# Patient Record
Sex: MALE | Race: WHITE | Employment: UNEMPLOYED | URBAN - METROPOLITAN AREA
[De-identification: names, ages, dates, MRNs, and addresses within clinical notes are randomized per-mention and may not be internally consistent; named-entity substitution may affect disease eponyms.]

---

## 2017-08-31 ENCOUNTER — OFFICE VISIT (OUTPATIENT)
Dept: FAMILY MEDICINE CLINIC | Age: 57
End: 2017-08-31

## 2017-08-31 VITALS
DIASTOLIC BLOOD PRESSURE: 82 MMHG | HEART RATE: 65 BPM | BODY MASS INDEX: 32.91 KG/M2 | RESPIRATION RATE: 18 BRPM | SYSTOLIC BLOOD PRESSURE: 126 MMHG | WEIGHT: 243 LBS | HEIGHT: 72 IN | OXYGEN SATURATION: 97 %

## 2017-08-31 DIAGNOSIS — Z23 NEED FOR INFLUENZA VACCINATION: ICD-10-CM

## 2017-08-31 DIAGNOSIS — Z80.42 FH: PROSTATE CANCER: ICD-10-CM

## 2017-08-31 DIAGNOSIS — F20.3 UNDIFFERENTIATED SCHIZOPHRENIA (HCC): Primary | ICD-10-CM

## 2017-08-31 DIAGNOSIS — E78.00 PURE HYPERCHOLESTEROLEMIA: ICD-10-CM

## 2017-08-31 DIAGNOSIS — I10 ESSENTIAL HYPERTENSION, BENIGN: ICD-10-CM

## 2017-08-31 DIAGNOSIS — R60.0 PEDAL EDEMA: ICD-10-CM

## 2017-08-31 LAB
A/G RATIO: 1.9 (ref 1.1–2.2)
ALBUMIN SERPL-MCNC: 4.3 G/DL (ref 3.4–5)
ALP BLD-CCNC: 68 U/L (ref 40–129)
ALT SERPL-CCNC: 34 U/L (ref 10–40)
ANION GAP SERPL CALCULATED.3IONS-SCNC: 16 MMOL/L (ref 3–16)
AST SERPL-CCNC: 39 U/L (ref 15–37)
BILIRUB SERPL-MCNC: 0.3 MG/DL (ref 0–1)
BUN BLDV-MCNC: 16 MG/DL (ref 7–20)
CALCIUM SERPL-MCNC: 9.3 MG/DL (ref 8.3–10.6)
CHLORIDE BLD-SCNC: 106 MMOL/L (ref 99–110)
CHOLESTEROL, TOTAL: 148 MG/DL (ref 0–199)
CO2: 22 MMOL/L (ref 21–32)
CREAT SERPL-MCNC: 0.8 MG/DL (ref 0.9–1.3)
GFR AFRICAN AMERICAN: >60
GFR NON-AFRICAN AMERICAN: >60
GLOBULIN: 2.3 G/DL
GLUCOSE BLD-MCNC: 100 MG/DL (ref 70–99)
HDLC SERPL-MCNC: 50 MG/DL (ref 40–60)
LDL CHOLESTEROL CALCULATED: 76 MG/DL
POTASSIUM SERPL-SCNC: 4.2 MMOL/L (ref 3.5–5.1)
PROSTATE SPECIFIC ANTIGEN: 0.57 NG/ML (ref 0–4)
SODIUM BLD-SCNC: 144 MMOL/L (ref 136–145)
TOTAL PROTEIN: 6.6 G/DL (ref 6.4–8.2)
TRIGL SERPL-MCNC: 112 MG/DL (ref 0–150)
VLDLC SERPL CALC-MCNC: 22 MG/DL

## 2017-08-31 PROCEDURE — 90471 IMMUNIZATION ADMIN: CPT | Performed by: FAMILY MEDICINE

## 2017-08-31 PROCEDURE — 99203 OFFICE O/P NEW LOW 30 MIN: CPT | Performed by: FAMILY MEDICINE

## 2017-08-31 PROCEDURE — 90686 IIV4 VACC NO PRSV 0.5 ML IM: CPT | Performed by: FAMILY MEDICINE

## 2017-08-31 RX ORDER — AMLODIPINE BESYLATE 5 MG/1
5 TABLET ORAL DAILY
COMMUNITY
End: 2017-08-31 | Stop reason: SDUPTHER

## 2017-08-31 RX ORDER — HALOPERIDOL 5 MG
5 TABLET ORAL 4 TIMES DAILY
COMMUNITY
End: 2017-09-14

## 2017-08-31 RX ORDER — AMLODIPINE BESYLATE 5 MG/1
5 TABLET ORAL DAILY
Qty: 30 TABLET | Refills: 5 | Status: SHIPPED | OUTPATIENT
Start: 2017-08-31 | End: 2017-09-14

## 2017-08-31 RX ORDER — SIMVASTATIN 20 MG
20 TABLET ORAL NIGHTLY
COMMUNITY
End: 2017-08-31 | Stop reason: SDUPTHER

## 2017-08-31 RX ORDER — BENZTROPINE MESYLATE 2 MG/1
2 TABLET ORAL 2 TIMES DAILY
COMMUNITY
End: 2017-09-14

## 2017-08-31 RX ORDER — TRIAMTERENE AND HYDROCHLOROTHIAZIDE 37.5; 25 MG/1; MG/1
1 CAPSULE ORAL DAILY PRN
Qty: 30 CAPSULE | Refills: 2 | Status: SHIPPED | OUTPATIENT
Start: 2017-08-31 | End: 2017-09-14

## 2017-08-31 RX ORDER — SIMVASTATIN 20 MG
20 TABLET ORAL NIGHTLY
Qty: 30 TABLET | Refills: 5 | Status: SHIPPED | OUTPATIENT
Start: 2017-08-31 | End: 2018-02-07

## 2017-08-31 RX ORDER — HYDROXYZINE 50 MG/1
50 TABLET, FILM COATED ORAL 3 TIMES DAILY PRN
COMMUNITY
End: 2017-09-14

## 2017-08-31 RX ORDER — DOXEPIN HYDROCHLORIDE 10 MG/1
10 CAPSULE ORAL NIGHTLY
COMMUNITY
End: 2017-09-14

## 2017-08-31 RX ORDER — BUSPIRONE HYDROCHLORIDE 10 MG/1
10 TABLET ORAL 2 TIMES DAILY
Qty: 60 TABLET | Refills: 5 | Status: ON HOLD | OUTPATIENT
Start: 2017-08-31 | End: 2017-10-04 | Stop reason: HOSPADM

## 2017-08-31 RX ORDER — ESCITALOPRAM OXALATE 20 MG/1
20 TABLET ORAL DAILY
COMMUNITY
End: 2017-09-14

## 2017-08-31 RX ORDER — TRAZODONE HYDROCHLORIDE 50 MG/1
50 TABLET ORAL NIGHTLY
COMMUNITY
End: 2017-09-14

## 2017-08-31 RX ORDER — BUSPIRONE HYDROCHLORIDE 10 MG/1
10 TABLET ORAL 2 TIMES DAILY
COMMUNITY
End: 2017-08-31 | Stop reason: SDUPTHER

## 2017-08-31 RX ORDER — CLONAZEPAM 1 MG/1
1 TABLET ORAL 2 TIMES DAILY PRN
COMMUNITY
End: 2017-09-14

## 2017-08-31 RX ORDER — FLUPHENAZINE HYDROCHLORIDE 10 MG/1
10 TABLET ORAL DAILY
COMMUNITY
End: 2017-09-14

## 2017-08-31 RX ORDER — DOCUSATE SODIUM 100 MG/1
100 CAPSULE, LIQUID FILLED ORAL 2 TIMES DAILY
COMMUNITY
End: 2017-09-14

## 2017-09-01 ENCOUNTER — TELEPHONE (OUTPATIENT)
Dept: FAMILY MEDICINE CLINIC | Age: 57
End: 2017-09-01

## 2017-09-01 PROBLEM — R74.8 ELEVATED LIVER ENZYMES: Status: ACTIVE | Noted: 2017-09-01

## 2017-09-01 NOTE — TELEPHONE ENCOUNTER
Someone claiming to be Hakeem's sister trying to access the [employer] information but the ss# was missing, she gave me #  which came up as Jakub Kern ss#. We need Dev ss#.  AIDEE

## 2017-09-14 ENCOUNTER — OFFICE VISIT (OUTPATIENT)
Dept: FAMILY MEDICINE CLINIC | Age: 57
End: 2017-09-14

## 2017-09-14 VITALS
OXYGEN SATURATION: 96 % | RESPIRATION RATE: 20 BRPM | BODY MASS INDEX: 32.25 KG/M2 | WEIGHT: 237.8 LBS | HEART RATE: 85 BPM | SYSTOLIC BLOOD PRESSURE: 112 MMHG | DIASTOLIC BLOOD PRESSURE: 78 MMHG | TEMPERATURE: 98.7 F

## 2017-09-14 DIAGNOSIS — F20.3 UNDIFFERENTIATED SCHIZOPHRENIA (HCC): ICD-10-CM

## 2017-09-14 DIAGNOSIS — R60.0 PEDAL EDEMA: ICD-10-CM

## 2017-09-14 DIAGNOSIS — I10 ESSENTIAL HYPERTENSION, BENIGN: Primary | ICD-10-CM

## 2017-09-14 DIAGNOSIS — E78.00 PURE HYPERCHOLESTEROLEMIA: ICD-10-CM

## 2017-09-14 PROCEDURE — 99214 OFFICE O/P EST MOD 30 MIN: CPT | Performed by: FAMILY MEDICINE

## 2017-09-14 RX ORDER — TRIAMTERENE AND HYDROCHLOROTHIAZIDE 37.5; 25 MG/1; MG/1
.5-1 TABLET ORAL DAILY PRN
Qty: 30 TABLET | Refills: 3 | Status: SHIPPED | OUTPATIENT
Start: 2017-09-14 | End: 2018-02-01

## 2017-10-02 ENCOUNTER — TELEPHONE (OUTPATIENT)
Dept: FAMILY MEDICINE CLINIC | Age: 57
End: 2017-10-02

## 2017-10-02 NOTE — TELEPHONE ENCOUNTER
COPIED FROM PT'S MOTHERS MIYA:    Gonzalo Valero  My son Antonieta Murrieta has his first psychiatrist appointment on October 5th at 3:30 PM with Dr. Ellen Quintana has not slept for two days and is in constant conversation with himself and extreme mood swings. I have increased the Buspirone to 1 1/2 pills twice a day but not helping.  Is there something I can give him to calm him so I can get him to [facility] in [place] without calling the ambulance for they will bring the police. [facility] is the pharmacy we use [phone].  P would like him to make it until Thursday but I don't think I can handle it without giving him something to relax. I can be reached at [phone] is my cell or my daughter in law 358Geovanna Abbasi or my son Tobin Pollack [phone].  [address] 9/17/2017  ER report is in pt chart.  Please see

## 2017-10-03 NOTE — TELEPHONE ENCOUNTER
I spoke with Musa Rivera. Unable to reach [place]. There is nothing that is needed presently. They are trying to get him to take medication, difficult at times. Musa Rivera said that they will keep you posted through [employer] of Hakeem's progress. FYI.

## 2017-10-04 RX ORDER — HYDROXYZINE HYDROCHLORIDE 25 MG/1
25 TABLET, FILM COATED ORAL EVERY 6 HOURS PRN
Qty: 20 TABLET | Refills: 2 | Status: SHIPPED | OUTPATIENT
Start: 2017-10-04 | End: 2017-10-14

## 2017-10-10 ENCOUNTER — TELEPHONE (OUTPATIENT)
Dept: FAMILY MEDICINE CLINIC | Age: 57
End: 2017-10-10

## 2017-10-10 DIAGNOSIS — F20.3 UNDIFFERENTIATED SCHIZOPHRENIA (HCC): Primary | ICD-10-CM

## 2018-02-01 ENCOUNTER — TELEPHONE (OUTPATIENT)
Dept: FAMILY MEDICINE CLINIC | Age: 58
End: 2018-02-01

## 2018-02-01 DIAGNOSIS — R60.0 PEDAL EDEMA: Primary | ICD-10-CM

## 2018-02-01 RX ORDER — FUROSEMIDE 20 MG/1
20 TABLET ORAL DAILY PRN
Qty: 30 TABLET | Refills: 2 | Status: SHIPPED | OUTPATIENT
Start: 2018-02-01 | End: 2018-03-14

## 2018-02-01 NOTE — TELEPHONE ENCOUNTER
Pt calling states he stopped the triam/ hctz yesterday because he thinks it was making him SOB for months and he saw on the Internet that this was a side effect. . Denies any CP. Pt states today he feels 100%better. Pt took his mother's lasix 20mg today and it helped and he felt fine on that and is asking if he can get an rx for this? Pt also states he stopped his simvistatin because it was making him feel bloated.  appt [facility]'ed for wed.2-7-18

## 2018-02-07 ENCOUNTER — OFFICE VISIT (OUTPATIENT)
Dept: FAMILY MEDICINE CLINIC | Age: 58
End: 2018-02-07

## 2018-02-07 VITALS
WEIGHT: 254.2 LBS | HEART RATE: 77 BPM | BODY MASS INDEX: 34.48 KG/M2 | RESPIRATION RATE: 16 BRPM | DIASTOLIC BLOOD PRESSURE: 66 MMHG | TEMPERATURE: 98 F | SYSTOLIC BLOOD PRESSURE: 110 MMHG | OXYGEN SATURATION: 98 %

## 2018-02-07 DIAGNOSIS — E78.00 PURE HYPERCHOLESTEROLEMIA: ICD-10-CM

## 2018-02-07 DIAGNOSIS — I10 ESSENTIAL HYPERTENSION, BENIGN: Primary | ICD-10-CM

## 2018-02-07 DIAGNOSIS — R60.0 PEDAL EDEMA: ICD-10-CM

## 2018-02-07 DIAGNOSIS — F20.3 UNDIFFERENTIATED SCHIZOPHRENIA (HCC): ICD-10-CM

## 2018-02-07 PROCEDURE — G8417 CALC BMI ABV UP PARAM F/U: HCPCS | Performed by: FAMILY MEDICINE

## 2018-02-07 PROCEDURE — 99214 OFFICE O/P EST MOD 30 MIN: CPT | Performed by: FAMILY MEDICINE

## 2018-02-07 PROCEDURE — 3017F COLORECTAL CA SCREEN DOC REV: CPT | Performed by: FAMILY MEDICINE

## 2018-02-07 PROCEDURE — G8427 DOCREV CUR MEDS BY ELIG CLIN: HCPCS | Performed by: FAMILY MEDICINE

## 2018-02-07 PROCEDURE — 1036F TOBACCO NON-USER: CPT | Performed by: FAMILY MEDICINE

## 2018-02-07 PROCEDURE — G8484 FLU IMMUNIZE NO ADMIN: HCPCS | Performed by: FAMILY MEDICINE

## 2018-02-07 RX ORDER — TRIAMTERENE AND HYDROCHLOROTHIAZIDE 37.5; 25 MG/1; MG/1
1 TABLET ORAL DAILY
Qty: 90 TABLET | Refills: 1 | Status: SHIPPED | OUTPATIENT
Start: 2018-02-07 | End: 2018-09-12 | Stop reason: ALTCHOICE

## 2018-02-07 RX ORDER — HYDROXYZINE HYDROCHLORIDE 25 MG/1
TABLET, FILM COATED ORAL
COMMUNITY
Start: 2017-12-07 | End: 2018-09-12 | Stop reason: SINTOL

## 2018-02-07 RX ORDER — ARIPIPRAZOLE 5 MG/1
TABLET ORAL
COMMUNITY
Start: 2017-12-19 | End: 2018-09-12

## 2018-02-07 RX ORDER — TRIAMTERENE AND HYDROCHLOROTHIAZIDE 37.5; 25 MG/1; MG/1
1 TABLET ORAL DAILY
COMMUNITY
End: 2018-02-07 | Stop reason: SDUPTHER

## 2018-02-07 RX ORDER — ALPRAZOLAM 0.5 MG/1
TABLET ORAL
COMMUNITY
Start: 2017-12-27 | End: 2018-09-12

## 2018-02-07 RX ORDER — EZETIMIBE 10 MG/1
10 TABLET ORAL DAILY
Qty: 90 TABLET | Refills: 1 | Status: SHIPPED | OUTPATIENT
Start: 2018-02-07 | End: 2018-09-12 | Stop reason: SINTOL

## 2018-02-07 NOTE — PROGRESS NOTES
triamterene-hydrochlorothiazide (MAXZIDE-25) 37.5-25 MG per tablet Take 1 tablet by mouth daily            Patient Active Problem List   Diagnosis    Undifferentiated schizophrenia ([facility]    Essential hypertension, benign    Pure hypercholesterolemia    Elevated liver enzymes    Pedal edema    Non compliance w medication regimen     PMH, PSH, SH, Problem list reviewed. Social History   Substance Use Topics    Smoking status: Former Smoker    Smokeless tobacco: Never Used    Alcohol use No      Allergies   Allergen Reactions    Klonopin [Clonazepam]         Review of Systems    Objective:   Physical Exam  .NAD    Skin is warm and dry. No rash. Well hydrated  Alert and oriented x 3. Speech is intense, focused on risk of medication. No agitation or psychomotor retardation. No hallucinations. Not suicidal.   The neck is supple and free of adenopathy or masses, the thyroid is normal without enlargement or nodules. Chest: clear with no wheezes or rales. No retractions, or use of accessory muscles noted. Cardiovascular: PMI is not displaced, and no thrill noted. Regular rate and rhythm with no rub, murmur or gallop.  trace peripheral edema. No carotid bruits. The abdomen is soft without tenderness, guarding, mass, rebound or organomegaly. Aorta, femoral, DP and PT pulses intact. Assessment:      1. Essential hypertension, benign  triamterene-hydrochlorothiazide (MAXZIDE-25) 37.5-25 MG per tablet  Well controlled   2. Pure hypercholesterolemia  ezetimibe (ZETIA) 10 MG tablet   3. Pedal edema  Continue triam/HCTZ . Add support hose. 4. Undifferentiated schizophrenia ([facility]  Continue with Dr. Lamon Brunner:      Side effects of current medications reviewed and questions answered. He wants to keep the appt with me in March.

## 2018-03-14 ENCOUNTER — OFFICE VISIT (OUTPATIENT)
Dept: FAMILY MEDICINE CLINIC | Age: 58
End: 2018-03-14

## 2018-03-14 VITALS
HEART RATE: 75 BPM | SYSTOLIC BLOOD PRESSURE: 114 MMHG | RESPIRATION RATE: 20 BRPM | TEMPERATURE: 99 F | WEIGHT: 252.8 LBS | DIASTOLIC BLOOD PRESSURE: 82 MMHG | OXYGEN SATURATION: 95 % | BODY MASS INDEX: 34.29 KG/M2

## 2018-03-14 DIAGNOSIS — Z91.14 NON COMPLIANCE W MEDICATION REGIMEN: ICD-10-CM

## 2018-03-14 DIAGNOSIS — Z80.42 FAMILY HISTORY OF PROSTATE CANCER: ICD-10-CM

## 2018-03-14 DIAGNOSIS — I10 ESSENTIAL HYPERTENSION, BENIGN: Primary | ICD-10-CM

## 2018-03-14 DIAGNOSIS — E78.00 PURE HYPERCHOLESTEROLEMIA: ICD-10-CM

## 2018-03-14 DIAGNOSIS — Z23 NEED FOR SHINGLES VACCINE: ICD-10-CM

## 2018-03-14 DIAGNOSIS — F20.3 UNDIFFERENTIATED SCHIZOPHRENIA (HCC): ICD-10-CM

## 2018-03-14 DIAGNOSIS — F42.4 SKIN PICKING HABIT: ICD-10-CM

## 2018-03-14 PROCEDURE — 1036F TOBACCO NON-USER: CPT | Performed by: FAMILY MEDICINE

## 2018-03-14 PROCEDURE — G8417 CALC BMI ABV UP PARAM F/U: HCPCS | Performed by: FAMILY MEDICINE

## 2018-03-14 PROCEDURE — 3017F COLORECTAL CA SCREEN DOC REV: CPT | Performed by: FAMILY MEDICINE

## 2018-03-14 PROCEDURE — G8427 DOCREV CUR MEDS BY ELIG CLIN: HCPCS | Performed by: FAMILY MEDICINE

## 2018-03-14 PROCEDURE — 99214 OFFICE O/P EST MOD 30 MIN: CPT | Performed by: FAMILY MEDICINE

## 2018-03-14 PROCEDURE — G8482 FLU IMMUNIZE ORDER/ADMIN: HCPCS | Performed by: FAMILY MEDICINE

## 2018-03-14 RX ORDER — DIAZEPAM 5 MG/1
TABLET ORAL
COMMUNITY
Start: 2018-02-13 | End: 2018-09-12 | Stop reason: ALTCHOICE

## 2018-03-19 DIAGNOSIS — E78.00 PURE HYPERCHOLESTEROLEMIA: ICD-10-CM

## 2018-03-19 DIAGNOSIS — Z80.42 FAMILY HISTORY OF PROSTATE CANCER: ICD-10-CM

## 2018-03-19 LAB
A/G RATIO: 2 (ref 1.1–2.2)
ALBUMIN SERPL-MCNC: 4.3 G/DL (ref 3.4–5)
ALP BLD-CCNC: 63 U/L (ref 40–129)
ALT SERPL-CCNC: 32 U/L (ref 10–40)
ANION GAP SERPL CALCULATED.3IONS-SCNC: 15 MMOL/L (ref 3–16)
AST SERPL-CCNC: 28 U/L (ref 15–37)
BILIRUB SERPL-MCNC: 0.3 MG/DL (ref 0–1)
BUN BLDV-MCNC: 13 MG/DL (ref 7–20)
CALCIUM SERPL-MCNC: 9 MG/DL (ref 8.3–10.6)
CHLORIDE BLD-SCNC: 103 MMOL/L (ref 99–110)
CHOLESTEROL, TOTAL: 215 MG/DL (ref 0–199)
CO2: 24 MMOL/L (ref 21–32)
CREAT SERPL-MCNC: 1 MG/DL (ref 0.9–1.3)
GFR AFRICAN AMERICAN: >60
GFR NON-AFRICAN AMERICAN: >60
GLOBULIN: 2.2 G/DL
GLUCOSE BLD-MCNC: 106 MG/DL (ref 70–99)
HDLC SERPL-MCNC: 46 MG/DL (ref 40–60)
LDL CHOLESTEROL CALCULATED: 127 MG/DL
POTASSIUM SERPL-SCNC: 4.4 MMOL/L (ref 3.5–5.1)
PROSTATE SPECIFIC ANTIGEN: 1 NG/ML (ref 0–4)
SODIUM BLD-SCNC: 142 MMOL/L (ref 136–145)
TOTAL PROTEIN: 6.5 G/DL (ref 6.4–8.2)
TRIGL SERPL-MCNC: 212 MG/DL (ref 0–150)
TSH SERPL DL<=0.05 MIU/L-ACNC: 1.74 UIU/ML (ref 0.27–4.2)
VLDLC SERPL CALC-MCNC: 42 MG/DL

## 2018-05-02 ENCOUNTER — OFFICE VISIT (OUTPATIENT)
Dept: FAMILY MEDICINE CLINIC | Age: 58
End: 2018-05-02

## 2018-05-02 VITALS
WEIGHT: 255.8 LBS | HEIGHT: 73 IN | BODY MASS INDEX: 33.9 KG/M2 | HEART RATE: 76 BPM | DIASTOLIC BLOOD PRESSURE: 78 MMHG | RESPIRATION RATE: 20 BRPM | OXYGEN SATURATION: 95 % | SYSTOLIC BLOOD PRESSURE: 122 MMHG | TEMPERATURE: 98.2 F

## 2018-05-02 DIAGNOSIS — I10 ESSENTIAL HYPERTENSION, BENIGN: ICD-10-CM

## 2018-05-02 DIAGNOSIS — E78.00 PURE HYPERCHOLESTEROLEMIA: Primary | ICD-10-CM

## 2018-05-02 DIAGNOSIS — F20.3 UNDIFFERENTIATED SCHIZOPHRENIA (HCC): ICD-10-CM

## 2018-05-02 DIAGNOSIS — Z11.59 ENCOUNTER FOR HEPATITIS C SCREENING TEST FOR LOW RISK PATIENT: ICD-10-CM

## 2018-05-02 PROCEDURE — G8417 CALC BMI ABV UP PARAM F/U: HCPCS | Performed by: FAMILY MEDICINE

## 2018-05-02 PROCEDURE — 99214 OFFICE O/P EST MOD 30 MIN: CPT | Performed by: FAMILY MEDICINE

## 2018-05-02 PROCEDURE — G8427 DOCREV CUR MEDS BY ELIG CLIN: HCPCS | Performed by: FAMILY MEDICINE

## 2018-05-02 PROCEDURE — 1036F TOBACCO NON-USER: CPT | Performed by: FAMILY MEDICINE

## 2018-05-02 PROCEDURE — 3017F COLORECTAL CA SCREEN DOC REV: CPT | Performed by: FAMILY MEDICINE

## 2018-06-01 DIAGNOSIS — E78.00 PURE HYPERCHOLESTEROLEMIA: ICD-10-CM

## 2018-06-01 DIAGNOSIS — Z11.59 ENCOUNTER FOR HEPATITIS C SCREENING TEST FOR LOW RISK PATIENT: ICD-10-CM

## 2018-06-01 LAB
CHOLESTEROL, TOTAL: 255 MG/DL (ref 0–199)
HDLC SERPL-MCNC: 50 MG/DL (ref 40–60)
LDL CHOLESTEROL CALCULATED: 170 MG/DL
TRIGL SERPL-MCNC: 177 MG/DL (ref 0–150)
VLDLC SERPL CALC-MCNC: 35 MG/DL

## 2018-06-02 LAB — HEPATITIS C ANTIBODY INTERPRETATION: NORMAL

## 2018-06-06 ENCOUNTER — TELEPHONE (OUTPATIENT)
Dept: FAMILY MEDICINE CLINIC | Age: 58
End: 2018-06-06

## 2018-06-06 DIAGNOSIS — E78.00 PURE HYPERCHOLESTEROLEMIA: ICD-10-CM

## 2018-06-06 DIAGNOSIS — E78.00 ELEVATED CHOLESTEROL: Primary | ICD-10-CM

## 2018-06-08 RX ORDER — LOVASTATIN 10 MG/1
10 TABLET ORAL NIGHTLY
Qty: 30 TABLET | Refills: 2 | Status: SHIPPED | OUTPATIENT
Start: 2018-06-08 | End: 2018-09-12 | Stop reason: SINTOL

## 2018-07-13 ENCOUNTER — OFFICE VISIT (OUTPATIENT)
Dept: FAMILY MEDICINE CLINIC | Age: 58
End: 2018-07-13

## 2018-07-13 VITALS
SYSTOLIC BLOOD PRESSURE: 120 MMHG | OXYGEN SATURATION: 94 % | DIASTOLIC BLOOD PRESSURE: 80 MMHG | RESPIRATION RATE: 22 BRPM | HEART RATE: 85 BPM | HEIGHT: 73 IN | WEIGHT: 260 LBS | BODY MASS INDEX: 34.46 KG/M2

## 2018-07-13 DIAGNOSIS — R74.8 ELEVATED LIVER ENZYMES: ICD-10-CM

## 2018-07-13 DIAGNOSIS — E78.00 PURE HYPERCHOLESTEROLEMIA: ICD-10-CM

## 2018-07-13 DIAGNOSIS — F20.3 UNDIFFERENTIATED SCHIZOPHRENIA (HCC): ICD-10-CM

## 2018-07-13 DIAGNOSIS — I10 ESSENTIAL HYPERTENSION, BENIGN: Primary | ICD-10-CM

## 2018-07-13 PROCEDURE — G8417 CALC BMI ABV UP PARAM F/U: HCPCS | Performed by: FAMILY MEDICINE

## 2018-07-13 PROCEDURE — 3017F COLORECTAL CA SCREEN DOC REV: CPT | Performed by: FAMILY MEDICINE

## 2018-07-13 PROCEDURE — G8427 DOCREV CUR MEDS BY ELIG CLIN: HCPCS | Performed by: FAMILY MEDICINE

## 2018-07-13 PROCEDURE — 99214 OFFICE O/P EST MOD 30 MIN: CPT | Performed by: FAMILY MEDICINE

## 2018-07-13 PROCEDURE — 1036F TOBACCO NON-USER: CPT | Performed by: FAMILY MEDICINE

## 2018-07-13 RX ORDER — MAGNESIUM OXIDE 400 MG/1
400 TABLET ORAL DAILY
COMMUNITY
End: 2019-01-28

## 2018-07-13 RX ORDER — MULTIVIT WITH MINERALS/LUTEIN
250 TABLET ORAL DAILY
COMMUNITY
End: 2019-12-06

## 2018-07-13 RX ORDER — VITAMIN E 268 MG
400 CAPSULE ORAL DAILY
COMMUNITY
End: 2019-06-05

## 2018-07-13 RX ORDER — VITAMIN B COMPLEX
1 CAPSULE ORAL DAILY
COMMUNITY
End: 2019-06-05

## 2018-07-13 RX ORDER — ROSUVASTATIN CALCIUM 5 MG/1
5 TABLET, COATED ORAL NIGHTLY
Qty: 30 TABLET | Refills: 3 | Status: SHIPPED | OUTPATIENT
Start: 2018-07-13 | End: 2018-09-12 | Stop reason: SINTOL

## 2018-07-13 ASSESSMENT — PATIENT HEALTH QUESTIONNAIRE - PHQ9
1. LITTLE INTEREST OR PLEASURE IN DOING THINGS: 0
2. FEELING DOWN, DEPRESSED OR HOPELESS: 0
SUM OF ALL RESPONSES TO PHQ9 QUESTIONS 1 & 2: 0
SUM OF ALL RESPONSES TO PHQ QUESTIONS 1-9: 0

## 2018-09-11 PROBLEM — R74.8 ELEVATED LIVER ENZYMES: Status: RESOLVED | Noted: 2017-09-01 | Resolved: 2018-09-11

## 2018-09-12 ENCOUNTER — OFFICE VISIT (OUTPATIENT)
Dept: FAMILY MEDICINE CLINIC | Age: 58
End: 2018-09-12

## 2018-09-12 VITALS
SYSTOLIC BLOOD PRESSURE: 118 MMHG | OXYGEN SATURATION: 95 % | DIASTOLIC BLOOD PRESSURE: 88 MMHG | BODY MASS INDEX: 34.33 KG/M2 | HEART RATE: 82 BPM | WEIGHT: 259 LBS | HEIGHT: 73 IN

## 2018-09-12 DIAGNOSIS — E78.00 PURE HYPERCHOLESTEROLEMIA: ICD-10-CM

## 2018-09-12 DIAGNOSIS — Z23 NEED FOR INFLUENZA VACCINATION: ICD-10-CM

## 2018-09-12 DIAGNOSIS — F20.3 UNDIFFERENTIATED SCHIZOPHRENIA (HCC): ICD-10-CM

## 2018-09-12 DIAGNOSIS — I10 ESSENTIAL HYPERTENSION, BENIGN: Primary | ICD-10-CM

## 2018-09-12 PROCEDURE — 90471 IMMUNIZATION ADMIN: CPT | Performed by: FAMILY MEDICINE

## 2018-09-12 PROCEDURE — G8427 DOCREV CUR MEDS BY ELIG CLIN: HCPCS | Performed by: FAMILY MEDICINE

## 2018-09-12 PROCEDURE — G8417 CALC BMI ABV UP PARAM F/U: HCPCS | Performed by: FAMILY MEDICINE

## 2018-09-12 PROCEDURE — 90686 IIV4 VACC NO PRSV 0.5 ML IM: CPT | Performed by: FAMILY MEDICINE

## 2018-09-12 PROCEDURE — 99214 OFFICE O/P EST MOD 30 MIN: CPT | Performed by: FAMILY MEDICINE

## 2018-09-12 PROCEDURE — 1036F TOBACCO NON-USER: CPT | Performed by: FAMILY MEDICINE

## 2018-09-12 PROCEDURE — 3017F COLORECTAL CA SCREEN DOC REV: CPT | Performed by: FAMILY MEDICINE

## 2018-09-12 RX ORDER — ARIPIPRAZOLE 5 MG/1
5 TABLET ORAL DAILY
Qty: 30 TABLET | Refills: 5 | Status: SHIPPED | OUTPATIENT
Start: 2018-09-12 | End: 2019-03-22 | Stop reason: SINTOL

## 2018-09-12 RX ORDER — DIPHENHYDRAMINE HCL 25 MG
100 CAPSULE ORAL EVERY 6 HOURS PRN
COMMUNITY
End: 2019-05-02 | Stop reason: SDUPTHER

## 2018-09-12 RX ORDER — CHLORAL HYDRATE 500 MG
3000 CAPSULE ORAL DAILY
COMMUNITY
End: 2019-01-28

## 2018-09-12 NOTE — PROGRESS NOTES
Vaccine Information Sheet, \"Influenza - Inactivated\"  given to Urszula Berrios, or parent/legal guardian of  Urszula Berrios and verbalized understanding. Patient responses:    Have you ever had a reaction to a flu vaccine? No  Are you able to eat eggs without adverse effects? Yes  Do you have any current illness? No  Have you ever had Guillian [place] Syndrome? No    Flu vaccine given per order. Please see immunization tab.

## 2018-09-25 ENCOUNTER — TELEPHONE (OUTPATIENT)
Dept: FAMILY MEDICINE CLINIC | Age: 58
End: 2018-09-25

## 2018-09-25 DIAGNOSIS — E78.00 PURE HYPERCHOLESTEROLEMIA: ICD-10-CM

## 2018-09-25 RX ORDER — ROSUVASTATIN CALCIUM 5 MG/1
5 TABLET, COATED ORAL NIGHTLY
Qty: 30 TABLET | Refills: 2 | Status: SHIPPED | OUTPATIENT
Start: 2018-09-25 | End: 2019-01-28

## 2019-01-28 ENCOUNTER — OFFICE VISIT (OUTPATIENT)
Dept: FAMILY MEDICINE CLINIC | Age: 59
End: 2019-01-28
Payer: COMMERCIAL

## 2019-01-28 VITALS
HEART RATE: 87 BPM | SYSTOLIC BLOOD PRESSURE: 124 MMHG | OXYGEN SATURATION: 94 % | TEMPERATURE: 97.3 F | RESPIRATION RATE: 12 BRPM | BODY MASS INDEX: 34.2 KG/M2 | DIASTOLIC BLOOD PRESSURE: 80 MMHG | WEIGHT: 259.2 LBS

## 2019-01-28 DIAGNOSIS — F20.3 UNDIFFERENTIATED SCHIZOPHRENIA (HCC): ICD-10-CM

## 2019-01-28 DIAGNOSIS — I10 ESSENTIAL HYPERTENSION, BENIGN: ICD-10-CM

## 2019-01-28 DIAGNOSIS — E78.00 PURE HYPERCHOLESTEROLEMIA: Primary | ICD-10-CM

## 2019-01-28 PROCEDURE — 3017F COLORECTAL CA SCREEN DOC REV: CPT | Performed by: FAMILY MEDICINE

## 2019-01-28 PROCEDURE — G8417 CALC BMI ABV UP PARAM F/U: HCPCS | Performed by: FAMILY MEDICINE

## 2019-01-28 PROCEDURE — 99214 OFFICE O/P EST MOD 30 MIN: CPT | Performed by: FAMILY MEDICINE

## 2019-01-28 PROCEDURE — G8482 FLU IMMUNIZE ORDER/ADMIN: HCPCS | Performed by: FAMILY MEDICINE

## 2019-01-28 PROCEDURE — 1036F TOBACCO NON-USER: CPT | Performed by: FAMILY MEDICINE

## 2019-01-28 PROCEDURE — G8427 DOCREV CUR MEDS BY ELIG CLIN: HCPCS | Performed by: FAMILY MEDICINE

## 2019-02-27 DIAGNOSIS — E78.00 PURE HYPERCHOLESTEROLEMIA: ICD-10-CM

## 2019-02-27 LAB
A/G RATIO: 2.2 (ref 1.1–2.2)
ALBUMIN SERPL-MCNC: 4.3 G/DL (ref 3.4–5)
ALP BLD-CCNC: 83 U/L (ref 40–129)
ALT SERPL-CCNC: 27 U/L (ref 10–40)
ANION GAP SERPL CALCULATED.3IONS-SCNC: 12 MMOL/L (ref 3–16)
AST SERPL-CCNC: 22 U/L (ref 15–37)
BILIRUB SERPL-MCNC: 0.4 MG/DL (ref 0–1)
BUN BLDV-MCNC: 13 MG/DL (ref 7–20)
CALCIUM SERPL-MCNC: 9.4 MG/DL (ref 8.3–10.6)
CHLORIDE BLD-SCNC: 107 MMOL/L (ref 99–110)
CHOLESTEROL, TOTAL: 193 MG/DL (ref 0–199)
CO2: 24 MMOL/L (ref 21–32)
CREAT SERPL-MCNC: 1 MG/DL (ref 0.8–1.3)
GFR AFRICAN AMERICAN: >60
GFR NON-AFRICAN AMERICAN: >60
GLOBULIN: 2 G/DL
GLUCOSE BLD-MCNC: 92 MG/DL (ref 70–99)
HDLC SERPL-MCNC: 46 MG/DL (ref 40–60)
LDL CHOLESTEROL CALCULATED: 111 MG/DL
POTASSIUM SERPL-SCNC: 4.2 MMOL/L (ref 3.5–5.1)
SODIUM BLD-SCNC: 143 MMOL/L (ref 136–145)
TOTAL PROTEIN: 6.3 G/DL (ref 6.4–8.2)
TRIGL SERPL-MCNC: 181 MG/DL (ref 0–150)
TSH REFLEX: 2.13 UIU/ML (ref 0.27–4.2)
VLDLC SERPL CALC-MCNC: 36 MG/DL

## 2019-03-06 ENCOUNTER — OFFICE VISIT (OUTPATIENT)
Dept: FAMILY MEDICINE CLINIC | Age: 59
End: 2019-03-06
Payer: COMMERCIAL

## 2019-03-06 VITALS
BODY MASS INDEX: 34.12 KG/M2 | HEART RATE: 75 BPM | OXYGEN SATURATION: 97 % | RESPIRATION RATE: 12 BRPM | SYSTOLIC BLOOD PRESSURE: 122 MMHG | DIASTOLIC BLOOD PRESSURE: 78 MMHG | WEIGHT: 258.6 LBS

## 2019-03-06 DIAGNOSIS — I10 ESSENTIAL HYPERTENSION, BENIGN: Primary | ICD-10-CM

## 2019-03-06 DIAGNOSIS — J40 BRONCHITIS: ICD-10-CM

## 2019-03-06 DIAGNOSIS — F20.3 UNDIFFERENTIATED SCHIZOPHRENIA (HCC): ICD-10-CM

## 2019-03-06 DIAGNOSIS — E78.00 PURE HYPERCHOLESTEROLEMIA: ICD-10-CM

## 2019-03-06 PROCEDURE — G8417 CALC BMI ABV UP PARAM F/U: HCPCS | Performed by: FAMILY MEDICINE

## 2019-03-06 PROCEDURE — 1036F TOBACCO NON-USER: CPT | Performed by: FAMILY MEDICINE

## 2019-03-06 PROCEDURE — 3017F COLORECTAL CA SCREEN DOC REV: CPT | Performed by: FAMILY MEDICINE

## 2019-03-06 PROCEDURE — G8427 DOCREV CUR MEDS BY ELIG CLIN: HCPCS | Performed by: FAMILY MEDICINE

## 2019-03-06 PROCEDURE — G8482 FLU IMMUNIZE ORDER/ADMIN: HCPCS | Performed by: FAMILY MEDICINE

## 2019-03-06 PROCEDURE — 99214 OFFICE O/P EST MOD 30 MIN: CPT | Performed by: FAMILY MEDICINE

## 2019-03-06 RX ORDER — DOXYCYCLINE HYCLATE 100 MG/1
100 CAPSULE ORAL 2 TIMES DAILY
COMMUNITY
End: 2019-03-22 | Stop reason: ALTCHOICE

## 2019-03-06 RX ORDER — AZITHROMYCIN 250 MG/1
TABLET, FILM COATED ORAL
Qty: 1 PACKET | Refills: 0 | Status: SHIPPED | OUTPATIENT
Start: 2019-03-06 | End: 2019-03-16

## 2019-03-22 ENCOUNTER — OFFICE VISIT (OUTPATIENT)
Dept: FAMILY MEDICINE CLINIC | Age: 59
End: 2019-03-22
Payer: COMMERCIAL

## 2019-03-22 VITALS
OXYGEN SATURATION: 97 % | HEIGHT: 73 IN | HEART RATE: 97 BPM | TEMPERATURE: 98.3 F | DIASTOLIC BLOOD PRESSURE: 82 MMHG | RESPIRATION RATE: 20 BRPM | SYSTOLIC BLOOD PRESSURE: 128 MMHG | BODY MASS INDEX: 35.04 KG/M2 | WEIGHT: 264.4 LBS

## 2019-03-22 DIAGNOSIS — R09.81 NASAL CONGESTION: Primary | ICD-10-CM

## 2019-03-22 DIAGNOSIS — R09.89 GLOBUS SENSATION: ICD-10-CM

## 2019-03-22 PROCEDURE — 3017F COLORECTAL CA SCREEN DOC REV: CPT | Performed by: FAMILY MEDICINE

## 2019-03-22 PROCEDURE — G8417 CALC BMI ABV UP PARAM F/U: HCPCS | Performed by: FAMILY MEDICINE

## 2019-03-22 PROCEDURE — 1036F TOBACCO NON-USER: CPT | Performed by: FAMILY MEDICINE

## 2019-03-22 PROCEDURE — G8482 FLU IMMUNIZE ORDER/ADMIN: HCPCS | Performed by: FAMILY MEDICINE

## 2019-03-22 PROCEDURE — G8427 DOCREV CUR MEDS BY ELIG CLIN: HCPCS | Performed by: FAMILY MEDICINE

## 2019-03-22 PROCEDURE — 99213 OFFICE O/P EST LOW 20 MIN: CPT | Performed by: FAMILY MEDICINE

## 2019-05-02 ENCOUNTER — TELEPHONE (OUTPATIENT)
Dept: FAMILY MEDICINE CLINIC | Age: 59
End: 2019-05-02

## 2019-05-02 RX ORDER — DIPHENHYDRAMINE HCL 25 MG
50 CAPSULE ORAL EVERY 6 HOURS PRN
Qty: 60 CAPSULE | Refills: 2 | Status: SHIPPED | OUTPATIENT
Start: 2019-05-02 | End: 2019-07-10 | Stop reason: SDUPTHER

## 2019-05-02 NOTE — TELEPHONE ENCOUNTER
Patient requesting refill for his tardive dyskinesia. Please advise. Thanks.     diphenhydrAMINE (BENADRYL) 25 MG capsule [[id number]]     Order Details   Dose: 100 mg Route: Oral Frequency: EVERY 6 HOURS PRN for Itching   Dispense Quantity: -- Refills: -- Fills remaining: --           Sig: Take 100 mg by mouth every 6 hours as needed for Itching          Written Date: -- Expiration Date: -- Ordering Date: 09/12/18    Start Date: -- End Date: --     Ordering Provider:  -- Authorizing Provider:  Perry Mueller MD Ordering User:  Adam Espinoza MA             Pharmacy:  [address] [phone] - F [phone]      Pharmacy Comments:  --          Fill quantity remaining:  --

## 2019-06-04 NOTE — PROGRESS NOTES
Subjective:      Patient ID: Lorrie Arshad 62 y.o. male. The primary encounter diagnosis was Essential hypertension, benign. Diagnoses of Pure hypercholesterolemia and Family history of prostate cancer were also pertinent to this visit. DIAZ Verduzco continues to get Ability from his mom without his knowledge per her agreement with psychiatry. He is not longer seeing psychiatry. His mom reports good results with current medication. If she stops giving him Ativan he becomes irrational, angry and she is unable to manage his emotions. She also gives him Valium 5 mg if he is very agitated, started by psych as well. (DR. Griselda Osorio). 30 tabs of Valium have lasted her a year. He is very helpful at home, pays the bills, does the taxes. No delusions, hallucinations. Sleeps well. He continues to complain of tardive dyskinesia - abnormal mouth movements. He took Cogentin in the past; it did not help. Benadryl helps minimally. He sees a psychologist weekly, Dr. Celia Jasmine. He is going to the gym 3 times a week. He complains of a globus sensation. No dyspnea, cough wheeze. He wonders if this is the statin. Hypertension: Patient here for follow-up of elevated blood pressure. He is exercising and is adherent to low salt diet. Blood pressure is not testing at home. Cardiac symptoms none. Patient denies chest pain, chest pressure/discomfort, exertional chest pressure/discomfort, lower extremity edema, near-syncope, orthopnea, palpitations and paroxysmal nocturnal dyspnea. Cardiovascular risk factors: advanced age (older than 54 for men, 72 for women), dyslipidemia and obesity (BMI >= 30 kg/m2). Use of agents associated with hypertension: none. History of target organ damage: none. Hyperlipidemia:  No new myalgias or GI upset on Livalo. Medication compliance: compliant most of the time. Patient is  following a low fat, low cholesterol diet. He is  exercising regularly.      Lab Results Component Value Date    CHOL 193 02/27/2019    TRIG 181 (H) 02/27/2019    HDL 46 02/27/2019    LDLCALC 111 (H) 02/27/2019     Lab Results   Component Value Date    ALT 27 02/27/2019    AST 22 02/27/2019        Lab Results   Component Value Date     02/27/2019    K 4.2 02/27/2019     02/27/2019    CO2 24 02/27/2019    BUN 13 02/27/2019    CREATININE 1.0 02/27/2019    GLUCOSE 92 02/27/2019    CALCIUM 9.4 02/27/2019      Lab Results   Component Value Date    PSA 1.00 03/19/2018    PSA 0.57 08/31/2017          Outpatient Medications Marked as Taking for the 6/5/19 encounter (Office Visit) with Ag Wren MD   Medication Sig Dispense Refill           diphenhydrAMINE (BENADRYL) 25 MG capsule Take 2 capsules by mouth every 6 hours as needed for Itching 60 capsule 2    pitavastatin (LIVALO) 1 MG TABS tablet Take 1 tablet by mouth nightly 30 tablet 5    calcium-cholecalciferol (CALCIUM 500+D) 500-200 MG-UNIT per tablet Take 1 tablet by mouth daily      Ascorbic Acid (VITAMIN C) 250 MG tablet Take 250 mg by mouth daily          Allergies   Allergen Reactions    Clozaril [Clozapine]     Crestor [Rosuvastatin]     Invega [Paliperidone Er] Other (See Comments)    Klonopin [Clonazepam]     Lipitor [Atorvastatin]      Increased urination    Mevacor [Lovastatin]     Simvastatin        Patient Active Problem List   Diagnosis    Undifferentiated schizophrenia ([facility]    Essential hypertension, benign    Pure hypercholesterolemia    Pedal edema    Non compliance w medication regimen    Family history of prostate cancer       Past Medical History:   Diagnosis Date    Elevated liver enzymes 9/1/2017    Hypertension     Pure hypercholesterolemia 8/31/2017       Past Surgical History:   Procedure Laterality Date    NOSE SURGERY      deviated septum        Family History   Problem Relation Age of Onset    Hypertension Mother     Heart Failure Mother 80    Prostate Cancer Father 52       Social of current medications reviewed and questions answered. Follow up in 4 weeks or prn.

## 2019-06-05 ENCOUNTER — OFFICE VISIT (OUTPATIENT)
Dept: FAMILY MEDICINE CLINIC | Age: 59
End: 2019-06-05
Payer: COMMERCIAL

## 2019-06-05 VITALS
DIASTOLIC BLOOD PRESSURE: 84 MMHG | BODY MASS INDEX: 33.91 KG/M2 | OXYGEN SATURATION: 96 % | RESPIRATION RATE: 16 BRPM | SYSTOLIC BLOOD PRESSURE: 124 MMHG | HEART RATE: 72 BPM | WEIGHT: 257 LBS

## 2019-06-05 DIAGNOSIS — Z80.42 FAMILY HISTORY OF PROSTATE CANCER: ICD-10-CM

## 2019-06-05 DIAGNOSIS — G24.01 TARDIVE DYSKINESIA: ICD-10-CM

## 2019-06-05 DIAGNOSIS — F20.3 UNDIFFERENTIATED SCHIZOPHRENIA (HCC): ICD-10-CM

## 2019-06-05 DIAGNOSIS — E78.00 PURE HYPERCHOLESTEROLEMIA: ICD-10-CM

## 2019-06-05 DIAGNOSIS — I10 ESSENTIAL HYPERTENSION, BENIGN: ICD-10-CM

## 2019-06-05 DIAGNOSIS — I10 ESSENTIAL HYPERTENSION, BENIGN: Primary | ICD-10-CM

## 2019-06-05 LAB
A/G RATIO: 2 (ref 1.1–2.2)
ALBUMIN SERPL-MCNC: 4.5 G/DL (ref 3.4–5)
ALP BLD-CCNC: 99 U/L (ref 40–129)
ALT SERPL-CCNC: 28 U/L (ref 10–40)
ANION GAP SERPL CALCULATED.3IONS-SCNC: 15 MMOL/L (ref 3–16)
AST SERPL-CCNC: 19 U/L (ref 15–37)
BILIRUB SERPL-MCNC: 0.3 MG/DL (ref 0–1)
BUN BLDV-MCNC: 13 MG/DL (ref 7–20)
CALCIUM SERPL-MCNC: 10.3 MG/DL (ref 8.3–10.6)
CHLORIDE BLD-SCNC: 105 MMOL/L (ref 99–110)
CHOLESTEROL, TOTAL: 209 MG/DL (ref 0–199)
CO2: 23 MMOL/L (ref 21–32)
CREAT SERPL-MCNC: 1 MG/DL (ref 0.8–1.3)
GFR AFRICAN AMERICAN: >60
GFR NON-AFRICAN AMERICAN: >60
GLOBULIN: 2.3 G/DL
GLUCOSE BLD-MCNC: 93 MG/DL (ref 70–99)
HDLC SERPL-MCNC: 45 MG/DL (ref 40–60)
LDL CHOLESTEROL CALCULATED: 116 MG/DL
POTASSIUM SERPL-SCNC: 4.5 MMOL/L (ref 3.5–5.1)
PROSTATE SPECIFIC ANTIGEN: 1.25 NG/ML (ref 0–4)
SODIUM BLD-SCNC: 143 MMOL/L (ref 136–145)
TOTAL PROTEIN: 6.8 G/DL (ref 6.4–8.2)
TRIGL SERPL-MCNC: 239 MG/DL (ref 0–150)
TSH REFLEX: 1.52 UIU/ML (ref 0.27–4.2)
VLDLC SERPL CALC-MCNC: 48 MG/DL

## 2019-06-05 PROCEDURE — 1036F TOBACCO NON-USER: CPT | Performed by: FAMILY MEDICINE

## 2019-06-05 PROCEDURE — 3017F COLORECTAL CA SCREEN DOC REV: CPT | Performed by: FAMILY MEDICINE

## 2019-06-05 PROCEDURE — G8427 DOCREV CUR MEDS BY ELIG CLIN: HCPCS | Performed by: FAMILY MEDICINE

## 2019-06-05 PROCEDURE — G8417 CALC BMI ABV UP PARAM F/U: HCPCS | Performed by: FAMILY MEDICINE

## 2019-06-05 PROCEDURE — 99214 OFFICE O/P EST MOD 30 MIN: CPT | Performed by: FAMILY MEDICINE

## 2019-06-05 RX ORDER — ARIPIPRAZOLE 5 MG/1
5 TABLET ORAL DAILY
Qty: 30 TABLET | Refills: 5 | Status: SHIPPED | OUTPATIENT
Start: 2019-06-05 | End: 2019-09-04 | Stop reason: SDUPTHER

## 2019-06-05 RX ORDER — DIAZEPAM 5 MG/1
5 TABLET ORAL EVERY 12 HOURS PRN
Qty: 30 TABLET | Refills: 0 | Status: SHIPPED | OUTPATIENT
Start: 2019-06-05 | End: 2019-07-10

## 2019-06-05 ASSESSMENT — PATIENT HEALTH QUESTIONNAIRE - PHQ9
2. FEELING DOWN, DEPRESSED OR HOPELESS: 0
SUM OF ALL RESPONSES TO PHQ QUESTIONS 1-9: 0
1. LITTLE INTEREST OR PLEASURE IN DOING THINGS: 0
SUM OF ALL RESPONSES TO PHQ9 QUESTIONS 1 & 2: 0
SUM OF ALL RESPONSES TO PHQ QUESTIONS 1-9: 0

## 2019-06-11 ENCOUNTER — TELEPHONE (OUTPATIENT)
Dept: PAIN MANAGEMENT | Age: 59
End: 2019-06-11

## 2019-06-11 NOTE — TELEPHONE ENCOUNTER
Submitted PA for Ingrezza 40MG capsules, Key: [id number] - PA Case ID: [id number] - Rx #: [id number]  Via CMM STATUS: PENDING

## 2019-06-12 NOTE — TELEPHONE ENCOUNTER
Received DENIAL for Ingrezza 40MG capsules. Denial letter attached. Please advise patient. Thank you.

## 2019-07-07 NOTE — PROGRESS NOTES
Outpatient Medications Marked as Taking for the 7/10/19 encounter (Office Visit) with Divya Calderon MD   Medication Sig Dispense Refill    Omega-3 Fatty Acids (FISH OIL) 1000 MG CAPS Take 1,000 mg by mouth daily      B Complex Vitamins (VITAMIN B COMPLEX PO) Take 1 capsule by mouth daily      diphenhydrAMINE (BENADRYL) 25 MG capsule Take 2 capsules by mouth every 6 hours as needed for Itching 120 capsule 2    pitavastatin (LIVALO) 1 MG TABS tablet Take 1 tablets by mouth nightly 45 tablet 1    calcium-cholecalciferol (CALCIUM 500+D) 500-200 MG-UNIT per tablet Take 1 tablet by mouth daily      Ascorbic Acid (VITAMIN C) 250 MG tablet Take 250 mg by mouth daily          Allergies   Allergen Reactions    Clozaril [Clozapine]     Crestor [Rosuvastatin]     Invega [Paliperidone Er] Other (See Comments)    Klonopin [Clonazepam]     Lipitor [Atorvastatin]      Increased urination    Mevacor [Lovastatin]     Simvastatin        Patient Active Problem List   Diagnosis    Undifferentiated schizophrenia ([facility]    Essential hypertension, benign    Pure hypercholesterolemia    Pedal edema    Non compliance w medication regimen    Family history of prostate cancer       Past Medical History:   Diagnosis Date    Elevated liver enzymes 2017    Hypertension     Pure hypercholesterolemia 2017       Past Surgical History:   Procedure Laterality Date    NOSE SURGERY      deviated septum        Family History   Problem Relation Age of Onset    Hypertension Mother     Heart Failure Mother 80    Prostate Cancer Father 52       Social History     Tobacco Use    Smoking status: Former Smoker     Packs/day: 0.10     Years: 4.00     Pack years: 0.40     Types: Cigarettes     Last attempt to quit: 1980     Years since quittin.5    Smokeless tobacco: Never Used    Tobacco comment: 1 pk per year - social smoker, didn't smoke much at all    Substance Use Topics    Alcohol use: No    Drug

## 2019-07-10 ENCOUNTER — OFFICE VISIT (OUTPATIENT)
Dept: FAMILY MEDICINE CLINIC | Age: 59
End: 2019-07-10
Payer: COMMERCIAL

## 2019-07-10 VITALS
DIASTOLIC BLOOD PRESSURE: 68 MMHG | SYSTOLIC BLOOD PRESSURE: 122 MMHG | HEIGHT: 73 IN | TEMPERATURE: 98.8 F | WEIGHT: 257 LBS | RESPIRATION RATE: 16 BRPM | HEART RATE: 86 BPM | OXYGEN SATURATION: 95 % | BODY MASS INDEX: 34.06 KG/M2

## 2019-07-10 DIAGNOSIS — G24.01 TARDIVE DYSTONIA: ICD-10-CM

## 2019-07-10 DIAGNOSIS — E78.00 PURE HYPERCHOLESTEROLEMIA: ICD-10-CM

## 2019-07-10 DIAGNOSIS — I10 ESSENTIAL HYPERTENSION, BENIGN: Primary | ICD-10-CM

## 2019-07-10 PROCEDURE — G8417 CALC BMI ABV UP PARAM F/U: HCPCS | Performed by: FAMILY MEDICINE

## 2019-07-10 PROCEDURE — G8427 DOCREV CUR MEDS BY ELIG CLIN: HCPCS | Performed by: FAMILY MEDICINE

## 2019-07-10 PROCEDURE — 99214 OFFICE O/P EST MOD 30 MIN: CPT | Performed by: FAMILY MEDICINE

## 2019-07-10 PROCEDURE — 3017F COLORECTAL CA SCREEN DOC REV: CPT | Performed by: FAMILY MEDICINE

## 2019-07-10 PROCEDURE — 1036F TOBACCO NON-USER: CPT | Performed by: FAMILY MEDICINE

## 2019-07-10 RX ORDER — CHLORAL HYDRATE 500 MG
1000 CAPSULE ORAL DAILY
Status: ON HOLD | COMMUNITY
End: 2021-10-27 | Stop reason: HOSPADM

## 2019-07-10 RX ORDER — DIPHENHYDRAMINE HCL 25 MG
50 CAPSULE ORAL EVERY 6 HOURS PRN
Qty: 120 CAPSULE | Refills: 2 | Status: SHIPPED | OUTPATIENT
Start: 2019-07-10 | End: 2020-07-29

## 2019-07-30 DIAGNOSIS — E78.00 PURE HYPERCHOLESTEROLEMIA: ICD-10-CM

## 2019-07-30 RX ORDER — PITAVASTATIN CALCIUM 1.04 MG/1
TABLET, FILM COATED ORAL
Qty: 30 TABLET | Refills: 2 | Status: SHIPPED | OUTPATIENT
Start: 2019-07-30 | End: 2019-11-01 | Stop reason: SDUPTHER

## 2019-09-04 ENCOUNTER — NURSE ONLY (OUTPATIENT)
Dept: FAMILY MEDICINE CLINIC | Age: 59
End: 2019-09-04
Payer: COMMERCIAL

## 2019-09-04 DIAGNOSIS — Z23 NEED FOR INFLUENZA VACCINATION: Primary | ICD-10-CM

## 2019-09-04 DIAGNOSIS — F20.3 UNDIFFERENTIATED SCHIZOPHRENIA (HCC): ICD-10-CM

## 2019-09-04 PROCEDURE — 90471 IMMUNIZATION ADMIN: CPT | Performed by: FAMILY MEDICINE

## 2019-09-04 PROCEDURE — 90686 IIV4 VACC NO PRSV 0.5 ML IM: CPT | Performed by: FAMILY MEDICINE

## 2019-09-04 RX ORDER — ARIPIPRAZOLE 15 MG/1
7.5 TABLET ORAL DAILY
Qty: 45 TABLET | Refills: 1 | Status: SHIPPED | OUTPATIENT
Start: 2019-09-04 | End: 2019-12-06

## 2019-09-04 NOTE — PROGRESS NOTES
Vaccine Information Sheet, \"Influenza - Inactivated\"  given to Meka León, or parent/legal guardian of  Meka León and verbalized understanding. Patient responses:    Have you ever had a reaction to a flu vaccine? No  Are you able to eat eggs without adverse effects? Yes  Do you have any current illness? No  Have you ever had Guillian [place] Syndrome? No    Flu vaccine given per order. Please see immunization tab. Current Influenza vaccine VIS given to patient. Influenza consent form/questionnaire completed and signed. Patient responses to  Influenza consent form / questionnaire  reviewed. Vaccine given per protocol.

## 2019-12-06 ENCOUNTER — OFFICE VISIT (OUTPATIENT)
Dept: FAMILY MEDICINE CLINIC | Age: 59
End: 2019-12-06
Payer: COMMERCIAL

## 2019-12-06 VITALS
DIASTOLIC BLOOD PRESSURE: 78 MMHG | SYSTOLIC BLOOD PRESSURE: 122 MMHG | WEIGHT: 252 LBS | OXYGEN SATURATION: 97 % | TEMPERATURE: 98.2 F | HEIGHT: 73 IN | RESPIRATION RATE: 14 BRPM | HEART RATE: 78 BPM | BODY MASS INDEX: 33.4 KG/M2

## 2019-12-06 DIAGNOSIS — E78.00 PURE HYPERCHOLESTEROLEMIA: ICD-10-CM

## 2019-12-06 DIAGNOSIS — J06.9 VIRAL URI: Primary | ICD-10-CM

## 2019-12-06 DIAGNOSIS — F20.3 UNDIFFERENTIATED SCHIZOPHRENIA (HCC): ICD-10-CM

## 2019-12-06 PROCEDURE — G8427 DOCREV CUR MEDS BY ELIG CLIN: HCPCS | Performed by: FAMILY MEDICINE

## 2019-12-06 PROCEDURE — 99214 OFFICE O/P EST MOD 30 MIN: CPT | Performed by: FAMILY MEDICINE

## 2019-12-06 PROCEDURE — G8482 FLU IMMUNIZE ORDER/ADMIN: HCPCS | Performed by: FAMILY MEDICINE

## 2019-12-06 PROCEDURE — G8417 CALC BMI ABV UP PARAM F/U: HCPCS | Performed by: FAMILY MEDICINE

## 2019-12-06 PROCEDURE — 3017F COLORECTAL CA SCREEN DOC REV: CPT | Performed by: FAMILY MEDICINE

## 2019-12-06 PROCEDURE — 1036F TOBACCO NON-USER: CPT | Performed by: FAMILY MEDICINE

## 2019-12-06 RX ORDER — ASCORBIC ACID 1000 MG
TABLET ORAL
Status: ON HOLD | COMMUNITY
End: 2021-10-27 | Stop reason: HOSPADM

## 2019-12-10 ENCOUNTER — PATIENT MESSAGE (OUTPATIENT)
Dept: FAMILY MEDICINE CLINIC | Age: 59
End: 2019-12-10

## 2019-12-10 RX ORDER — CEFUROXIME AXETIL 500 MG/1
500 TABLET ORAL 2 TIMES DAILY
Qty: 20 TABLET | Refills: 0 | Status: SHIPPED | OUTPATIENT
Start: 2019-12-10 | End: 2019-12-20

## 2020-01-21 ENCOUNTER — PATIENT MESSAGE (OUTPATIENT)
Dept: FAMILY MEDICINE CLINIC | Age: 60
End: 2020-01-21

## 2020-01-21 NOTE — TELEPHONE ENCOUNTER
I have been having problems with TD since Christmas and had to start Benadryl. Could it be time to see a Neurologist and start that medication for tar dive dyskinesia? Could you set me up with one? With a Neurologist the doctor has to make the appoint to see one.  Fredy Goes Sugar    Please advise

## 2020-02-18 ENCOUNTER — PATIENT MESSAGE (OUTPATIENT)
Dept: FAMILY MEDICINE CLINIC | Age: 60
End: 2020-02-18

## 2020-02-19 NOTE — TELEPHONE ENCOUNTER
From: Quynh Boucher  To: Nohemi Sinha MD  Sent: 2/18/2020 5:09 PM EST  Subject: Prescription Question    I got a letter from [facility] saying that I need to get approval for [facility] and that you got the same letter. I am doing very good on livalo and do not want to change as this statin is the last option. I am starting a new bottle of livalo tomorrow and i have one refill left on this prescription. Also the tardive has been very bad since christmas and i have been taking 200 mg of benadryl in the mornings. Do you think that this is safe. i didn't like prescription benadryl.  I am on the waiting list at [facility] to see a neurologist. Torito meyer

## 2020-03-02 RX ORDER — ARIPIPRAZOLE 5 MG/1
5 TABLET ORAL DAILY
Qty: 30 TABLET | Refills: 5 | Status: SHIPPED | OUTPATIENT
Start: 2020-03-02 | End: 2020-03-02

## 2020-06-14 ENCOUNTER — TELEPHONE (OUTPATIENT)
Dept: FAMILY MEDICINE CLINIC | Age: 60
End: 2020-06-14

## 2020-06-14 NOTE — TELEPHONE ENCOUNTER
Went to [facility] last night--apparently has tardive dyskinesia from antipsychotic use--wanted to try a new med for this(valbenazine) -told them can try but not sure insurance will cover

## 2020-07-29 ENCOUNTER — HOSPITAL ENCOUNTER (INPATIENT)
Age: 60
LOS: 7 days | Discharge: HOME | DRG: 750 | End: 2020-08-05
Attending: EMERGENCY MEDICINE | Admitting: PSYCHIATRY & NEUROLOGY
Payer: COMMERCIAL

## 2020-07-29 PROBLEM — F20.9 SCHIZOPHRENIA (HCC): Status: ACTIVE | Noted: 2020-07-29

## 2020-07-29 LAB
A/G RATIO: 1.6 (ref 1.1–2.2)
ACETAMINOPHEN LEVEL: <5 UG/ML (ref 10–30)
ALBUMIN SERPL-MCNC: 4.7 G/DL (ref 3.4–5)
ALP BLD-CCNC: 73 U/L (ref 40–129)
ALT SERPL-CCNC: 35 U/L (ref 10–40)
AMPHETAMINE SCREEN, URINE: NORMAL
ANION GAP SERPL CALCULATED.3IONS-SCNC: 12 MMOL/L (ref 3–16)
AST SERPL-CCNC: 40 U/L (ref 15–37)
BARBITURATE SCREEN URINE: NORMAL
BENZODIAZEPINE SCREEN, URINE: NORMAL
BILIRUB SERPL-MCNC: 0.3 MG/DL (ref 0–1)
BUN BLDV-MCNC: 16 MG/DL (ref 7–20)
CALCIUM SERPL-MCNC: 10 MG/DL (ref 8.3–10.6)
CANNABINOID SCREEN URINE: NORMAL
CHLORIDE BLD-SCNC: 107 MMOL/L (ref 99–110)
CO2: 24 MMOL/L (ref 21–32)
COCAINE METABOLITE SCREEN URINE: NORMAL
CREAT SERPL-MCNC: 0.9 MG/DL (ref 0.8–1.3)
EKG ATRIAL RATE: 89 BPM
EKG DIAGNOSIS: NORMAL
EKG P AXIS: 27 DEGREES
EKG P-R INTERVAL: 152 MS
EKG Q-T INTERVAL: 360 MS
EKG QRS DURATION: 84 MS
EKG QTC CALCULATION (BAZETT): 438 MS
EKG R AXIS: -48 DEGREES
EKG T AXIS: 19 DEGREES
EKG VENTRICULAR RATE: 89 BPM
ETHANOL: NORMAL MG/DL (ref 0–0.08)
GFR AFRICAN AMERICAN: >60
GFR NON-AFRICAN AMERICAN: >60
GLOBULIN: 2.9 G/DL
GLUCOSE BLD-MCNC: 132 MG/DL (ref 70–99)
HCT VFR BLD CALC: 52.9 % (ref 40.5–52.5)
HEMOGLOBIN: 17.7 G/DL (ref 13.5–17.5)
Lab: NORMAL
MCH RBC QN AUTO: 30.9 PG (ref 26–34)
MCHC RBC AUTO-ENTMCNC: 33.5 G/DL (ref 31–36)
MCV RBC AUTO: 92 FL (ref 80–100)
METHADONE SCREEN, URINE: NORMAL
OPIATE SCREEN URINE: NORMAL
OXYCODONE URINE: NORMAL
PDW BLD-RTO: 13.5 % (ref 12.4–15.4)
PH UA: 5
PHENCYCLIDINE SCREEN URINE: NORMAL
PLATELET # BLD: 282 K/UL (ref 135–450)
PMV BLD AUTO: 8.4 FL (ref 5–10.5)
POTASSIUM REFLEX MAGNESIUM: 4.5 MMOL/L (ref 3.5–5.1)
PROPOXYPHENE SCREEN: NORMAL
RBC # BLD: 5.75 M/UL (ref 4.2–5.9)
SALICYLATE, SERUM: <0.3 MG/DL (ref 15–30)
SODIUM BLD-SCNC: 143 MMOL/L (ref 136–145)
TOTAL PROTEIN: 7.6 G/DL (ref 6.4–8.2)
TSH SERPL DL<=0.05 MIU/L-ACNC: 0.81 UIU/ML (ref 0.27–4.2)
WBC # BLD: 7.5 K/UL (ref 4–11)

## 2020-07-29 PROCEDURE — G0480 DRUG TEST DEF 1-7 CLASSES: HCPCS

## 2020-07-29 PROCEDURE — 85027 COMPLETE CBC AUTOMATED: CPT

## 2020-07-29 PROCEDURE — 6360000002 HC RX W HCPCS: Performed by: EMERGENCY MEDICINE

## 2020-07-29 PROCEDURE — 6360000002 HC RX W HCPCS: Performed by: NURSE PRACTITIONER

## 2020-07-29 PROCEDURE — 99285 EMERGENCY DEPT VISIT HI MDM: CPT

## 2020-07-29 PROCEDURE — 80307 DRUG TEST PRSMV CHEM ANLYZR: CPT

## 2020-07-29 PROCEDURE — 83036 HEMOGLOBIN GLYCOSYLATED A1C: CPT

## 2020-07-29 PROCEDURE — 1240000000 HC EMOTIONAL WELLNESS R&B

## 2020-07-29 PROCEDURE — 84443 ASSAY THYROID STIM HORMONE: CPT

## 2020-07-29 PROCEDURE — 93010 ELECTROCARDIOGRAM REPORT: CPT | Performed by: INTERNAL MEDICINE

## 2020-07-29 PROCEDURE — 96374 THER/PROPH/DIAG INJ IV PUSH: CPT

## 2020-07-29 PROCEDURE — 80061 LIPID PANEL: CPT

## 2020-07-29 PROCEDURE — 96375 TX/PRO/DX INJ NEW DRUG ADDON: CPT

## 2020-07-29 PROCEDURE — 90792 PSYCH DIAG EVAL W/MED SRVCS: CPT | Performed by: NURSE PRACTITIONER

## 2020-07-29 PROCEDURE — 36415 COLL VENOUS BLD VENIPUNCTURE: CPT

## 2020-07-29 PROCEDURE — 80053 COMPREHEN METABOLIC PANEL: CPT

## 2020-07-29 PROCEDURE — 93005 ELECTROCARDIOGRAM TRACING: CPT | Performed by: EMERGENCY MEDICINE

## 2020-07-29 RX ORDER — LORAZEPAM 2 MG/ML
2 INJECTION INTRAMUSCULAR EVERY 6 HOURS PRN
Status: DISCONTINUED | OUTPATIENT
Start: 2020-07-29 | End: 2020-08-05 | Stop reason: HOSPADM

## 2020-07-29 RX ORDER — DIPHENHYDRAMINE HYDROCHLORIDE 50 MG/ML
25 INJECTION INTRAMUSCULAR; INTRAVENOUS EVERY 6 HOURS PRN
Status: DISCONTINUED | OUTPATIENT
Start: 2020-07-29 | End: 2020-08-05 | Stop reason: HOSPADM

## 2020-07-29 RX ORDER — IBUPROFEN 400 MG/1
400 TABLET ORAL EVERY 6 HOURS PRN
Status: DISCONTINUED | OUTPATIENT
Start: 2020-07-29 | End: 2020-08-05 | Stop reason: HOSPADM

## 2020-07-29 RX ORDER — HALOPERIDOL 5 MG/ML
5 INJECTION INTRAMUSCULAR EVERY 6 HOURS PRN
Status: DISCONTINUED | OUTPATIENT
Start: 2020-07-29 | End: 2020-07-31

## 2020-07-29 RX ORDER — DIPHENHYDRAMINE HYDROCHLORIDE 50 MG/ML
50 INJECTION INTRAMUSCULAR; INTRAVENOUS ONCE
Status: COMPLETED | OUTPATIENT
Start: 2020-07-29 | End: 2020-07-29

## 2020-07-29 RX ORDER — TRAZODONE HYDROCHLORIDE 50 MG/1
50 TABLET ORAL NIGHTLY PRN
Status: DISCONTINUED | OUTPATIENT
Start: 2020-07-29 | End: 2020-08-05 | Stop reason: HOSPADM

## 2020-07-29 RX ORDER — HALOPERIDOL 5 MG/ML
5 INJECTION INTRAMUSCULAR ONCE
Status: COMPLETED | OUTPATIENT
Start: 2020-07-29 | End: 2020-07-29

## 2020-07-29 RX ORDER — BENZTROPINE MESYLATE 1 MG/ML
2 INJECTION INTRAMUSCULAR; INTRAVENOUS 2 TIMES DAILY PRN
Status: DISCONTINUED | OUTPATIENT
Start: 2020-07-29 | End: 2020-08-05 | Stop reason: HOSPADM

## 2020-07-29 RX ORDER — HALOPERIDOL 5 MG
5 TABLET ORAL EVERY 6 HOURS PRN
Status: DISCONTINUED | OUTPATIENT
Start: 2020-07-29 | End: 2020-07-31

## 2020-07-29 RX ORDER — DIPHENHYDRAMINE HCL 25 MG
50 TABLET ORAL EVERY 6 HOURS PRN
Status: DISCONTINUED | OUTPATIENT
Start: 2020-07-29 | End: 2020-08-05 | Stop reason: HOSPADM

## 2020-07-29 RX ORDER — HYDROXYZINE PAMOATE 25 MG/1
50 CAPSULE ORAL 3 TIMES DAILY PRN
Status: DISCONTINUED | OUTPATIENT
Start: 2020-07-29 | End: 2020-08-05 | Stop reason: HOSPADM

## 2020-07-29 RX ORDER — LORAZEPAM 2 MG/ML
2 INJECTION INTRAMUSCULAR ONCE
Status: COMPLETED | OUTPATIENT
Start: 2020-07-29 | End: 2020-07-29

## 2020-07-29 RX ORDER — HALOPERIDOL 1 MG/1
1 TABLET ORAL 2 TIMES DAILY
Status: DISCONTINUED | OUTPATIENT
Start: 2020-07-29 | End: 2020-07-29

## 2020-07-29 RX ORDER — ACETAMINOPHEN 325 MG/1
650 TABLET ORAL EVERY 4 HOURS PRN
Status: DISCONTINUED | OUTPATIENT
Start: 2020-07-29 | End: 2020-08-05 | Stop reason: HOSPADM

## 2020-07-29 RX ORDER — LORAZEPAM 2 MG/1
2 TABLET ORAL EVERY 6 HOURS PRN
Status: DISCONTINUED | OUTPATIENT
Start: 2020-07-29 | End: 2020-08-05 | Stop reason: HOSPADM

## 2020-07-29 RX ORDER — MAGNESIUM HYDROXIDE/ALUMINUM HYDROXICE/SIMETHICONE 120; 1200; 1200 MG/30ML; MG/30ML; MG/30ML
30 SUSPENSION ORAL EVERY 6 HOURS PRN
Status: DISCONTINUED | OUTPATIENT
Start: 2020-07-29 | End: 2020-08-05 | Stop reason: HOSPADM

## 2020-07-29 RX ADMIN — DIPHENHYDRAMINE HYDROCHLORIDE 50 MG: 50 INJECTION, SOLUTION INTRAMUSCULAR; INTRAVENOUS at 11:42

## 2020-07-29 RX ADMIN — DIPHENHYDRAMINE HYDROCHLORIDE 25 MG: 50 INJECTION, SOLUTION INTRAMUSCULAR; INTRAVENOUS at 19:52

## 2020-07-29 RX ADMIN — LORAZEPAM 2 MG: 2 INJECTION INTRAMUSCULAR; INTRAVENOUS at 19:51

## 2020-07-29 RX ADMIN — HALOPERIDOL LACTATE 5 MG: 5 INJECTION, SOLUTION INTRAMUSCULAR at 19:52

## 2020-07-29 RX ADMIN — HALOPERIDOL LACTATE 5 MG: 5 INJECTION, SOLUTION INTRAMUSCULAR at 11:39

## 2020-07-29 RX ADMIN — LORAZEPAM 2 MG: 2 INJECTION INTRAMUSCULAR; INTRAVENOUS at 11:39

## 2020-07-29 ASSESSMENT — SLEEP AND FATIGUE QUESTIONNAIRES
DO YOU HAVE DIFFICULTY SLEEPING: NO
DO YOU USE A SLEEP AID: NO
AVERAGE NUMBER OF SLEEP HOURS: 7

## 2020-07-29 ASSESSMENT — PAIN SCALES - GENERAL: PAINLEVEL_OUTOF10: 0

## 2020-07-29 ASSESSMENT — LIFESTYLE VARIABLES: HISTORY_ALCOHOL_USE: NO

## 2020-07-29 NOTE — ED NOTES
Restraint 1 Hour RN assessment      Ryan Gutiérrez was placed in Leather/Alternative Bilateral Wrist and Leather/Alternative Bilateral Ankle restraints at 11:20 due to Behavioral management problems. Currently patient is continuing to be uncooperative and aggressive and has reacted negatively to being placed in restraints. Patient medical history includes       Diagnosis Date    Elevated liver enzymes 9/1/2017    Hypertension     Pure hypercholesterolemia 8/31/2017    with consideration given to any physical and/or mental issues in regards to restraint risk. Current mental status awake and alert; oriented to person, place, and time. At this time the patient  does not meet criteria for continued restraint AEB Anxious, Agitated, Combative, Disruptive and Imulsive behavior. The room has been assessed for safety and potentially harmful objects. Patient has been assessed for comfort and current needs. Respirations 15. Skin Skin color, tempature, turgor normal. No rashes or lesions. Current vitals include 159/102bp, 83p.     Most recent lab values include:   Admission on 07/29/2020   Component Date Value Ref Range Status    WBC 07/29/2020 7.5  4.0 - 11.0 K/uL Final    RBC 07/29/2020 5.75  4.20 - 5.90 M/uL Final    Hemoglobin 07/29/2020 17.7* 13.5 - 17.5 g/dL Final    Hematocrit 07/29/2020 52.9* 40.5 - 52.5 % Final    MCV 07/29/2020 92.0  80.0 - 100.0 fL Final    MCH 07/29/2020 30.9  26.0 - 34.0 pg Final    MCHC 07/29/2020 33.5  31.0 - 36.0 g/dL Final    RDW 07/29/2020 13.5  12.4 - 15.4 % Final    Platelets 41/27/3534 282  135 - 450 K/uL Final    MPV 07/29/2020 8.4  5.0 - 10.5 fL Final    Sodium 07/29/2020 143  136 - 145 mmol/L Final    Potassium reflex Magnesium 07/29/2020 4.5  3.5 - 5.1 mmol/L Final    Chloride 07/29/2020 107  99 - 110 mmol/L Final    CO2 07/29/2020 24  21 - 32 mmol/L Final    Anion Gap 07/29/2020 12  3 - 16 Final    Glucose 07/29/2020 132* 70 - 99 mg/dL Final    BUN Preliminary    T Axis 07/29/2020 19  degrees Preliminary    Diagnosis 07/29/2020 Normal sinus rhythmLeft anterior fascicular blockAbnormal ECGWhen compared with ECG of 29-JUL-2020 11:46, (unconfirmed)No significant change was found   Preliminary         Patient presentation and results of RN assessment has been discussed with the on call physician.         Marilee Pa RN  07/29/20 0862

## 2020-07-29 NOTE — ED PROVIDER NOTES
[address] ED  EMERGENCY DEPARTMENT ENCOUNTER        Pt Name: Randall Viera  MRN: [id number]  [place] 1960  Date of evaluation: 7/29/2020  Provider: SAULO Thomas - CNP  PCP: Saud Pruitt MD  ED Attending: Heidy Dumas MD    CHIEF COMPLAINT       Chief Complaint   Patient presents with   [facility]     Brought in by Neida RODRIGUEZ for eval, pt being aggressive, placed on hold       HISTORY OF PRESENT ILLNESS   (Location/Symptom, Timing/Onset, Context/Setting, Quality, Duration, Modifying Factors, Severity)  Note limiting factors. Randall Viera is a 56 y.o. male for aggressive behavior. Onset was today. Context includes patient was brought in by police and mobile crisis for aggressive behavior. Please were called because he was outside yelling in his neighbors. Mobile crisis placed patient under psychiatric hold. Alleviating factors include nothing. Aggravating factors include nothing. Pain is 0/10. Nothing has been used for pain today. Nursing Notes were all reviewed and agreed with or any disagreements were addressed  in the HPI. REVIEW OF SYSTEMS  (2-9 systems for level 4, 10 or more for level 5)     Review of Systems   Unable to perform ROS: Psychiatric disorder       Positivesand Pertinent negatives as per HPI. Except as noted above in the ROS, all other systems were reviewed and negative.        PAST MEDICAL HISTORY     Past Medical History:   Diagnosis Date    Elevated liver enzymes 9/1/2017    Hypertension     Pure hypercholesterolemia 8/31/2017         SURGICAL HISTORY       Past Surgical History:   Procedure Laterality Date    NOSE SURGERY      deviated septum         CURRENT MEDICATIONS       Previous Medications    COENZYME Q10 (CO Q 10) 10 MG CAPS    Take by mouth    DIPHENHYDRAMINE (BENADRYL) 25 MG CAPSULE    Take 2 capsules by mouth every 6 hours as needed for Itching    FLAXSEED, LINSEED, (FLAX SEED OIL PO)    Take 1 capsule by mouth daily    MULTIPLE VITAMINS-MINERALS (MULTIVITAMIN ADULT PO)    Take by mouth    OMEGA-3 FATTY ACIDS (FISH OIL) 1000 MG CAPS    Take 1,000 mg by mouth daily    PITAVASTATIN (LIVALO) 2 MG TABS TABLET    Take 0.5 tablets by mouth nightly         ALLERGIES     Clozaril [clozapine]; Crestor [rosuvastatin]; Invega [paliperidone er]; Moises Julio; Lipitor [atorvastatin];  Mevacor [lovastatin]; and Simvastatin    FAMILY HISTORY       Family History   Problem Relation Age of Onset    Hypertension Mother     Heart Failure Mother 80    Prostate Cancer Father 52         SOCIAL HISTORY       Social History     Socioeconomic History    Marital status: Single     Spouse name: Not on file    Number of children: Not on file    Years of education: Not on file    Highest education level: Not on file   Occupational History    Not on file   Social Needs    Financial resource strain: Not on file    Food insecurity     Worry: Not on file     Inability: Not on file    Transportation needs     Medical: Not on file     Non-medical: Not on file   Tobacco Use    Smoking status: Former Smoker     Packs/day: 0.10     Years: 4.00     Pack years: 0.40     Types: Cigarettes     Last attempt to quit: 1980     Years since quittin.6    Smokeless tobacco: Never Used    Tobacco comment: 1 pk per year - social smoker, didn't smoke much at all    Substance and Sexual Activity    Alcohol use: No    Drug use: No     Comment: Pt states he does \"natural stuff\"    Sexual activity: Never   Lifestyle    Physical activity     Days per week: Not on file     Minutes per session: Not on file    Stress: Not on file   Relationships    Social connections     Talks on phone: Not on file     Gets together: Not on file     Attends Nondenominational service: Not on file     Active member of club or organization: Not on file     Attends meetings of clubs or organizations: Not on file     Relationship status: Not on file   [address] [place] [address] Intimate partner violence     Fear of current or ex partner: Not on file     Emotionally abused: Not on file     Physically abused: Not on file     Forced sexual activity: Not on file   Other Topics Concern    Not on file   Social History Narrative    Not on file       SCREENINGS    Isiah Coma Scale  Eye Opening: Spontaneous  Best Verbal Response: Oriented  Best Motor Response: Obeys commands  [place] Coma Scale Score: 15        PHYSICAL EXAM    (up to 7 for level 4, 8 ormore for level 5)     ED Triage Vitals   BP Temp Temp src Pulse Resp SpO2 Height Weight   -- -- -- -- -- -- -- --       Physical Exam  Constitutional:       Appearance: He is well-developed. HENT:      Head: Normocephalic and atraumatic. Neck:      Musculoskeletal: Normal range of motion. Cardiovascular:      Rate and Rhythm: Normal rate. Pulmonary:      Effort: Pulmonary effort is normal. No respiratory distress. Abdominal:      General: There is no distension. Palpations: Abdomen is soft. Tenderness: There is no abdominal tenderness. Musculoskeletal: Normal range of motion. Skin:     General: Skin is warm and dry. Neurological:      Mental Status: He is alert. Psychiatric:         Mood and Affect: Affect is inappropriate. Thought Content: Thought content does not include homicidal or suicidal ideation. Thought content does not include homicidal or suicidal plan. Cognition and Memory: Cognition is impaired. Memory is impaired.       Comments: Yelling outside at his neighbors   Concern for violence towards 90yr old mother         DIAGNOSTIC RESULTS   LABS:    Labs Reviewed   CBC - Abnormal; Notable for the following components:       Result Value    Hemoglobin 17.7 (*)     Hematocrit 52.9 (*)     All other components within normal limits    Narrative:     Performed at:  [facility],  [place], [facility]   Phone [phone]   COMPREHENSIVE METABOLIC medications:  Medications   diphenhydrAMINE (BENADRYL) injection 50 mg (50 mg Intravenous Given 7/29/20 1142)   LORazepam (ATIVAN) injection 2 mg (2 mg Intravenous Given 7/29/20 1139)   haloperidol lactate (HALDOL) injection 5 mg (5 mg Intravenous Given 7/29/20 1139)       Patient was seen and evaluated by myself and . Patient here today for complaints of aggressive behavior at home. Patient resides with his 25-year-old mother and was brought in by police after he was outside yelling at his neighbors and the police and mobile crisis were called. Mobile crisis reports that they have dealt with this patient a few times and have been trying to give him resources however today with his aggression they brought him to the ED. He was placed under psychiatric hold. On arrival to the ED the patient continued to be aggressive and required restraints. He was placed in four-point restraints and chemically restrained. Patient continues to make comments that are inappropriate. Lab values have been reviewed and interpreted. Patient denies any suicidal or homicidal ideations. At this point the patient is considered medically cleared however urine drug screen and urinalysis are currently pending. Behavioral health was consulted for further evaluation and assistance and final disposition. The patient tolerated their visit well. They were seen and evaluated by the Izzy Boyd MD who agreed with the assessment and plan. The patient and / or the family were informed of the results of any tests, a time was given to answer questions, a plan was proposed and they agreed Maame Rob. FINAL IMPRESSION      1. Aggressive behavior          DISPOSITION/PLAN   DISPOSITION        PATIENT REFERRED TO:  No follow-up provider specified.     DISCHARGE MEDICATIONS:  New Prescriptions    No medications on file       DISCONTINUED MEDICATIONS:  Discontinued Medications    No medications on file (Please note that portions of this note were completed with a voice recognition program.  Efforts were made to edit the dictations but occasionally words are mis-transcribed.)    SAULO Patton CNP (electronically signed)       SAUOL Patton CNP  07/29/20 1300

## 2020-07-29 NOTE — CONSULTS
[facility]  Psychiatric Evaluation    Mercedes Elena  [id number]    Presenting Problem: Delusional, agitation, aggressive behavior    Hold Status: Statement of Belief    Mercedes Elena is a 56year old male who presented to the ED via Mobile Crisis Team and police in handcuffs due to aggressive behavior. Per the Statement of Belief, \"MCT was requested for the 3rd time in one week due to concerns from police, his family, and the neighborhood, as Nirmal Nunes has continually stood outside his home, yelling at his neighbors. The police initially became involved due to concerns from family and neighbors that Nirmal Nunes may attempt to harm children in the neighborhood. Teresas mood is often labile and behavior often impulsive. On previous police and MCT runs, Nirmal Nunes has presented with calm mood and cooperative behavior, but his mother would report that when no one was present, he would become angry and call family to yell at them frequently. Nirmal Nunes admitted that he slapped his mother for not letting him use the phone. Nirmal Nunes has reported that he was possessed by a demon in 2001 [facility], but has since been 'through deliverance. Nirmal Nunes also expresses delusions that he is a 'certified '. MCT has attempted to get him connected with ongoing services, but he sabotages any attempts to get him treatment. He stopped taking medication (Abilify) and was seen flushing it today. He also admitted to throwing away food, but reported having little to eat. His mother reported she is now terrified for her safety. Teresas agitation has increased over the last week and there is concern he would be arrested due to his aggressive behavior\". Upon arrival to the ED, he was aggressive upon arrival, threatening, spitting on staff. He was placed in four-point restraints and given emergency medications. Attempted to meet with Nirmal Nunes in room 2 of the ED with Enzo Biggs RN.  He presents as disorganized, delusional, easily additional details on this  Outpatient Services: History of outpatient psychiatric services per chart review but none current; MCT attempting to get his established with [facility]  Past Medication Trials: Allergies listed to Clozaril, Invega, Olanzapine, Rexuli, Thiothixene, Klonopin, Ingrezza. He states he has been on every antipsychotic on the [facility] 9091 list.  Family Hx Psychiatric: None known     Substance Abuse History:  ETOH: Per Epic, none   Illicit: Per Epic, pt states he does \"natural stuff\"  Prescription: None listed in Epic  OARRS: Ran and reviewed, no fills    Review of Systems   Unable to perform ROS: Psychiatric disorder   Exhibited agitation, behavior problem, delusional thought process, verbal threats of harming others and burning down buildings    Appearance/Hygiene: street clothes, seated in bed, in 4-point restraints  Motor Behavior: observed while seated in bed, pulling on the restraints at times during assessment    Attitude: uncooperative  Affect: agitation   Speech: loud and pressured  Mood: angry and irritable   Thought Processes: disorganized  Perceptions: appeared distracted at times but did not overtly appear to be RTIS during assessment    Thought Content: + delusional content   Suicidal Ideation: no specific plan to harm self   Homicidal Ideation: reports he has \"horrible thoughts\" towards psychiatric providers but would not elaborate  Cognition: impaired  Orientation: difficult to fully assess due to patient's current presentation  Memory: difficult to fully assess due to patient's current presentation  Concentration: impaired  Insight/Judgement: impaired insight and judgment, delusions    Exam  Physical Exam  Vitals signs and nursing note reviewed. HENT:      Head: Normocephalic.       Nose: Nose normal.   Eyes:      General: Lids are normal.   Cardiovascular:      Comments: HR and BP elevated upon arrival to ED but improving upon rechecks  Pulmonary:      Effort: Pulmonary effort is normal. Musculoskeletal:      Comments: In 4 pt restraints during assessment    Neurological:      Mental Status: He is alert. Psychiatric:         Mood and Affect: Affect is labile and angry. Behavior: Behavior is uncooperative, agitated and aggressive. Thought Content: Thought content is delusional. Thought content includes homicidal ideation. Thought content does not include suicidal ideation. Judgment: Judgment is impulsive and inappropriate.       Comments: Irritable, angry mood  Pressured speech, mumbling at times, increased volume  Verbally threatening to kill staff and burn down the hospital, pulling on restraints   Poor insight  Expressed having \"horrible thoughts\" towards psychiatric providers but would not elaborate  Difficult to fully assess cognition, orientation, memory due to current presentation     Labs  Recent Results (from the past 24 hour(s))   CBC    Collection Time: 07/29/20 11:30 AM   Result Value Ref Range    WBC 7.5 4.0 - 11.0 K/uL    RBC 5.75 4.20 - 5.90 M/uL    Hemoglobin 17.7 (H) 13.5 - 17.5 g/dL    Hematocrit 52.9 (H) 40.5 - 52.5 %    MCV 92.0 80.0 - 100.0 fL    MCH 30.9 26.0 - 34.0 pg    MCHC 33.5 31.0 - 36.0 g/dL    RDW 13.5 12.4 - 15.4 %    Platelets 025 700 - 175 K/uL    MPV 8.4 5.0 - 10.5 fL   Comprehensive Metabolic Panel w/ Reflex to MG    Collection Time: 07/29/20 11:30 AM   Result Value Ref Range    Sodium 143 136 - 145 mmol/L    Potassium reflex Magnesium 4.5 3.5 - 5.1 mmol/L    Chloride 107 99 - 110 mmol/L    CO2 24 21 - 32 mmol/L    Anion Gap 12 3 - 16    Glucose 132 (H) 70 - 99 mg/dL    BUN 16 7 - 20 mg/dL    CREATININE 0.9 0.8 - 1.3 mg/dL    GFR Non-African American >60 >60    GFR African American >60 >60    Calcium 10.0 8.3 - 10.6 mg/dL    Total Protein 7.6 6.4 - 8.2 g/dL    Alb 4.7 3.4 - 5.0 g/dL    Albumin/Globulin Ratio 1.6 1.1 - 2.2    Total Bilirubin 0.3 0.0 - 1.0 mg/dL    Alkaline Phosphatase 73 40 - 129 U/L    ALT 35 10 - 40 U/L    AST 40 (H) 15 - 37 U/L    Globulin 2.9 g/dL   Ethanol    Collection Time: 07/29/20 11:30 AM   Result Value Ref Range    Ethanol Lvl None Detected mg/dL   Acetaminophen (TYLENOL) level    Collection Time: 07/29/20 11:30 AM   Result Value Ref Range    Acetaminophen Level <5 (L) 10 - 30 ug/mL   Salicylate    Collection Time: 07/29/20 11:30 AM   Result Value Ref Range    Salicylate, Serum <2.1 (L) 15.0 - 30.0 mg/dL   EKG 12 Lead    Collection Time: 07/29/20 11:46 AM   Result Value Ref Range    Ventricular Rate 89 BPM    Atrial Rate 89 BPM    P-R Interval 152 ms    QRS Duration 84 ms    Q-T Interval 360 ms    QTc Calculation (Bazett) 438 ms    P Axis 27 degrees    R Axis -48 degrees    T Axis 19 degrees    Diagnosis       Normal sinus rhythmLeft anterior fascicular blockAbnormal ECGWhen compared with ECG of 29-JUL-2020 11:46, (unconfirmed)No significant change was found      Radiology  None completed today in ED/TUNDE    EKG Interpretation  Completed 07/29/2020  Component  Value  Ref Range & Units  Status  Collected  Lab    Ventricular Rate  89  BPM  Preliminary  07/29/2020 11:46 AM  14    Atrial Rate  89  BPM  Preliminary  07/29/2020 11:46 AM  14    P-R Interval  152  ms  Preliminary  07/29/2020 11:46 AM  14    QRS Duration  84  ms  Preliminary  07/29/2020 11:46 AM  14    Q-T Interval  360  ms  Preliminary  07/29/2020 11:46 AM  14    QTc Calculation (Bazett)  438  ms  Preliminary  07/29/2020 11:46 AM  14    P Axis  27  degrees  Preliminary  07/29/2020 11:46 AM  14    R Axis  -48  degrees  Preliminary  07/29/2020 11:46 AM  14    T Axis  19  degrees  Preliminary  07/29/2020 11:46 AM  14    Diagnosis    Preliminary  07/29/2020 11:46 AM  14    Normal sinus rhythmLeft anterior fascicular blockAbnormal ECGWhen compared with ECG of 29-JUL-2020 11:46, (unconfirmed)No significant change was found      Diagnosis:  1. Schizophrenia  2.  Borderline intellectual functioning, per history     Level of Care Disposition: Admit to adult I    RATIONALE FOR ADMISSION:   Patient has a mental illness: schizophrenia   Patient at imminent risk of danger to self as demonstrated by delusional thought content, poor insight and judgement, poor safety awareness  Patient at imminent risk of danger toward others demonstrated by verbal threats of killing staff members, having \"horrible thoughts\" towards psychiatric providers and burning down the hospital   Patient is at imminent risk of violating their own rights or the rights of others demonstrated by AND they could benefit from psychiatric treatment    Clinical Summary:    Patient presents to [facility] via MCT and police on a SOB for delusional thought content, agitation, aggression. Madyson Washington does not appear to be medically compromised and was evaluated and treated in the Emergency Department. Patient was clinically sober at the time of the evaluation. Patient was evaluated and offered supportive counseling. Collateral information was gathered by [facility] staff from patient's mother and brother. Patient required 4-point restraints was given Benadryl, Haldol, and Ativan while in [facility] for agitation, verbal threats, and aggression and this was effective in reducing his behaviors. At this time the patient is a danger to self and others and would also benefit from inpatient treatment. Inpatient hospitalization is the least restrictive environment for the patient at this time. The patient will be admitted to the inpatient unit.      Babs Hall, MPH, APRN, PMHNP-BC  07/29/20

## 2020-07-29 NOTE — ED NOTES
Verbal consent to treat obtained from mother and guardian Harsh Forte earlier during collateral phone call. Guardianship and [employer] paperwork faxed to [facility].

## 2020-07-29 NOTE — BH NOTE
Brought patients tray into him. Patient was asleep. Patient startled. Says he needs to protect his mom. Patient states, Vishal Lloyd is a warlock and Christin Bullardevan is a witch. \" Fears for his mom's safety. Patient asks, \"Do u think there is a neighborhood watch able to look out for her? Maybe I should call her? \" Patient up eating meal in room. Encouraged patient to focus on one thing at a time.

## 2020-07-29 NOTE — ED PROVIDER NOTES
[id number] [facility]  Psychiatric Evaluation (Brought in by [facility] for eval, pt being aggressive, placed on hold)       HISTORY OF PRESENT ILLNESS  Milo Hassan is a 56 y.o. male  who presents to the ED for evaluation of aggressive behaviors. Per police, police were called on to his house due to patient's aggressive behavior towards his mother. Patient is supposed to be taking Abilify but has not been taking them recently. Patient presents to the emergency department cussing and spitting at staff. When patient is inquired about hallucinations, patient reports he is a prophet and says \"it's a Latter-day thing. \"     No other complaints, modifying factors or associated symptoms. I have reviewed the following from the nursing documentation.     Past Medical History:   Diagnosis Date    Elevated liver enzymes 2017    Hypertension     Pure hypercholesterolemia 2017     Past Surgical History:   Procedure Laterality Date    NOSE SURGERY      deviated septum     Family History   Problem Relation Age of Onset    Hypertension Mother     Heart Failure Mother 80    Prostate Cancer Father 52    Cancer Father      Social History     Socioeconomic History    Marital status: Single     Spouse name: Not on file    Number of children: 1    Years of education: 15    Highest education level: Not on file   Occupational History     Employer: UNEMPLOYED   Social Needs    Financial resource strain: Not on file    Food insecurity     Worry: Not on file     Inability: Not on file   [employer] needs     Medical: Not on file     Non-medical: Not on file   Tobacco Use    Smoking status: Former Smoker     Packs/day: 0.10     Years: 4.00     Pack years: 0.40     Types: Cigarettes     Last attempt to quit: 1980     Years since quittin.6    Smokeless tobacco: Never Used    Tobacco comment: 1 pk per year - social smoker, didn't smoke much at all    Substance and Sexual Activity    Alcohol use: No    Drug use: No     Comment: Pt states he does \"natural stuff\"    Sexual activity: Never   Lifestyle    Physical activity     Days per week: Not on file     Minutes per session: Not on file    Stress: Not on file   Relationships    Social connections     Talks on phone: Not on file     Gets together: Not on file     Attends Islam service: Not on file     Active member of club or organization: Not on file     Attends meetings of clubs or organizations: Not on file     Relationship status: Not on file    Intimate partner violence     Fear of current or ex partner: Not on file     Emotionally abused: Not on file     Physically abused: Not on file     Forced sexual activity: Not on file   Other Topics Concern    Not on file   Social History Narrative    Not on file     Current Facility-Administered Medications   Medication Dose Route Frequency Provider Last Rate Last Dose    acetaminophen (TYLENOL) tablet 650 mg  650 mg Oral Q4H PRN Narberth Prince Elena, APRN - CNP        ibuprofen (ADVIL;MOTRIN) tablet 400 mg  400 mg Oral Q6H PRN Narberth Prince Elena, APRN - CNP        traZODone (DESYREL) tablet 50 mg  50 mg Oral Nightly PRN Narberth Prince Elena, APRN - CNP        benztropine mesylate (COGENTIN) injection 2 mg  2 mg Intramuscular BID PRN Narberth  Shahid, APRN - CNP        magnesium hydroxide (MILK OF MAGNESIA) 400 MG/5ML suspension 30 mL  30 mL Oral Daily PRN Narberthbonnie Elena, APRN - CNP        aluminum & magnesium hydroxide-simethicone (MAALOX) 200-200-20 MG/5ML suspension 30 mL  30 mL Oral Q6H PRN Narberth Prince Elena, APRN - CNP        hydrOXYzine (VISTARIL) capsule 50 mg  50 mg Oral TID PRN Narberth Prince Elena, APRN - CNP        diphenhydrAMINE (BENADRYL) tablet 50 mg  50 mg Oral Q6H PRN Maddisonbonnie Elena, APRN - CNP        Or    diphenhydrAMINE (BENADRYL) injection 25 mg  25 mg Intramuscular Q6H PRN Narberthbonnie Elena, APRN - CNP   25 mg at 07/30/20 0518    haloperidol (HALDOL) tablet 5 mg  5 mg Oral Q6H PRN [facility], APRN - CNP        Or    haloperidol lactate (HALDOL) injection 5 mg  5 mg Intramuscular Q6H PRN Willard Frederick Fling, APRN - CNP   5 mg at 07/30/20 0518    LORazepam (ATIVAN) tablet 2 mg  2 mg Oral Q6H PRN Willard Frederick Fling, APRN - CNP        Or    LORazepam (ATIVAN) injection 2 mg  2 mg Intramuscular Q6H PRN Maddison Frederick Fling, APRN - CNP   2 mg at 07/30/20 0518    pitavastatin (LIVALO) tablet 1 mg  1 mg Oral Nightly Willard Frederick Fling, APRN - CNP         Allergies   Allergen Reactions    Clozaril [Clozapine]     Crestor [Rosuvastatin] Other (See Comments)     myalgia    Haldol [Haloperidol]      Gives him tardive dysknesia    Invega [Paliperidone Er] Other (See Comments)    Klonopin [Clonazepam]     Lipitor [Atorvastatin]      Increased urination    Mevacor [Lovastatin] Other (See Comments)     myalgia    Olanzapine     Rexulti [Brexpiprazole]     Simvastatin Other (See Comments)     myalgia    Thiothixene     Vraylar [Cariprazine Hcl]        REVIEW OF SYSTEMS  10 systems reviewed, pertinent positives per HPI otherwise noted to be negative. PHYSICAL EXAM  /83   Pulse 87   Temp 97.7 °F (36.5 °C) (Temporal)   Resp 18   Ht 6' 1\" (1.854 m)   Wt 252 lb (114.3 kg)   SpO2 95%   BMI 33.25 kg/m²    GENERAL APPEARANCE: Combative  HENT: Normocephalic. Atraumatic. CN  2-12 grossly intact. HEART/CHEST: RRR. No murmurs appreciated  LUNGS: Respirations unlabored. Speaking comfortably in full sentences. CTAB. ABDOMEN: Soft, non-distended abdomen. Non tender to palpation. No guarding. No rebound. EXTREMITIES: No gross deformities. Moving all extremities. All extremities neurovascularly intact. SKIN: Warm and dry. No acute rashes. NEUROLOGICAL: Alert and oriented. CN's 2-12 intact. No gross facial drooping. Strength 5/5, sensation intact.     LABS  Results for orders placed or performed during the hospital encounter of 07/29/20   CBC   Result Value Ref Range    WBC 7.5 4.0 - 11.0 K/uL    RBC 5.75 4.20 - 5.90 M/uL Interval 152 ms    QRS Duration 84 ms    Q-T Interval 360 ms    QTc Calculation (Bazett) 438 ms    P Axis 27 degrees    R Axis -48 degrees    T Axis 19 degrees    Diagnosis       Normal sinus rhythmleft axisLeft anterior fascicular blockNonspecific ST abnormalityConfirmed by Andre Burr MD PAM ([id number]) on 7/29/2020 2:44:43 PM       I have reviewed all labs for this visit. ECG  The Ekg interpreted by me shows  Normal sinus rhythm with a rate of 89  Axis is left  QTc is normal  Intervals and Durations are unremarkable. ST Segments: No ST elevation    ED COURSE/MDM  Patient seen and evaluated. At presentation, patient was combative, cursing, spitting at staff. Attempted verbal de-escalation and was unsuccessful. Restraints had to be put on patient's extremities. Patient given 2 mg IV Ativan, 50 mg Benadryl, and 5 mg IV Haldol. Differential diagnosis includes HI, psychotic break, agitation to a stressor, substance use, among others. Given this, CBC, CMP, urine drug screen, and EKG obtained. Psychiatry consulted for evaluation. Labs remarkable for normal white count, normal hemoglobin, normal renal function, normal electrolytes, negative alcohol, negative acetaminophen, and negative salicylate. Psychiatry consulted for evaluation. Patient admitted.     During the patient's ED course, the patient was given:  Medications   acetaminophen (TYLENOL) tablet 650 mg (has no administration in time range)   ibuprofen (ADVIL;MOTRIN) tablet 400 mg (has no administration in time range)   traZODone (DESYREL) tablet 50 mg (has no administration in time range)   benztropine mesylate (COGENTIN) injection 2 mg (has no administration in time range)   magnesium hydroxide (MILK OF MAGNESIA) 400 MG/5ML suspension 30 mL (has no administration in time range)   aluminum & magnesium hydroxide-simethicone (MAALOX) 200-200-20 MG/5ML suspension 30 mL (has no administration in time range)   hydrOXYzine (VISTARIL) capsule 50 mg (has no

## 2020-07-29 NOTE — ED NOTES
Pt being aggressive upon arrival, yelling, spitting on staff members, and threatening. Patient brought in by police in handcuffs. Handcuffs removed and patient placed in 4-point leather restraints. Dr. Brian Rizo at bedside.       Jenelle Hammans, RN  07/29/20 [id number]

## 2020-07-29 NOTE — BH NOTE
Upon arrival to [facility] unit, pt asked to use phone. Pt provided pt phone and informed he must be appropriate when making calls. While on phone pt heard stating, \"Mom, fuck you! \". Pt reminded by charge RN, Fernando Peres pt must be appropriate while on phone and not use profanity.

## 2020-07-30 ENCOUNTER — TELEPHONE (OUTPATIENT)
Dept: FAMILY MEDICINE CLINIC | Age: 60
End: 2020-07-30

## 2020-07-30 LAB
CHOLESTEROL, TOTAL: 162 MG/DL (ref 0–199)
EKG ATRIAL RATE: 67 BPM
EKG DIAGNOSIS: NORMAL
EKG P AXIS: 38 DEGREES
EKG P-R INTERVAL: 164 MS
EKG Q-T INTERVAL: 404 MS
EKG QRS DURATION: 94 MS
EKG QTC CALCULATION (BAZETT): 426 MS
EKG R AXIS: -45 DEGREES
EKG T AXIS: -12 DEGREES
EKG VENTRICULAR RATE: 67 BPM
ESTIMATED AVERAGE GLUCOSE: 102.5 MG/DL
HBA1C MFR BLD: 5.2 %
HDLC SERPL-MCNC: 48 MG/DL (ref 40–60)
LDL CHOLESTEROL CALCULATED: 87 MG/DL
SARS-COV-2, NAAT: NOT DETECTED
TRIGL SERPL-MCNC: 135 MG/DL (ref 0–150)
VLDLC SERPL CALC-MCNC: 27 MG/DL

## 2020-07-30 PROCEDURE — 6360000002 HC RX W HCPCS: Performed by: NURSE PRACTITIONER

## 2020-07-30 PROCEDURE — 2500000003 HC RX 250 WO HCPCS: Performed by: PSYCHIATRY & NEUROLOGY

## 2020-07-30 PROCEDURE — 6360000002 HC RX W HCPCS: Performed by: PSYCHIATRY & NEUROLOGY

## 2020-07-30 PROCEDURE — 1240000000 HC EMOTIONAL WELLNESS R&B

## 2020-07-30 PROCEDURE — 93005 ELECTROCARDIOGRAM TRACING: CPT | Performed by: PSYCHIATRY & NEUROLOGY

## 2020-07-30 PROCEDURE — 99223 1ST HOSP IP/OBS HIGH 75: CPT | Performed by: PSYCHIATRY & NEUROLOGY

## 2020-07-30 PROCEDURE — U0002 COVID-19 LAB TEST NON-CDC: HCPCS

## 2020-07-30 PROCEDURE — 93010 ELECTROCARDIOGRAM REPORT: CPT | Performed by: INTERNAL MEDICINE

## 2020-07-30 RX ORDER — CHLORPROMAZINE HYDROCHLORIDE 25 MG/ML
50 INJECTION INTRAMUSCULAR ONCE
Status: COMPLETED | OUTPATIENT
Start: 2020-07-30 | End: 2020-07-30

## 2020-07-30 RX ORDER — ZIPRASIDONE MESYLATE 20 MG/ML
20 INJECTION, POWDER, LYOPHILIZED, FOR SOLUTION INTRAMUSCULAR ONCE
Status: COMPLETED | OUTPATIENT
Start: 2020-07-30 | End: 2020-07-30

## 2020-07-30 RX ADMIN — HALOPERIDOL LACTATE 5 MG: 5 INJECTION, SOLUTION INTRAMUSCULAR at 05:18

## 2020-07-30 RX ADMIN — CHLORPROMAZINE HYDROCHLORIDE 50 MG: 25 INJECTION INTRAMUSCULAR at 08:56

## 2020-07-30 RX ADMIN — ZIPRASIDONE MESYLATE 20 MG: 20 INJECTION, POWDER, LYOPHILIZED, FOR SOLUTION INTRAMUSCULAR at 10:19

## 2020-07-30 RX ADMIN — LORAZEPAM 2 MG: 2 INJECTION INTRAMUSCULAR; INTRAVENOUS at 05:18

## 2020-07-30 RX ADMIN — DIPHENHYDRAMINE HYDROCHLORIDE 25 MG: 50 INJECTION, SOLUTION INTRAMUSCULAR; INTRAVENOUS at 05:18

## 2020-07-30 ASSESSMENT — PAIN SCALES - GENERAL: PAINLEVEL_OUTOF10: 0

## 2020-07-30 ASSESSMENT — LIFESTYLE VARIABLES: HISTORY_ALCOHOL_USE: NO

## 2020-07-30 ASSESSMENT — SLEEP AND FATIGUE QUESTIONNAIRES
DO YOU USE A SLEEP AID: NO
DO YOU HAVE DIFFICULTY SLEEPING: NO
AVERAGE NUMBER OF SLEEP HOURS: 6

## 2020-07-30 NOTE — BH NOTE
Pt is awake and eating lunch at table after urinating large amount  And placing pants on He is calmer than this a.m. remains irritable  At times but able to control himself at this time will continue to monitor.   Sander Torres, KEVON-BC

## 2020-07-30 NOTE — PROGRESS NOTES
Patient came out of his room and yelled at the other patients who were watching TV, \"You dam people called me a faggot\" Patient then went toward the direction to other patients in a physically threatening manner. I explained that I didn't hear any one tel Patent was asked to not approach the other patients so quickly as this could be interpreted as a threat to others. Patient continued to argue and was clearly verbalizing psychotic content. 'I'm supposed to get my own hilario! I don't belong here! \" \"Those assholes called me a faggot. \" patient became louder as he continued to make accusations towards the two patients on the small unit. Staff interceded and attempted to get patient to talk about something else, provide some distraction but attempts were unsuccessful. Patient continued to threaten patients and staff. Two patients were removed from the small side and taken to the large side of the [facility] without incident. Patient was advised that verbal and physical threatening wouldn't be tolerated and he was asked to control his violence or he would be given medication to help reduce his agitation. Patient replied, \"You can't give me anything, I'm allergic to everything. \" Patient then started targeting the staff, \"You bitch I'm going to kill you! \"    Patient was asked not to use threatening language toward staff or others. Patient response, \"Fuck you I'll do what I want! \" Patient approached the team station and grabbed the computer in an effort to cause damage. Patient was redirected from threatening staff and expectations that verbal and physical agitation would not be tolerated on the unit. Patient continued to yell \"Fuck you let me out of here, I'm here by mistake. \"

## 2020-07-30 NOTE — PLAN OF CARE
Problem: Altered Mood, Psychotic Behavior:  Goal: Able to verbalize decrease in frequency and intensity of hallucinations  Description: Able to verbalize decrease in frequency and intensity of hallucinations  Outcome: Ongoing  Goal: Able to verbalize reality based thinking  Description: Able to verbalize reality based thinking  Outcome: Ongoing   Patient is not oriented to his situation this evening. He refuses to cooperate with staff, he is noted to threaten staff and peers. At one time this evening he came rushing out of his room and accused the there patents of calling him \"faggot\" I was present at team station and noted that nothing was spoken to the patient. Attempted to deescalate the patient down but he continued to insist that he hear what he heard and was going to get restitution. Other patients were moved to larger side of unit, and kolby required a code violent to be called. Once help arrived kolby was medicated IM with good results Will continue to work with patient to help him eel safe on the unit as well as his peers.

## 2020-07-30 NOTE — PROGRESS NOTES
Patient was noted to be sleep at this time. Respirations are even and easy. Medication was effective to stop agitation and physical acting out by patient. Will continue to monitor patient.

## 2020-07-30 NOTE — PROGRESS NOTES
Explained to patient that he will be released from restraint as soon as he can demonstrate safe behavior. Patient said he is demonstrating good behavior, and I reminded him that he attempted to kick at the vital sign machine and this staff when obtaining vital signs.

## 2020-07-30 NOTE — BH NOTE
Ailin Faust is screaming again. He is threatening staff, stating \"your dead\" also stating that he is \"going to bring this whole system down. \"  Ailin Faust is also screaming, Fuck You repeatedly. He is demanding that he have a \"wing to myself or I will blow this place up. \"

## 2020-07-30 NOTE — BH NOTE
Anita Acevedo woke up screaming that he had received \"psychiatric medications\". Anita Acevedo repeatedly screamed that he wants a private wing, and  that he is going to \"bring this whole system down\". After screaming for 10 minutes, Anita Acevedo screamed that he was hungry. Anita Acevedo was provided snacks and milk. He was able to calm slightly.

## 2020-07-30 NOTE — BH NOTE
Writer assisted KEVON Iniguez and PARAG Nayak in cleaning up pt after he urinated and defecated on the bed. Pt yelling profanities and sexually inappropriate comments during this process. Writer explained step by step what was being done and why. He continued to yell and threaten staff. \" I will fucking kill you bitch! Take me out of these fucking restraints right now! Your hair will fall out! You are a whore! I hope you all have miscarriages! Pt's underwear removed, bed washed and stephan area cleaned and dried. Pt refusing to allow staff to remove his shirt that is soaked with urine. \" I don't care if you cut it off! \" Towel placed under pt to facilitate drying until pt calms down a little more and wrist restraints can be safely removed one at a time to remove his shirt. Pt asking for something to drink. Writer offered him water and he proceeded to spit it at me. Writer explained very clearly the expected behavior before restraints can be removed. Pt showed no evidence of learning.

## 2020-07-30 NOTE — TELEPHONE ENCOUNTER
Spoke with Roseline Wallace, patients brother, states brother is in [facility] and wanted to make sure they would be able to see brother's allergies.  Informed him the [facility] facility would be able to and if they had any additional questions they can call our office as well

## 2020-07-30 NOTE — BH NOTE
21 - 32 mmol/L Final    Anion Gap 07/29/2020 12  3 - 16 Final    Glucose 07/29/2020 132* 70 - 99 mg/dL Final    BUN 07/29/2020 16  7 - 20 mg/dL Final    CREATININE 07/29/2020 0.9  0.8 - 1.3 mg/dL Final    GFR Non- 07/29/2020 >60  >60 Final    Comment: >60 mL/min/1.73m2 EGFR, calc. for ages 25 and older using the  MDRD formula (not corrected for weight), is valid for stable  renal function.  GFR  07/29/2020 >60  >60 Final    Comment: Chronic Kidney Disease: less than 60 ml/min/1.73 sq.m. Kidney Failure: less than 15 ml/min/1.73 sq.m. Results valid for patients 18 years and older.  Calcium 07/29/2020 10.0  8.3 - 10.6 mg/dL Final    Total Protein 07/29/2020 7.6  6.4 - 8.2 g/dL Final    Alb 07/29/2020 4.7  3.4 - 5.0 g/dL Final    Albumin/Globulin Ratio 07/29/2020 1.6  1.1 - 2.2 Final    Total Bilirubin 07/29/2020 0.3  0.0 - 1.0 mg/dL Final    Alkaline Phosphatase 07/29/2020 73  40 - 129 U/L Final    ALT 07/29/2020 35  10 - 40 U/L Final    AST 07/29/2020 40* 15 - 37 U/L Final    Comment: Specimen hemolysis has exceeded the interference as defined by Roche. Value may be falsely increased. Suggest reorder and recollection if  clinically indicated.       Globulin 07/29/2020 2.9  g/dL Final    Ethanol Lvl 07/29/2020 None Detected  mg/dL Final    Comment:    None Detected  Conversion factor:  100 mg/dl = .100 g/dl  For Medical Purposes Only      Amphetamine Screen, Urine 07/29/2020 Neg  Negative <1000ng/mL Final    Barbiturate Screen, Ur 07/29/2020 Neg  Negative <200 ng/mL Final    Benzodiazepine Screen, Urine 07/29/2020 Neg  Negative <200 ng/mL Final    Cannabinoid Scrn, Ur 07/29/2020 Neg  Negative <50 ng/mL Final    Cocaine Metabolite Screen, Urine 07/29/2020 Neg  Negative <300 ng/mL Final    Opiate Scrn, Ur 07/29/2020 Neg  Negative <300 ng/mL Final    Comment: \"Therapeutic levels of pain medication, especially oxycontin and synthetic  opioids, may not be detected by this Methodology. Pain management screen  panel  Drug panel-PM-Hi Res Ur, Interp (PAIN) should be considered for drug  monitoring \".  PCP Screen, Urine 07/29/2020 Neg  Negative <25 ng/mL Final    Methadone Screen, Urine 07/29/2020 Neg  Negative <300 ng/mL Final    Propoxyphene Scrn, Ur 07/29/2020 Neg  Negative <300 ng/mL Final    Oxycodone Urine 07/29/2020 Neg  Negative <100 ng/ml Final    pH, UA 07/29/2020 5.0   Final    Comment: Urine pH less than 5.0 or greater than 8.0 may indicate sample adulteration. Another sample should be collected if clinically  indicated.  Drug Screen Comment: 07/29/2020 see below   Final    Comment: This method is a screening test to detect only these drug  classes as part of a medical workup. Confirmatory testing  by another method should be ordered if clinically indicated.  Acetaminophen Level 07/29/2020 <5* 10 - 30 ug/mL Final    Comment: Therapeutic Range: 10.0-30.0 ug/mL  Toxic: >=546 ug/mL      Salicylate, Serum 31/07/6381 <0.3* 15.0 - 30.0 mg/dL Final    Comment: Therapeutic Range: 15.0-30.0 mg/dL  Toxic: >30.0 mg/dL      Ventricular Rate 07/29/2020 89  BPM Final    Atrial Rate 07/29/2020 89  BPM Final    P-R Interval 07/29/2020 152  ms Final    QRS Duration 07/29/2020 84  ms Final    Q-T Interval 07/29/2020 360  ms Final    QTc Calculation (Bazett) 07/29/2020 438  ms Final    P Axis 07/29/2020 27  degrees Final    R Axis 07/29/2020 -48  degrees Final    T Axis 07/29/2020 19  degrees Final    Diagnosis 07/29/2020 Normal sinus rhythmleft axisLeft anterior fascicular blockNonspecific ST abnormalityConfirmed by PAM QUILES MD ([id number]) on 7/29/2020 2:44:43 PM   Final    TSH 07/29/2020 0.81  0.27 - 4.20 uIU/mL Final         Patient presentation and results of RN assessment has been discussed with the on call physician.

## 2020-07-30 NOTE — BH NOTE
Shavon Barba is continuing to escalate. He asked for a Bible and was provided one then he threw it at the desk because it is the wrong Bible. He continues to threaten staff calling both staff \"faggots\" and suggesting vile acts.

## 2020-07-30 NOTE — BH NOTE
Pt continues to sleep in room EKG done. Respirations easy and unlabored. Will continue to monitor.   Sander Torres, KEVON-BC

## 2020-07-30 NOTE — BH NOTE
Pt asleep in seclusion room. Woke up and walked to room to sleep. Pt cooperative. Will monitor.   Sander Torres, KEVON-BC

## 2020-07-30 NOTE — BH NOTE
Pt continues to be agitated yelling and screaming at staff. Threatening staff telling nurse that she is going to die and refusing to calm down pt is extremely labile. Given Im thorazine 50 mg per physician order at 6508 and pt continues to be labile and uncooperative. Restraints continue to be needed due to aggressive and threatening behaviors towards staff. Will continue every 15 minute checks and 1:1 monitoring.   Sander Torres, RN-BC

## 2020-07-30 NOTE — BH NOTE
Mother who is guardian called to check on patient. She stated her concern was that he would not take any medications and that he is unsafe to go home and making rash decisions at home since family took his keys away. She stated she would like staff to keep her in the loop about Marco Saunas and his care. She was agreeable to medications to treat him. Informed her pt was sleeping after bad phone call home today. Reassured her jared he was safe and that he is slightly  better since Geodon injection. \  Bernarda Reynoso, KEVON-BC

## 2020-07-30 NOTE — H&P
[brexpiprazole]; Simvastatin; Thiothixene; and [place] [cariprazine hcl]    Social History:  The patient currently lives at home    TOBACCO:   reports that he quit smoking about 40 years ago. His smoking use included cigarettes. He has a 0.40 pack-year smoking history. He has never used smokeless tobacco.  ETOH:   reports no history of alcohol use. Family History:   Positive as follows:        Problem Relation Age of Onset    Hypertension Mother     Heart Failure Mother 80    Prostate Cancer Father 52    Cancer Father        REVIEW OF SYSTEMS:     Constitutional: Negative for fever   HENT: Negative for sore throat   Eyes: Negative for redness   Respiratory: Negative  for dyspnea, cough   Cardiovascular: Negative for chest pain   Gastrointestinal: Negative for vomiting, diarrhea   Genitourinary: Negative for hematuria   Musculoskeletal: Negative for arthralgias   Skin: Negative for rash   Neurological: Negative for syncope    Hematological: Negative for easy bruising/bleeding   Psychiatric/Behavorial: Per psychiatry team evaluation     PHYSICAL EXAM:    /83   Pulse 87   Temp 97.7 °F (36.5 °C) (Temporal)   Resp 18   Ht 6' 1\" (1.854 m)   Wt 252 lb (114.3 kg)   SpO2 95%   BMI 33.25 kg/m²   Gen: No distress. Alert. Elderly  male  Eyes: PERRL. No sclera icterus. No conjunctival injection. Glasses  ENT: No discharge. Pharynx clear. Neck: Trachea midline. Resp: No accessory muscle use. No crackles. No wheezes. No rhonchi. On RA  CV: Regular rate. Regular rhythm. No murmur. No rub. No edema. GI: Non-tender. Non-distended. Skin: Warm and dry. No rash on exposed extremities. M/S: No cyanosis. No joint deformity. No clubbing. Neuro: Awake. No focal neurologic deficit on exam.  Cranial nerves II through XII intact. Patient is able to ambulate without difficulty.   Psych: Per psychiatry team evaluation     CBC:   Recent Labs     07/29/20  1130   WBC 7.5   HGB 17.7*   HCT 52.9*   MCV

## 2020-07-30 NOTE — H&P
Patient has been very aggressive with staff and is currently in seclusion. I as asked to postpone my medical H&P at this time. Chart has been reviewed, I agree with current medical POC. I will attempt to evaluate the patient tomorrow.      [place] PA-C  7/30/2020 1:49 PM

## 2020-07-30 NOTE — SIGNIFICANT EVENT
Pt remains in seclusion with restraints in place. He continues to shout threats to/about the staff. He shouts \"I have a weapon. \" Constant observer remains in place.  Peter Beckman Clinical

## 2020-07-30 NOTE — FLOWSHEET NOTE
seclusion room at this time s/t outburts, recent physical aggression directed toward staff. Pt observed exhibiting sleep behaviors at this time. Will perform chart review.)   Barriers to participating    (Writer attempted to meet with pt to complete Leisure Assessment. Pt in seclusion room at this time s/t outburts, recent physical aggression directed toward staff. Pt observed exhibiting sleep behaviors at this time. Will perform chart review.)   Social   Patient reports spending the majority of their free time   (Writer attempted to meet with pt to complete Leisure Assessment. Pt in seclusion room at this time s/t outburts, recent physical aggression directed toward staff. Pt observed exhibiting sleep behaviors at this time. Will perform chart review.)   Patient verbalizes a preference for spending free time   (Writer attempted to meet with pt to complete Leisure Assessment. Pt in seclusion room at this time s/t outburts, recent physical aggression directed toward staff. Pt observed exhibiting sleep behaviors at this time. Will perform chart review.)   Patients perception of support system   (Writer attempted to meet with pt to complete Leisure Assessment. Pt in seclusion room at this time s/t outburts, recent physical aggression directed toward staff. Pt observed exhibiting sleep behaviors at this time. Will perform chart review.)   Patients perception of barriers to socializing with others include(s)   (Writer attempted to meet with pt to complete Leisure Assessment. Pt in seclusion room at this time s/t outburts, recent physical aggression directed toward staff. Pt observed exhibiting sleep behaviors at this time. Will perform chart review.)   Social Details Writer attempted to meet with pt to complete Leisure Assessment. Pt in seclusion room at this time s/t outburts, recent physical aggression directed toward staff. Pt observed exhibiting sleep behaviors at this time. Will perform chart review.    Beliefs & Coping   Has difficulty dealing with feelings     (Writer attempted to meet with pt to complete Leisure Assessment. Pt in seclusion room at this time s/t outburts, recent physical aggression directed toward staff. Pt observed exhibiting sleep behaviors at this time. Will perform chart review.)   Internalizes feelings/Keeps feelings in   (Writer attempted to meet with pt to complete Leisure Assessment. Pt in seclusion room at this time s/t outburts, recent physical aggression directed toward staff. Pt observed exhibiting sleep behaviors at this time. Will perform chart review.)   Externalizes feelings through aggressiveness or poor temper control    (Writer attempted to meet with pt to complete Leisure Assessment. Pt in seclusion room at this time s/t outburts, recent physical aggression directed toward staff. Pt observed exhibiting sleep behaviors at this time. Will perform chart review.)   Feels uncomfortable around others    (Writer attempted to meet with pt to complete Leisure Assessment. Pt in seclusion room at this time s/t outburts, recent physical aggression directed toward staff. Pt observed exhibiting sleep behaviors at this time. Will perform chart review.)   Has difficulty talking to others    (Writer attempted to meet with pt to complete Leisure Assessment. Pt in seclusion room at this time s/t outburts, recent physical aggression directed toward staff. Pt observed exhibiting sleep behaviors at this time. Will perform chart review.)   Depends on others for direction or decisions   (Writer attempted to meet with pt to complete Leisure Assessment. Pt in seclusion room at this time s/t outburts, recent physical aggression directed toward staff. Pt observed exhibiting sleep behaviors at this time. Will perform chart review.)   Difficulty dealing with anger of others    (Writer attempted to meet with pt to complete Leisure Assessment.  Pt in seclusion room at this time s/t outburts, recent physical aggression directed toward staff. Pt observed exhibiting sleep behaviors at this time. Will perform chart review.)   Difficulty dealing with own anger    (Writer attempted to meet with pt to complete Leisure Assessment. Pt in seclusion room at this time s/t outburts, recent physical aggression directed toward staff. Pt observed exhibiting sleep behaviors at this time. Will perform chart review.)   Difficulty managing stress   (Writer attempted to meet with pt to complete Leisure Assessment. Pt in seclusion room at this time s/t outburts, recent physical aggression directed toward staff. Pt observed exhibiting sleep behaviors at this time. Will perform chart review.)   Frequently has difficulty with relationships    (Writer attempted to meet with pt to complete Leisure Assessment. Pt in seclusion room at this time s/t outburts, recent physical aggression directed toward staff. Pt observed exhibiting sleep behaviors at this time. Will perform chart review.)   Has recently perceived/experienced loss, disappointment, humiliation or failure    (Writer attempted to meet with pt to complete Leisure Assessment. Pt in seclusion room at this time s/t outburts, recent physical aggression directed toward staff. Pt observed exhibiting sleep behaviors at this time. Will perform chart review.)   General perception about self   (Writer attempted to meet with pt to complete Leisure Assessment. Pt in seclusion room at this time s/t outburts, recent physical aggression directed toward staff. Pt observed exhibiting sleep behaviors at this time. Will perform chart review.)   Attitude about abilities   (Writer attempted to meet with pt to complete Leisure Assessment. Pt in seclusion room at this time s/t outburts, recent physical aggression directed toward staff. Pt observed exhibiting sleep behaviors at this time. Will perform chart review.)   Locus of Control    (Writer attempted to meet with pt to complete Leisure Assessment.  Pt in seclusion room at this time s/t outburts, recent physical aggression directed toward staff. Pt observed exhibiting sleep behaviors at this time. Will perform chart review.)   Belief about recovery   (Writer attempted to meet with pt to complete Leisure Assessment. Pt in seclusion room at this time s/t outburts, recent physical aggression directed toward staff. Pt observed exhibiting sleep behaviors at this time. Will perform chart review.)   Patient Identified Strengths  Writer attempted to meet with pt to complete Leisure Assessment. Pt in seclusion room at this time s/t outburts, recent physical aggression directed toward staff. Pt observed exhibiting sleep behaviors at this time. Will perform chart review. Patient Identified Limitations  Writer attempted to meet with pt to complete Leisure Assessment. Pt in seclusion room at this time s/t outburts, recent physical aggression directed toward staff. Pt observed exhibiting sleep behaviors at this time. Will perform chart review. Perception of most stressful event prior to hospitalization Writer attempted to meet with pt to complete Leisure Assessment. Pt in seclusion room at this time s/t outburts, recent physical aggression directed toward staff. Pt observed exhibiting sleep behaviors at this time. Will perform chart review. Perception of changes needed Writer attempted to meet with pt to complete Leisure Assessment. Pt in seclusion room at this time s/t outburts, recent physical aggression directed toward staff. Pt observed exhibiting sleep behaviors at this time. Will perform chart review. Strengths and Limitations   Strengths   (Writer attempted to meet with pt to complete Leisure Assessment. Pt in seclusion room at this time s/t outburts, recent physical aggression directed toward staff. Pt observed exhibiting sleep behaviors at this time. Will perform chart review.)   Limitations Difficult relationships / poor social skills; Difficulty problem solving/relies on others to help solve problems; Inappropriate/potentially harmful leisure interests     Writer attempted to meet with pt to complete Leisure Assessment. Pt in seclusion room at this time s/t outburts, recent physical aggression directed toward staff. Pt observed exhibiting sleep behaviors at this time. Leisure assessment completed with chart review.

## 2020-07-30 NOTE — PROGRESS NOTES
Patient was instructed that staff will get him out of restraints at the earliest opportunity. Expected behavior was reviewed and patient responded by howling loudly at the nurse. No learning observed.

## 2020-07-30 NOTE — BH NOTE
Attempted to meet with patient to complete psychosocial assessment. Patient is currently in restraints due to out of control and aggressive behavior. Patient is uncooperative. Will complete via chart review. Patient brought in via PD. Police were called because the patient was outside yelling and threatening the neighbors. Patient was also being aggressive to his mother, whom he lives with. Patient was brought in cussing and spitting at staff. Patient also exhibits delusions. Believes he is a prophet of God. Tox screen negative and no hx to suggest AoD concerns. Hx of schizophrenia, disorganized type. Was admitted to [facility] in 10/19. Oriented x3. Hallucinations appear present. No SI present. HI unable to be determined at this time due to aggressive behavior.
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
## 2020-07-30 NOTE — PROGRESS NOTES
Medications were drawn up and code violent was called. With a show of force, patient although argumentative with staff tolerated getting injections to the left deltoid. No hands on were applied to patient. He was medicated with ativan 2 mg, haldol 5 mg and benadryl 25 mg IM to left deltoid muscle mass. He was verbally argumentative during injections then sat in the dayroom watching TV for a few minutes then went to his room and was asleep shortly after. Will continue to monitor patient.

## 2020-07-30 NOTE — BH NOTE
Pt continues to be agitated and aggressive towards staff. He is pulling at restraints and bed trying to flip it. Medication orders received and talked with dr and charge nurse and decision made to place pt in seclusion with 1:1 after medication given. Currently in seclusion with 1:1 monitor. Will monitor for the  Effectiveness of medications.   Sander Torres, KEVON-BC

## 2020-07-30 NOTE — BH NOTE
Pt out eating dinner stating that he has business until fri and stating \" My whole family is a pile of dog shit\" The rambling on in illogical, disorganized thought process. Continues to randomly threaten to murder family. Will continue to monitor.   Sander Torres, KEVON-BC

## 2020-07-30 NOTE — PROGRESS NOTES
Patient was offered medication to help him calm himself, He replied, \"Fuck you I'm not taking any medication. I explained that he has been unable to control his behavior and it has been increasing in intensity and intent as he has verbalized wanting to kill staff. I got up to obtain medication and patient went to his room.

## 2020-07-30 NOTE — BH NOTE
Pt became irritable after calling home yelling \" Bristow Osgood my family they put me here on the psych hilario\" , encouraged pt to remain calm and to focus on changing his behaviors from violent outbursts and pt chose to go to his room and rest. Will continue to monitor.   Sander Torres, KEVON-BC

## 2020-07-30 NOTE — SIGNIFICANT EVENT
Called for Code Violet/ pt agitation. Upon entering unit, pt standing in day room shouting at staff. POC reviewed with pt by primary nurse - IM shot chosen and administered. Pt approached the nurses station shouting \"I said I want it in my butt\" and other profanities, then proceeded to grab computer and throw it at nurse. Pt then allowed second injection and spit twice at staff. Decision then made to place pt in restraints in seclusion for staff, other patients and patient safety. Inititialy, pt sat on the bed but then resisted and attempted to kick and hit staff. Bed with pt placed in seclusion with constant observer at the door.  Anival Larsen Clinical

## 2020-07-31 PROCEDURE — 6360000002 HC RX W HCPCS: Performed by: NURSE PRACTITIONER

## 2020-07-31 PROCEDURE — 1240000000 HC EMOTIONAL WELLNESS R&B

## 2020-07-31 PROCEDURE — 6360000002 HC RX W HCPCS: Performed by: PSYCHIATRY & NEUROLOGY

## 2020-07-31 PROCEDURE — 99233 SBSQ HOSP IP/OBS HIGH 50: CPT | Performed by: PSYCHIATRY & NEUROLOGY

## 2020-07-31 RX ORDER — HALOPERIDOL DECANOATE 50 MG/ML
200 INJECTION INTRAMUSCULAR
Status: DISCONTINUED | OUTPATIENT
Start: 2020-07-31 | End: 2020-07-31 | Stop reason: SDUPTHER

## 2020-07-31 RX ORDER — HALOPERIDOL DECANOATE 100 MG/ML
200 INJECTION INTRAMUSCULAR
Status: DISCONTINUED | OUTPATIENT
Start: 2020-07-31 | End: 2020-08-03

## 2020-07-31 RX ORDER — HALOPERIDOL 10 MG/1
10 TABLET ORAL EVERY 6 HOURS PRN
Status: DISCONTINUED | OUTPATIENT
Start: 2020-07-31 | End: 2020-08-05 | Stop reason: HOSPADM

## 2020-07-31 RX ORDER — PALIPERIDONE 3 MG/1
3 TABLET, EXTENDED RELEASE ORAL DAILY
Status: DISCONTINUED | OUTPATIENT
Start: 2020-07-31 | End: 2020-07-31

## 2020-07-31 RX ORDER — HALOPERIDOL 5 MG/ML
10 INJECTION INTRAMUSCULAR EVERY 6 HOURS PRN
Status: DISCONTINUED | OUTPATIENT
Start: 2020-07-31 | End: 2020-08-05 | Stop reason: HOSPADM

## 2020-07-31 RX ADMIN — HALOPERIDOL LACTATE 10 MG: 5 INJECTION, SOLUTION INTRAMUSCULAR at 22:54

## 2020-07-31 RX ADMIN — DIPHENHYDRAMINE HYDROCHLORIDE 25 MG: 50 INJECTION, SOLUTION INTRAMUSCULAR; INTRAVENOUS at 19:24

## 2020-07-31 RX ADMIN — DIPHENHYDRAMINE HYDROCHLORIDE 25 MG: 50 INJECTION, SOLUTION INTRAMUSCULAR; INTRAVENOUS at 22:53

## 2020-07-31 RX ADMIN — LORAZEPAM 2 MG: 2 INJECTION INTRAMUSCULAR; INTRAVENOUS at 22:53

## 2020-07-31 RX ADMIN — HALOPERIDOL DECANOATE 200 MG: 100 INJECTION INTRAMUSCULAR at 11:21
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
## 2020-07-31 NOTE — PLAN OF CARE
Patient is still having troubles with aggression and behavioral problems. I was asked to postpone a medical H&P today. We will follow up again tomorrow to attempt reassessment. Chart has been reviewed.  Agree with current medical POC    Triston Palomares PA-C  7/31/2020 1:17 PM

## 2020-07-31 NOTE — BH NOTE
Patient having good conversation with mom on the phone. Patient apologizing for his behavior. Thinks he is going home tomorrow. Thinks people are listening and recording his conversations. Able to remain calm throughout the conversation.

## 2020-07-31 NOTE — PLAN OF CARE
Problem: Altered Mood, Psychotic Behavior:  Goal: Able to verbalize reality based thinking  Description: Able to verbalize reality based thinking  7/31/2020 0800 by Thao Thomason RN  Outcome: Ongoing  Note: Pt in day room constantly yelling at staff. Making statements such as, \"I hate you bitch. \" \"I hate white girls. \" I'll torture you with my bare hands. \" Pt then states, \"I wouldn't get away with acting this way in [place]. \" Pt unable to be redirected.

## 2020-07-31 NOTE — BH NOTE
Purposeful Rounding    Patient Location Nurses station    Patient willing to engage in conversationYes    Presentation/behavior Attention Seeking, Agitated, Disorganized, Disruptive and Imulsive    Affect Labile, Irritable and Angry    Concerns reported: Pt highly agitated d/t not being discharged    PRN medications given: None    Effectiveness of PRN medication given: N/A    Environmental assessment No safety hazards noted    Fall prevention interventions in place:  Low risk

## 2020-07-31 NOTE — BH NOTE
Purposeful Rounding    Patient Location Day room    Patient willing to engage in conversationYes    Presentation/behavior Attention Seeking and Agitated    Affect Inappropriate/Incongruent and Labile    Concerns reported: None    PRN medications given: None    Effectiveness of PRN medication given: N/A    Environmental assessment No safety hazards noted    Fall prevention interventions in place: Low risk

## 2020-07-31 NOTE — BH NOTE
Pt currently threatening dietary staff, stating \"You oriental bitch! I'll kill you! You stupid oriental whore! \" Pt unable to be verbally deescalated, currently still yelling at dietary staff.

## 2020-07-31 NOTE — PROGRESS NOTES
PHARMACY NOTE  Zackery Wren was ordered Pitavastatin. Per the [address], this medication is non-formulary and not stocked by pharmacy. Patient has adverse reactions to three other statins and is unable to have his home medication brought to the hospital.  The medication can be reordered at discharge.    Rolly Ocampo R.Ph.7/31/20209:31 AM
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
## 2020-07-31 NOTE — PLAN OF CARE
Problem: Altered Mood, Psychotic Behavior:  Goal: Able to verbalize decrease in frequency and intensity of hallucinations  Description: Able to verbalize decrease in frequency and intensity of hallucinations  Outcome: Ongoing  Goal: Able to verbalize reality based thinking  Description: Able to verbalize reality based thinking  Outcome: Ongoing   Patient is noted at times to yell out to unseen people in his room. Tonight he is less irritable and confrontational as compared to yesterday evening. He denied any A/V hallucinations.

## 2020-07-31 NOTE — BH NOTE
Pt approached nurses station continuing to yell as he has throughout shift and made a show of gathering spit in his mouth and spit in the direction of staff members.

## 2020-07-31 NOTE — BH NOTE
Computer monitor removed from nurses station d/t pt threatening staff and making comments about destroying property.

## 2020-07-31 NOTE — BH NOTE
During safety check, Huseyin Sanchez called to this nurse to his room. Huseyin Sanchez told this nurse, \"I am a deceitful man and I will kill you with my bare hands. \"  Huseyin Sanchez is currently laying in bed, yelling at staff periodically.

## 2020-07-31 NOTE — BH NOTE
When patient heard writer state that his mother gave consent for Haldol Decanoate he stated, \"If she lets me have that medicine I will get her. \" Report filed with APS by Sukhwinder Waldrop. APS requires notification when the patient is discharged.

## 2020-07-31 NOTE — PROGRESS NOTES
Department of Psychiatry  Progress Note    Patient's chart was reviewed. Discussed with treatment team. Met with patient. SUBJECTIVE:     Still very pressured, agitated, delusional, and impaired. Limited-to-no insight. Does not believe he has an illness that would benefit from psychiatric treatment. Declined invega po today. ROS:   Patient has new complaints: no  Sleeping adequately:  No:    Appetite adequate: Yes  Engaged in programming: No: not able to participate     OBJECTIVE:  VITALS:  BP (!) 110/59   Pulse 101   Temp 98 °F (36.7 °C) (Temporal)   Resp 16   Ht 6' 1\" (1.854 m)   Wt 252 lb (114.3 kg)   SpO2 98%   BMI 33.25 kg/m²     Mental Status Examination:    Appearance: in restraints when initially approached, later in his room     Behavior/Attitude toward examiner:  Suspicious   Speech:  Pressured, elevated volume  Mood:  \"ok\"  Affect:  Elevated   Thought processes:  Disorganized   Thought Content: no SI, + HI none specific, grandiose delusions   Perceptions:  No AVH, no RTIS  Attention: impaired  Abstraction: impaired  Cognition: impaired  Insight: impaired   Judgment: impaired     Medication:  Scheduled:   paliperidone  3 mg Oral Daily    haloperidol decanoate  200 mg Intramuscular Q30 Days        PRN:  acetaminophen, ibuprofen, traZODone, benztropine mesylate, magnesium hydroxide, aluminum & magnesium hydroxide-simethicone, hydrOXYzine, diphenhydrAMINE **OR** diphenhydrAMINE, haloperidol **OR** haloperidol lactate, LORazepam **OR** LORazepam     Formulation: domiciled, never , disabled 61yo with a history of schizophrenia who was brought to the ED via Mobile Crisis Team and police in handcuffs with psychotic symptoms. Active Problems:    Schizophrenia ([facility] [address]  Resolved Problems:    * No resolved hospital problems. *     Plan:  [facility]     2. On admission, - work toward stabilization with a neuroleptic.  Pt has declined scheduled PO so far. Ordered Q15min checks for safety, prn medication, and the structured milieu. Many antipsychotics listed in EPIC as allergies but not likely true allergies. 7/31/2020 - The patient continues to decline PO medication and is at high risk without treatment. Given he has already received Haldol IM here without issue and given we cannot give a test PO doses of other SALVADOR candidates such as invega, we've decided on Haldol Decanoate. Given his presentation, the risks of not treating him outweigh the risks of treatment him with Haldol. We discussed this with his guarding who gave approval. Haldol decanoate 200mg IM given.      3. Internal medicine consult for admission.     4. Further collateral information for diagnostic clarification and care coordination.      5. ELOS - 5-8 days for stabilization. Is voluntary by mother Guardian. Total time with patient was 35 minutes and more than 50 % of that time was spent counseling the patient on their symptoms, treatment, and expected goals.      Tj Blue MD, [address] [place] [address],Third Floor, [facility]

## 2020-07-31 NOTE — BH NOTE
Purposeful Rounding    Patient Location Day room    Patient willing to engage in conversationYes    Presentation/behavior Attention Seeking, Agitated, Disruptive and Imulsive    Affect Labile    Concerns reported: None    PRN medications given: None    Effectiveness of PRN medication given: N/A    Environmental assessment Pt is threatening to \"bust the tv\" so TV removed from unit    Fall prevention interventions in place: Low risk
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
## 2020-07-31 NOTE — PROGRESS NOTES
Occupational Therapy Attempt    Unit: [facility]   Date:  7/31/2020  Patient Name:    Terrance Vanessa  Admitting diagnosis:  Schizophrenia [facility]) [F20.9]  Admit Date:  7/29/2020    Attempt @ 1454: Hold. Per staff, pt not appropriate for OT evaluation at this time. Will re-attempt at a later date and pt is agreeable/appropriate. Thank you.      СВЕТЛАНА Bañuelos, OTR/L   [id number]      No Charge

## 2020-07-31 NOTE — BH NOTE
Purposeful Rounding    Patient Location Day room    Patient willing to engage in conversationYes    Presentation/behavior Attention Seeking, Agitated, Disruptive and Other Loud, Verbally abusive*    Affect Irritable and Angry    Concerns reported: \"I want to go home with Mumsy. \"    PRN medications given: None    Effectiveness of PRN medication given: N/A    Environmental assessment No safety hazards noted    Fall prevention interventions in place: Low risk

## 2020-07-31 NOTE — BH NOTE
Telephone consent obtained from pt's guardian, Cherry Cornell, for release of information for [employer] and [employer]

## 2020-07-31 NOTE — BH NOTE
Pt approached writer caroline, \"Give me six Benadryl, bitch! \" Explained to pt that he does have benadryl ordered but we are unable to give six tablets. Ordered dose retrieved and attempted to give to pt but pt refused stating \"I want six! I've been off Benadryl for four days! I'm an instigator, agitator. \"

## 2020-07-31 NOTE — BH NOTE
Pt's mother/guardian called and gave consent for writer to give Josue Panchal (pt's brother) the keys and garage  that the pt brought in.

## 2020-08-01 PROCEDURE — 99222 1ST HOSP IP/OBS MODERATE 55: CPT | Performed by: PHYSICIAN ASSISTANT

## 2020-08-01 PROCEDURE — 99233 SBSQ HOSP IP/OBS HIGH 50: CPT | Performed by: PSYCHIATRY & NEUROLOGY

## 2020-08-01 PROCEDURE — 6360000002 HC RX W HCPCS: Performed by: PSYCHIATRY & NEUROLOGY

## 2020-08-01 PROCEDURE — 6370000000 HC RX 637 (ALT 250 FOR IP): Performed by: NURSE PRACTITIONER

## 2020-08-01 PROCEDURE — 1240000000 HC EMOTIONAL WELLNESS R&B

## 2020-08-01 RX ORDER — ATORVASTATIN CALCIUM 10 MG/1
10 TABLET, FILM COATED ORAL DAILY
Status: DISCONTINUED | OUTPATIENT
Start: 2020-08-01 | End: 2020-08-05 | Stop reason: HOSPADM

## 2020-08-01 RX ORDER — HALOPERIDOL DECANOATE 100 MG/ML
100 INJECTION INTRAMUSCULAR
Status: COMPLETED | OUTPATIENT
Start: 2020-08-01 | End: 2020-08-01

## 2020-08-01 RX ORDER — HALOPERIDOL DECANOATE 50 MG/ML
100 INJECTION INTRAMUSCULAR
Status: CANCELLED | OUTPATIENT
Start: 2020-08-01 | End: 2020-08-01

## 2020-08-01 RX ADMIN — HALOPERIDOL DECANOATE 100 MG: 100 INJECTION INTRAMUSCULAR at 15:05

## 2020-08-01 RX ADMIN — LORAZEPAM 2 MG: 2 TABLET ORAL at 19:15

## 2020-08-01 RX ADMIN — DIPHENHYDRAMINE HCL 50 MG: 25 TABLET ORAL at 19:15

## 2020-08-01 NOTE — BH NOTE
Pt getting anxious and agitated about shift change, worried about having a \"bad night with,\" with new staff. Making statements about not speaking to them because he believed, \"they just stood by when I was screaming for help. \"   Pt asked for medication to help him calm down and help with sleep. PRN benadryl and ativan giver per order.

## 2020-08-01 NOTE — PROGRESS NOTES
Department of Psychiatry  Progress Note    Patient's chart was reviewed. Discussed with treatment team. Met with patient. SUBJECTIVE:     Over less agitated and pressured today but continues to make delusional and grandiose statements. Required emergency medical intervention. No signs of EPS on haldol. Has trace perioral TD - this is long standing. No new    ROS:   Patient has new complaints: no  Sleeping adequately:  No:    Appetite adequate: Yes  Engaged in programming: No: not able to participate     OBJECTIVE:  VITALS:  /68   Pulse 73   Temp 97.1 °F (36.2 °C) (Temporal)   Resp 16   Ht 6' 1\" (1.854 m)   Wt 252 lb (114.3 kg)   SpO2 95%   BMI 33.25 kg/m²     Mental Status Examination:    Appearance: in milieu; less agitated  Behavior/Attitude toward examiner:  less suspicious   Speech: less Pressured  Mood:  \"ok i'm going home\"  Affect:  Elevated   Thought processes:  Disorganized   Thought Content: no SI, no HI, grandiose delusions   Perceptions:  No AVH, no RTIS  Attention: impaired  Abstraction: impaired  Cognition: impaired  Insight: impaired   Judgment: impaired     Medication:  Scheduled:   atorvastatin  10 mg Oral Daily    haloperidol decanoate  200 mg Intramuscular Q30 Days        PRN:  haloperidol decanoate, haloperidol **OR** haloperidol lactate, acetaminophen, ibuprofen, traZODone, benztropine mesylate, magnesium hydroxide, aluminum & magnesium hydroxide-simethicone, hydrOXYzine, diphenhydrAMINE **OR** diphenhydrAMINE, LORazepam **OR** LORazepam     Formulation: domiciled, never , disabled 63yo with a history of schizophrenia who was brought to the ED via Mobile Crisis Team and police in handcuffs with psychotic symptoms. Principal Problem:    Schizophrenia ([facility] [address]  Active Problems:    Essential hypertension    Hyperlipidemia  Resolved Problems:    * No resolved hospital problems. *     Plan:  1. Admit to impatent psychiatry for stabilization and treatment.     2.  On admission, - work toward stabilization with a neuroleptic. Pt has declined scheduled PO so far. Ordered Q15min checks for safety, prn medication, and the structured milieu. Many antipsychotics listed in EPIC as allergies but not likely true allergies. 7/31/2020 - The patient continues to decline PO medication and is at high risk without treatment. Given he has already received Haldol IM here without issue and given we cannot give a test PO doses of other SALVADOR candidates such as invega, we've decided on Haldol Decanoate. Given his presentation, the risks of not treating him outweigh the risks of treatment him with Haldol. We discussed this with his guarding who gave approval. Haldol decanoate 200mg IM given. 8/1/2020 - Haldol Decanoate 100mg for a total of 300mg.      3. Internal medicine consult for admission.     4. Further collateral information for diagnostic clarification and care coordination.      5. ELOS - 5-8 days for stabilization. Is voluntary by mother Guardian. Total time with patient was 35 minutes and more than 50 % of that time was spent counseling the patient on their symptoms, treatment, and expected goals.      Patrice Anthony MD, [facility] [address] [place],Third Floor, [facility]OST[place]
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
## 2020-08-01 NOTE — BH NOTE
[id number] [facility]  Day 3 Interdisciplinary Treatment Plan NOTE    Review Date & Time: 08/01/2020 0900    Patient was not in treatment team    Admission Type:   Admission Type: Involuntary    Reason for admission:  Reason for Admission: schizophrenia, non compliant with treatment. Estimated Length of Stay Update:  1-3 days   Estimated Discharge Date Update: 1-3 days     PATIENT STRENGTHS:  Patient Strengths Strengths: Positive Support, No significant Physical Illness  Patient Strengths and Limitations:Limitations: Tendency to isolate self, Unrealistic self-view, General negative or hopeless attitude about future/recovery, Lacks leisure interests, Demonstrates discomfort with /lack of social skills, Multiple barriers to leisure interests, Difficulty problem solving/relies on others to help solve problems, Difficult relationships / poor social skills, External locus of control  Addictive Behavior:Addictive Behavior  In the past 3 months, have you felt or has someone told you that you have a problem with:  : None  Do you have a history of Chemical Use?: No  Do you have a history of Alcohol Use?: No  Do you have a history of Street Drug Abuse?: No  Histroy of Prescripton Drug Abuse?: No  Medical Problems:  Past Medical History:   Diagnosis Date    Elevated liver enzymes 9/1/2017    Hypertension     Pure hypercholesterolemia 8/31/2017       Risk:  Fall RiskTotal: 49  Osman Scale Osman Scale Score: 22  BVC Total: 3  Change in scores NO. Changes to plan of Care  NO    Status EXAM:   Status and Exam  Normal: No  Facial Expression: Elevated  Affect: Stable  Level of Consciousness: Alert  Mood:Normal: No  Mood: Labile, Suspicious, Irritable  Motor Activity:Normal: No  Motor Activity: Increased  Interview Behavior: (S) Irritable  Preception: [place] to Person, [place] to Time, [place] to Place  Attention:Normal: No  Attention: Distractible  Thought Processes: Flt.of Ideas, Loose Assoc.   Thought Content:Normal: Short-Term Goals:  By time of discharge       Ngozi Painting RN

## 2020-08-01 NOTE — BH NOTE
Pt given 100mg haldol-D per order. Pt behaviors have been improved as compared to documented behaviors on previous days. Pt speech has been inappropriate on multiple occassions, usually directed toward staff with racial and/or political overtones. Patient very focused on being allowed to leave, denying that he has any problems and does not need to be on unit.

## 2020-08-01 NOTE — FLOWSHEET NOTE
08/01/20 1706   Status and Exam   Normal No   Facial Expression Exaggerated   Affect Inappropriate   Level of Consciousness Alert   Mood:Normal No   Mood Labile; Suspicious;Irritable   Motor Activity:Normal Yes   Interview Behavior Cooperative; Impulsive;Evasive   Preception [place] to Person;[place] to Time;[place] to Place;[place] to Situation   Attention:Normal No   Attention Unable to Concentrate   Thought Processes Flt. of Ideas; Loose Assoc. Cheril Gathers   Thought Content:Normal No   Thought Content Delusions;Preoccupations; Obsessions   Hallucinations Other (Comment)  (denies)   Delusions Yes   Delusions Grandeur;Persecution   Memory:Normal No   Memory Confabulation   Insight and Judgment No   Insight and Judgment Poor Judgment;Poor Insight;Unrealistic   Present Suicidal Ideation No   Present Homicidal Ideation No

## 2020-08-01 NOTE — PROGRESS NOTES
Patient has been yelling loudly threatening staff members that he will \"Snap your neck! If you come over here. \" Patient then went on to yell that he was with the [facility] and he is a mole and he will be reporting everything as he said, \"I will bring this place down. \" He continues to say that staff are \"witches and demons. \" He is noted to accentuate his ranting with slamming his fists on the counter, gestures that he is breaking staff member's neck and continues to ask why he is here and when it is explained why he is here, he calls staff liars and idiots. He is reminded several times that staff isn't making any stories up about him, as we weren't involved in the circumstances that brought him to the [facility]. Patient is noted to yell frequently \"take me to [place]. \" ( for mental health care.) Patient is displaying FOI, and loose associations. He is grandiose. Labile and threatening to staff.

## 2020-08-01 NOTE — PLAN OF CARE
Problem: Altered Mood, Psychotic Behavior:  Goal: Able to verbalize decrease in frequency and intensity of hallucinations  Description: Able to verbalize decrease in frequency and intensity of hallucinations  Outcome: Ongoing     Problem: Altered Mood, Psychotic Behavior:  Goal: Able to verbalize reality based thinking  Description: Able to verbalize reality based thinking  Outcome: Ongoing

## 2020-08-01 NOTE — BH NOTE
Patient standing at the nursing station yelling and screaming at staff members. Patient is verbally threatening a staff member. Patient stating, \"I am going to kill you, and snap your neck. Patient states, \"I am going to [employer]. \"

## 2020-08-02 PROCEDURE — 6370000000 HC RX 637 (ALT 250 FOR IP): Performed by: NURSE PRACTITIONER

## 2020-08-02 PROCEDURE — 6370000000 HC RX 637 (ALT 250 FOR IP): Performed by: PSYCHIATRY & NEUROLOGY

## 2020-08-02 PROCEDURE — 99233 SBSQ HOSP IP/OBS HIGH 50: CPT | Performed by: PSYCHIATRY & NEUROLOGY

## 2020-08-02 PROCEDURE — 1240000000 HC EMOTIONAL WELLNESS R&B

## 2020-08-02 RX ADMIN — LORAZEPAM 2 MG: 2 TABLET ORAL at 19:05

## 2020-08-02 RX ADMIN — HALOPERIDOL 10 MG: 10 TABLET ORAL at 09:56

## 2020-08-02 RX ADMIN — DIPHENHYDRAMINE HCL 50 MG: 25 TABLET ORAL at 09:55

## 2020-08-02 RX ADMIN — DIPHENHYDRAMINE HCL 50 MG: 25 TABLET ORAL at 19:05

## 2020-08-02 NOTE — FLOWSHEET NOTE
08/02/20 0903   Status and Exam   Normal No   Facial Expression Exaggerated   Affect Incongruent   Level of Consciousness Alert   Mood:Normal No   Mood Anxious; Suspicious   Motor Activity:Normal Yes   Interview Behavior Cooperative; Impulsive   Preception [place] to Person;[place] to Time;[place] to Place;[place] to Situation   Attention:Normal No   Attention Distractible   Thought Processes Loose Assoc. ;Tangential;Perseveration;Flt. of Ideas   Thought Content:Normal No   Thought Content Delusions; Ideas of Influence;Obsessions;Paranoia; Poverty of Content;Preoccupations   Hallucinations None   Delusions Yes   Delusions Grandeur; Influence;Persecution   Memory:Normal No   Memory Poor Recent   Insight and Judgment No   Insight and Judgment Poor Judgment;Poor Insight   Present Suicidal Ideation No   Present Homicidal Ideation No

## 2020-08-02 NOTE — BH NOTE
PRN medication were effective in helping patient better organize thoughts and control impulses. Pt stated that he would \"behave himself,\" and not antagonize other pts if they moved onto unit.

## 2020-08-02 NOTE — PLAN OF CARE
Patient has been in bed most of the shift. Patient been cooperative. Patient was verbal in 1:1, with rapid pressured  speech. Patient reported not knowing why he is here. Patient later stated,\"I guess I'm here to get drugs. \" Patient stated several times, that he guess he will sleep, because he took 2 benadryl & ativan. \"I only need 2 hours. \" Patient with with rambling disorganized speech. Patient asked for the [facility] version & was giving a Bible. Patient started to read it, but then stated the pages stunk. Patient later tossed the Bible in the lazo. Patient denied having hallucinations, but appeared preoccupied. Patient was noted to be talking to himself at intervals. Patient offer of something to drink & an evening snack. Patient appeared asleep, with his eyes closed at 20:50. Patient was awaken briefly at 00:15, after another patient was yelling. Patient went to the bathroom & returned to sleep.  Alfonso Ridley R.N.

## 2020-08-02 NOTE — BH NOTE
Pt preoccupied with thoughts about illicit drugs, specifically hallucinogenics and stimulants, \"synthetic lsd is terrible, but natural stuff like mescaline and peyote is great, cocaine is kind of natural but its bad too. \"  Pt returns to this topic several times a day. Pt behavior has been mostly appropriate with the exception of some sexually inappropriate comments about a staff members breasts.

## 2020-08-02 NOTE — PROGRESS NOTES
Department of Psychiatry  Progress Note    Patient's chart was reviewed. Discussed with treatment team. Met with patient. SUBJECTIVE:     Continues to make gains. Less agitated and more appropriate. Fewer delusional remarks. Tends to get louder and threatening when he doesn't get something he's requested but no-longer as the result of psychotic symptoms. ROS:   Patient has new complaints: no  Sleeping adequately:  No:    Appetite adequate: Yes  Engaged in programming: No: not able to participate     OBJECTIVE:  VITALS:  BP (!) 145/74   Pulse 77   Temp 96.9 °F (36.1 °C) (Temporal)   Resp 16   Ht 6' 1\" (1.854 m)   Wt 252 lb (114.3 kg)   SpO2 93%   BMI 33.25 kg/m²     Mental Status Examination:    Appearance: in milieu; less agitated  Behavior/Attitude toward examiner:  friendly   Speech: less Pressured  Mood:  \"page\"  Affect:  Elevated   Thought processes:  more organized  Thought Content: no SI, no HI, less grandiose  Perceptions:  No AVH, no RTIS  Attention: improving  Abstraction: impaired  Cognition: impaired  Insight: impaired   Judgment: improving    Medication:  Scheduled:   atorvastatin  10 mg Oral Daily    haloperidol decanoate  200 mg Intramuscular Q30 Days        PRN:  sodium chloride, haloperidol **OR** haloperidol lactate, acetaminophen, ibuprofen, traZODone, benztropine mesylate, magnesium hydroxide, aluminum & magnesium hydroxide-simethicone, hydrOXYzine, diphenhydrAMINE **OR** diphenhydrAMINE, LORazepam **OR** LORazepam     Formulation: domiciled, never , disabled 63yo with a history of schizophrenia who was brought to the ED via Mobile Crisis Team and police in handcuffs with psychotic symptoms. Principal Problem:    Schizophrenia ([facility] [address]  Active Problems:    Essential hypertension    Hyperlipidemia  Resolved Problems:    * No resolved hospital problems. *     Plan[facility]  [facility]     2.  On admission, - work toward stabilization with a neuroleptic. Pt has declined scheduled PO so far. Ordered Q15min checks for safety, prn medication, and the structured milieu. Many antipsychotics listed in EPIC as allergies but not likely true allergies. 7/31/2020 - The patient continues to decline PO medication and is at high risk without treatment. Given he has already received Haldol IM here without issue and given we cannot give a test PO doses of other SALVADOR candidates such as invega, we've decided on Haldol Decanoate. Given his presentation, the risks of not treating him outweigh the risks of treatment him with Haldol. We discussed this with his guarding who gave approval. Haldol decanoate 200mg IM given. 8/1/2020 - Haldol Decanoate 100mg for a total of 300mg.      3. Internal medicine consult for admission.     4. Further collateral information for diagnostic clarification and care coordination.      5. ELOS - 5-8 days for stabilization. Is voluntary by mother Guardian. Total time with patient was 35 minutes and more than 50 % of that time was spent counseling the patient on their symptoms, treatment, and expected goals.      Negrito Reyna MD, [address] [place] [address],Third Floor, FOST[place]

## 2020-08-02 NOTE — BH NOTE
Pt making statements about wanting to murder his brother stating that the brother, \"is a evil warlock, he has tried to kill me several times, I will bash his head against a wall till he is dead. \"    When asked if he seriously wanted to kill his brother right now, pt responded, \"No, not right now, he backed off - But I would have then. \"  Pt then went on lengthy violent description of how he would have killed him and his subsequent enternal damnation.

## 2020-08-02 NOTE — PLAN OF CARE
Problem: Altered Mood, Psychotic Behavior:  Goal: Able to verbalize decrease in frequency and intensity of hallucinations  Description: Able to verbalize decrease in frequency and intensity of hallucinations  Outcome: Ongoing     Problem: Altered Mood, Psychotic Behavior:  Goal: Able to verbalize reality based thinking  Description: Able to verbalize reality based thinking  8/2/2020 0919 by Stephen Rai RN  Outcome: Ongoing     Problem: Anger Management/Homicidal Ideation:  Goal: Able to display appropriate communication and problem solving  Description: Able to display appropriate communication and problem solving  Outcome: Ongoing     Problem: Anger Management/Homicidal Ideation:  Goal: Absence of angry outbursts  Description: Absence of angry outbursts  Outcome: Ongoing     Problem: Anger Management/Homicidal Ideation:  Goal: Absence of homicidal ideation  Description: Absence of homicidal ideation  Outcome: Ongoing
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
## 2020-08-02 NOTE — PROGRESS NOTES
Patient has been awake since, 04:15. \"I slept the most that I have in a long time. I don't need to sleep again for a week. \" Patient has been talking non-stop. Patient with loose delusional thoughts. \"I only come around every 1,750 years. I'm 1 of a kind. My nancy calls me & I pop up. \" Patient stated that he will come back as a woman next time & asked if I wanted to see his breasts. Patient was easily redirected from raising his shirt up. Patient kept asking to go home. \"Today is meat loaf day. My mom fixes meatloaf & potatoes. \" Patient was redirected from being verbally sexually inappropriate. \" A woman would take off her skirt & panties & get on her knees. \" Estuardo Ridley R.N.

## 2020-08-03 PROCEDURE — 99233 SBSQ HOSP IP/OBS HIGH 50: CPT | Performed by: PSYCHIATRY & NEUROLOGY

## 2020-08-03 PROCEDURE — 1240000000 HC EMOTIONAL WELLNESS R&B

## 2020-08-03 PROCEDURE — 6370000000 HC RX 637 (ALT 250 FOR IP): Performed by: PHYSICIAN ASSISTANT

## 2020-08-03 PROCEDURE — 6370000000 HC RX 637 (ALT 250 FOR IP): Performed by: NURSE PRACTITIONER

## 2020-08-03 RX ORDER — HALOPERIDOL DECANOATE 100 MG/ML
300 INJECTION INTRAMUSCULAR
Status: DISCONTINUED | OUTPATIENT
Start: 2020-08-30 | End: 2020-08-05 | Stop reason: HOSPADM

## 2020-08-03 RX ADMIN — ATORVASTATIN CALCIUM 10 MG: 10 TABLET, FILM COATED ORAL at 08:02

## 2020-08-03 RX ADMIN — LORAZEPAM 2 MG: 2 TABLET ORAL at 08:02

## 2020-08-03 RX ADMIN — DIPHENHYDRAMINE HCL 50 MG: 25 TABLET ORAL at 08:02

## 2020-08-03 NOTE — BH NOTE
PRN ativan 2mg Po and PRN benadryl 50mg PO given with scheduled lipitor. Pt complaint with taking medication orally.

## 2020-08-03 NOTE — PROGRESS NOTES
Department of Psychiatry  Progress Note    Patient's chart was reviewed. Discussed with treatment team. Met with patient. SUBJECTIVE:     Psychotic symptoms under better contol. Continues with behavioral disturbances 2/2 not getting something he wants right away and because he wants to be discharged to mother's home. This is now a little more complicated because APS has gotten involved. Has not expressed thoughts of wanting to harm himself of his mother. \"I'd never hurt her. \"    ROS:   Patient has new complaints: yes; wants to go home  Sleeping adequately:  No:    Appetite adequate: Yes  Engaged in programming: No: not able to participate     OBJECTIVE:  VITALS:  /75   Pulse 72   Temp 97.1 °F (36.2 °C) (Temporal)   Resp 16   Ht 6' 1\" (1.854 m)   Wt 252 lb (114.3 kg)   SpO2 95%   BMI 33.25 kg/m²     Mental Status Examination:    Appearance: in room   Behavior/Attitude toward examiner: acting out   Speech: loud  Mood:  \"page\"  Affect:  Elevated   Thought processes:  more organized  Thought Content: no SI, no HI, less grandiose  Perceptions:  No AVH, no RTIS  Attention: improving  Abstraction: impaired  Cognition: impaired  Insight: impaired   Judgment: improving    Medication:  Scheduled:   atorvastatin  10 mg Oral Daily    haloperidol decanoate  200 mg Intramuscular Q30 Days        PRN:  sodium chloride, haloperidol **OR** haloperidol lactate, acetaminophen, ibuprofen, traZODone, benztropine mesylate, magnesium hydroxide, aluminum & magnesium hydroxide-simethicone, hydrOXYzine, diphenhydrAMINE **OR** diphenhydrAMINE, LORazepam **OR** LORazepam     Formulation: domiciled, never , disabled 63yo with a history of schizophrenia who was brought to the ED via Mobile Crisis Team and police in handcuffs with psychotic symptoms. Principal Problem:    Schizophrenia ([facility] [address]  Active Problems:    Essential hypertension    Hyperlipidemia  Resolved Problems:    * No resolved hospital problems.  *

## 2020-08-03 NOTE — BH NOTE
Writer spoke briefly with APS worker [employer] [employer] [phone] regarding pt progress with plans to call her back after treatment team. Writer attempted to reach her again and left a message for a return call to learn about APS disposition with recommended discharge being in 1-2 days. Writer was able to speak with pt's mother, APS worker, and sister regarding family concerns and discharge disposition. Family is concerned for pt's ongoing psychosis and anger and mother/guardian's safety should he return home. They would like for pt to remain on the unit as long as possible to increase medication efficacy and to give pt time to come down from his psychosis. APS shared that they will remain in place after hospitalization to provide support to mother until things are safe and calm. Writer discussed recommendations for [address] [place] [address] ([facility] upon discharge and the need for mother/guardian to assist in initiating these services. Initial follow up injection can be schedule here at [address] to ensure an appointment is maintained for discharge.     Bob Hardin MSW, LSW
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
## 2020-08-03 NOTE — PLAN OF CARE
Problem: Anger Management/Homicidal Ideation:  Goal: Able to display appropriate communication and problem solving  Description: Able to display appropriate communication and problem solving  Outcome: Ongoing   Patient awake and already visible on unit this morning. Pt presents with slightly brightened affect, pressured speech, and FOI. At times, pt heard making sexual comments. Pt cooperative during 1:1 interaction with staff. Pt made phone call to his mother and was able to remain appropriate conversation while on phone. Pt compliant with taking scheduled Lipitor, as well as PRN ativan and benadryl to maintain current state of behavior. 100% of breakfast consumed. Pt currently resting calmly in assigned bed and room. Will continue to monitor for safety and behavioral disturbances.

## 2020-08-04 PROCEDURE — 1240000000 HC EMOTIONAL WELLNESS R&B

## 2020-08-04 PROCEDURE — 99233 SBSQ HOSP IP/OBS HIGH 50: CPT | Performed by: PSYCHIATRY & NEUROLOGY

## 2020-08-04 PROCEDURE — 6370000000 HC RX 637 (ALT 250 FOR IP): Performed by: NURSE PRACTITIONER

## 2020-08-04 PROCEDURE — 6370000000 HC RX 637 (ALT 250 FOR IP): Performed by: PSYCHIATRY & NEUROLOGY

## 2020-08-04 RX ORDER — LORAZEPAM 1 MG/1
1 TABLET ORAL ONCE
Status: COMPLETED | OUTPATIENT
Start: 2020-08-04 | End: 2020-08-04

## 2020-08-04 RX ORDER — DIPHENHYDRAMINE HCL 25 MG
50 TABLET ORAL ONCE
Status: COMPLETED | OUTPATIENT
Start: 2020-08-04 | End: 2020-08-04

## 2020-08-04 RX ORDER — DIPHENHYDRAMINE HCL 25 MG
25 TABLET ORAL ONCE
Status: COMPLETED | OUTPATIENT
Start: 2020-08-04 | End: 2020-08-04

## 2020-08-04 RX ORDER — LORAZEPAM 2 MG/1
2 TABLET ORAL ONCE
Status: COMPLETED | OUTPATIENT
Start: 2020-08-04 | End: 2020-08-04

## 2020-08-04 RX ADMIN — DIPHENHYDRAMINE HCL 50 MG: 25 TABLET ORAL at 12:40

## 2020-08-04 RX ADMIN — LORAZEPAM 2 MG: 2 TABLET ORAL at 12:40

## 2020-08-04 RX ADMIN — LORAZEPAM 1 MG: 2 TABLET ORAL at 21:05

## 2020-08-04 RX ADMIN — LORAZEPAM 2 MG: 2 TABLET ORAL at 13:22

## 2020-08-04 RX ADMIN — DIPHENHYDRAMINE HCL 50 MG: 25 TABLET ORAL at 13:22

## 2020-08-04 RX ADMIN — DIPHENHYDRAMINE HCL 25 MG: 25 TABLET ORAL at 21:05

## 2020-08-04 ASSESSMENT — PAIN SCALES - GENERAL: PAINLEVEL_OUTOF10: 0

## 2020-08-04 NOTE — PROGRESS NOTES
Occupational Therapy      Attempted OT eval.  Hold OT today secondary to patient's agitation.     Onetha Mikayla, OTR/L  #[phone]

## 2020-08-04 NOTE — BH NOTE
The patient continues to be  disruptive AEB yelling fuck you I'm going to take you all down and staff redirected and continuing to monitor behavior . Staff using Tactful interruption , expectation of cooperation and choice A/B when pt  threating staff .

## 2020-08-04 NOTE — PLAN OF CARE
Pt refused Lipitor, stated it made him urinate all night and in the bed. Pt has screamed and cursed at staff, demanding for his messes to be cleaned up. Demanding to be discharged. Making sexually inappropriate comments towards staff, calling staff crude names, wishing staff to \"get breast and cervical cancer and die\", and making statement about burning this place down. Pt then prays to God to be discharged, talking about demons and being delivered. Pt calmed down after speaking with Dr. Calli Ross and apologized to writer for his outburst. Lying in bed crying at this time, \"Oh, my God. I want to go home to my mommy\". Will continue to monitor.

## 2020-08-04 NOTE — BH NOTE
The patient continues to be disruptive AEB yelling at staff  and staff continue to monitor behavior using wale skills.
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
## 2020-08-04 NOTE — BH NOTE
Purposeful Rounding    Patient Location Patient room    Patient willing to engage in conversationNo    Presentation/behavior Other pt is resting in bed at this time*        Concerns reported:     PRN medications given:    Effectiveness of PRN medication given:    Environmental assessment Clear path to bathroom , Adequate lighting and No safety hazards noted    Fall prevention interventions in place: Yellow non-skid socks on

## 2020-08-04 NOTE — BH NOTE
Purposeful Rounding    Patient Location Day room    Patient willing to engage in conversationYes    Presentation/behavior Agitated, Disorganized and Imulsive    Affect irritable    Concerns reported:     PRN medications given:    Effectiveness of PRN medication given:    Environmental assessment Clear path to bathroom , Adequate lighting and No safety hazards noted    Fall prevention interventions in place: Yellow non-skid socks on

## 2020-08-04 NOTE — BH NOTE
Purposeful Rounding    Patient Location Patient room    Patient willing to engage in conversationYes    Presentation/behavior Other pt resting in bed*    Affect Flat/blunted    Concerns reported:     PRN medications given:    Effectiveness of PRN medication given:    Environmental assessment room free from clutter, Clear path to bathroom , Adequate lighting and No safety hazards noted    Fall prevention interventions in place: Yellow non-skid socks on

## 2020-08-04 NOTE — BH NOTE
The patient became disruptive threat of ekaterina RIOJAS yelling I m going to kill and Dr Moisés Cummins going to get it too. fuck you fuck you   and staff redirected continued to vent and yell. .pt refusing meds

## 2020-08-04 NOTE — BH NOTE
Purposeful Rounding    Patient Location Day room    Patient willing to engage in conversationYes    Presentation/behavior Agitated, Excessive Activity/Restless and Disorganized    Affect Irritable and Restless    Concerns reported:     PRN medications given:    Effectiveness of PRN medication given:    Environmental assessment Clear path to bathroom , Adequate lighting and No safety hazards noted    Fall prevention interventions in place: Yellow non-skid socks on

## 2020-08-04 NOTE — PROGRESS NOTES
Department of Psychiatry  Progress Note    Patient's chart was reviewed. Discussed with treatment team. Met with patient. SUBJECTIVE:     Occasional delusional-like comments but when pressed acknowledges he doesn't actually believe them. For example, says his brother is a \"warlock\" and when asked about this in more detaill, says he doesn't actually believe he is a warlock, he just doesn't like him    This morning was very agitated because he hoped to go home. However, he was able to calm and speak with me in an organized and productive manner. Periodically throughout the morning he's been acting out to gain attention and says is doing so.      ROS:   Patient has new complaints: yes; wants to go home  Sleeping adequately:  No = 4-5 hours    Appetite adequate: Yes  Engaged in programming: No: not able to participate     OBJECTIVE:  VITALS:  BP (!) 147/92   Pulse 92   Temp 97 °F (36.1 °C) (Temporal)   Resp 18   Ht 6' 1\" (1.854 m)   Wt 252 lb (114.3 kg)   SpO2 96%   BMI 33.25 kg/m²     Mental Status Examination:    Appearance: in room   Behavior/Attitude toward examiner: irritable   Speech: loud  Mood:  \"fine\"  Affect:  irritable  Thought processes:  more organized  Thought Content: no SI, no HI, less grandiose  Perceptions:  No AVH, no RTIS  Attention: improving  Abstraction: impaired  Cognition: impaired  Insight: impaired   Judgment: improving    Medication:  Scheduled:   [START ON 8/30/2020] haloperidol decanoate  300 mg Intramuscular Q30 Days    atorvastatin  10 mg Oral Daily      PRN:  sodium chloride, haloperidol **OR** haloperidol lactate, acetaminophen, ibuprofen, traZODone, benztropine mesylate, magnesium hydroxide, aluminum & magnesium hydroxide-simethicone, hydrOXYzine, diphenhydrAMINE **OR** diphenhydrAMINE, LORazepam **OR** LORazepam     Formulation: domiciled, never , disabled 61yo with a history of schizophrenia who was brought to the ED via Mobile Crisis Team and police in handcuffs with psychotic symptoms. Principal Problem:    Schizophrenia ([facility] [address]  Active Problems:    Essential hypertension    Hyperlipidemia  Resolved Problems:    * No resolved hospital problems. *     Plan:  [facility].     2. On admission, - work toward stabilization with a neuroleptic. Pt has declined scheduled PO so far. Ordered Q15min checks for safety, prn medication, and the structured milieu. Many antipsychotics listed in EPIC as allergies but not likely true allergies. 7/31/2020 - The patient continues to decline PO medication and is at high risk without treatment. Given he has already received Haldol IM here without issue and given we cannot give a test PO doses of other SALVADOR candidates such as invega, we've decided on Haldol Decanoate. Given his presentation, the risks of not treating him outweigh the risks of treatment him with Haldol. We discussed this with his guarding who gave approval. Haldol decanoate 200mg IM given. 8/1/2020 - Haldol Decanoate 100mg for a total of 300mg.      3. Internal medicine consult for admission.     4. Further collateral information for diagnostic clarification and care coordination.      5. ELOS - 5-8 days for stabilization. Is voluntary by mother Guardian. [employer] DC on tomorrow orThursday if maintains psychiatric gains. Total time with patient was 35 minutes and more than 50 % of that time was spent counseling the patient on their symptoms, treatment, and expected goals.      Darling Lutz MD, [address] [place] [address],Third Floor, [facility]

## 2020-08-04 NOTE — BH NOTE
Patient given another 50 mg Benadryl as well as Ativan 2 mg per Dr. Beata Carrillo order due to behaviors to gain attention. Patient states \"see, acting up got Dr. Beata Carrillo to come back. \"

## 2020-08-05 VITALS
TEMPERATURE: 96.9 F | SYSTOLIC BLOOD PRESSURE: 133 MMHG | HEIGHT: 73 IN | RESPIRATION RATE: 16 BRPM | DIASTOLIC BLOOD PRESSURE: 85 MMHG | BODY MASS INDEX: 33.4 KG/M2 | OXYGEN SATURATION: 96 % | HEART RATE: 79 BPM | WEIGHT: 252 LBS

## 2020-08-05 PROCEDURE — 6370000000 HC RX 637 (ALT 250 FOR IP): Performed by: PHYSICIAN ASSISTANT

## 2020-08-05 PROCEDURE — 6370000000 HC RX 637 (ALT 250 FOR IP): Performed by: NURSE PRACTITIONER

## 2020-08-05 PROCEDURE — 6370000000 HC RX 637 (ALT 250 FOR IP): Performed by: PSYCHIATRY & NEUROLOGY

## 2020-08-05 PROCEDURE — 5130000000 HC BRIDGE APPOINTMENT

## 2020-08-05 PROCEDURE — 99239 HOSP IP/OBS DSCHRG MGMT >30: CPT | Performed by: PSYCHIATRY & NEUROLOGY

## 2020-08-05 RX ORDER — HALOPERIDOL DECANOATE 100 MG/ML
300 INJECTION INTRAMUSCULAR
Qty: 6 ML | Refills: 1 | Status: SHIPPED | OUTPATIENT
Start: 2020-08-31 | End: 2020-08-05

## 2020-08-05 RX ORDER — HALOPERIDOL DECANOATE 100 MG/ML
300 INJECTION INTRAMUSCULAR
Qty: 6 ML | Refills: 1 | Status: SHIPPED | OUTPATIENT
Start: 2020-08-31 | End: 2020-09-21 | Stop reason: SDUPTHER

## 2020-08-05 RX ORDER — LORAZEPAM 1 MG/1
1 TABLET ORAL ONCE
Status: COMPLETED | OUTPATIENT
Start: 2020-08-05 | End: 2020-08-05

## 2020-08-05 RX ORDER — LORAZEPAM 2 MG/1
2 TABLET ORAL EVERY 6 HOURS PRN
Qty: 10 TABLET | Refills: 0 | Status: SHIPPED | OUTPATIENT
Start: 2020-08-05 | End: 2020-08-12

## 2020-08-05 RX ADMIN — DIPHENHYDRAMINE HCL 50 MG: 25 TABLET ORAL at 10:15

## 2020-08-05 RX ADMIN — ATORVASTATIN CALCIUM 10 MG: 10 TABLET, FILM COATED ORAL at 07:47

## 2020-08-05 RX ADMIN — LORAZEPAM 1 MG: 1 TABLET ORAL at 11:52

## 2020-08-05 RX ADMIN — LORAZEPAM 2 MG: 2 TABLET ORAL at 07:47

## 2020-08-05 RX ADMIN — HYDROXYZINE PAMOATE 50 MG: 25 CAPSULE ORAL at 07:46

## 2020-08-05 NOTE — BH NOTE
Preoccupations  Hallucinations: None  Delusions: No  Delusions: (None noted)  Memory:Normal: Yes  Memory: Poor Recent  Insight and Judgment: No  Insight and Judgment: Poor Judgment, Poor Insight  Present Suicidal Ideation: No  Present Homicidal Ideation: No    Daily Assessment Last Entry:   Daily Sleep (WDL): Exceptions to WDL         Patient Currently in Pain: Denies  Daily Nutrition (WDL): Within Defined Limits  Appetite Change: Normal for patient  Barriers to Nutrition: None  Level of Assistance: Independent/Self    Patient Monitoring:  Frequency of Checks: 4 times per hour, close    Psychiatric Symptoms:   Depression Symptoms  Depression Symptoms: Impaired concentration  Anxiety Symptoms  Anxiety Symptoms: Generalized  Rebeka Symptoms  Rebeka Symptoms: Hypersexuality, Labile, Poor judgment     Psychosis Symptoms  Delusion Type: No problems reported or observed. Suicide Risk CSSR-S:  1) Within the past month, have you wished you were dead or wished you could go to sleep and not wake up? : No  2) Have you actually had any thoughts of killing yourself? : No  6) Have you ever done anything, started to do anything, or prepared to do anything to end your life?: No    EDUCATION:   Learner Progress Toward Treatment Goals: Reviewed goals and plan of care    Method: Individual    Outcome: Needs reinforcement and No evidence of Learning    PATIENT GOALS: Patient did not attend goals group    PLAN/TREATMENT RECOMMENDATIONS UPDATE:  Patient will take medication as prescribed, eat 75% of meals, attend groups, participate in milieu activities, participate in treatment team and care planning for discharge and follow up.     GOALS UPDATE:  Time frame for Short-Term Goals: 1-2 days      Shirley Smalls RN

## 2020-08-05 NOTE — PROGRESS NOTES
Patient awake at the start of shift and as soon as staff came out of report he was making demands of Ativan and Benadryl. He was stating that \"I need this right now\" when staff tried to redirect him about giving them some time to check his chart. He then raised his voice stating \"right now I need Benadryl and Ativan or else\". He was able to wait and did not do anything for staff to look up his chart. He was cooperative with taking the medication offered. He was offered Haldol, but he refused to take that. He is out on the unit continuously wanting staff's attention.

## 2020-08-05 NOTE — BH NOTE
Jenny Fuentes is at the desk demanding \"my ativan and benadryl\". Jenny Fuentes was informed that he does not have ativan and benadryl available at this time. Trazodone and vistaril were offered but refused. Jenny Fuentes demanded the nurse to order the medication as he wants it and when told that was not possible, went back to his room.

## 2020-08-05 NOTE — BH NOTE
Pt sleeping at shift change. Easily aroused for vital signs and back to sleep. He woke up at 2100 and requested \"Ativan and Benedryl\". Admin per one time order. Pt questioned 1 tab of Benedryl and 1/2 tab of Ativan. He was satisfied with explanation of one time dose. He came to the common area and ate his supper, had a soda and a bag of chips and talked with writer. He states he wants to be employed bay a temporary staffing company \"After Labor Day\". When asked what  He wanted to be employed as, he stated \"Nothing\". He is pleasant and cooperative. He denies SI/HI/AVH. No RTIS noted. He went back to bed after eating and taking his medications.

## 2020-08-05 NOTE — BH NOTE
Dr. John Will called and informed of Hakeem's demands and behavior. Dr John Will was informed of the available PRN medications for Peiro Painter. This nurse suggested that if Piero Painter comes to the desk again, Vistaril and Trazodone would be offered again. Dr John Will made no new orders at this time.

## 2020-08-05 NOTE — BH NOTE
[facility]  Discharge Note    Pt discharged with followings belongings:   Dentures: None  Vision - Corrective Lenses: Glasses  Hearing Aid: None  Jewelry: None  Body Piercings Removed: N/A  Clothing: Pants, Shirt, Footwear  Were All Patient Medications Collected?: Not Applicable  Other Valuables: [address] sent home with patient. Valuables retrieved from safe and returned to patient. Patient education on aftercare instructions: yes. Information faxed to [facility] Access by this writer. Patient verbalize understanding of AVS:  yes.     Status EXAM upon discharge:  Status and Exam  Normal: No  Facial Expression: Hostile, Flat  Affect: Unstable, Incongruent  Level of Consciousness: Alert  Mood:Normal: No  Mood: Irritable, Labile  Motor Activity:Normal: Yes  Motor Activity: Decreased  Interview Behavior: Impulsive, Irritable, Cooperative  Preception: [place] to Person, Brandi Rebel to Time, [place] to Place  Attention:Normal: No  Attention: Distractible  Thought Processes: Circumstantial  Thought Content:Normal: No  Thought Content: Preoccupations  Hallucinations: None  Delusions: No  Delusions: (None noted)  Memory:Normal: Yes  Memory: Poor Recent  Insight and Judgment: No  Insight and Judgment: Poor Judgment, Poor Insight  Present Suicidal Ideation: No  Present Homicidal Ideation: No      Metabolic Screening:    Lab Results   Component Value Date    LABA1C 5.2 07/29/2020       Lab Results   Component Value Date    CHOL 162 07/29/2020    CHOL 209 (H) 06/05/2019    CHOL 193 02/27/2019    CHOL 255 (H) 06/01/2018    CHOL 215 (H) 03/19/2018    CHOL 148 08/31/2017     Lab Results   Component Value Date    TRIG 135 07/29/2020    TRIG 239 (H) 06/05/2019    TRIG 181 (H) 02/27/2019    TRIG 177 (H) 06/01/2018    TRIG 212 (H) 03/19/2018    TRIG 112 08/31/2017     Lab Results   Component Value Date    HDL 48 07/29/2020    HDL 45 06/05/2019    HDL 46 02/27/2019    HDL 50 06/01/2018    HDL 46 03/19/2018    HDL 50 08/31/2017     No components found for: [facility]  Lab Results   Component Value Date    LABVLDL 27 07/29/2020    LABVLDL 48 06/05/2019    LABVLDL 36 02/27/2019    LABVLDL 35 06/01/2018    LABVLDL 42 03/19/2018    LABVLDL 22 08/31/2017       Noel Aguilar RN    Bridge Appointment completed: Reviewed Discharge Instructions with patient. Patient verbalizes understanding and agreement with the discharge plan using the teachback method.      Referral for Outpatient Tobacco Cessation Counseling, upon discharge (soledad X if applicable and completed):    ( )  Hospital staff assisted patient to call Quit Line or faxed referral during hospitalization                  (X)  Recognizing danger situations (included triggers and roadblocks), if not completed on admission                    (X)  Coping skills (new ways to manage stress, exercise, relaxation techniques, changing routine, distraction), if not completed on admission                                                           (X)  Basic information about quitting (benefits of quitting, techniques in how to quit, available resources, if not completed on admission  ( ) Referral for counseling faxed to [facility]   ( ) Patient refused referral  ( ) Patient refused counseling  ( ) Patient refused smoking cessation medication upon discharge

## 2020-08-05 NOTE — PLAN OF CARE
Problem: Altered Mood, Psychotic Behavior:  Goal: Able to verbalize decrease in frequency and intensity of hallucinations  Description: Able to verbalize decrease in frequency and intensity of hallucinations  Outcome: Ongoing     Problem: Anger Management/Homicidal Ideation:  Goal: Absence of angry outbursts  Description: Absence of angry outbursts  Outcome: Ongoing   Patient has been awake and visible out on the unit since the start of shift. He is A&Ox3 and does not seem to completely understand what lead to his admission. He denies that he is SI/HI and denies hallucinations, but says God talks to him about personal things. He presents impulsive and demanding majority of the time interacting with staff. He was on the phone with his mom and had to be redirected about talking to her. He was able to be redirected. He has been focused today on receiving Ativan and Benadryl. He has received a dose of each, plus Vistaril. Once he found out he was going to be discharged today he was restless and anxious about when he can leave. He did manage redirection for that well.

## 2020-08-05 NOTE — BH NOTE
Writer spoke with mother/guardian for permission to send collateral information to [facility] Access Team for assistance in placing pt in services at discharge. Mother was in agreement with this and will also be reaching out to them tomorrow if they've not called her directly. Writer contacted the [facility] Access  Chitra Fountain as well as [facility] CM supervisor for [address] [place] [address] and the ACT  for [facility] to provide collateral information and documentation for referral to service. Jobyr will continue to collaborate with sources for aftercare planning. Writer left message for APS worker Johny Sousa with plan for referral to [facility] as well as the contact with the Access/CM teams. [facility] provided [facility] team with contact information for MsCristopher Nicole for continued collaboration post-discharge.     Rio Downey MSW, LSW

## 2020-08-10 RX ORDER — HALOPERIDOL DECANOATE 50 MG/ML
300 INJECTION INTRAMUSCULAR ONCE
Status: CANCELLED
Start: 2020-08-28

## 2020-08-27 ENCOUNTER — TELEPHONE (OUTPATIENT)
Dept: FAMILY MEDICINE CLINIC | Age: 60
End: 2020-08-27

## 2020-08-27 NOTE — TELEPHONE ENCOUNTER
Timothy GARCIA with [facility] is requesting a return call from pt's PCP to discuss pt's previous mental health diagnosis. Can you please contact [facility] with information?     [facility] [place]  [phone]      Please advise  Thank you

## 2020-08-27 NOTE — TELEPHONE ENCOUNTER
Farheen GARCIA with [facility] is requesting a return call from pt's PCP to discuss pt's previous mental health diagnosis. Can you please contact Pat Horton with information?

## 2020-08-27 NOTE — TELEPHONE ENCOUNTER
Did pt sign HIPPA release for me to talk to her? Is HIPPA needed since she is with insurance company?

## 2020-09-01 ENCOUNTER — HOSPITAL ENCOUNTER (OUTPATIENT)
Dept: NURSING | Age: 60
Setting detail: INFUSION SERIES
Discharge: HOME | End: 2020-09-01
Payer: COMMERCIAL

## 2020-09-01 VITALS
WEIGHT: 240 LBS | RESPIRATION RATE: 20 BRPM | HEIGHT: 73 IN | DIASTOLIC BLOOD PRESSURE: 78 MMHG | TEMPERATURE: 98.8 F | BODY MASS INDEX: 31.81 KG/M2 | HEART RATE: 77 BPM | SYSTOLIC BLOOD PRESSURE: 131 MMHG

## 2020-09-01 DIAGNOSIS — F20.9 SCHIZOPHRENIA, UNSPECIFIED TYPE (HCC): Primary | ICD-10-CM

## 2020-09-01 PROCEDURE — 96372 THER/PROPH/DIAG INJ SC/IM: CPT

## 2020-09-01 PROCEDURE — 6360000002 HC RX W HCPCS: Performed by: SOCIAL WORKER

## 2020-09-01 PROCEDURE — 99201 HC NEW PT, OUTPT VISIT LEVEL 1: CPT

## 2020-09-01 RX ORDER — HALOPERIDOL DECANOATE 50 MG/ML
300 INJECTION INTRAMUSCULAR ONCE
Status: CANCELLED
Start: 2020-09-01

## 2020-09-01 RX ORDER — HALOPERIDOL DECANOATE 100 MG/ML
300 INJECTION INTRAMUSCULAR ONCE
Status: COMPLETED | OUTPATIENT
Start: 2020-09-01 | End: 2020-09-01

## 2020-09-01 RX ADMIN — HALOPERIDOL DECANOATE 300 MG: 100 INJECTION INTRAMUSCULAR at 11:50

## 2020-09-01 NOTE — PROGRESS NOTES
Pt here with family for a haldol D injection  Pt calm, pleasant and cooperative  Have discussed haldol D with pt's family ie mother and brother and I have verified dosage with Teena Lyles RN for Dr. Angelica Tian  Pt received Haldol D as an in patient and per his family and it makes him very sleepy in general  It also gives him tardive dysknesia as a side effect but pt has never had any obvious breathing reactions  Pt also does have benadryl at home if needed  Haldol D 150 mg was given IM in rt and left deltoid without difficulty  bandaid applied to site  Sites unremarkable  Pt prerna well  Pt and his brother were given then number for [facility] to call and make appt or get further instructions from Dr. Angelica Tian  Discharge instructions reviewed with pt and his family and understanding was verbalized  Pt was then discharged ambulatory in stable condition with his family

## 2020-09-02 ENCOUNTER — TELEPHONE (OUTPATIENT)
Dept: NURSING | Age: 60
End: 2020-09-02

## 2020-09-02 NOTE — TELEPHONE ENCOUNTER
Received call from [address] mother at 0800 today  She reported that after yesterday's injection that Lj Robertson was having some difficulty with his breathing last nite  She reports the same thing that happened after the injection was given in the hospital is happening again She reports that he is currently sleeping I instructed her to call 911 for any breathing issues and get him to the nearest ER for evaluation  Pt stated that she would  I then called and informed Nirali LUND at [facility] and she will do a follow up call with his mother in a few mins
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
## 2020-09-11 ENCOUNTER — HOSPITAL ENCOUNTER (EMERGENCY)
Age: 60
Discharge: HOME | End: 2020-09-11
Attending: EMERGENCY MEDICINE
Payer: COMMERCIAL

## 2020-09-11 ENCOUNTER — APPOINTMENT (OUTPATIENT)
Dept: GENERAL RADIOLOGY | Age: 60
End: 2020-09-11
Payer: COMMERCIAL

## 2020-09-11 VITALS
BODY MASS INDEX: 31.81 KG/M2 | OXYGEN SATURATION: 95 % | RESPIRATION RATE: 14 BRPM | WEIGHT: 240 LBS | DIASTOLIC BLOOD PRESSURE: 97 MMHG | HEIGHT: 73 IN | HEART RATE: 73 BPM | SYSTOLIC BLOOD PRESSURE: 112 MMHG | TEMPERATURE: 98.4 F

## 2020-09-11 LAB
A/G RATIO: 1.5 (ref 1.1–2.2)
ALBUMIN SERPL-MCNC: 4.3 G/DL (ref 3.4–5)
ALP BLD-CCNC: 97 U/L (ref 40–129)
ALT SERPL-CCNC: 23 U/L (ref 10–40)
ANION GAP SERPL CALCULATED.3IONS-SCNC: 12 MMOL/L (ref 3–16)
AST SERPL-CCNC: 16 U/L (ref 15–37)
BASOPHILS ABSOLUTE: 0 K/UL (ref 0–0.2)
BASOPHILS RELATIVE PERCENT: 0.3 %
BILIRUB SERPL-MCNC: 0.3 MG/DL (ref 0–1)
BUN BLDV-MCNC: 12 MG/DL (ref 7–20)
CALCIUM SERPL-MCNC: 9.6 MG/DL (ref 8.3–10.6)
CHLORIDE BLD-SCNC: 103 MMOL/L (ref 99–110)
CO2: 24 MMOL/L (ref 21–32)
CREAT SERPL-MCNC: 1 MG/DL (ref 0.8–1.3)
EKG ATRIAL RATE: 72 BPM
EKG DIAGNOSIS: NORMAL
EKG P AXIS: 31 DEGREES
EKG P-R INTERVAL: 156 MS
EKG Q-T INTERVAL: 384 MS
EKG QRS DURATION: 88 MS
EKG QTC CALCULATION (BAZETT): 420 MS
EKG R AXIS: -54 DEGREES
EKG T AXIS: 1 DEGREES
EKG VENTRICULAR RATE: 72 BPM
EOSINOPHILS ABSOLUTE: 0.2 K/UL (ref 0–0.6)
EOSINOPHILS RELATIVE PERCENT: 2.6 %
GFR AFRICAN AMERICAN: >60
GFR NON-AFRICAN AMERICAN: >60
GLOBULIN: 2.8 G/DL
GLUCOSE BLD-MCNC: 118 MG/DL (ref 70–99)
HCT VFR BLD CALC: 54.2 % (ref 40.5–52.5)
HEMOGLOBIN: 18.4 G/DL (ref 13.5–17.5)
LYMPHOCYTES ABSOLUTE: 1.3 K/UL (ref 1–5.1)
LYMPHOCYTES RELATIVE PERCENT: 16.9 %
MCH RBC QN AUTO: 30.7 PG (ref 26–34)
MCHC RBC AUTO-ENTMCNC: 34 G/DL (ref 31–36)
MCV RBC AUTO: 90.3 FL (ref 80–100)
MONOCYTES ABSOLUTE: 0.6 K/UL (ref 0–1.3)
MONOCYTES RELATIVE PERCENT: 8.2 %
NEUTROPHILS ABSOLUTE: 5.4 K/UL (ref 1.7–7.7)
NEUTROPHILS RELATIVE PERCENT: 72 %
PDW BLD-RTO: 13.6 % (ref 12.4–15.4)
PLATELET # BLD: 263 K/UL (ref 135–450)
PMV BLD AUTO: 8.2 FL (ref 5–10.5)
POTASSIUM SERPL-SCNC: 4.4 MMOL/L (ref 3.5–5.1)
PRO-BNP: 32 PG/ML (ref 0–124)
RBC # BLD: 6 M/UL (ref 4.2–5.9)
SODIUM BLD-SCNC: 139 MMOL/L (ref 136–145)
TOTAL PROTEIN: 7.1 G/DL (ref 6.4–8.2)
TROPONIN: <0.01 NG/ML
WBC # BLD: 7.5 K/UL (ref 4–11)

## 2020-09-11 PROCEDURE — 93010 ELECTROCARDIOGRAM REPORT: CPT | Performed by: INTERNAL MEDICINE

## 2020-09-11 PROCEDURE — 85025 COMPLETE CBC W/AUTO DIFF WBC: CPT

## 2020-09-11 PROCEDURE — 71046 X-RAY EXAM CHEST 2 VIEWS: CPT

## 2020-09-11 PROCEDURE — 96374 THER/PROPH/DIAG INJ IV PUSH: CPT

## 2020-09-11 PROCEDURE — 93005 ELECTROCARDIOGRAM TRACING: CPT | Performed by: EMERGENCY MEDICINE

## 2020-09-11 PROCEDURE — 99285 EMERGENCY DEPT VISIT HI MDM: CPT

## 2020-09-11 PROCEDURE — 83880 ASSAY OF NATRIURETIC PEPTIDE: CPT

## 2020-09-11 PROCEDURE — 84484 ASSAY OF TROPONIN QUANT: CPT

## 2020-09-11 PROCEDURE — 6360000002 HC RX W HCPCS: Performed by: PHYSICIAN ASSISTANT

## 2020-09-11 PROCEDURE — 80053 COMPREHEN METABOLIC PANEL: CPT

## 2020-09-11 RX ORDER — DIPHENHYDRAMINE HYDROCHLORIDE 50 MG/ML
25 INJECTION INTRAMUSCULAR; INTRAVENOUS ONCE
Status: DISCONTINUED | OUTPATIENT
Start: 2020-09-11 | End: 2020-09-11

## 2020-09-11 RX ORDER — HALOPERIDOL DECANOATE 50 MG/ML
300 INJECTION INTRAMUSCULAR ONCE
Status: CANCELLED
Start: 2020-09-29

## 2020-09-11 RX ORDER — LORAZEPAM 2 MG/ML
0.5 INJECTION INTRAMUSCULAR ONCE
Status: COMPLETED | OUTPATIENT
Start: 2020-09-11 | End: 2020-09-11

## 2020-09-11 RX ADMIN — LORAZEPAM 0.5 MG: 2 INJECTION INTRAMUSCULAR; INTRAVENOUS at 17:09

## 2020-09-11 ASSESSMENT — ENCOUNTER SYMPTOMS
COUGH: 0
ABDOMINAL PAIN: 0
SHORTNESS OF BREATH: 1
VOMITING: 0

## 2020-09-11 NOTE — ED PROVIDER NOTES
[address] ED  EMERGENCY DEPARTMENT ENCOUNTER        Pt Name: Tadeo Macdonald  MRN: [id number]  [place] 1960  Date of evaluation: 9/11/2020  Provider: Soraida Trammell PA-C  PCP: JOSE Roth    Shared Visit or Autonomous Visit:  I have seen and evaluated this patient with my supervising physician Cee Kimbrough MD.    [facility]       Chief Complaint   Patient presents with    Shortness of Breath     startted in mid August after started on Haldol injections for psych issues. pt gets a monthly Haldol injection and is getting progressively worse       HISTORY OF PRESENT ILLNESS   (Location/Symptom, Timing/Onset, Context/Setting, Quality, Duration, Modifying Factors, Severity)  Note limiting factors. Tadeo Macdonald is a 56 y.o. male presenting to the emergency department with complaint of feeling short of breath for the past 5 weeks states since he started getting Haldol shots has gotten them last was 10 days ago. Family in the room states this has been an ongoing problem he has been having side effects with medications and feeling short of breath off and on for the past 8 years. States he had a bad reaction in the past to a medication he thinks was Clozaril and he had heart failure at that time. No history of asthma. Former smoker. No leg pain or swelling. Denies any cough or fevers. States feels like he cannot get a good breath in or out states this starts when he wakes up in the morning lasts all day states it goes away at night when he goes to sleep and is back the next morning. Family in the room mentions a friend of theirs that has a rare form of pneumonia they were concerned he may have something like this. Nursing Notes were reviewed    REVIEW OF SYSTEMS    (2-9 systems for level 4, 10 or more for level 5)     Review of Systems   Constitutional: Negative for chills and fever. Respiratory: Positive for shortness of breath. Negative for cough. Cardiovascular: Negative for chest pain and leg swelling. Gastrointestinal: Negative for abdominal pain and vomiting. Neurological: Negative for dizziness and syncope. All other systems reviewed and are negative. Positives and Pertinent negatives as per HPI. PAST MEDICAL HISTORY     Past Medical History:   Diagnosis Date    Elevated liver enzymes 9/1/2017    Hypertension     Pure hypercholesterolemia 8/31/2017         SURGICAL HISTORY     Past Surgical History:   Procedure Laterality Date    NOSE SURGERY      deviated septum         CURRENTMEDICATIONS       Discharge Medication List as of 9/11/2020  5:44 PM      CONTINUE these medications which have NOT CHANGED    Details   haloperidol decanoate (HALDOL DECANOATE) 100 MG/ML injection Inject 3 mLs into the muscle every 30 days Schizophrenia     Next dose due: 8/30/2020, Disp-6 mL,R-1Print      Flaxseed, Linseed, (FLAX SEED OIL PO) Take 1 capsule by mouth dailyHistorical Med      Multiple Vitamins-Minerals (MULTIVITAMIN ADULT PO) Take by mouthHistorical Med      Coenzyme Q10 (CO Q 10) 10 MG CAPS Take by mouthHistorical Med      pitavastatin (LIVALO) 2 MG TABS tablet Take 0.5 tablets by mouth nightly, Disp-45 tablet, R-1Normal      Omega-3 Fatty Acids (FISH OIL) 1000 MG CAPS Take 1,000 mg by mouth dailyHistorical Med               ALLERGIES     Clozaril [clozapine]; Crestor [rosuvastatin]; Haldol [haloperidol]; Invega [paliperidone er]; Naty Debbie; Lipitor [atorvastatin]; Mevacor [lovastatin]; Olanzapine; Rexulti [brexpiprazole]; Simvastatin;  Thiothixene; and Vraylar [cariprazine hcl]    FAMILYHISTORY       Family History   Problem Relation Age of Onset    Hypertension Mother     Heart Failure Mother 80    Prostate Cancer Father 52    Cancer Father           SOCIAL HISTORY       Social History     Socioeconomic History    Marital status: Single     Spouse name: None    Number of children: 1    Years of education: 15    Highest education level: None   Occupational History     Employer: UNEMPLOYED   Social Needs    Financial resource strain: None    Food insecurity     Worry: None     Inability: None    Transportation needs     Medical: None     Non-medical: None   Tobacco Use    Smoking status: Former Smoker     Packs/day: 0.10     Years: 4.00     Pack years: 0.40     Types: Cigarettes     Last attempt to quit: 1980     Years since quittin.7    Smokeless tobacco: Never Used    Tobacco comment: 1 pk per year - social smoker, didn't smoke much at all    Substance and Sexual Activity    Alcohol use: No    Drug use: No     Comment: Pt states he does \"natural stuff\"    Sexual activity: Never   Lifestyle    Physical activity     Days per week: None     Minutes per session: None    Stress: None   Relationships    Social connections     Talks on phone: None     Gets together: None     Attends [employer] service: None     Active member of club or organization: None     Attends meetings of clubs or organizations: None     Relationship status: None    Intimate partner violence     Fear of current or ex partner: None     Emotionally abused: None     Physically abused: None     Forced sexual activity: None   Other Topics Concern    None   Social History Narrative    None       SCREENINGS    [place] Coma Scale  Eye Opening: Spontaneous  Best Verbal Response: Oriented  Best Motor Response: Obeys commands  Isiah Coma Scale Score: 15        PHYSICAL EXAM    (up to 7 for level 4, 8 or more for level 5)     ED Triage Vitals [20 1418]   BP Temp Temp Source Pulse Resp SpO2 Height Weight   (!) 140/96 98.3 °F (36.8 °C) Oral 79 18 96 % 6' 1\" (1.854 m) 240 lb (108.9 kg)       Physical Exam  Vitals signs and nursing note reviewed. Constitutional:       Appearance: He is well-developed. He is not toxic-appearing. HENT:      Head: Normocephalic and atraumatic.       Mouth/Throat:      Mouth: Mucous membranes are moist.      Pharynx: Oropharynx is clear. Uvula midline. No pharyngeal swelling, posterior oropharyngeal erythema or uvula swelling. Eyes:      Extraocular Movements: Extraocular movements intact. Conjunctiva/sclera: Conjunctivae normal.      Pupils: Pupils are equal, round, and reactive to light. Neck:      Musculoskeletal: Normal range of motion and neck supple. Cardiovascular:      Rate and Rhythm: Normal rate and regular rhythm. Pulses: Normal pulses. Radial pulses are 2+ on the right side and 2+ on the left side. Posterior tibial pulses are 2+ on the right side and 2+ on the left side. Heart sounds: Normal heart sounds. Pulmonary:      Effort: Pulmonary effort is normal. No respiratory distress. Breath sounds: Normal breath sounds. Transmitted upper airway sounds present. No stridor. No wheezing, rhonchi or rales. Abdominal:      General: Bowel sounds are normal.      Palpations: Abdomen is soft. Abdomen is not rigid. Tenderness: There is no abdominal tenderness. There is no guarding or rebound. Musculoskeletal: Normal range of motion. Right lower leg: No edema. Left lower leg: No edema. Skin:     General: Skin is warm and dry. Neurological:      Mental Status: He is alert and oriented to person, place, and time. GCS: GCS eye subscore is 4. GCS verbal subscore is 5. GCS motor subscore is 6. Cranial Nerves: No cranial nerve deficit. Sensory: No sensory deficit. Motor: No abnormal muscle tone. Coordination: Coordination normal.   Psychiatric:         Mood and Affect: Mood is anxious. Speech: Speech normal.         Behavior: Behavior normal.         Thought Content:  Thought content normal.         DIAGNOSTIC RESULTS   LABS:    Labs Reviewed   CBC WITH AUTO DIFFERENTIAL - Abnormal; Notable for the following components:       Result Value    RBC 6.00 (*)     Hemoglobin 18.4 (*)     Hematocrit 54.2 (*)     All other components within normal limits    Narrative:     Performed at:  [facility],  [place], [facility]   Phone [phone]   COMPREHENSIVE METABOLIC PANEL - Abnormal; Notable for the following components:    Glucose 118 (*)     All other components within normal limits    Narrative:     Performed at:  [place] [address],  [place], [facility]   Phone [phone]   TROPONIN    Narrative:     Performed at:  [place] [address],  [place], [facility]   Phone [phone]   BRAIN NATRIURETIC PEPTIDE    Narrative:     Performed at:  [place] [address],  [place], [facility]   Phone [phone]     Results for orders placed or performed during the hospital encounter of 09/11/20   CBC Auto Differential   Result Value Ref Range    WBC 7.5 4.0 - 11.0 K/uL    RBC 6.00 (H) 4.20 - 5.90 M/uL    Hemoglobin 18.4 (H) 13.5 - 17.5 g/dL    Hematocrit 54.2 (H) 40.5 - 52.5 %    MCV 90.3 80.0 - 100.0 fL    MCH 30.7 26.0 - 34.0 pg    MCHC 34.0 31.0 - 36.0 g/dL    RDW 13.6 12.4 - 15.4 %    Platelets 243 377 - 323 K/uL    MPV 8.2 5.0 - 10.5 fL    Neutrophils % 72.0 %    Lymphocytes % 16.9 %    Monocytes % 8.2 %    Eosinophils % 2.6 %    Basophils % 0.3 %    Neutrophils Absolute 5.4 1.7 - 7.7 K/uL    Lymphocytes Absolute 1.3 1.0 - 5.1 K/uL    Monocytes Absolute 0.6 0.0 - 1.3 K/uL    Eosinophils Absolute 0.2 0.0 - 0.6 K/uL    Basophils Absolute 0.0 0.0 - 0.2 K/uL   Comprehensive Metabolic Panel   Result Value Ref Range    Sodium 139 136 - 145 mmol/L    Potassium 4.4 3.5 - 5.1 mmol/L    Chloride 103 99 - 110 mmol/L    CO2 24 21 - 32 mmol/L    Anion Gap 12 3 - 16    Glucose 118 (H) 70 - 99 mg/dL    BUN 12 7 - 20 mg/dL    CREATININE 1.0 0.8 - 1.3 mg/dL    GFR Non-African American >60 >60    GFR African American >60 >60    Calcium 9.6 8.3 - 10.6 mg/dL    Total Protein 7.1 6.4 - 8.2 Costophrenic angles sharp     Right basilar atelectasis due to elevated diaphragm         PROCEDURES   Unless otherwise noted below, none     Procedures    CRITICAL CARE TIME   N/A    CONSULTS:  None      EMERGENCY DEPARTMENT COURSE and DIFFERENTIAL DIAGNOSIS/MDM:   Vitals:    Vitals:    09/11/20 1604 09/11/20 1634 09/11/20 1705 09/11/20 1734   BP: (!) 118/101 (!) 135/117 (!) 130/95 (!) 112/97   Pulse: 77 74 76 73   Resp: 17 21 17 14   Temp: 98.4 °F (36.9 °C)      TempSrc:       SpO2: 95% 94% 95% 95%   Weight:       Height:           Patient was given thefollowing medications:  Medications   LORazepam (ATIVAN) injection 0.5 mg (0.5 mg Intravenous Given 9/11/20 1709)       PULMONARY EMBOLISM RULE-OUT CRITERIA:    1. Age > 52? Yes  2. Pulse greater than 99/min? No  3. Room air pulse ox <95%? No  4. Hemoptysis? No  5. On estrogen? No  6. Prior diagnosis of DVT or PE? No  7. Surgery or trauma requiring endotracheal intubation or hospitalization in past 4 wks? No  8. Unilateral leg swelling?   No    Wells Score  Select Criteria:    Score  Condition    0 Symptoms of DVT (3 points)    0 No alternative diagnosis better explains the illness (3 points)    0 Tachycardia with pulse > 100 (1.5 points)    0 Immobilization (>= 3 days) or surgery in the previous four weeks (1.5 points)    0 Prior history of DVT or pulmonary embolism (1.5 points)    0 Presence of hemoptysis (1 point)    0 Presence of malignancy (1 point)   Pulmonary Embolism Risk Score Interpretation   Score > 6: High probability   Score >= 2 and <= 6: Moderate probability   Score < 2: Low Probability   Further work up: No      HEART SCORE:        0-3 Adverse outcome risk: 2.5% (very low to low risk) Supports early discharge with appropriate follow-up   4-6 Adverse outcome risk: 20.3% (moderate risk) Supports admission with standard rule-out management and stress testing   7-10 Adverse outcome risk: 72.7% (high to very high risk) Risk in first 30 days >50% Supports early aggressive management and typically with cardiac catheterization       Heart score: 2- age and risk factors. This falls under the following category: Score of 0-3, which indicates a very low risk for major adverse cardiac event and supports early discharge      4:39 PM EDT  Pt making sounds when he breaths transmitted sounds on exam. No lower lung sounds. No wheezes rales or rhonchi. No stridor. No respiratory distress. Pulse ox 96%. Good air exchange. Suspect psychogenic cause for symptoms. 5:35 PM EDT  Pt much improved after ativan here. On re-examination. No symptoms. No longer making sounds with breathing. Denies feeling short of breath after the Ativan. Plan d/c home with family and follow up psychiatry 9/21 as planned Dr Yudi Do. I estimate there is LOW risk for PULMONARY EMBOLISM, PULMONARY EDEMA, PNEUMONIA, PNEUMOTHORAX, STATUS ASTHMATICUS, ACUTE RESPIRATORY FAILURE, OR ACUTE CORONARY SYNDROME, thus I consider the discharge disposition reasonable. FINAL IMPRESSION      1. Shortness of breath    2. Elevated blood pressure reading    3.  History of schizophrenia          DISPOSITION/PLAN   DISPOSITION     PATIENT REFERREDTO:  [place], [facility] [address]  [phone]    Schedule an appointment as soon as possible for a visit       Beatriz Lyn MD  [address] [phone]      as previously scheduled    [facility] ED  [address]  [phone]    If symptoms worsen      DISCHARGE MEDICATIONS:  Discharge Medication List as of 9/11/2020  5:44 PM          DISCONTINUED MEDICATIONS:  Discharge Medication List as of 9/11/2020  5:44 PM                 (Please note that portions ofthis note were completed with a voice recognition program.  Efforts were made to edit the dictations but occasionally words are mis-transcribed.)    Ben Stratton PA-C (electronically signed) Tasha Cramer PA-C  09/12/20 [id number]

## 2020-09-11 NOTE — ED PROVIDER NOTES
I independently examined and evaluated Terrance Vanessa. In brief, patient is a 68-year-old male with history of schizophrenia on haldol injections who presents to the ED for evaluation of feeling of shortness of breath. Patient reports having history of similar reactions with Haldol injections in the past.  Reports the last injection was ago and has been having feeling of shortness of breath since the Haldol injection. Denies any tongue or oropharyngeal swelling. Focused exam revealed clinically nontoxic-appearing patient, satting 99 to 100% on monitor on room air, hemodynamically stable, and no clinical sign of allergic reaction/anaphylaxis. There were no hives, uvulitis, wheezing, tongue/lip/oropharyngeal swelling. Therefore, not consistent with allergic reaction or anaphylaxis. I have considered viral URI or pneumonia and chest x-ray obtained. Two-view chest x-ray showed no focal consolidation. Troponin negative. BNP less than 100 and therefore not acute heart failure. Patient given Ativan as I suspect patient symptoms are from anxiety. On reassessment, patient reported improved symptoms. Patient instructed to try Benadryl at home as needed for symptoms. Patient instructed to follow-up with his psychiatrist to discuss his medication regimen. Patient agreeable with plan and verbalized understanding. Patient discharged home with strict return precautions. The Ekg interpreted by me shows  normal sinus rhythm with a rate of 72  Axis is   left  QTc is  normal  Intervals and Durations are unremarkable. ST Segments: nonspecific changes  No significant change from prior EKG dated 07/30/20    All diagnostic, treatment, and disposition decisions were made by myself in conjunction with the advanced practice provider. For all further details of the patient's emergency department visit, please see the advanced practice provider's documentation.     Comment: Please note this report has been produced using speech recognition software and may contain errors related to that system including errors in grammar, punctuation, and spelling, as well as words and phrases that may be inappropriate. If there are any questions or concerns please feel free to contact the dictating provider for clarification.        Caro Bravo MD  09/12/20 0020

## 2020-09-12 ENCOUNTER — CARE COORDINATION (OUTPATIENT)
Dept: CARE COORDINATION | Age: 60
End: 2020-09-12

## 2020-09-21 ENCOUNTER — HOSPITAL ENCOUNTER (OUTPATIENT)
Dept: PSYCHIATRY | Age: 60
Setting detail: THERAPIES SERIES
Discharge: HOME | End: 2020-09-21
Payer: COMMERCIAL

## 2020-09-21 VITALS
TEMPERATURE: 97.6 F | DIASTOLIC BLOOD PRESSURE: 81 MMHG | RESPIRATION RATE: 16 BRPM | HEART RATE: 78 BPM | SYSTOLIC BLOOD PRESSURE: 120 MMHG

## 2020-09-21 PROCEDURE — 90792 PSYCH DIAG EVAL W/MED SRVCS: CPT | Performed by: PSYCHIATRY & NEUROLOGY

## 2020-09-21 RX ORDER — LORAZEPAM 1 MG/1
1 TABLET ORAL 2 TIMES DAILY
Qty: 28 TABLET | Refills: 0 | Status: SHIPPED | OUTPATIENT
Start: 2020-09-21 | End: 2020-09-21 | Stop reason: SDUPTHER

## 2020-09-21 RX ORDER — HALOPERIDOL DECANOATE 100 MG/ML
200 INJECTION INTRAMUSCULAR
Qty: 2 ML | Refills: 2 | Status: SHIPPED | OUTPATIENT
Start: 2020-09-29 | End: 2020-09-21 | Stop reason: SDUPTHER

## 2020-09-21 RX ORDER — LORAZEPAM 1 MG/1
1 TABLET ORAL 2 TIMES DAILY
Qty: 28 TABLET | Refills: 0 | Status: SHIPPED | OUTPATIENT
Start: 2020-09-21 | End: 2020-10-05 | Stop reason: SDUPTHER

## 2020-09-21 RX ORDER — HALOPERIDOL DECANOATE 100 MG/ML
200 INJECTION INTRAMUSCULAR
Qty: 2 ML | Refills: 2 | Status: SHIPPED | OUTPATIENT
Start: 2020-09-29 | End: 2020-11-25

## 2020-09-27 NOTE — BH NOTE
[address]                 [address] [place] [address]                             PSYCHIATRIC EVALUATION    PATIENT NAME: Stefani Blandon                      :        1960  MED REC NO:   [id number]                          ROOM:  ACCOUNT NO:   [de-identified]                           ADMIT DATE: 2020  PROVIDER:     Soumya Martinez MD    IDENTIFICATION:  This is a domiciled, never , psychiatrically   disabled 76-year-old with a history of schizophrenia, who presents for  a bridge appointment after an inpatient stabilization for psychosis. SOURCES OF INFORMATION:  The patient. Recent admission. The patient's  mother and brother, who presented with the patient. CHIEF COMPLAINT:  \"Things are going okay. I think I have a hard time  breathing because of tardive dyskinesia. \"    HISTORY OF PRESENT ILLNESS:  The patient was admitted to our program  from 2020 through 2020 for treatment of psychotic symptoms. At  that time, he was agitated, delusional, disorganized, and impaired. He  was placed on a total of 300 mg of Haldol Decanoate and responded   Well. Nori Pinto He presents today with his mother and brother for followup. His mother  is his guardian. Overall, he has done  better since being  stabilized on Haldol Decanoate. This was confirmed by both his mother  and his brother. However, they say that he has been spending a great  deal of time in bed, at times seems overly sedated. The patient tells me that he believes he has tardive dyskinesia in his throat and so  cannot breathe. He has no other evidence of tardive dyskinesia. He  does have some evidence of EPS including a mild tremor in his hand and  jaw. I found no evidence of acute dystonia. The patient was evaluated  in the emergency department recently because of this and they found no  evidence of impairment in his breathing or throat.     We discussed this intact. His use of language, speech, and educational attainment suggested a  low-average level of intellectual functioning. His thought processes were more goal-directed and logical.  He did not  describe or endorse hallucinations, delusions or homicidal thinking. He  did not endorse suicidal thinking and reported feeling safe. His ability for abstract thought was fair based on his interpretation of  simple proverbs. Insight and judgment were fair. PHYSICAL EXAMINATION:  VITAL SIGNS:  Stable. NEUROLOGIC:  Gait normal.    LABORATORY DATA:  Noncontributory. FORMULATION:  This is a domiciled, never , psychiatrically  disabled, 55-year-old with schizophrenia, who presents for a bridge  appointment after inpatient stabilization for psychosis. The patient is  doing better on Haldol Decanoate. His symptoms have stabilized;  however, he is now struggling with increased sedation during the daytime  and  anxiety about what he believes to be tardive dyskinesia. It is my opinion   that he does not have tardive dyskinesia nor acute dystonia. He does  have some signs and symptoms of EPS including tremor. Treatment plan discussed with the patient, his guardian mother, and  brother at length. Weagreed to reduce Haldol Decanoate to 200 mg and  to provide low-dose scheduled Ativan for now to help maintain stability. DIAGNOSES:  1.  Schizophrenia. 2.  Central hypertension. 3.  Hyperlipidemia. PLAN:  1. Outpatient level of care as appropriate for the patient at this  point. 2.  Reduce Haldol Decanoate to 200 mg every 4 weeks. Add Ativan 1 mg  twice daily for further stabilization. 3.  Psychoeducation provided at length regarding symptoms of EPS,  tardive dyskinesia, and acute dystonia. Discussed this with family and  the patient. 4.  Safety plan discussed at length including presenting to a nearest  emergency department or calling 911 if he should develop thoughts of  self-harm.   5. Discussed the various things that the patient can do to help  maintain stability going forward including being active in the daytime  and good sleep hygiene. 6.  Follow up with me at next scheduled injection or sooner if needed.         Carlos Eduardo Castanon MD    D: 09/27/2020 13:07:34       T: 09/27/2020 17:42:10     [email or web address]  Job#: [id number]     Doc#: [id number]    CC:

## 2020-09-29 ENCOUNTER — HOSPITAL ENCOUNTER (OUTPATIENT)
Dept: NURSING | Age: 60
Setting detail: INFUSION SERIES
Discharge: HOME | End: 2020-09-29
Payer: COMMERCIAL

## 2020-09-29 VITALS
HEART RATE: 68 BPM | DIASTOLIC BLOOD PRESSURE: 89 MMHG | RESPIRATION RATE: 14 BRPM | TEMPERATURE: 97.8 F | WEIGHT: 235 LBS | SYSTOLIC BLOOD PRESSURE: 127 MMHG | BODY MASS INDEX: 31.14 KG/M2 | HEIGHT: 73 IN

## 2020-09-29 DIAGNOSIS — F20.9 SCHIZOPHRENIA, UNSPECIFIED TYPE (HCC): ICD-10-CM

## 2020-09-29 DIAGNOSIS — F20.3 UNDIFFERENTIATED SCHIZOPHRENIA (HCC): Primary | ICD-10-CM

## 2020-09-29 PROCEDURE — 96372 THER/PROPH/DIAG INJ SC/IM: CPT

## 2020-09-29 PROCEDURE — 99211 OFF/OP EST MAY X REQ PHY/QHP: CPT

## 2020-09-29 PROCEDURE — 6360000002 HC RX W HCPCS: Performed by: PSYCHIATRY & NEUROLOGY

## 2020-09-29 RX ORDER — HALOPERIDOL DECANOATE 50 MG/ML
300 INJECTION INTRAMUSCULAR ONCE
Status: CANCELLED
Start: 2020-10-27

## 2020-09-29 RX ORDER — HALOPERIDOL DECANOATE 50 MG/ML
300 INJECTION INTRAMUSCULAR ONCE
Status: DISCONTINUED | OUTPATIENT
Start: 2020-09-29 | End: 2020-09-29

## 2020-09-29 RX ORDER — HALOPERIDOL DECANOATE 100 MG/ML
200 INJECTION INTRAMUSCULAR ONCE
Status: COMPLETED | OUTPATIENT
Start: 2020-09-29 | End: 2020-09-29

## 2020-09-29 RX ORDER — HALOPERIDOL DECANOATE 50 MG/ML
200 INJECTION INTRAMUSCULAR ONCE
Status: CANCELLED
Start: 2020-10-27

## 2020-09-29 RX ADMIN — HALOPERIDOL DECANOATE 200 MG: 100 INJECTION INTRAMUSCULAR at 09:23

## 2020-09-29 NOTE — PROGRESS NOTES
Patient and mother came in for patients haldol injection. They stated haldol dose was decreased on the 21 st of this month. Call placed to [facility] to contact MD JERMAINE Tam about dosage change. Spoke with MD Kota Gil he confirmed the dosage change. New therapy plan placed. And pharmacy notified. Haldol 200 mg given to patient into right deltoid. Tolerated well. Discharge instructions were discussed with patient and mother copy given. Verbalized understanding back. Left ambulatory in stable condition.

## 2020-10-05 ENCOUNTER — HOSPITAL ENCOUNTER (OUTPATIENT)
Dept: PSYCHIATRY | Age: 60
Setting detail: THERAPIES SERIES
Discharge: HOME | End: 2020-10-05
Payer: COMMERCIAL

## 2020-10-05 PROCEDURE — 99215 OFFICE O/P EST HI 40 MIN: CPT | Performed by: PSYCHIATRY & NEUROLOGY

## 2020-10-05 RX ORDER — LORAZEPAM 1 MG/1
1 TABLET ORAL 2 TIMES DAILY PRN
Qty: 60 TABLET | Refills: 0 | Status: SHIPPED | OUTPATIENT
Start: 2020-10-05 | End: 2020-10-05 | Stop reason: SDUPTHER

## 2020-10-05 RX ORDER — LORAZEPAM 1 MG/1
1 TABLET ORAL 2 TIMES DAILY PRN
Qty: 60 TABLET | Refills: 0 | Status: SHIPPED | OUTPATIENT
Start: 2020-10-05 | End: 2020-10-29 | Stop reason: SDUPTHER

## 2020-10-05 NOTE — PROGRESS NOTES
Department of Psychiatry  Progress Note    Patient's chart was reviewed. Met with patient, guardian mom, and brother. SUBJECTIVE:      Positive psychotic symptoms under good control. Per family, patient struggling with avolition. Spends a lot of his time in bed. The patient does not describe or endorse symptoms of depression. He says his lack of motivation it's due to TD in his throat and mouth. Explained that I don't see evidence of TD but I do see evidence of EPS (tremor). The four of us discussed SALVADOR treatment options at length incorporating the patient's concerns about being on medication. We agreed to meet before his next scheduled injection to discuss the following options:  1. Continued Haldol at 200mg monthly  2. Reduce Haldol to 100mg monthly. 3. Switch to another SALVADOR    ROS:   Patient has new complaints: yes - see above  Sleeping adequately:  Yes   Appetite adequate: Yes    Does not endorse or describe head aches, SOB, cough, sore throat, chills, chest pain, abdominal pain, neuro problems, bleeding problems, or skin problems. Was moving all four extremities  without difficulty.     OBJECTIVE:  VITALS: stable  Gait: normal.  AIMS: 1    Mental Status Examination:     Appearance: fair grooming and hygiene, tremor   Behavior/Attitude toward examiner:  cooperative, attentive and fair eye contact  Speech: Normal rate, volume, amount  Mood:  \"ok\"  Affect:  FLAT     Thought processes:  Goal directed, linear, no SYLVIE or gross disorganization  Thought Content: no SI, no HI, no delusions voiced, no obsessions  Perceptions: no AVH  Attention: attention span and concentration were intact to interview   Abstraction: intact  Cognition:  Alert and oriented to person, place, time, and situation, recall intact  Insight: Limited insight   Judgment: Limited judgment     Medication:  Current Outpatient Medications on File Prior to Encounter   Medication Sig Dispense Refill    haloperidol decanoate (HALDOL DECANOATE) 100 MG/ML injection Inject 2 mLs into the muscle every 30 days Schizophrenia     Next dose due: 9/29/2020 2 mL 2    Flaxseed, Linseed, (FLAX SEED OIL PO) Take 1 capsule by mouth daily      Multiple Vitamins-Minerals (MULTIVITAMIN ADULT PO) Take by mouth      Coenzyme Q10 (CO Q 10) 10 MG CAPS Take by mouth      pitavastatin (LIVALO) 2 MG TABS tablet Take 0.5 tablets by mouth nightly 45 tablet 1    Omega-3 Fatty Acids (FISH OIL) 1000 MG CAPS Take 1,000 mg by mouth daily       No current facility-administered medications on file prior to encounter. FORMULATION:  This is a domiciled, never , psychiatrically  disabled, 64-year-old with schizophrenia, who presents for a bridge  appointment after inpatient stabilization for psychosis. The patient is  doing better on Haldol Decanoate. His symptoms have stabilized;  however, he is now struggling with increased sedation during the daytime  and  anxiety about what he believes to be tardive dyskinesia. It is my opinion   that he does not have tardive dyskinesia nor acute dystonia. He does  have some signs and symptoms of EPS including tremor. DIAGNOSES:  1.  Schizophrenia. 2.  Central hypertension. 3.  Hyperlipidemia. PLAN:  1. Outpatient level of care as appropriate for the patient at this point. 2.  9/21/2020 - reduce Haldol Decanoate to 200 mg every 4 weeks. Added Ativan 1 mg  twice daily for further stabilization. 3.  Psychoeducation provided again at length regarding symptoms of EPS,  tardive dyskinesia, and acute dystonia. Discussed this with family and  the patient. 4.  Safety plan -calling 911 if he should develop thoughts of  self-harm. 5.   encouraged active daytime schedule and good sleep hygiene. 6.  Follow up with me before next scheduled dose.      Daniel Molina MD, [address], [facility]

## 2020-10-06 RX ORDER — PITAVASTATIN CALCIUM 2.09 MG/1
TABLET, FILM COATED ORAL
Qty: 45 TABLET | Refills: 0 | Status: ON HOLD | OUTPATIENT
Start: 2020-10-06 | End: 2022-08-30 | Stop reason: HOSPADM

## 2020-10-29 ENCOUNTER — HOSPITAL ENCOUNTER (OUTPATIENT)
Dept: NURSING | Age: 60
Setting detail: INFUSION SERIES
Discharge: HOME | End: 2020-10-29
Payer: COMMERCIAL

## 2020-10-29 ENCOUNTER — HOSPITAL ENCOUNTER (OUTPATIENT)
Dept: PSYCHIATRY | Age: 60
Setting detail: THERAPIES SERIES
Discharge: HOME | End: 2020-10-29
Payer: COMMERCIAL

## 2020-10-29 VITALS
HEART RATE: 74 BPM | DIASTOLIC BLOOD PRESSURE: 79 MMHG | RESPIRATION RATE: 16 BRPM | TEMPERATURE: 96.3 F | SYSTOLIC BLOOD PRESSURE: 136 MMHG

## 2020-10-29 VITALS
TEMPERATURE: 96.4 F | HEART RATE: 79 BPM | SYSTOLIC BLOOD PRESSURE: 132 MMHG | RESPIRATION RATE: 18 BRPM | DIASTOLIC BLOOD PRESSURE: 86 MMHG

## 2020-10-29 DIAGNOSIS — F20.9 SCHIZOPHRENIA, UNSPECIFIED TYPE (HCC): ICD-10-CM

## 2020-10-29 DIAGNOSIS — F20.3 UNDIFFERENTIATED SCHIZOPHRENIA (HCC): Primary | ICD-10-CM

## 2020-10-29 PROCEDURE — 99215 OFFICE O/P EST HI 40 MIN: CPT | Performed by: PSYCHIATRY & NEUROLOGY

## 2020-10-29 PROCEDURE — 96372 THER/PROPH/DIAG INJ SC/IM: CPT

## 2020-10-29 PROCEDURE — 6360000002 HC RX W HCPCS: Performed by: PSYCHIATRY & NEUROLOGY

## 2020-10-29 PROCEDURE — 99211 OFF/OP EST MAY X REQ PHY/QHP: CPT

## 2020-10-29 RX ORDER — HALOPERIDOL DECANOATE 50 MG/ML
200 INJECTION INTRAMUSCULAR ONCE
Status: CANCELLED
Start: 2020-11-26

## 2020-10-29 RX ORDER — LORAZEPAM 1 MG/1
1 TABLET ORAL 2 TIMES DAILY PRN
Qty: 60 TABLET | Refills: 0 | Status: SHIPPED | OUTPATIENT
Start: 2020-10-29 | End: 2020-11-25

## 2020-10-29 RX ORDER — HALOPERIDOL DECANOATE 100 MG/ML
200 INJECTION INTRAMUSCULAR ONCE
Status: COMPLETED | OUTPATIENT
Start: 2020-10-29 | End: 2020-10-29

## 2020-10-29 RX ADMIN — HALOPERIDOL DECANOATE 200 MG: 100 INJECTION INTRAMUSCULAR at 11:08

## 2020-10-29 NOTE — PROGRESS NOTES
Department of Psychiatry  Progress Note    Patient's chart was reviewed. Met with patient, guardian mom, and sister. SUBJECTIVE:      Positive psychotic symptoms remain well controlled. Mother reports ongoing negative symptoms - avolition, isolation, limited conversation. Patient continues to be concerned about TD though he does not present with it today. His says it comes on suddenly and involves his tongue and mouth. His tongue writhes in and out of his mouth. Comes and goes. Says he has had it regardless of what medication he is on including just 5mg of Abilify. Discussed that his risk of developing TD was elevated given he is on a high-potency antipsychotic. Sister and mother report he has been on multiple different antipsychotics including most Jennifer and Clozaril. The patient would like to be on a lower dose of medication. His family says that he typically talks his treatment providers into lower doses until he gets acutely ill and stops taking medicine altogether. We discuss the following options:  1. Continued Haldol at 200mg monthly - family's interest  2. Reduce Haldol to 100mg monthly - patient's interest  3. Switch to another SALVADOR - nobody interest in this. Also discussed starting an antidepressant for negative symptoms but the patient is adamant about not taking another psychotropic medication. In the end we agreed on continuing Haldol Dec 200mg for now. ROS:   Patient has new complaints: yes - see above  Sleeping adequately:  Yes   Appetite adequate: Yes    Does not endorse or describe head aches, SOB, cough, sore throat, chills, chest pain, abdominal pain, neuro problems, bleeding problems, or skin problems. Was moving all four extremities without difficulty.     OBJECTIVE:  VITALS: stable  Gait: normal.  AIMS: 1    Mental Status Examination:     Appearance: fair grooming and hygiene, tremor   Behavior/Attitude toward examiner:  cooperative, attentive and fair eye contact  Speech: Normal rate, volume, amount  Mood:  \"ok\"  Affect:  FLAT     Thought processes:  Goal directed, linear, no SYLVIE or gross disorganization  Thought Content: no SI, no HI, no delusions voiced, no obsessions  Perceptions: no AVH  Attention: attention span and concentration were intact to interview   Abstraction: intact  Cognition:  Alert and oriented to person, place, time, and situation, recall intact  Insight: Limited insight   Judgment: Limited judgment     Medication:  Current Outpatient Medications on File Prior to Encounter   Medication Sig Dispense Refill    LIVALO 2 MG TABS tablet TAKE 1/2 TABLET BY MOUTH AT BEDTIME  45 tablet 0    haloperidol decanoate (HALDOL DECANOATE) 100 MG/ML injection Inject 2 mLs into the muscle every 30 days Schizophrenia     Next dose due: 9/29/2020 2 mL 2    Flaxseed, Linseed, (FLAX SEED OIL PO) Take 1 capsule by mouth daily      Multiple Vitamins-Minerals (MULTIVITAMIN ADULT PO) Take by mouth      Coenzyme Q10 (CO Q 10) 10 MG CAPS Take by mouth      Omega-3 Fatty Acids (FISH OIL) 1000 MG CAPS Take 1,000 mg by mouth daily       No current facility-administered medications on file prior to encounter. FORMULATION:  This is a domiciled, never , psychiatrically  disabled, 56-year-old with schizophrenia, who presents for a bridge  appointment after inpatient stabilization for psychosis. The patient is  doing better on Haldol Decanoate. His symptoms have stabilized;  however, he is now struggling with increased sedation during the daytime  and  anxiety about what he believes to be tardive dyskinesia. It is my opinion  that he does not have tardive dyskinesia nor acute dystonia. He has had some signs and symptoms of EPS including tremor but those have resolved. DIAGNOSES:  1.  Schizophrenia. 2.  Central hypertension. 3.  Hyperlipidemia. PLAN:  1. Outpatient level of care as appropriate for the patient at this point.   2.  9/21/2020 - reduced Haldol Decanoate to 200 mg every 4 weeks. Added Ativan 1 mg twice daily for further stabilization. 3.  Psychoeducation provided again at length regarding symptoms of EPS, tardive dyskinesia, and acute dystonia. Discussed this with family and the patient. 4.  Safety plan -calling 911 if he should develop thoughts of  self-harm. 5.  encouraged active daytime schedule and good sleep hygiene. 6.  Follow up with me before next scheduled dose.      Justo Jones MD, [address] [place] [address],[address], [facility]

## 2020-10-29 NOTE — PROGRESS NOTES
Patient has tolerated his injection of haldol 200 mg IM into left deltoid. Patient and sister given discharge instructions verbally and written. Verbalized understanding back.

## 2020-11-25 ENCOUNTER — HOSPITAL ENCOUNTER (OUTPATIENT)
Dept: NURSING | Age: 60
Setting detail: INFUSION SERIES
Discharge: HOME | End: 2020-11-25
Payer: COMMERCIAL

## 2020-11-25 ENCOUNTER — HOSPITAL ENCOUNTER (OUTPATIENT)
Dept: PSYCHIATRY | Age: 60
Setting detail: THERAPIES SERIES
Discharge: HOME | End: 2020-11-25
Payer: COMMERCIAL

## 2020-11-25 VITALS
BODY MASS INDEX: 31.14 KG/M2 | DIASTOLIC BLOOD PRESSURE: 86 MMHG | WEIGHT: 235 LBS | HEIGHT: 73 IN | TEMPERATURE: 97.3 F | RESPIRATION RATE: 16 BRPM | HEART RATE: 77 BPM | SYSTOLIC BLOOD PRESSURE: 141 MMHG

## 2020-11-25 VITALS
RESPIRATION RATE: 16 BRPM | SYSTOLIC BLOOD PRESSURE: 130 MMHG | HEART RATE: 91 BPM | TEMPERATURE: 97.3 F | DIASTOLIC BLOOD PRESSURE: 93 MMHG

## 2020-11-25 DIAGNOSIS — F20.3 UNDIFFERENTIATED SCHIZOPHRENIA (HCC): Primary | ICD-10-CM

## 2020-11-25 DIAGNOSIS — F20.9 SCHIZOPHRENIA, UNSPECIFIED TYPE (HCC): ICD-10-CM

## 2020-11-25 PROCEDURE — 6360000002 HC RX W HCPCS: Performed by: PSYCHIATRY & NEUROLOGY

## 2020-11-25 PROCEDURE — 99215 OFFICE O/P EST HI 40 MIN: CPT | Performed by: PSYCHIATRY & NEUROLOGY

## 2020-11-25 PROCEDURE — 99211 OFF/OP EST MAY X REQ PHY/QHP: CPT

## 2020-11-25 PROCEDURE — 96372 THER/PROPH/DIAG INJ SC/IM: CPT

## 2020-11-25 RX ORDER — HALOPERIDOL DECANOATE 50 MG/ML
150 INJECTION INTRAMUSCULAR ONCE
Status: CANCELLED
Start: 2020-12-23

## 2020-11-25 RX ORDER — LORAZEPAM 1 MG/1
1 TABLET ORAL NIGHTLY PRN
Qty: 30 TABLET | Refills: 0 | Status: SHIPPED | OUTPATIENT
Start: 2020-11-25 | End: 2020-12-25

## 2020-11-25 RX ORDER — HALOPERIDOL DECANOATE 100 MG/ML
150 INJECTION INTRAMUSCULAR ONCE
Qty: 1.5 ML | Refills: 0 | Status: SHIPPED | OUTPATIENT
Start: 2020-11-25 | End: 2020-12-23 | Stop reason: SINTOL

## 2020-11-25 RX ORDER — HALOPERIDOL DECANOATE 100 MG/ML
150 INJECTION INTRAMUSCULAR ONCE
Status: COMPLETED | OUTPATIENT
Start: 2020-11-25 | End: 2020-11-25

## 2020-11-25 RX ADMIN — HALOPERIDOL DECANOATE 150 MG: 100 INJECTION INTRAMUSCULAR at 12:13

## 2020-11-25 NOTE — PROGRESS NOTES
Pt here with family for a haldol D injection  Pt calm, pleasant and cooperative today  Pt was just seen by Dr. Angelica Tian and dose today was reduced to 150 mg per Dr. Angelica Tian  Pt without any questions, concerns or c/o's   Haldol D 150 mg was given IM left deltoid without difficulty  bandaid applied to site  Site unremarkable  Pt prerna well   Discharge instructions reviewed with pt and his family and understanding was verbalized  Pt was then discharged ambulatory in stable condition with his family

## 2020-11-26 NOTE — PROGRESS NOTES
Department of Psychiatry  Progress Note    Patient's chart was reviewed. Met with patient, guardian mom, and brother. SUBJECTIVE:      Positive psychotic symptoms well controlled. ongoing negative symptoms but mother says he's been a little more actice the last couple of days. Patient remains concerned about TD thought reports none today (no presentation either). Patient continues to express interest in decreasing and potentially stopping his medication. We discuss the following options:  1. Continued Haldol at 200mg monthly   2. Reduce Haldol to 150mg monthly   3. Switch to another SALVADOR     Also again discussed starting an antidepressant for negative symptoms and anxiety but patient remains uninterested. Agreed on reducing Haldol Dec 150 mg. ROS:   Patient has new complaints: none  Sleeping adequately:  Yes   Appetite adequate: Yes    Does not endorse or describe head aches, SOB, cough, sore throat, chills, chest pain, abdominal pain, neuro problems, bleeding problems, or skin problems. Was moving all four extremities without difficulty.     OBJECTIVE:  Vitals:    11/25/20 1100   BP: (!) 130/93   Pulse: 91   Resp: 16   Temp: 97.3 °F (36.3 °C)     Gait: normal.  AIMS: 1    Mental Status Examination:     Appearance: fair grooming and hygiene, tremor   Behavior/Attitude toward examiner:  cooperative, attentive and fair eye contact  Speech: Normal rate, volume, amount  Mood:  \"pretty good\"  Affect:  FLAT     Thought processes:  Goal directed, linear, no SYLVIE or gross disorganization  Thought Content: no SI, no HI, no delusions voiced, no obsessions  Perceptions: no AVH  Attention: attention span and concentration were intact to interview   Abstraction: intact  Cognition:  Alert and oriented to person, place, time, and situation, recall intact  Insight: Limited insight   Judgment: intact    Medication:  Current Outpatient Medications on File Prior to Encounter   Medication Sig Dispense Refill    LIVALO 2 MG TABS tablet TAKE 1/2 TABLET BY MOUTH AT BEDTIME  45 tablet 0    Flaxseed, Linseed, (FLAX SEED OIL PO) Take 1 capsule by mouth daily      Multiple Vitamins-Minerals (MULTIVITAMIN ADULT PO) Take by mouth      Coenzyme Q10 (CO Q 10) 10 MG CAPS Take by mouth      Omega-3 Fatty Acids (FISH OIL) 1000 MG CAPS Take 1,000 mg by mouth daily       No current facility-administered medications on file prior to encounter. FORMULATION:  This is a domiciled, never , psychiatrically  disabled, 57-year-old with schizophrenia, who presents for a bridge  appointment after inpatient stabilization for psychosis. The patient is  doing better on Haldol Decanoate. His symptoms have stabilized;  however, he is now struggling with increased sedation during the daytime  and  anxiety about what he believes to be tardive dyskinesia. It is my opinion  that he does not have tardive dyskinesia nor acute dystonia. He has had some signs and symptoms of EPS including tremor but those have mostly resolved. DIAGNOSES:  1.  Schizophrenia. 2.  hypertension. 3.  Hyperlipidemia. PLAN:  1. Outpatient level of care is appropriate. 2.  9/21/2020 - reduced Haldol Decanoate to 200 mg every 4 weeks. Added Ativan 1 mg twice daily for further stabilization. 11/25/2020 - reduced Haldol Decanoate to 150mg and prn ativan to 1mg PO QHS prn.  3.  Psychoeducation provided again at length regarding symptoms of EPS, tardive dyskinesia, and acute dystonia. Discussed this with family and the patient. 4.  Safety plan -calling 911 if he should develop thoughts of  self-harm. 5.  encouraged active daytime schedule and good sleep hygiene. 6.  Follow up with me before next scheduled dose.      Tiago Duffy MD, [address], [facility]

## 2020-12-22 NOTE — BH NOTE
Call received from patient's family regarding the patient having had an appointment with neurologist Dr. Vj Hughes. Family reported that Dr. Zachary Johnson office was going to be send some recommendations to Dr. Richard Morales. Per Dr. Richard Morales, he has not received anything. Call placed to Dr. Zachary Johnson office who will fax Dr. Zachary Johnson last assessment and recommendation.

## 2020-12-23 ENCOUNTER — HOSPITAL ENCOUNTER (OUTPATIENT)
Dept: PSYCHIATRY | Age: 60
Setting detail: THERAPIES SERIES
Discharge: HOME | End: 2020-12-23
Payer: COMMERCIAL

## 2020-12-23 ENCOUNTER — HOSPITAL ENCOUNTER (OUTPATIENT)
Dept: NURSING | Age: 60
Setting detail: INFUSION SERIES
Discharge: HOME | End: 2020-12-23
Payer: COMMERCIAL

## 2020-12-23 VITALS
HEIGHT: 73 IN | RESPIRATION RATE: 16 BRPM | BODY MASS INDEX: 31.14 KG/M2 | HEART RATE: 73 BPM | DIASTOLIC BLOOD PRESSURE: 79 MMHG | TEMPERATURE: 98.7 F | SYSTOLIC BLOOD PRESSURE: 140 MMHG | WEIGHT: 235 LBS

## 2020-12-23 VITALS — TEMPERATURE: 97.1 F

## 2020-12-23 PROCEDURE — 99211 OFF/OP EST MAY X REQ PHY/QHP: CPT

## 2020-12-23 PROCEDURE — 99215 OFFICE O/P EST HI 40 MIN: CPT | Performed by: PSYCHIATRY & NEUROLOGY

## 2020-12-23 PROCEDURE — 96372 THER/PROPH/DIAG INJ SC/IM: CPT

## 2020-12-23 RX ORDER — ARIPIPRAZOLE 400 MG
400 KIT INTRAMUSCULAR ONCE
Qty: 400 MG | Refills: 0 | Status: SHIPPED | OUTPATIENT
Start: 2020-12-23 | End: 2021-01-25 | Stop reason: SDUPTHER

## 2020-12-23 RX ORDER — BENZTROPINE MESYLATE 1 MG/1
1 TABLET ORAL 2 TIMES DAILY
Qty: 60 TABLET | Refills: 0 | Status: SHIPPED | OUTPATIENT
Start: 2020-12-23 | End: 2021-02-26 | Stop reason: HOSPADM

## 2020-12-23 RX ORDER — CITALOPRAM 20 MG/1
20 TABLET ORAL DAILY
Qty: 30 TABLET | Refills: 0 | Status: SHIPPED | OUTPATIENT
Start: 2020-12-23 | End: 2021-02-26 | Stop reason: HOSPADM

## 2020-12-23 NOTE — PROGRESS NOTES
Speech: Normal rate, volume, amount  Mood:  \"ok\"  Affect:  FLAT     Thought processes:  Goal directed, linear, no SYLVIE or gross disorganization  Thought Content: no SI, no HI, no delusions voiced, no obsessions  Perceptions: no AVH  Attention: attention span and concentration were intact to interview   Abstraction: intact  Cognition:  Alert and oriented to person, place, time, and situation, recall intact  Insight: Limited insight   Judgment: intact    Medication:  Current Outpatient Medications on File Prior to Encounter   Medication Sig Dispense Refill    LORazepam (ATIVAN) 1 MG tablet Take 1 tablet by mouth nightly as needed for Anxiety for up to 30 days. 30 tablet 0    LIVALO 2 MG TABS tablet TAKE 1/2 TABLET BY MOUTH AT BEDTIME  45 tablet 0    Flaxseed, Linseed, (FLAX SEED OIL PO) Take 1 capsule by mouth daily      Multiple Vitamins-Minerals (MULTIVITAMIN ADULT PO) Take by mouth      Coenzyme Q10 (CO Q 10) 10 MG CAPS Take by mouth      Omega-3 Fatty Acids (FISH OIL) 1000 MG CAPS Take 1,000 mg by mouth daily       No current facility-administered medications on file prior to encounter. FORMULATION:  This is a domiciled, never , psychiatrically  disabled, 76-year-old with schizophrenia, who presents for a bridge  appointment after inpatient stabilization for psychosis. The patient is  doing better on Haldol Decanoate. His symptoms have stabilized;  however, he is now struggling with increased sedation during the daytime  and  anxiety about what he believes to be tardive dyskinesia. It is my opinion  that he does not have tardive dyskinesia nor acute dystonia. He has had some signs and symptoms of EPS including tremor but those have mostly resolved. DIAGNOSES:  1.  Schizophrenia. 2.  hypertension. 3.  Hyperlipidemia. PLAN:  1. Outpatient level of care is appropriate. 2.  9/21/2020 - reduced Haldol Decanoate to 200 mg every 4 weeks. Added Ativan 1 mg twice daily for further stabilization. 11/25/2020 - reduced Haldol Decanoate to 150mg and prn ativan to 1mg PO QHS prn.  12/23/2020 - Maintena 400mg IM. Stop Haldol decanoate. They should cross taper ok. Start cogentin 1mg BID and citalopram 20mg daily. 3.  Psychoeducation provided again at length regarding symptoms of EPS, tardive dyskinesia, and acute dystonia. Discussed this with family and the patient. 4.  Safety plan -calling 911 if he should develop thoughts of  self-harm. 5.  encouraged active daytime schedule and good sleep hygiene. 6.  Follow up with me in two weeks.      Fidencio Knowles MD, [address] [place] [address],[address], [facility]

## 2020-12-23 NOTE — PROGRESS NOTES
Pt here with family for a Abilify Maintena injection  Pt was to get a haldol D injection but order was changed today per Dr. Kellie Mcgarry  Pt calm, pleasant and cooperative today  Pt's brother reported that he has had this medication in the past but it have a long time ago pt and his brother are without any questions, concerns or c/o's    Abilify Maintena 400 mg was given in right deltoid without difficulty  bandaid applied to site  Site unremarkable  Pt prerna well    Pt and family agreeable with 20 min monitoring time for any adverse reactions  will monitor

## 2020-12-23 NOTE — PROGRESS NOTES
Pt without c/o's and reports that he is feeling ok resp easy and even  Lungs CTA Discharge instructions reviewed with pt and his family and understanding was verbalized  Pt was then discharged ambulatory in stable condition with his family

## 2021-01-06 ENCOUNTER — HOSPITAL ENCOUNTER (OUTPATIENT)
Dept: PSYCHIATRY | Age: 61
Setting detail: THERAPIES SERIES
Discharge: HOME | End: 2021-01-06
Payer: COMMERCIAL

## 2021-01-06 VITALS
TEMPERATURE: 97.3 F | RESPIRATION RATE: 16 BRPM | DIASTOLIC BLOOD PRESSURE: 80 MMHG | SYSTOLIC BLOOD PRESSURE: 124 MMHG | HEART RATE: 72 BPM

## 2021-01-06 PROCEDURE — 99215 OFFICE O/P EST HI 40 MIN: CPT | Performed by: PSYCHIATRY & NEUROLOGY

## 2021-01-07 NOTE — PROGRESS NOTES
Department of Psychiatry  Progress Note    Patient's chart was reviewed. Met with patient, guardian mom, and brother. SUBJECTIVE:      Positive psychotic symptoms continue to be well controlled despite the switch to [place]. Both the patient's mother and brother report ongoing negative symptoms - apathy, amotivation, poverty of speech/thought, asocial, and blunted affect. Has not yet started citalopram. He is reluctant to try yet another medication. Continues with movments I now believe are related to both Tardive Dyskinesia and EPS. Some improve when he takes cogentin. Some do not. Patient repoots they have improved overall since switching to Maintena from Haldol D. patient reports some dry mouth with cogentin. Patient continues to hope to be off all medication. We discussed the following options at length: 1.Start Citalopram  2. Connect with [facility] for more support. 3.experiment with cogentin  to see what's best for the patient (no cogent, 0.5mg BID, or 1mg BID)   4. Discussed treatment options for TD.    ROS:   Patient has new complaints: no  Sleeping adequately:  Yes   Appetite adequate: Yes    Does not endorse or describe head aches, SOB, cough, sore throat, chills, chest pain, abdominal pain, neuro problems, bleeding problems, or skin problems. Was moving all four extremities without difficulty. OBJECTIVE:  Vitals:    01/06/21 1200   BP: 124/80   Pulse: 72   Resp: 16   Temp: 97.3 °F (36.3 °C)     Gait: normal.    Mental Status Examination:     Appearance: fair grooming and hygiene.  Oral TD  Behavior/Attitude toward examiner:  cooperative, attentive and fair eye contact  Speech: poverty  Mood:  \"ok\"  Affect:  FLAT     Thought processes:  Goal directed, linear, no SYLVIE or gross disorganization  Thought Content: no SI, no HI, paucity  Perceptions: no AVH  Attention: attention span and concentration were intact to interview   Abstraction: intact Cognition:  Alert and oriented to person, place, time, and situation, recall intact  Insight: Limited insight   Judgment: intact    Medication:  Current Outpatient Medications on File Prior to Encounter   Medication Sig Dispense Refill    benztropine (COGENTIN) 1 MG tablet Take 1 tablet by mouth 2 times daily 60 tablet 0    citalopram (CELEXA) 20 MG tablet Take 1 tablet by mouth daily 30 tablet 0    ARIPiprazole ER (ABILIFY MAINTENA) 400 MG PRSY Inject 400 mg into the muscle once for 1 dose 400 mg 0    LIVALO 2 MG TABS tablet TAKE 1/2 TABLET BY MOUTH AT BEDTIME  45 tablet 0    Flaxseed, Linseed, (FLAX SEED OIL PO) Take 1 capsule by mouth daily      Multiple Vitamins-Minerals (MULTIVITAMIN ADULT PO) Take by mouth      Coenzyme Q10 (CO Q 10) 10 MG CAPS Take by mouth      Omega-3 Fatty Acids (FISH OIL) 1000 MG CAPS Take 1,000 mg by mouth daily       No current facility-administered medications on file prior to encounter. FORMULATION:  This is a domiciled, never , psychiatrically  disabled, 78-year-old with schizophrenia, who presents for a bridge  appointment after inpatient stabilization for psychosis. The patient is  doing better on Haldol Decanoate. His symptoms have stabilized;  however, he is now struggling with increased sedation during the daytime  and  anxiety about what he believes to be tardive dyskinesia. It is my opinion  that he does not have tardive dyskinesia nor acute dystonia. He has had some signs and symptoms of EPS including tremor but those have mostly resolved. DIAGNOSES:  1.  Schizophrenia. 2.  hypertension. 3.  Hyperlipidemia. PLAN:  1. Outpatient level of care is appropriate. 2.  9/21/2020 - reduced Haldol Decanoate to 200 mg every 4 weeks. Added Ativan 1 mg twice daily for further stabilization. 11/25/2020 - reduced Haldol Decanoate to 150mg and prn ativan to 1mg PO QHS prn. 12/23/2020 - Maintena 400mg IM. Stop Haldol decanoate. They should cross taper ok. Start cogentin 1mg BID and citalopram 20mg daily. 1/7/2021 - Continue Maintena 400mg IM. Start citalopram 20mg daily. Trial different doses of congentin. Contact B for services. Discuss further treatment options for TD at next visit. 3.  Psychoeducation provided again at length regarding symptoms of EPS, tardive dyskinesia, and acute dystonia. Discussed this with family and the patient. 4.  Safety plan -calling 911 if he should develop thoughts of  self-harm. 5.  encouraged active daytime schedule and good sleep hygiene. 6.  Follow up with me at next injection date.     Dixon Peck MD, [facility] [address], [facility]

## 2021-01-25 ENCOUNTER — HOSPITAL ENCOUNTER (OUTPATIENT)
Dept: NURSING | Age: 61
Setting detail: INFUSION SERIES
Discharge: HOME | End: 2021-01-25
Payer: COMMERCIAL

## 2021-01-25 ENCOUNTER — HOSPITAL ENCOUNTER (OUTPATIENT)
Dept: PSYCHIATRY | Age: 61
Setting detail: THERAPIES SERIES
Discharge: HOME | End: 2021-01-25
Payer: COMMERCIAL

## 2021-01-25 VITALS
RESPIRATION RATE: 16 BRPM | DIASTOLIC BLOOD PRESSURE: 74 MMHG | TEMPERATURE: 96.5 F | SYSTOLIC BLOOD PRESSURE: 132 MMHG | HEART RATE: 79 BPM

## 2021-01-25 VITALS
HEART RATE: 75 BPM | BODY MASS INDEX: 31.14 KG/M2 | HEIGHT: 73 IN | SYSTOLIC BLOOD PRESSURE: 119 MMHG | DIASTOLIC BLOOD PRESSURE: 85 MMHG | TEMPERATURE: 98.6 F | RESPIRATION RATE: 16 BRPM | WEIGHT: 235 LBS

## 2021-01-25 DIAGNOSIS — F20.9 SCHIZOPHRENIA, UNSPECIFIED TYPE (HCC): ICD-10-CM

## 2021-01-25 DIAGNOSIS — G24.01 TARDIVE DYSKINESIA: Primary | ICD-10-CM

## 2021-01-25 PROCEDURE — 99215 OFFICE O/P EST HI 40 MIN: CPT | Performed by: PSYCHIATRY & NEUROLOGY

## 2021-01-25 PROCEDURE — 99211 OFF/OP EST MAY X REQ PHY/QHP: CPT

## 2021-01-25 PROCEDURE — 6360000002 HC RX W HCPCS: Performed by: PSYCHIATRY & NEUROLOGY

## 2021-01-25 PROCEDURE — 96372 THER/PROPH/DIAG INJ SC/IM: CPT

## 2021-01-25 RX ORDER — VALBENAZINE 40 MG/1
40 CAPSULE ORAL DAILY
Qty: 30 CAPSULE | Refills: 0 | Status: SHIPPED | OUTPATIENT
Start: 2021-01-25 | End: 2021-02-26 | Stop reason: HOSPADM

## 2021-01-25 RX ORDER — ARIPIPRAZOLE 400 MG
300 KIT INTRAMUSCULAR ONCE
Qty: 300 MG | Refills: 0 | Status: SHIPPED | OUTPATIENT
Start: 2021-01-25 | End: 2021-02-04 | Stop reason: DRUGHIGH

## 2021-01-25 RX ADMIN — ARIPIPRAZOLE 300 MG: KIT at 13:30

## 2021-01-25 NOTE — PROGRESS NOTES
Pt here with family for a Abilify Maintena injection  Pt calm, pleasant and cooperative today  Abilify Maintena 300 mg was given IM in right deltoid without difficulty  bandaid applied to site  Site unremarkable  Pt prerna well  Discharge instructions reviewed with pt and his brother and understanding was verbalized  Pt was then discharged ambulatory in stable condition with his brother

## 2021-01-26 NOTE — PROGRESS NOTES
Department of Psychiatry  Progress Note    Patient's chart was reviewed. Met with patient, guardian mom, and brother. SUBJECTIVE:      Positive psychotic symptoms continue to be well controlled on Abilify Maintena. Continues with TD and negative symptoms of schizophrenia. He did not start Citalopram. He has been taking Ativan PRN since our last visit. After much discussion, agreed to decrease Maintena to 300mg and an Ingrezza trial for TD. Goal is just two medications - Maintena and Ingrezza. ROS:   Patient has new complaints: no  Sleeping adequately:  Yes   Appetite adequate: Yes    Does not endorse or describe head aches, SOB, cough, sore throat, chills, chest pain, abdominal pain, neuro problems, bleeding problems, or skin problems. Was moving all four extremities without difficulty. OBJECTIVE:  Vitals:    01/25/21 1200   BP: 132/74   Pulse: 79   Resp: 16   Temp: 96.5 °F (35.8 °C)   Gait: normal.    Mental Status Examination:     Appearance: fair grooming and hygiene.  Oral TD  Behavior/Attitude toward examiner:  cooperative, attentive and fair eye contact  Speech: poverty  Mood:  \"ok\"  Affect:  FLAT     Thought processes:  Goal directed, linear, no SYLVIE or gross disorganization  Thought Content: no SI, no HI, paucity  Perceptions: no AVH  Attention: attention span and concentration were intact to interview   Abstraction: intact  Cognition:  Alert and oriented to person, place, time, and situation, recall intact  Insight: Limited insight   Judgment: intact    Medication:  Current Outpatient Medications on File Prior to Encounter   Medication Sig Dispense Refill    benztropine (COGENTIN) 1 MG tablet Take 1 tablet by mouth 2 times daily 60 tablet 0    citalopram (CELEXA) 20 MG tablet Take 1 tablet by mouth daily 30 tablet 0    LIVALO 2 MG TABS tablet TAKE 1/2 TABLET BY MOUTH AT BEDTIME  45 tablet 0    Flaxseed, Linseed, (FLAX SEED OIL PO) Take 1 capsule by mouth daily  Multiple Vitamins-Minerals (MULTIVITAMIN ADULT PO) Take by mouth      Coenzyme Q10 (CO Q 10) 10 MG CAPS Take by mouth      Omega-3 Fatty Acids (FISH OIL) 1000 MG CAPS Take 1,000 mg by mouth daily       No current facility-administered medications on file prior to encounter. FORMULATION:  This is a domiciled, never , psychiatrically  disabled, 57-year-old with schizophrenia, who presents for a bridge  appointment after inpatient stabilization for psychosis. The patient is  doing better on Haldol Decanoate. His symptoms have stabilized;  however, he is now struggling with increased sedation during the daytime  and  anxiety about what he believes to be tardive dyskinesia. It is my opinion  that he does not have tardive dyskinesia nor acute dystonia. He has had some signs and symptoms of EPS including tremor but those have mostly resolved. DIAGNOSES:  1.  Schizophrenia. 2.  hypertension. 3.  Hyperlipidemia. 4.  TD    PLAN:  1. Outpatient level of care is appropriate. 2.  9/21/2020 - reduced Haldol Decanoate to 200 mg every 4 weeks. Added Ativan 1 mg twice daily for further stabilization. 11/25/2020 - reduced Haldol Decanoate to 150mg and prn ativan to 1mg PO QHS prn.  12/23/2020 - Maintena 400mg IM. Stop Haldol decanoate. They should cross taper ok. Start cogentin 1mg BID and citalopram 20mg daily. 1/7/2021 - Continue Maintena 400mg IM. Start citalopram 20mg daily. Trial different doses of congentin. Contact B for services. Discuss further treatment options for TD at next visit. 1/25/2021 - Decrease Maintena to 300mg today. Start Ingrezz 40mg daily. Goal is just two medications - Maintena and Ingrezza. 3.  Psychoeducation provided again at length regarding symptoms of EPS, tardive dyskinesia, and acute dystonia. Discussed this with family and the patient. 4.  Safety plan -calling 911 if he should develop thoughts of self-harm. 5. encouraged active daytime schedule and good sleep hygiene. 6.  Follow up with me in one week - Ingrezza working?     Barbie Perdue MD, [address], [place]

## 2021-02-01 NOTE — BH NOTE
Patient's brother called and reported that the patient does not wish to start the Ingrezza any longer. Scheduled to see Dr. Yady Genao on Thursday. Also requested a refill of his Cogentin, approved by Dr. Yady Genao and called into [facility], patient's preferred pharmacy.

## 2021-02-04 ENCOUNTER — HOSPITAL ENCOUNTER (OUTPATIENT)
Dept: PSYCHIATRY | Age: 61
Setting detail: THERAPIES SERIES
Discharge: HOME | End: 2021-02-04
Payer: COMMERCIAL

## 2021-02-04 VITALS — HEART RATE: 76 BPM | SYSTOLIC BLOOD PRESSURE: 121 MMHG | TEMPERATURE: 96.8 F | DIASTOLIC BLOOD PRESSURE: 81 MMHG

## 2021-02-04 DIAGNOSIS — F20.9 SCHIZOPHRENIA, UNSPECIFIED TYPE (HCC): ICD-10-CM

## 2021-02-04 PROCEDURE — 99215 OFFICE O/P EST HI 40 MIN: CPT | Performed by: PSYCHIATRY & NEUROLOGY

## 2021-02-04 RX ORDER — ARIPIPRAZOLE 300 MG
300 KIT INTRAMUSCULAR
Qty: 1 SYRINGE | Refills: 2 | Status: SHIPPED | OUTPATIENT
Start: 2021-02-22 | End: 2021-04-19 | Stop reason: SDUPTHER

## 2021-02-04 NOTE — PROGRESS NOTES
Department of Psychiatry  Progress Note    Patient's chart was reviewed. Met with patient, guardian mom, and brother. SUBJECTIVE:      Positive psychotic symptoms well controlled on Abilify Maintena despite lowered dose. Continues with TD and negative symptoms of schizophrenia. He did not start [place] as agreed on at last visit out of fear of side effects    Discussed the risk, benefits, and side effects again at length with the patient, his brother, and guardian mother. He agreed to give it a chance. Goal is just two medications - Maintena and Ingrezza. ROS:   Patient has new complaints: no  Sleeping adequately:  Yes   Appetite adequate: Yes    Does not endorse or describe head aches, SOB, cough, sore throat, chills, chest pain, abdominal pain, neuro problems, bleeding problems, or skin problems. Was moving all four extremities without difficulty. OBJECTIVE:  Vitals:    02/04/21 1145   BP: 121/81   Pulse: 76   Temp: 96.8 °F (36 °C)   Gait: normal.    Mental Status Examination:     Appearance: fair grooming and hygiene.  Oral TD  Behavior/Attitude toward examiner:  cooperative, attentive and fair eye contact  Speech: poverty  Mood:  \"ok\"  Affect:  FLAT     Thought processes:  Goal directed, linear, no SYLVIE or gross disorganization  Thought Content: no SI, no HI, paucity  Perceptions: no AVH  Attention: attention span and concentration were intact to interview   Abstraction: intact  Cognition:  Alert and oriented to person, place, time, and situation, recall intact  Insight: Limited insight   Judgment: intact    Medication:  Current Outpatient Medications on File Prior to Encounter   Medication Sig Dispense Refill    Valbenazine Tosylate (INGREZZA) 40 MG CAPS Take 40 mg by mouth daily 30 capsule 0    benztropine (COGENTIN) 1 MG tablet Take 1 tablet by mouth 2 times daily 60 tablet 0    citalopram (CELEXA) 20 MG tablet Take 1 tablet by mouth daily 30 tablet 0  LIVALO 2 MG TABS tablet TAKE 1/2 TABLET BY MOUTH AT BEDTIME  45 tablet 0    Flaxseed, Linseed, (FLAX SEED OIL PO) Take 1 capsule by mouth daily      Multiple Vitamins-Minerals (MULTIVITAMIN ADULT PO) Take by mouth      Coenzyme Q10 (CO Q 10) 10 MG CAPS Take by mouth      Omega-3 Fatty Acids (FISH OIL) 1000 MG CAPS Take 1,000 mg by mouth daily       No current facility-administered medications on file prior to encounter. FORMULATION:  This is a domiciled, never , psychiatrically  disabled, 49-year-old with schizophrenia, who presents for a bridge  appointment after inpatient stabilization for psychosis. The patient is  doing better on Haldol Decanoate. His symptoms have stabilized;  however, he is now struggling with increased sedation during the daytime  and  anxiety about what he believes to be tardive dyskinesia. It is my opinion  that he does not have tardive dyskinesia nor acute dystonia. He has had some signs and symptoms of EPS including tremor but those have mostly resolved. DIAGNOSES:  1.  Schizophrenia. 2.  hypertension. 3.  Hyperlipidemia. 4.  TD    PLAN:  1. Outpatient level of care is appropriate. 2.  9/21/2020 - reduced Haldol Decanoate to 200 mg every 4 weeks. Added Ativan 1 mg twice daily for further stabilization. 11/25/2020 - reduced Haldol Decanoate to 150mg and prn ativan to 1mg PO QHS prn.  12/23/2020 - Maintena 400mg IM. Stop Haldol decanoate. They should cross taper ok. Start cogentin 1mg BID and citalopram 20mg daily. 1/7/2021 - Continue Maintena 400mg IM. Start citalopram 20mg daily. Trial different doses of congentin. Contact B for services. Discuss further treatment options for TD at next visit. 1/25/2021 - Decrease Maintena to 300mg today. Start Ingrezz 40mg daily. Goal is just two medications - Maintena and Ingrezza. 2/4/2021 - did not start Logan Jagjit as agreed on at last visit. Agreed to start this week after much discussion. 3.  Psychoeducation provided again at length regarding symptoms of EPS, tardive dyskinesia, and acute dystonia. Discussed this with family and the patient. 4.  Safety plan -calling 911 if he should develop thoughts of self-harm. 5.  encouraged active daytime schedule and good sleep hygiene. 6.  Follow up with me in one week - Ingrezza working?     Gayla Quintanilla MD, [address] [facility],Third Floor, [facility]

## 2021-02-19 ENCOUNTER — HOSPITAL ENCOUNTER (OUTPATIENT)
Dept: PSYCHIATRY | Age: 61
Setting detail: THERAPIES SERIES
Discharge: HOME | End: 2021-02-19
Payer: COMMERCIAL

## 2021-02-19 VITALS — TEMPERATURE: 97.3 F | DIASTOLIC BLOOD PRESSURE: 81 MMHG | SYSTOLIC BLOOD PRESSURE: 122 MMHG

## 2021-02-19 PROCEDURE — 99215 OFFICE O/P EST HI 40 MIN: CPT | Performed by: PSYCHIATRY & NEUROLOGY

## 2021-02-19 NOTE — PROGRESS NOTES
 [START ON 2/22/2021] ARIPiprazole er (ABILIFY MAINTENA) 300 MG PRSY prefilled syringe Inject 1 Syringe into the muscle every 30 days NEXT DOSE DUE 2/22/2021 1 Syringe 2    Valbenazine Tosylate (INGREZZA) 40 MG CAPS Take 40 mg by mouth daily 30 capsule 0    benztropine (COGENTIN) 1 MG tablet Take 1 tablet by mouth 2 times daily 60 tablet 0    citalopram (CELEXA) 20 MG tablet Take 1 tablet by mouth daily 30 tablet 0    LIVALO 2 MG TABS tablet TAKE 1/2 TABLET BY MOUTH AT BEDTIME  45 tablet 0    Flaxseed, Linseed, (FLAX SEED OIL PO) Take 1 capsule by mouth daily      Multiple Vitamins-Minerals (MULTIVITAMIN ADULT PO) Take by mouth      Coenzyme Q10 (CO Q 10) 10 MG CAPS Take by mouth      Omega-3 Fatty Acids (FISH OIL) 1000 MG CAPS Take 1,000 mg by mouth daily       No current facility-administered medications on file prior to encounter. FORMULATION:  This is a domiciled, never , psychiatrically  disabled, 58-year-old with schizophrenia, who presents for a bridge  appointment after inpatient stabilization for psychosis. The patient is  doing better on Haldol Decanoate. His symptoms have stabilized;  however, he is now struggling with increased sedation during the daytime  and  anxiety about what he believes to be tardive dyskinesia. It is my opinion  that he does not have tardive dyskinesia nor acute dystonia. He has had some signs and symptoms of EPS including tremor but those have mostly resolved. DIAGNOSES:  1.  Schizophrenia. 2.  hypertension. 3.  Hyperlipidemia. 4.  TD    PLAN:  1. Outpatient level of care is appropriate. 2.  9/21/2020 - reduced Haldol Decanoate to 200 mg every 4 weeks. Added Ativan 1 mg twice daily for further stabilization. 11/25/2020 - reduced Haldol Decanoate to 150mg and prn ativan to 1mg PO QHS prn.  12/23/2020 - Maintena 400mg IM. Stop Haldol decanoate. They should cross taper ok. Start cogentin 1mg BID and citalopram 20mg daily. 1/7/2021 - Continue Maintena 400mg IM. Start citalopram 20mg daily. Trial different doses of congentin. Contact B for services. Discuss further treatment options for TD at next visit. 1/25/2021 - Decrease Maintena to 300mg today. Start Ingrezz 40mg daily. Goal is just two medications - Maintena and Ingrezza. 2/4/2021 - did not start Armando Loupe as agreed on at last visit. Agreed to start this week after much discussion. 2/19/2021 - resume Ingrezza trial. Limit or stop benadryl use. 3.  Psychoeducation provided again at length regarding symptoms of EPS, tardive dyskinesia, and acute dystonia. Discussed this with family and the patient. 4.  Safety plan -calling 911 if he should develop thoughts of self-harm. 5.  encouraged active daytime schedule and good sleep hygiene. 6.  Follow up with me in one week - Ingrezza working?     Nestor Pandya MD, [address], [place]

## 2021-02-22 ENCOUNTER — HOSPITAL ENCOUNTER (OUTPATIENT)
Dept: NURSING | Age: 61
Setting detail: INFUSION SERIES
Discharge: HOME | End: 2021-02-22
Payer: COMMERCIAL

## 2021-02-22 VITALS
HEIGHT: 73 IN | TEMPERATURE: 98.5 F | WEIGHT: 235 LBS | SYSTOLIC BLOOD PRESSURE: 150 MMHG | HEART RATE: 77 BPM | BODY MASS INDEX: 31.14 KG/M2 | RESPIRATION RATE: 16 BRPM | DIASTOLIC BLOOD PRESSURE: 57 MMHG

## 2021-02-22 DIAGNOSIS — F20.9 SCHIZOPHRENIA, UNSPECIFIED TYPE (HCC): Primary | ICD-10-CM

## 2021-02-22 PROCEDURE — 96372 THER/PROPH/DIAG INJ SC/IM: CPT

## 2021-02-22 PROCEDURE — 6360000002 HC RX W HCPCS: Performed by: PSYCHIATRY & NEUROLOGY

## 2021-02-22 PROCEDURE — 99211 OFF/OP EST MAY X REQ PHY/QHP: CPT

## 2021-02-22 RX ADMIN — ARIPIPRAZOLE 300 MG: KIT at 13:14

## 2021-03-01 DIAGNOSIS — G24.01 TARDIVE DYSKINESIA: Primary | ICD-10-CM

## 2021-03-02 DIAGNOSIS — G24.01 TARDIVE DYSKINESIA: ICD-10-CM

## 2021-03-22 ENCOUNTER — HOSPITAL ENCOUNTER (OUTPATIENT)
Dept: NURSING | Age: 61
Setting detail: INFUSION SERIES
Discharge: HOME | End: 2021-03-22
Payer: COMMERCIAL

## 2021-03-22 VITALS
TEMPERATURE: 98.5 F | BODY MASS INDEX: 31.81 KG/M2 | RESPIRATION RATE: 20 BRPM | HEART RATE: 71 BPM | HEIGHT: 73 IN | SYSTOLIC BLOOD PRESSURE: 156 MMHG | DIASTOLIC BLOOD PRESSURE: 89 MMHG | WEIGHT: 240 LBS

## 2021-03-22 DIAGNOSIS — F20.9 SCHIZOPHRENIA, UNSPECIFIED TYPE (HCC): Primary | ICD-10-CM

## 2021-03-22 PROCEDURE — 6360000002 HC RX W HCPCS: Performed by: PSYCHIATRY & NEUROLOGY

## 2021-03-22 PROCEDURE — 96372 THER/PROPH/DIAG INJ SC/IM: CPT

## 2021-03-22 PROCEDURE — 99211 OFF/OP EST MAY X REQ PHY/QHP: CPT

## 2021-03-22 RX ADMIN — ARIPIPRAZOLE 300 MG: KIT at 10:35

## 2021-03-22 ASSESSMENT — PAIN - FUNCTIONAL ASSESSMENT: PAIN_FUNCTIONAL_ASSESSMENT: 0-10

## 2021-03-22 NOTE — PROGRESS NOTES
Abilify Maintena 300 mg was given IM in right deltoid without difficulty  bandaid applied to site  Site unremarkable  Pt prerna well  Discharge instructions reviewed with pt and his brother and understanding was verbalized  Pt was then discharged ambulatory in stable condition with his brother

## 2021-04-19 ENCOUNTER — HOSPITAL ENCOUNTER (OUTPATIENT)
Dept: PSYCHIATRY | Age: 61
Setting detail: THERAPIES SERIES
Discharge: HOME | End: 2021-04-19
Payer: COMMERCIAL

## 2021-04-19 VITALS
RESPIRATION RATE: 20 BRPM | SYSTOLIC BLOOD PRESSURE: 156 MMHG | TEMPERATURE: 97.1 F | DIASTOLIC BLOOD PRESSURE: 92 MMHG | HEART RATE: 76 BPM

## 2021-04-19 DIAGNOSIS — F20.9 SCHIZOPHRENIA, UNSPECIFIED TYPE (HCC): ICD-10-CM

## 2021-04-19 PROCEDURE — 99215 OFFICE O/P EST HI 40 MIN: CPT | Performed by: PSYCHIATRY & NEUROLOGY

## 2021-04-19 RX ORDER — ARIPIPRAZOLE 300 MG
300 KIT INTRAMUSCULAR
Qty: 1 SYRINGE | Refills: 2 | Status: SHIPPED | OUTPATIENT
Start: 2021-04-19 | End: 2021-04-19 | Stop reason: SDUPTHER

## 2021-04-19 RX ORDER — ARIPIPRAZOLE 300 MG
300 KIT INTRAMUSCULAR
Qty: 1 SYRINGE | Refills: 2 | Status: ON HOLD | OUTPATIENT
Start: 2021-04-19 | End: 2021-10-27 | Stop reason: HOSPADM

## 2021-04-19 NOTE — BH NOTE
Pt's brother called back and stated pt refused to go downstairs for Abilify Injection. Injection nurse Blaine Rocha informed and Dr. Deep Santana paged and informed.

## 2021-04-19 NOTE — PROGRESS NOTES
Department of Psychiatry  Progress Note    Patient's chart was reviewed. Met with patient, guardian mom, and brother. SUBJECTIVE:      Patient tried Jostin Fabry for a few days, then stopped. He then tried 1 dose of Austedo and did not continue. He says they are both powerful drugs and does not want continue them for that reason. He cannot provide a specific concern other than they are powerful. His positive psychotic symptoms remain well controlled on Abilify Maintena despite the lowered dose. Discussed treatment options  for TD again at length. The patient is convinced his symptoms of TD and trouble breathing are caused by the Abilify and does not want to continue it nor any other psychotropic medication. My recommendation is that he continue on the Abilify Maintena and to reconsider a full trial of Ingrezza or Austedo. He has not taken Benadryl for over a week. He was taking between 6 -to-12 25 mg tablets daily up until about a week ago attempt to manage his TD.    ROS:   Patient has new complaints: no  Sleeping adequately:  Yes   Appetite adequate: Yes    Does not endorse or describe head aches, SOB, cough, sore throat, chills, chest pain, abdominal pain, bleeding problems, or skin problems. Was moving all four extremities without difficulty. OBJECTIVE:  Vitals:    04/19/21 1115   BP: (!) 156/92   Pulse: 76   Resp: 20   Temp: 97.1 °F (36.2 °C)   Gait: normal.    Mental Status Examination:     Appearance: fair grooming and hygiene.  Oral TD  Behavior/Attitude toward examiner:   attentive and fair eye contact  Speech: more communicative   Mood:  \"okr\"  Affect:  FLAT     Thought processes:  Goal directed, linear, no SYLVIE or gross disorganization  Thought Content: no SI, no HI, paucity  Perceptions: no AVH  Attention: attention span and concentration were intact to interview   Abstraction: intact  Cognition:  Alert and oriented to person, place, time, and situation, recall intact  Insight: Limited insight   Judgment: intact    Medication:  Current Outpatient Medications on File Prior to Encounter   Medication Sig Dispense Refill    LIVALO 2 MG TABS tablet TAKE 1/2 TABLET BY MOUTH AT BEDTIME  45 tablet 0    Flaxseed, Linseed, (FLAX SEED OIL PO) Take 1 capsule by mouth daily      Multiple Vitamins-Minerals (MULTIVITAMIN ADULT PO) Take by mouth      Coenzyme Q10 (CO Q 10) 10 MG CAPS Take by mouth      Omega-3 Fatty Acids (FISH OIL) 1000 MG CAPS Take 1,000 mg by mouth daily       No current facility-administered medications on file prior to encounter. FORMULATION:  This is a domiciled, never , psychiatrically  disabled, 69-year-old with schizophrenia, who presents for a bridge  appointment after inpatient stabilization for psychosis. The patient is  doing better on Haldol Decanoate. His symptoms have stabilized;  however, he is now struggling with increased sedation during the daytime  and TD. DIAGNOSES:  1.  Schizophrenia. 2.  hypertension. 3.  Hyperlipidemia. 4.  TD    PLAN:  1. Outpatient level of care is appropriate. 2.  9/21/2020 - reduced Haldol Decanoate to 200 mg every 4 weeks. Added Ativan 1 mg twice daily for further stabilization. 11/25/2020 - reduced Haldol Decanoate to 150mg and prn ativan to 1mg PO QHS prn.  12/23/2020 - Maintena 400mg IM. Stop Haldol decanoate. They should cross taper ok. Start cogentin 1mg BID and citalopram 20mg daily. 1/7/2021 - Continue Maintena 400mg IM. Start citalopram 20mg daily. Trial different doses of congentin. Contact [facility] for services. Discuss further treatment options for TD at next visit. 1/25/2021 - Decrease Maintena to 300mg today. Start Ingrezz 40mg daily. Goal is just two medications - Maintena and Ingrezza. 2/4/2021 - did not start Shruthi Lard as agreed on at last visit. Agreed to start this week after much discussion. 2/19/2021 - resume Ingrezza trial. Limit or stop benadryl use.    4/19/2021 -  recommendation is that he continue on the Abilify Maintena and to reconsider a full trial of either Ingrezza or Austedo. 3.  Psychoeducation provided again at length regarding symptoms of EPS, tardive dyskinesia, and acute dystonia. Discussed this with family and the patient. 4.  Safety plan -calling 911 if he should develop thoughts of self-harm. 5.  encouraged active daytime schedule and good sleep hygiene. 6.  Follow up with me in one month.      Rivera Lindo MD, [address] [place] [address],Third Floor, [place]

## 2021-05-26 ENCOUNTER — HOSPITAL ENCOUNTER (OUTPATIENT)
Dept: PSYCHIATRY | Age: 61
Setting detail: THERAPIES SERIES
Discharge: HOME | End: 2021-05-26
Payer: COMMERCIAL

## 2021-05-26 VITALS
TEMPERATURE: 96.8 F | HEART RATE: 80 BPM | SYSTOLIC BLOOD PRESSURE: 134 MMHG | RESPIRATION RATE: 20 BRPM | DIASTOLIC BLOOD PRESSURE: 76 MMHG

## 2021-05-26 PROCEDURE — 99215 OFFICE O/P EST HI 40 MIN: CPT | Performed by: PSYCHIATRY & NEUROLOGY

## 2021-05-27 NOTE — PROGRESS NOTES
Department of Psychiatry  Progress Note    Patient's chart was reviewed. Met with patient, guardian mom, and brother. SUBJECTIVE:      Did not take Abilify as recommended at last visit. Has been off antipsychotic treatment now since March. Patient, mother, and brother all report his psychotic symptoms remain in remission. We discussed the risks of going without treatment at length including a recurrence of symptoms. He did however start Austedo yesterday and 6mg BID and reports dramatic improvement in TD since then. Agrees to give it a full trial before stopping. No side effects. Patient continues to want to go without treatment. ROS:   Patient has new complaints: no  Sleeping adequately:  Yes   Appetite adequate: Yes    Does not endorse or describe head aches, SOB, cough, sore throat, chills, chest pain, abdominal pain, bleeding problems, or skin problems. Was moving all four extremities without difficulty. OBJECTIVE:  Vitals:    05/26/21 1115   BP: 134/76   Pulse: 80   Resp: 20   Temp: 96.8 °F (36 °C)   Gait: normal.    Mental Status Examination:     Appearance: fair grooming and hygiene. Less osral TD  Behavior/Attitude toward examiner: attentive and fair eye contact  Speech: communicative   Mood:  \"ok\"  Affect:  FLAT     Thought processes:  Goal directed, linear, no SYLVIE or gross disorganization  Thought Content: no SI, no HI, paucity  Perceptions: no AVH  Attention: attention span and concentration were intact to interview   Abstraction: intact  Cognition:  Alert and oriented to person, place, time, and situation, recall intact  Insight: Limited insight   Judgment: intact    Medication:  Austedo 6mg BID    FORMULATION:  This is a domiciled, never , psychiatrically  disabled, 64-year-old with schizophrenia, who presents for a bridge  appointment after inpatient stabilization for psychosis. DIAGNOSES:  1.  Schizophrenia. 2.  hypertension. 3.  Hyperlipidemia. 4.  TD    PLAN:  1. Outpatient level of care is appropriate. 2.  9/21/2020 - reduced Haldol Decanoate to 200 mg every 4 weeks. Added Ativan 1 mg twice daily for further stabilization. 11/25/2020 - reduced Haldol Decanoate to 150mg and prn ativan to 1mg PO QHS prn.  12/23/2020 - Maintena 400mg IM. Stop Haldol decanoate. They should cross taper ok. Start cogentin 1mg BID and citalopram 20mg daily. 1/7/2021 - Continue Maintena 400mg IM. Start citalopram 20mg daily. Trial different doses of congentin. Contact [facility] for services. Discuss further treatment options for TD at next visit. 1/25/2021 - Decrease Maintena to 300mg today. Start Ingrezz 40mg daily. Goal is just two medications - Maintena and Ingrezza. 2/4/2021 - did not start Favian Bourgeois as agreed on at last visit. Agreed to start this week after much discussion. 2/19/2021 - resume Ingrezza trial. Limit or stop benadryl use. 4/19/2021 -  recommendation is that he continue on the [employer] and to reconsider a full trial of either Ingrezza or Austedo. 5/26/2021 - recommended he go back on a neuroleptic but he declined. Continue Austedo trial  3. Psychoeducation provided again at length regarding symptoms of EPS, tardive dyskinesia, and acute dystonia. Discussed this with family and the patient. 4.  Safety plan -calling 911 if he should develop thoughts of self-harm. 5.  encouraged active daytime schedule and good sleep hygiene. 6.  Follow up with me in one month.      Daniel Faust MD

## 2021-06-25 ENCOUNTER — HOSPITAL ENCOUNTER (OUTPATIENT)
Dept: PSYCHIATRY | Age: 61
Setting detail: THERAPIES SERIES
Discharge: HOME | End: 2021-06-25
Payer: COMMERCIAL

## 2021-06-25 VITALS
RESPIRATION RATE: 20 BRPM | SYSTOLIC BLOOD PRESSURE: 134 MMHG | TEMPERATURE: 96.9 F | DIASTOLIC BLOOD PRESSURE: 83 MMHG | HEART RATE: 75 BPM

## 2021-06-25 PROCEDURE — 99215 OFFICE O/P EST HI 40 MIN: CPT | Performed by: PSYCHIATRY & NEUROLOGY

## 2021-06-30 NOTE — PROGRESS NOTES
Department of Psychiatry  Progress Note    Patient's chart was reviewed. Met with patient, guardian mom, and brother. SUBJECTIVE:      Remains off Abilify though psychotic symptoms are still in remission. Has not taken Austedo either as recommended and continues with some TD. Both his mother (who is his guardian) and brother have encouraged him to go back on scheduled neuroleptics but he has declined. Behaviorally stable. ROS:   Patient has new complaints: no  Sleeping adequately:  Yes   Appetite adequate: Yes    Does not endorse or describe head aches, SOB, cough, sore throat, chills, chest pain, abdominal pain, bleeding problems, or skin problems. Was moving all four extremities without difficulty. OBJECTIVE:  Vitals:    06/25/21 1130   BP: 134/83   Pulse: 75   Resp: 20   Temp: 96.9 °F (36.1 °C)   Gait: normal.    Mental Status Examination:     Appearance: fair grooming and hygiene. Less oral TD  Behavior/Attitude toward examiner: attentive and fair eye contact  Speech: communicative   Mood:  \"ok\"  Affect:  FLAT     Thought processes:  Goal directed, linear, no SYLVIE or gross disorganization  Thought Content: no SI, no HI, paucity  Perceptions: no AVH  Attention: attention span and concentration were intact to interview   Abstraction: intact  Cognition:  Alert and oriented to person, place, time, and situation, recall intact  Insight: Limited insight   Judgment: intact    Medication:  Not adherent     FORMULATION:  This is a domiciled, never , psychiatrically  disabled, 17-year-old with schizophrenia, who presents for a bridge  Appointment. DIAGNOSES:  1.  Schizophrenia. 2.  hypertension. 3.  Hyperlipidemia. 4.  TD    PLAN:  1. Outpatient level of care is appropriate. 2.  9/21/2020 - reduced Haldol Decanoate to 200 mg every 4 weeks. Added Ativan 1 mg twice daily for further stabilization.   11/25/2020 - reduced Haldol Decanoate to 150mg and prn ativan to 1mg PO QHS prn.  12/23/2020 - Maintena 400mg IM. Stop Haldol decanoate. They should cross taper ok. Start cogentin 1mg BID and citalopram 20mg daily. 1/7/2021 - Continue Maintena 400mg IM. Start citalopram 20mg daily. Trial different doses of congentin. Contact [facility] for services. Discuss further treatment options for TD at next visit. 1/25/2021 - Decrease Maintena to 300mg today. Start Ingrezz 40mg daily. Goal is just two medications - Maintena and Ingrezza. 2/4/2021 - did not start Adolm Shad as agreed on at last visit. Agreed to start this week after much discussion. 2/19/2021 - resume Ingrezza trial. Limit or stop benadryl use. 4/19/2021 -  recommendation is that he continue on the [employer] and to reconsider a full trial of either Ingrezza or Austedo. 5/26/2021 - recommended he go back on a neuroleptic but he declined. Continue Austedo trial  6/25/2021 - not adherent but so far symptoms remain in remission. 3.  Safety plan -call 911 if he should develop thoughts of self-harm. 4.  encourage active daytime schedule and good sleep hygiene. 5.  Follow up with me in one month.      Patrice Torres MD

## 2021-08-02 ENCOUNTER — HOSPITAL ENCOUNTER (OUTPATIENT)
Dept: PSYCHIATRY | Age: 61
Setting detail: THERAPIES SERIES
Discharge: HOME | End: 2021-08-02
Payer: COMMERCIAL

## 2021-08-02 VITALS
RESPIRATION RATE: 20 BRPM | TEMPERATURE: 97.3 F | SYSTOLIC BLOOD PRESSURE: 132 MMHG | HEART RATE: 86 BPM | DIASTOLIC BLOOD PRESSURE: 77 MMHG

## 2021-08-02 PROCEDURE — 99215 OFFICE O/P EST HI 40 MIN: CPT | Performed by: PSYCHIATRY & NEUROLOGY

## 2021-08-03 NOTE — PROGRESS NOTES
Department of Psychiatry  Progress Note    Patient's chart was reviewed. Met with patient, guardian mom, and brother. SUBJECTIVE:      Continues without Abilify and Austedo. More delusional today - talks about experiencing a deliverance from demons years ago. Says he had demons in his head that were removed by God. Seems more elevated and energetic today; more talkative. No other delusional content. Not RTIS. Endorses feeling safe at home. Again discussed the risks of relapse off treatment at length including another hospitalization. Both mother and brother are supportive of treatment, but the patient continues to decline. Behaviorally stable. ROS:   Patient has new complaints: no  Sleeping adequately:  Yes   Appetite adequate: Yes    Does not endorse or describe head aches, SOB, cough, sore throat, chills, chest pain, abdominal pain, bleeding problems, or skin problems. Was moving all four extremities without difficulty. OBJECTIVE:  Vitals:    08/02/21 1115   BP: 132/77   Pulse: 86   Resp: 20   Temp: 97.3 °F (36.3 °C)   Gait: normal.    Mental Status Examination:     Appearance: fair grooming and hygiene. + oral TD  Behavior/Attitude toward examiner: attentive and fair eye contact  Speech: + communicative   Mood:  \"fine\"  Affect:  FLAT     Thought processes:  Goal directed, linear, no SYLVIE or gross disorganization  Thought Content: no SI, no HI, + delusional   Perceptions: no AVH  Attention: attention span and concentration were intact to interview   Abstraction: intact  Cognition:  Alert and oriented to person, place, time, and situation, recall intact  Insight: Limited insight   Judgment: fair     Medication:  Not adherent to Abilify Maintena nor Austedo    FORMULATION:  This is a domiciled, never , psychiatrically  disabled, 64-year-old with schizophrenia, who presents for a bridge  Appointment. DIAGNOSES:  1.  Schizophrenia. 2.  hypertension. 3.  Hyperlipidemia.   4. TD    PLAN:  1. Outpatient level of care is appropriate. 2.  9/21/2020 - reduced Haldol Decanoate to 200 mg every 4 weeks. Added Ativan 1 mg twice daily for further stabilization. 11/25/2020 - reduced Haldol Decanoate to 150mg and prn ativan to 1mg PO QHS prn.  12/23/2020 - Maintena 400mg IM. Stop Haldol decanoate. They should cross taper ok. Start cogentin 1mg BID and citalopram 20mg daily. 1/7/2021 - Continue Maintena 400mg IM. Start citalopram 20mg daily. Trial different doses of congentin. Contact B for services. Discuss further treatment options for TD at next visit. 1/25/2021 - Decrease Maintena to 300mg today. Start Ingrezz 40mg daily. Goal is just two medications - Maintena and Ingrezza. 2/4/2021 - did not start M[place] Half as agreed on at last visit. Agreed to start this week after much discussion. 2/19/2021 - resume Ingrezza trial. Limit or stop benadryl use. 4/19/2021 -  recommendation is that he continue on the [place] and to reconsider a full trial of either Ingrezza or Austedo. 5/26/2021 - recommended he go back on a neuroleptic but he declined. Continue Austedo trial  6/25/2021 - non-adherent but so far symptoms remain in remission. 8/2/2021 - remains non-adherent with treatment. 3.  Safety plan -call 911 if he should develop thoughts of self-harm. 4.  encourage active daytime schedule and good sleep hygiene. 5.  Follow up with me in one month.      Wen Goldberg MD

## 2021-08-31 ENCOUNTER — HOSPITAL ENCOUNTER (OUTPATIENT)
Dept: PSYCHIATRY | Age: 61
Setting detail: THERAPIES SERIES
Discharge: HOME | End: 2021-08-31
Payer: COMMERCIAL

## 2021-08-31 VITALS
OXYGEN SATURATION: 94 % | RESPIRATION RATE: 20 BRPM | TEMPERATURE: 97.1 F | DIASTOLIC BLOOD PRESSURE: 78 MMHG | HEART RATE: 92 BPM | SYSTOLIC BLOOD PRESSURE: 150 MMHG

## 2021-08-31 PROCEDURE — 99215 OFFICE O/P EST HI 40 MIN: CPT | Performed by: PSYCHIATRY & NEUROLOGY

## 2021-09-01 NOTE — PROGRESS NOTES
Department of Psychiatry  Progress Note    Patient's chart was reviewed. Met with patient, guardian mom, and brother. SUBJECTIVE:      Remains off treatment and mostly asymptomatic. Presents mildly disorganized and religiously preoccupied but otherwise stable. Feels safe at home - no thoughts of harming himself or others. Behaviorally stable. Multiple complaints about TD but not interested in an Austedo trial.     Patient and family would like to continue monthly meetings at this point for monitoring. ROS:   Patient has new complaints: no  Sleeping adequately:  Yes   Appetite adequate: Yes    Does not endorse or describe head aches, SOB, cough, sore throat, chills, chest pain, abdominal pain, bleeding problems, or skin problems. Was moving all four extremities without difficulty. OBJECTIVE:  Vitals:    08/31/21 1045   BP: (!) 150/78   Pulse: 92   Resp: 20   Temp: 97.1 °F (36.2 °C)   SpO2: 94%   Gait: normal.    Mental Status Examination:     Appearance: fair grooming and hygiene. + oral TD  Behavior/Attitude toward examiner: attentive and fair eye contact  Speech: + communicative   Mood:  \"fine\"  Affect:  FLAT     Thought processes:  Goal directed, linear, no SYLVIE or gross disorganization  Thought Content: no SI, no HI, + delusional   Perceptions: no AVH  Attention: attention span and concentration were intact to interview   Abstraction: intact  Cognition:  Alert and oriented to person, place, time, and situation, recall intact  Insight: Limited insight   Judgment: fair     Medication:  Not adherent to Abilify Maintena nor Austedo    FORMULATION:  This is a domiciled, never , psychiatrically  disabled, 58-year-old with schizophrenia, who presents for a bridge  Appointment. DIAGNOSES:  1.  Schizophrenia. 2.  hypertension. 3.  Hyperlipidemia. 4.  TD    PLAN:  1. Outpatient level of care is appropriate. 2.  9/21/2020 - reduced Haldol Decanoate to 200 mg every 4 weeks.  Added Ativan

## 2021-09-28 ENCOUNTER — HOSPITAL ENCOUNTER (OUTPATIENT)
Dept: PSYCHIATRY | Age: 61
Setting detail: THERAPIES SERIES
Discharge: HOME | End: 2021-09-28
Payer: COMMERCIAL

## 2021-09-28 VITALS
DIASTOLIC BLOOD PRESSURE: 83 MMHG | TEMPERATURE: 97.7 F | OXYGEN SATURATION: 95 % | SYSTOLIC BLOOD PRESSURE: 165 MMHG | RESPIRATION RATE: 20 BRPM | HEART RATE: 84 BPM

## 2021-09-28 PROCEDURE — 99214 OFFICE O/P EST MOD 30 MIN: CPT | Performed by: PSYCHIATRY & NEUROLOGY

## 2021-09-30 NOTE — PROGRESS NOTES
Department of Psychiatry  Progress Note    Patient's chart was reviewed. Met with patient, guardian mom, and brother. SUBJECTIVE:      Continues off treatment and mostly asymptomatic.     mildly disorganized and religiously preoccupied but otherwise stable. Feels safe at home - no thoughts of harming himself or others. Behaviorally stable. Remains concerned about TD but not interested in an Austedo or [place] trial.  Believes the TD will spontaneously remit at some point. Patient and family would like to continue monthly meetings at this point for monitoring. Discussed elevated BP - encouraged him to see his PCP. ROS:   Patient has new complaints: no  Sleeping adequately:  Yes   Appetite adequate: Yes    Does not endorse or describe head aches, SOB, cough, sore throat, chills, chest pain, abdominal pain, bleeding problems, or skin problems. Was moving all four extremities without difficulty. OBJECTIVE:  Vitals:    09/28/21 1100   BP: (!) 165/83   Pulse: 84   Resp: 20   Temp: 97.7 °F (36.5 °C)   SpO2: 95%   Gait: normal.    Mental Status Examination:     Appearance: fair grooming and hygiene. + oral TD  Behavior/Attitude toward examiner: attentive and fair eye contact  Speech: non-pressured  Mood:  \"fine\"  Affect:  FLAT     Thought processes:  Goal directed, linear, no SYLVIE or gross disorganization  Thought Content: no SI, no HI, + delusional   Perceptions: no AVH  Attention: attention span and concentration were intact to interview   Abstraction: intact  Cognition:  Alert and oriented to person, place, time, and situation, recall intact  Insight: Limited insight   Judgment: fair     Medication:  Not adherent to Abilify Maintena nor Austedo    FORMULATION:  This is a domiciled, never , psychiatrically  disabled, 57-year-old with schizophrenia, who presents for a bridge appointment. DIAGNOSES:  1.  Schizophrenia. 2.  hypertension. 3.  Hyperlipidemia. 4.  TD    PLAN:  1. Outpatient level of care is appropriate. 2.  9/21/2020 - reduced Haldol Decanoate to 200 mg every 4 weeks. Added Ativan 1 mg twice daily for further stabilization. 11/25/2020 - reduced Haldol Decanoate to 150mg and prn ativan to 1mg PO QHS prn.  12/23/2020 - Maintena 400mg IM. Stop Haldol decanoate. They should cross taper ok. Start cogentin 1mg BID and citalopram 20mg daily. 1/7/2021 - Continue Maintena 400mg IM. Start citalopram 20mg daily. Trial different doses of congentin. Contact [facility] for services. Discuss further treatment options for TD at next visit. 1/25/2021 - Decrease Maintena to 300mg today. Start Ingrezz 40mg daily. Goal is just two medications - Maintena and Ingrezza. 2/4/2021 - did not start [place] as agreed on at last visit. Agreed to start this week after much discussion. 2/19/2021 - resume Ingrezza trial. Limit or stop benadryl use. 4/19/2021 -  recommendation is that he continue on the [place] [employer] and to reconsider a full trial of either Ingrezza or Austedo. 5/26/2021 - recommended he go back on a neuroleptic but he declined. Continue Austedo trial  6/25/2021 - non-adherent but so far symptoms remain in remission. 8/2/2021 - remains non-adherent with treatment. 8/31/2021 - remains non-adherent with treatment. 9/28/2021 - remains non-adherent with treatment. 3.  Safety plan -call 911 if he should develop thoughts of self-harm. 4.  encouraged active daytime schedule and good sleep hygiene. 5.  Follow up with me in one month. Daniel Molina MD    30 minutes spent with patient and family discussing treatments options.

## 2021-10-15 ENCOUNTER — HOSPITAL ENCOUNTER (INPATIENT)
Age: 61
LOS: 12 days | Discharge: HOME | DRG: 750 | End: 2021-10-27
Attending: STUDENT IN AN ORGANIZED HEALTH CARE EDUCATION/TRAINING PROGRAM | Admitting: PSYCHIATRY & NEUROLOGY
Payer: COMMERCIAL

## 2021-10-15 DIAGNOSIS — Z86.59 HISTORY OF SCHIZOPHRENIA: Primary | ICD-10-CM

## 2021-10-15 DIAGNOSIS — Z91.14 DRUG NONCOMPLIANCE: ICD-10-CM

## 2021-10-15 DIAGNOSIS — F20.9 SCHIZOPHRENIA, UNSPECIFIED TYPE (HCC): ICD-10-CM

## 2021-10-15 PROBLEM — F29 PSYCHOSIS (HCC): Status: ACTIVE | Noted: 2021-10-15

## 2021-10-15 PROBLEM — G24.01 TARDIVE DYSKINESIA: Status: ACTIVE | Noted: 2021-10-15

## 2021-10-15 LAB
A/G RATIO: 1.7 (ref 1.1–2.2)
ACETAMINOPHEN LEVEL: <5 UG/ML (ref 10–30)
ALBUMIN SERPL-MCNC: 4.3 G/DL (ref 3.4–5)
ALP BLD-CCNC: 74 U/L (ref 40–129)
ALT SERPL-CCNC: 30 U/L (ref 10–40)
ANION GAP SERPL CALCULATED.3IONS-SCNC: 10 MMOL/L (ref 3–16)
AST SERPL-CCNC: 33 U/L (ref 15–37)
BASOPHILS ABSOLUTE: 0 K/UL (ref 0–0.2)
BASOPHILS RELATIVE PERCENT: 0.5 %
BILIRUB SERPL-MCNC: 0.4 MG/DL (ref 0–1)
BUN BLDV-MCNC: 16 MG/DL (ref 7–20)
CALCIUM SERPL-MCNC: 9.4 MG/DL (ref 8.3–10.6)
CHLORIDE BLD-SCNC: 106 MMOL/L (ref 99–110)
CHOLESTEROL, TOTAL: 155 MG/DL (ref 0–199)
CO2: 26 MMOL/L (ref 21–32)
CREAT SERPL-MCNC: 1 MG/DL (ref 0.8–1.3)
EOSINOPHILS ABSOLUTE: 0.3 K/UL (ref 0–0.6)
EOSINOPHILS RELATIVE PERCENT: 3.6 %
ETHANOL: NORMAL MG/DL (ref 0–0.08)
GFR AFRICAN AMERICAN: >60
GFR NON-AFRICAN AMERICAN: >60
GLOBULIN: 2.6 G/DL
GLUCOSE BLD-MCNC: 121 MG/DL (ref 70–99)
HCT VFR BLD CALC: 49.4 % (ref 40.5–52.5)
HDLC SERPL-MCNC: 42 MG/DL (ref 40–60)
HEMOGLOBIN: 16.6 G/DL (ref 13.5–17.5)
INFLUENZA A: NOT DETECTED
INFLUENZA B: NOT DETECTED
LDL CHOLESTEROL CALCULATED: 97 MG/DL
LYMPHOCYTES ABSOLUTE: 1.2 K/UL (ref 1–5.1)
LYMPHOCYTES RELATIVE PERCENT: 16.6 %
MCH RBC QN AUTO: 30.7 PG (ref 26–34)
MCHC RBC AUTO-ENTMCNC: 33.7 G/DL (ref 31–36)
MCV RBC AUTO: 91.3 FL (ref 80–100)
MONOCYTES ABSOLUTE: 0.6 K/UL (ref 0–1.3)
MONOCYTES RELATIVE PERCENT: 8.6 %
NEUTROPHILS ABSOLUTE: 5.1 K/UL (ref 1.7–7.7)
NEUTROPHILS RELATIVE PERCENT: 70.7 %
PDW BLD-RTO: 13.3 % (ref 12.4–15.4)
PLATELET # BLD: 260 K/UL (ref 135–450)
PMV BLD AUTO: 8.1 FL (ref 5–10.5)
POTASSIUM REFLEX MAGNESIUM: 4.3 MMOL/L (ref 3.5–5.1)
RBC # BLD: 5.41 M/UL (ref 4.2–5.9)
SALICYLATE, SERUM: <0.3 MG/DL (ref 15–30)
SARS-COV-2 RNA, RT PCR: NOT DETECTED
SODIUM BLD-SCNC: 142 MMOL/L (ref 136–145)
TOTAL PROTEIN: 6.9 G/DL (ref 6.4–8.2)
TRIGL SERPL-MCNC: 82 MG/DL (ref 0–150)
VLDLC SERPL CALC-MCNC: 16 MG/DL
WBC # BLD: 7.2 K/UL (ref 4–11)

## 2021-10-15 PROCEDURE — 87636 SARSCOV2 & INF A&B AMP PRB: CPT

## 2021-10-15 PROCEDURE — 6370000000 HC RX 637 (ALT 250 FOR IP): Performed by: PSYCHIATRY & NEUROLOGY

## 2021-10-15 PROCEDURE — 85025 COMPLETE CBC W/AUTO DIFF WBC: CPT

## 2021-10-15 PROCEDURE — 6360000002 HC RX W HCPCS: Performed by: STUDENT IN AN ORGANIZED HEALTH CARE EDUCATION/TRAINING PROGRAM

## 2021-10-15 PROCEDURE — 99285 EMERGENCY DEPT VISIT HI MDM: CPT

## 2021-10-15 PROCEDURE — 99221 1ST HOSP IP/OBS SF/LOW 40: CPT | Performed by: PHYSICIAN ASSISTANT

## 2021-10-15 PROCEDURE — 80179 DRUG ASSAY SALICYLATE: CPT

## 2021-10-15 PROCEDURE — 1240000000 HC EMOTIONAL WELLNESS R&B

## 2021-10-15 PROCEDURE — 80061 LIPID PANEL: CPT

## 2021-10-15 PROCEDURE — 6370000000 HC RX 637 (ALT 250 FOR IP): Performed by: STUDENT IN AN ORGANIZED HEALTH CARE EDUCATION/TRAINING PROGRAM

## 2021-10-15 PROCEDURE — 83036 HEMOGLOBIN GLYCOSYLATED A1C: CPT

## 2021-10-15 PROCEDURE — 80143 DRUG ASSAY ACETAMINOPHEN: CPT

## 2021-10-15 PROCEDURE — 80053 COMPREHEN METABOLIC PANEL: CPT

## 2021-10-15 PROCEDURE — 36415 COLL VENOUS BLD VENIPUNCTURE: CPT

## 2021-10-15 PROCEDURE — 82077 ASSAY SPEC XCP UR&BREATH IA: CPT

## 2021-10-15 PROCEDURE — 99223 1ST HOSP IP/OBS HIGH 75: CPT | Performed by: PSYCHIATRY & NEUROLOGY

## 2021-10-15 RX ORDER — ZIPRASIDONE HYDROCHLORIDE 20 MG/1
20 CAPSULE ORAL ONCE
Status: DISCONTINUED | OUTPATIENT
Start: 2021-10-15 | End: 2021-10-15

## 2021-10-15 RX ORDER — ZIPRASIDONE MESYLATE 20 MG/ML
20 INJECTION, POWDER, LYOPHILIZED, FOR SOLUTION INTRAMUSCULAR ONCE
Status: COMPLETED | OUTPATIENT
Start: 2021-10-15 | End: 2021-10-15

## 2021-10-15 RX ORDER — OLANZAPINE 5 MG/1
5 TABLET ORAL EVERY 8 HOURS PRN
Status: DISCONTINUED | OUTPATIENT
Start: 2021-10-15 | End: 2021-10-19

## 2021-10-15 RX ORDER — POLYETHYLENE GLYCOL 3350 17 G/17G
17 POWDER, FOR SOLUTION ORAL DAILY PRN
Status: DISCONTINUED | OUTPATIENT
Start: 2021-10-15 | End: 2021-10-27 | Stop reason: HOSPADM

## 2021-10-15 RX ORDER — OLANZAPINE 10 MG/1
10 INJECTION, POWDER, LYOPHILIZED, FOR SOLUTION INTRAMUSCULAR 2 TIMES DAILY PRN
Status: DISCONTINUED | OUTPATIENT
Start: 2021-10-15 | End: 2021-10-27 | Stop reason: HOSPADM

## 2021-10-15 RX ORDER — ACETAMINOPHEN 325 MG/1
650 TABLET ORAL EVERY 4 HOURS PRN
Status: DISCONTINUED | OUTPATIENT
Start: 2021-10-15 | End: 2021-10-27 | Stop reason: HOSPADM

## 2021-10-15 RX ORDER — MINERAL OIL AND WHITE PETROLATUM 150; 830 MG/G; MG/G
OINTMENT OPHTHALMIC PRN
Status: DISCONTINUED | OUTPATIENT
Start: 2021-10-15 | End: 2021-10-27 | Stop reason: HOSPADM

## 2021-10-15 RX ORDER — CHLORAL HYDRATE 500 MG
1000 CAPSULE ORAL DAILY
Status: DISCONTINUED | OUTPATIENT
Start: 2021-10-15 | End: 2021-10-15 | Stop reason: CLARIF

## 2021-10-15 RX ORDER — LORAZEPAM 2 MG/1
2 TABLET ORAL EVERY 8 HOURS PRN
Status: DISCONTINUED | OUTPATIENT
Start: 2021-10-15 | End: 2021-10-27 | Stop reason: HOSPADM

## 2021-10-15 RX ADMIN — WHITE PETROLATUM 57.7 %-MINERAL OIL 31.9 % EYE OINTMENT: at 06:26

## 2021-10-15 RX ADMIN — LORAZEPAM 2 MG: 2 TABLET ORAL at 18:55

## 2021-10-15 RX ADMIN — ZIPRASIDONE MESYLATE 20 MG: 20 INJECTION, POWDER, LYOPHILIZED, FOR SOLUTION INTRAMUSCULAR at 07:18

## 2021-10-15 ASSESSMENT — LIFESTYLE VARIABLES: HISTORY_ALCOHOL_USE: NO
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
## 2021-10-15 NOTE — FLOWSHEET NOTE
10/15/21 1028   Psychiatric History   Psychiatric history treatment Psychiatric admissions; Non-adherence to treatment  [facility] 2020 I)   Are there any medication issues? Yes  (Per physician, pt has been medication non-compliant since previous [facility] admission)   Support System   Support system Adequate;Primary support persons   Types of Support System Mother;Brother   Problems in support system Paranoia   Current Living Situation   Home Living Adequate   Living information Lives with others   Problems with living situation  No   Lack of basic needs No   SSDI/SSI SSI $800   Other government assistance none   Problems with environment none   Current abuse issues none   Supervised setting None   Relationship problems No   Medical and Self-Care Issues   Relevant medical problems none reported   Relevant self-care issues Medication non-compliance   Barriers to treatment No   Family [place]   Spouse/partner-name/age none   Children-names/ages none   Parents Pilar Galvan [phone], father  at age 9   Siblings [facility]   Support services   (Monthly appt with Dr Violetta Pugh)   Childhood   Raised by Biological mother;Biological father   Biological mother Pilar Galvan [phone]   Biological father    Relevant family history Pt grew up with both parents and brothers. Father  when patient was 7years old.    History of abuse No   Legal History   Legal history No   Juvenile legal history No   Substance Use   Use of substances  No   Motivation for SA Treatment   Stage of engagement   (NA)   Motivation for treatment No  (NA)   Education   Education Vocation/technical training   Special education   (NA)   Work History   Currently employed No   Recent job loss or change   (NA)    service   (none)   /VA involvement none   Leisure/Activity   Past interests \"tinkering with computers\"   Present interests compoters   Current daily activity tv, computers, browing internet   Social with friends/family No   Cultural and Spiritual   Spiritual concerns No   Cultural concerns No     This writer completed psychosocial, leisure assessment via chart review. Writer attempted to meet with pt who refused engagement in assessment, presenting with sporadic outbursts, unintelligible yelling out. Completed C-SSRS with pt denying all suicidality throughout life.     Completed by Magdy Salmon, MM, MT-BC

## 2021-10-15 NOTE — ED NOTES
Security called to take patient to [facility].       Gisell Basurto, KEVON  10/15/21 0024

## 2021-10-15 NOTE — PLAN OF CARE
Problem: Anger Management/Homicidal Ideation:  Goal: Absence of angry outbursts  Description: Absence of angry outbursts  Outcome: Ongoing  Voiced that he refused injectable due to shots causing him tardive dyskinesia'  Talked some of being in the service, torturing his brother,realing with his witch sister & warlock relatives.

## 2021-10-15 NOTE — PLAN OF CARE
Problem: Anxiety:  Goal: Level of anxiety will d  Medicated with Ativan for increasing agitation, aeb yelling,;talking about monsters. Stated that Ativan \"hypes me up\". Declined, then reconsidered. Contiued to elizabeth about 15 minutes. Continued to say Ativan hypes him up. No longer yelling at this time. No longer visibly agitated.

## 2021-10-15 NOTE — ED NOTES
[place]  provided [employer]. MICHAELLE is Leatha Newton. Patient's mother.  Contact phone is [phone]     Jossie Mason  10/15/21 6194

## 2021-10-15 NOTE — ED NOTES
Pt transported to [facility] at this time. Pt calm and cooperative and appears drowsy. Pt has significantly calmed down since medication.      Torrey Ren RN  10/15/21 8594

## 2021-10-15 NOTE — ED PROVIDER NOTES
[address]  EMERGENCY DEPARTMENT ENCOUNTER      Pt Name: Joseph Marcelino  MRN: [id number]  [place] 1960  Date of evaluation: 10/15/2021  Provider: Matt Moscoso DO    CHIEF COMPLAINT       Chief Complaint   Patient presents with    Psychiatric Evaluation     Pt called 911 stating that someone spit in his contact saline. Patient has known behavior issues. POA requested he be brought in to be evaluated. Patient arrived in soft restraints and is agitated but not comative at triage. HISTORY OF PRESENT ILLNESS   (Location/Symptom, Timing/Onset, Context/Setting, Quality, Duration, Modifying Factors, Severity)  Note limiting factors. Joseph Marcelino is a 59 y.o. male who presents to the emergency department complaining of patient arrives by EMS for psychiatric evaluation. Patient has history of psychiatric disorder, follows with Dr. Skinny Simon, with diagnosis of schizophrenia. No in epic 9/28/2021 reports that patient has been nonadherent with treatment. Patient reported called police tonight, agitated reports that someone was spitting in his contact lens solution. States that he complains of eye burning. States that his brother's wife did this however was not witnessed. Was placed in soft restraints due to agitation prior to arrival.  Patient is history of schizophrenia noncompliant with medications. HPI limited due to psychiatric history. Nursing Notes were reviewed.     PAST MEDICAL HISTORY     Past Medical History:   Diagnosis Date    Elevated liver enzymes 9/1/2017    Hypertension     Pure hypercholesterolemia 8/31/2017         SURGICAL HISTORY       Past Surgical History:   Procedure Laterality Date    NOSE SURGERY      deviated septum         CURRENT MEDICATIONS       Previous Medications    ARIPIPRAZOLE ER (ABILIFY MAINTENA) 300 MG PRSY PREFILLED SYRINGE    Inject 1 Syringe into the muscle every 30 days    COENZYME Q10 (CO Q 10) 10 MG CAPS    Take by mouth    FLAXSEED, LINSEED, (FLAX SEED OIL PO)    Take 1 capsule by mouth daily    LIVALO 2 MG TABS TABLET    TAKE 1/2 TABLET BY MOUTH AT BEDTIME     MULTIPLE VITAMINS-MINERALS (MULTIVITAMIN ADULT PO)    Take by mouth    OMEGA-3 FATTY ACIDS (FISH OIL) 1000 MG CAPS    Take 1,000 mg by mouth daily       ALLERGIES     Clozaril [clozapine], Crestor [rosuvastatin], Haldol [haloperidol], Invega [paliperidone er], Klonopin [clonazepam], Lipitor [atorvastatin], Mevacor [lovastatin], Olanzapine, Rexulti [brexpiprazole], Simvastatin, Thiothixene, and Vraylar [cariprazine hcl]    FAMILY HISTORY       Family History   Problem Relation Age of Onset    Hypertension Mother     Heart Failure Mother 80    Prostate Cancer Father 52    Cancer Father           SOCIAL HISTORY       Social History     Socioeconomic History    Marital status: Single     Spouse name: None    Number of children: 1    Years of education: 15    Highest education level: None   Occupational History     Employer: UNEMPLOYED   Tobacco Use    Smoking status: Former Smoker     Packs/day: 0.10     Years: 4.00     Pack years: 0.40     Types: Cigarettes     Quit date: 1980     Years since quittin.8    Smokeless tobacco: Never Used    Tobacco comment: 1 pk per year - social smoker, didn't smoke much at all    Vaping Use    Vaping Use: Never used   Substance and Sexual Activity    Alcohol use: No    Drug use: No     Comment: Pt states he does \"natural stuff\"    Sexual activity: Never   Other Topics Concern    None   Social History Narrative    None     Social Determinants of Health     Financial Resource Strain:     Difficulty of Paying Living Expenses:    Food Insecurity:     Worried About Running Out of Food in the Last Year:     Ran Out of Food in the Last Year:    Transportation Needs:     Lack of Transportation (Medical):      Lack of Transportation (Non-Medical):    Physical Activity:     Days of Exercise per Week:     Minutes of Exercise per Session: Stress:     Feeling of Stress :    Social Connections:     Frequency of Communication with Friends and Family:     Frequency of Social Gatherings with Friends and Family:     Attends Mandaeism Services:     Active Member of Clubs or Organizations:     Attends Club or Organization Meetings:     Marital Status:    Intimate Partner Violence:     Fear of Current or Ex-Partner:     Emotionally Abused:     Physically Abused:     Sexually Abused:        SCREENINGS                            REVIEW OF SYSTEMS    (2-9 systems for level 4, 10 or more for level 5)   Review of Systems   Unable to perform ROS: Psychiatric disorder         PHYSICAL EXAM    (up to 7 for level 4, 8 or more for level 5)   RECENT VITALS:     Temp: 98.5 °F (36.9 °C),  Pulse: 81, Resp: 17, BP: (!) 157/99, SpO2: 94 %    Physical Exam  Constitutional:       Appearance: Normal appearance. HENT:      Head: Normocephalic. Nose: Nose normal.   Eyes:      General: No scleral icterus. Right eye: No discharge. Left eye: No discharge. Extraocular Movements: Extraocular movements intact. Conjunctiva/sclera: Conjunctivae normal.      Pupils: Pupils are equal, round, and reactive to light. Comments: pH 7 bilaterally   Cardiovascular:      Rate and Rhythm: Normal rate and regular rhythm. Pulmonary:      Effort: Pulmonary effort is normal. No respiratory distress. Abdominal:      General: Abdomen is flat. There is no distension. Tenderness: There is no abdominal tenderness. Musculoskeletal:         General: No swelling or deformity. Normal range of motion. Skin:     General: Skin is warm and dry. Neurological:      Mental Status: He is alert. Psychiatric:         Speech: Speech is rapid and pressured. Behavior: Behavior is agitated. Thought Content: Thought content does not include suicidal ideation.       Comments: Paranoid delusions         DIAGNOSTIC RESULTS     EKG: All EKG's are interpreted by the Emergency Department Physician who either signs or Co-signs this chart in the absence of a cardiologist.      RADIOLOGY:   Non-plain film images such as CT, Ultrasound and MRI are read by the radiologist. Plain radiographic images are visualized and preliminarily interpreted by the emergency physician. Interpretation per the Radiologist below, if available at the time of this note:    No orders to display         LABS:  Labs Reviewed   ACETAMINOPHEN LEVEL - Abnormal; Notable for the following components:       Result Value    Acetaminophen Level <5 (*)     All other components within normal limits    Narrative:     Performed at:  [facility] [address],  [employer], [facility]   Phone [phone]   SALICYLATE LEVEL - Abnormal; Notable for the following components:    Salicylate, Serum <8.4 (*)     All other components within normal limits    Narrative:     Performed at:  [facility] [address],  [employer], [facility]   Phone [phone]   COMPREHENSIVE METABOLIC PANEL W/ REFLEX TO MG FOR LOW K - Abnormal; Notable for the following components:    Glucose 121 (*)     All other components within normal limits    Narrative:     Performed at:  [facility] [address],  [employer], [facility]   Phone [phone] & INFLUENZA COMBO   ETHANOL    Narrative:     Performed at:  [facility] [address],  [employer], [facility]   Phone [phone]   CBC WITH AUTO DIFFERENTIAL    Narrative:     Performed at:  [address] [place] [address],  [place], [facility]   Phone [phone]   URINE DRUG SCREEN       All other labs were within normal range or not returned as of this dictation.     EMERGENCY DEPARTMENT COURSE and DIFFERENTIAL DIAGNOSIS/MDM:   Ciara Petty is a 59 y.o. male who presents to the emergency department with the complaint of patient arrives by EMS for psychiatric evaluation. History of schizophrenia noncompliant with medications POA was requesting patient be evaluated here due to psychiatric complaints. Patient has paranoid delusions that his sister-in-law is spitting in or spiking his contact isolation. Does complain of eye burning. Eyes appear normal appearance no drainage conjunctive is normal in appearance pupils equal and reactive normal EOMI, pH paper was used, pH is 7 bilaterally. Will provide artificial tears. Will check basic labs including toxicology studies and discuss with psychiatry for possible admission. Patient's lab work reviewed no significant abnormality, negative significant salicylate and ethanol levels. Pending drug screen, Covid. Otherwise at this time I do feel patient is medically clear will have psych evaluate for possible admission      CRITICAL CARE TIME   Total Critical Care time was 0 minutes, excluding separately reportable procedures. There was a high probability of clinically significant/life threatening deterioration in the patient's condition which required my urgent intervention. Clinical concern   Intervention     CONSULTS:  None    PROCEDURES:  Unless otherwise noted below, none     Procedures        FINAL IMPRESSION      1. History of schizophrenia    2. Drug noncompliance          DISPOSITION/PLAN   DISPOSITION        PATIENT REFERRED TO:  No follow-up provider specified. DISCHARGE MEDICATIONS:  New Prescriptions    No medications on file     Controlled Substances Monitoring:     RX Monitoring 6/5/2019   Periodic Controlled Substance Monitoring Possible medication side effects, risk of tolerance/dependence & alternative treatments discussed.        (Please note that portions of this note were completed with a voice recognition program.  Efforts were made to edit the dictations but occasionally words are mis-transcribed.)    Satnam Gomez DO (electronically signed)  Attending Emergency Physician            Roya Caballero DO  10/15/21 [id number]

## 2021-10-15 NOTE — PROGRESS NOTES
Behavioral Services  Medicare Certification Upon Admission    I certify that this patient's inpatient psychiatric hospital admission is medically necessary for:    [x] (1) Treatment which could reasonably be expected to improve this patient's condition,       [x] (2) Or for diagnostic study;     AND     [x](2) The inpatient psychiatric services are provided while the individual is under the care of a physician and are included in the individualized plan of care.     Estimated length of stay/service 5-8 days    Plan for post-hospital care incomplete     Electronically signed by Adrian Samuel MD on 10/15/2021 at 11:51 AM

## 2021-10-15 NOTE — BH NOTE
Case management note:  Writer called Charles Gutierrez, this patient's mother, to get permission to treat this patient and to initiate a trial of Abilify for this patient. Charles Gutierrez is agreeable to this patient being here in the hospital to receive treatment and she is agreeable for this patient to start the trial of Abilify. Charles Gutierrez reports that this patient has been throwing possessions of hers away. When asked why she reports he has become more controlling and dictating in the home and wanting to have things his way. She reports that he threw away some of her collectable clown statues. She reports that she has to go through the trash whenever she takes it out to take her stuff out of the trash. When asked about sleep Charles Gutierrez reports this patient has had poor sleep recently and could have been away for the past 2-3 days. When Charles Gutierrez was asked if she was in danger she reported no. Writer met with this patient who was in bed screaming loudly in garbled speech. Writer spoke with this patient about his mother being home and OK, and patient did not believe this writer so writer called Charles Gutierrez on speaker phone in the room and Charles Gutierrez reported she was ok. Patient screamed loudly that \"JASPREET IS A WARLOCK HE IS GOING TO KILL YOU\". Writer finished his call with Charles Brenda at this time.      Kamala Melendez states:  \"JASPREET IS GOING TO THROW MOTHER DOWN THE STEPS\"  \"JASPREET IS GOING TO SMOTHER MY MOTHER WITH A PILLOW\"  \"JASPREET SUGAR IS A [employer]" (pt screamed this at least 10x)  \"I'VE BEEN THROUGH THE DELIVERANCE\"  \"JASPREET USES MAGIC ON THE HOUSE, I'M IN shelter\"  \"MOTHER HAS A CURSE THROUGH JASPREET'S WITCHCRAFT\"  \"I CALLED THE POLICE AND THEY TOOK ME INSTEAD OF HIM\"    Thanh Carrillo

## 2021-10-15 NOTE — PLAN OF CARE
Problem: Anger Management/Homicidal Ideation:  Goal: Able to display appropriate communication and problem solving  Description: Able to display appropriate communication and problem solving  Outcome: Not Met This Shift  Patient becomes angry when discussing mediction compliance

## 2021-10-16 LAB
ESTIMATED AVERAGE GLUCOSE: 105.4 MG/DL
HBA1C MFR BLD: 5.3 %

## 2021-10-16 PROCEDURE — 6360000002 HC RX W HCPCS: Performed by: PSYCHIATRY & NEUROLOGY

## 2021-10-16 PROCEDURE — 1240000000 HC EMOTIONAL WELLNESS R&B

## 2021-10-16 PROCEDURE — 6370000000 HC RX 637 (ALT 250 FOR IP): Performed by: PSYCHIATRY & NEUROLOGY

## 2021-10-16 PROCEDURE — 99233 SBSQ HOSP IP/OBS HIGH 50: CPT | Performed by: PSYCHIATRY & NEUROLOGY

## 2021-10-16 RX ORDER — TRAZODONE HYDROCHLORIDE 50 MG/1
50 TABLET ORAL NIGHTLY
Status: DISCONTINUED | OUTPATIENT
Start: 2021-10-16 | End: 2021-10-21

## 2021-10-16 RX ORDER — DIPHENHYDRAMINE HYDROCHLORIDE 50 MG/ML
50 INJECTION INTRAMUSCULAR; INTRAVENOUS ONCE
Status: COMPLETED | OUTPATIENT
Start: 2021-10-16 | End: 2021-10-16

## 2021-10-16 RX ORDER — LORAZEPAM 2 MG/ML
2 INJECTION INTRAMUSCULAR ONCE
Status: COMPLETED | OUTPATIENT
Start: 2021-10-16 | End: 2021-10-16

## 2021-10-16 RX ORDER — HYDROXYZINE PAMOATE 50 MG/1
50 CAPSULE ORAL EVERY 6 HOURS PRN
Status: DISCONTINUED | OUTPATIENT
Start: 2021-10-16 | End: 2021-10-26

## 2021-10-16 RX ADMIN — LORAZEPAM 2 MG: 2 INJECTION INTRAMUSCULAR; INTRAVENOUS at 17:45

## 2021-10-16 RX ADMIN — DIPHENHYDRAMINE HYDROCHLORIDE 50 MG: 50 INJECTION, SOLUTION INTRAMUSCULAR; INTRAVENOUS at 17:45

## 2021-10-16 RX ADMIN — LORAZEPAM 2 MG: 2 TABLET ORAL at 13:33

## 2021-10-16 RX ADMIN — LORAZEPAM 2 MG: 2 TABLET ORAL at 03:57

## 2021-10-16 NOTE — PLAN OF CARE
Patient alert and oriented x 3. Patient seclusive to his bed and room this evening. Patient denies SI/HI/A/V/H but appears to be RTIS. Patient talking to himself at times. Patient stated,\"My brother is a Haris [place]. \" Patient doesn't have HS medications scheduled. Patient resting quietly in bed with eyes closed. No angry outbursts noted. No c/o's voiced at present.

## 2021-10-16 NOTE — BH NOTE
Patient sitting in the dayroom yelling and  screaming with ramble speech. Patient wants to know why he is here  in the hospital  Patient wants to talk Dr. Terra Reyes  Patient laughing inappropriately at times. Patient saying, \"Fuck you,\" \"Fuck you. \"Patient medicated with Ativan 2 mg po for anxiety.

## 2021-10-16 NOTE — PLAN OF CARE
Problem: Anger Management/Homicidal Ideation:  Goal: Able to display appropriate communication and problem solving  Description: Able to display appropriate communication and problem solving  Outcome: Ongoing     Problem: Anger Management/Homicidal Ideation:  Goal: Absence of angry outbursts  Description: Absence of angry outbursts  Outcome: Ongoing     Problem: Altered Mood, Deterioration in Function:  Goal: Ability to perform activities of daily living will improve  Description: Ability to perform activities of daily living will improve  Outcome: Ongoing     Problem: Altered Mood, Psychotic Behavior:  Goal: Able to demonstrate trust by eating, participating in treatment and following staff's direction  Description: Able to demonstrate trust by eating, participating in treatment and following staff's direction  Outcome: Ongoing     Problem: Altered Mood, Psychotic Behavior:  Goal: Ability to interact with others will improve  Description: Ability to interact with others will improve  Outcome: Ongoing     Problem: Anxiety:  Goal: Level of anxiety will decrease  Description: Level of anxiety will decrease  Outcome: Ongoing  Patient visible on unit. Isolated to self, unable to interact with peers appropriately. Continues to yell and scream, cussing at times. He is redirectable with continued staff support. He refused scheduled medications, but does have a guardian who has requested him to  continue his medications. He denies SI/HI/AVH, although he seem to be RTIS at times. He is labile, irritable. He is paranoid and continues with delusional statements concerning illegal activity happening in his home. He made verbal threats to his mother on the phone concerning not wanting to take his medication. He believes all anti psychotic medications will cause him to have EPS symptoms. Patient did have these symptoms in the past d/t Haldol.

## 2021-10-16 NOTE — PROGRESS NOTES
Patient continues to yell at staff. Verbal outburst are decreasing. Patient was offered zyprexa injection and he verbally contracted to go to his room. I suggested that he stay in room on his own and try to allow the oral medication to work. Will continue to monitor patient.

## 2021-10-16 NOTE — PROGRESS NOTES
Patient becoming increasing agitated in the day room when asked to changed the channel on the TV as what he was watching was inappropriate. Patient declined to change the channel. TV was turned off. Patient then demanded to see the physician. Patient was educated on the process of the facility. Patient was then offered a PRN for agitation. Patient declined yelling, \"I don't take antipsychotics. \" Patient was asked to return to his room to de-escalate. Patient did comply, returned to his room and is resting in bed at this time.

## 2021-10-16 NOTE — PROGRESS NOTES
Patient asks why he is here. He is yelling that his mother is in danger and that he never should have been brought to the hospital.  I reminded him that he called 911 to report delusional content. Following ativan administration, patient sat down in front of TV and continued to make threats to staff and said that his doctor would pay the pedro and he made threats to kill his doctor. Patient was reminded that he is in the hospital to be kept safe and that others have a right to a quiet environment. Patient said, \"FU-- that! I want to watch TV. Explained that TV would not be turned on until 7 am. He was encouraged to go to his room and rest until the morning. Will continue to monitor patient.

## 2021-10-16 NOTE — PROGRESS NOTES
Patient yelling on unit, cussing to his mother on the phone. PRN Ativan offered, patient agreeable. Patient given Ativan per order.   See [facility]

## 2021-10-16 NOTE — PROGRESS NOTES
4.20 - 5.90 M/uL Final    Hemoglobin 10/15/2021 16.6  13.5 - 17.5 g/dL Final    Hematocrit 10/15/2021 49.4  40.5 - 52.5 % Final    MCV 10/15/2021 91.3  80.0 - 100.0 fL Final    MCH 10/15/2021 30.7  26.0 - 34.0 pg Final    MCHC 10/15/2021 33.7  31.0 - 36.0 g/dL Final    RDW 10/15/2021 13.3  12.4 - 15.4 % Final    Platelets 72/23/3710 260  135 - 450 K/uL Final    MPV 10/15/2021 8.1  5.0 - 10.5 fL Final    Neutrophils % 10/15/2021 70.7  % Final    Lymphocytes % 10/15/2021 16.6  % Final    Monocytes % 10/15/2021 8.6  % Final    Eosinophils % 10/15/2021 3.6  % Final    Basophils % 10/15/2021 0.5  % Final    Neutrophils Absolute 10/15/2021 5.1  1.7 - 7.7 K/uL Final    Lymphocytes Absolute 10/15/2021 1.2  1.0 - 5.1 K/uL Final    Monocytes Absolute 10/15/2021 0.6  0.0 - 1.3 K/uL Final    Eosinophils Absolute 10/15/2021 0.3  0.0 - 0.6 K/uL Final    Basophils Absolute 10/15/2021 0.0  0.0 - 0.2 K/uL Final    Sodium 10/15/2021 142  136 - 145 mmol/L Final    Potassium reflex Magnesium 10/15/2021 4.3  3.5 - 5.1 mmol/L Final    Chloride 10/15/2021 106  99 - 110 mmol/L Final    CO2 10/15/2021 26  21 - 32 mmol/L Final    Anion Gap 10/15/2021 10  3 - 16 Final    Glucose 10/15/2021 121* 70 - 99 mg/dL Final    BUN 10/15/2021 16  7 - 20 mg/dL Final    CREATININE 10/15/2021 1.0  0.8 - 1.3 mg/dL Final    GFR Non- 10/15/2021 >60  >60 Final    Comment: >60 mL/min/1.73m2 EGFR, calc. for ages 25 and older using the  MDRD formula (not corrected for weight), is valid for stable  renal function.  GFR  10/15/2021 >60  >60 Final    Comment: Chronic Kidney Disease: less than 60 ml/min/1.73 sq.m. Kidney Failure: less than 15 ml/min/1.73 sq.m. Results valid for patients 18 years and older.       Calcium 10/15/2021 9.4  8.3 - 10.6 mg/dL Final    Total Protein 10/15/2021 6.9  6.4 - 8.2 g/dL Final    Albumin 10/15/2021 4.3  3.4 - 5.0 g/dL Final    Albumin/Globulin Ratio 10/15/2021 1.7  1.1 - 2.2 Final    Total Bilirubin 10/15/2021 0.4  0.0 - 1.0 mg/dL Final    Alkaline Phosphatase 10/15/2021 74  40 - 129 U/L Final    ALT 10/15/2021 30  10 - 40 U/L Final    AST 10/15/2021 33  15 - 37 U/L Final    Globulin 10/15/2021 2.6  Not Established g/dL Final    SARS-CoV-2 RNA, RT PCR 10/15/2021 NOT DETECTED  NOT DETECTED Final    Comment: Not Detected results do not preclude SARS-CoV-2 infection and  should not be used as the sole basis for patient management  decisions. Results must be combined with clinical observations,  patient history, and epidemiological information. Testing was performed using CHE TRAV SARS-CoV-2 and Influenza A/B  nucleic acid assay. This test is a multiplex Real-Time Reverse  Transcriptase Polymerase Chain Reaction (RT-PCR)-based in vitro  diagnostic test intended for the qualitative detection of nucleic  acids from SARS-CoV-2, influenza A, and influenza B in nasopharyngeal  and nasal swab specimens for use under the FDAs Emergency Use  Authorization (EUA) only.     Patient Fact Sheet:  [employer]  Provider Fact Sheet: Find[employer]rives.pl  EUA: FindDrives.pl  IFU: [employer]pl    Methodology:  RT-PCR      INFLUENZA A 10/15/2021 NOT DETECTED  NOT DETECTED Final    INFLUENZA B 10/15/2021 NOT DETECTED  NOT DETECTED Final    Cholesterol, Total 10/15/2021 155  0 - 199 mg/dL Final    Triglycerides 10/15/2021 82  0 - 150 mg/dL Final    HDL 10/15/2021 42  40 - 60 mg/dL Final    LDL Calculated 10/15/2021 97  <100 mg/dL Final    VLDL Cholesterol Calculated 10/15/2021 16  Not Established mg/dL Final    Hemoglobin A1C 10/15/2021 5.3  See comment % Final    Comment: Comment:  Diagnosis of Diabetes: > or = 6.5%  Increased risk of diabetes (Prediabetes): 5.7-6.4%  Glycemic Control: Nonpregnant Adults: <7.0%                    Pregnant: <6.0%        eAG 10/15/2021 105.4  mg/dL Final            Medications  Current Facility-Administered Medications: sterile water injection, , ,   ARIPiprazole ER (ABILIFY MAINTENA) 400 mg, 400 mg, IntraMUSCular, Once  traZODone (DESYREL) tablet 50 mg, 50 mg, Oral, Nightly  hydrOXYzine (VISTARIL) capsule 50 mg, 50 mg, Oral, Q6H PRN  lubrifresh P.M. (artificial tears) ophthalmic ointment, , Both Eyes, PRN  acetaminophen (TYLENOL) tablet 650 mg, 650 mg, Oral, Q4H PRN  polyethylene glycol (GLYCOLAX) packet 17 g, 17 g, Oral, Daily PRN  LORazepam (ATIVAN) tablet 2 mg, 2 mg, Oral, Q8H PRN  OLANZapine (ZYPREXA) tablet 5 mg, 5 mg, Oral, Q8H PRN  OLANZapine (ZYPREXA) injection 10 mg, 10 mg, IntraMUSCular, BID PRN    ASSESSMENT AND PLAN    Principal Problem:    Schizophrenia ([address]  Active Problems:    Essential hypertension    Hyperlipidemia    Obesity (BMI 30.0-34. 9)    Tardive dyskinesia  Resolved Problems:    * No resolved hospital problems. *       1. Patient s symptoms   show no change  2. Probable discharge is next week  3. Discharge planning is incomplete  4. Suicidal ideation is none  5. Total time with patient was 40 minutes and more than 50 % of that time was spent counseling the patient on their symptoms, treatment and expected goals.

## 2021-10-17 PROCEDURE — 1240000000 HC EMOTIONAL WELLNESS R&B

## 2021-10-17 NOTE — PLAN OF CARE
Problem: Anger Management/Homicidal Ideation:  Goal: Able to display appropriate communication and problem solving  Description: Able to display appropriate communication and problem solving  Outcome: Ongoing     Problem: Anger Management/Homicidal Ideation:  Goal: Absence of angry outbursts  Description: Absence of angry outbursts  Outcome: Ongoing     Problem: Altered Mood, Deterioration in Function:  Goal: Ability to perform activities of daily living will improve  Description: Ability to perform activities of daily living will improve  Outcome: Ongoing     Problem: Altered Mood, Psychotic Behavior:  Goal: Able to demonstrate trust by eating, participating in treatment and following staff's direction  Description: Able to demonstrate trust by eating, participating in treatment and following staff's direction  Outcome: Ongoing     Problem: Altered Mood, Psychotic Behavior:  Goal: Ability to interact with others will improve  Description: Ability to interact with others will improve  Outcome: Ongoing     Problem: Anxiety:  Goal: Level of anxiety will decrease  Description: Level of anxiety will decrease  Outcome: Ongoing   Patient visible on unit. Social with a peer on small side at times. He was noted to cuss at other peers on the unit. He did not have any scheduled medications this shift and denied any PRN's offered. He was calm but evasive during interview. He denies SI/AVH. Early in the shift he was able to remain calm, listened to music and was easily redirected when he started being loud. He did have one episode of angry outburst while on the phone with his mother in the late afternoon. He continues to state he can't take antipsychotics d/t dyskinsia symptoms and refuses to  be educated on the medications and will start yelling. Delusional statements made that his brother and sister in law are demonic and have placed a spell on his mother.   He has also made homicidal statements towards the physician

## 2021-10-17 NOTE — PROGRESS NOTES
Pt awake and agitated at desk. He is screaming about having heart problems and ask what he was given last night. After naming benadryl pt screams he is allergic and cant breath. V.s.s. oxygen 96% on RA HR 88. Pt still screaming and cussing at staff and hard to comprehend. Snacks provided and patient goes back to room. Refusing prn at this time.

## 2021-10-17 NOTE — PROGRESS NOTES
[facility]  Treatment Team Note  Review Date & Time:   10/17/21 0900    Patient was not in treatment team      Status EXAM:   Status and Exam  Normal: No  Facial Expression: Avoids Gaze  Affect: Constricted  Level of Consciousness: Alert  Mood:Normal: No  Mood: Irritable (improving)  Motor Activity:Normal: Yes  Motor Activity: Decreased  Interview Behavior: Cooperative, Evasive  Preception: [place] to Person, [place] to Time, [place] to Place  Attention:Normal: No  Attention: Distractible  Thought Processes: Circumstantial  Thought Content:Normal: No  Thought Content: Preoccupations, Paranoia  Hallucinations:  (pt denies)  Delusions: Yes  Delusions: Other(See Comment) (pt believes his brother and sister in law are witches and warlocks)  Memory:Normal: No  Memory: Poor Recent, Poor Remote  Insight and Judgment: No  Insight and Judgment: Poor Judgment, Poor Insight, Unrealistic  Present Suicidal Ideation: No  Present Homicidal Ideation: No      Suicide Risk CSSR-S:  1) Within the past month, have you wished you were dead or wished you could go to sleep and not wake up? : No  2) Have you actually had any thoughts of killing yourself? : No  6) Have you ever done anything, started to do anything, or prepared to do anything to end your life?: No      PLAN/TREATMENT RECOMMENDATIONS UPDATE: Patient will take medication as prescribed, eat 75% of meals, attend groups, participate in milieu activities, participate in treatment team and care planning for discharge and follow up.           Nayely Bosch RN

## 2021-10-17 NOTE — PLAN OF CARE
Problem: Altered Mood, Deterioration in Function:  Goal: Ability to perform activities of daily living will improve  Description: Ability to perform activities of daily living will improve  10/16/2021 2125 by Agustin Candelario RN  Outcome: Ongoing  10/16/2021 1412 by Mariela Ortega RN  Outcome: Ongoing       Problem: Altered Mood, Psychotic Behavior:  Goal: Ability to interact with others will improve  Description: Ability to interact with others will improve  10/16/2021 2125 by Agustin Candelario RN  Outcome: Ongoing  10/16/2021 1412 by Mariela Ortega RN  Outcome: Ongoing     Pt is isolative to room. He wakes up and comes to day room and sits without speaking. He is responsive to name but refusing to engage with nurse. When ask how he is feeling he reports \"miserable\" but will not elaborate on why. He denies SI/AH/VH. He becomes irritable and screams to stop with the questions. Declines snack/drink. He sits in day room for few minutes then lays back down in room. No s/s of distress.

## 2021-10-17 NOTE — PROGRESS NOTES
Pt on phone with mother. He does a good job controlling emotions as he tells her he was given benadryl last night and demands that she tells the doctor he cannot have antipsychotic or benadryl anymore. Pt talks about the past having heart issues after these medications and pt becomes increasingly agitated when she does not agree to do so and yell at her mom.

## 2021-10-17 NOTE — PROGRESS NOTES
Pt sitting in dayroom making comments about Dr. Emre Camacho. States he is going to kill him with his bear hands. Calls him multiple names and demands that he will let him out today. Also talks about his mother saying she is a piece of sh*t and is worthless. Pt will not listen to this nurse instead he yells when I try to speak. Continues to have burst of cussing and agitation then calms self for several minutes.

## 2021-10-17 NOTE — PROGRESS NOTES
[facility]  Treatment Team Note  Review Date & Time: 10/15/21 0910    Patient was not in treatment team      Status EXAM:   Status and Exam  Normal: No  Facial Expression: Avoids Gaze  Affect: Constricted  Level of Consciousness: Alert  Mood:Normal: No  Mood: Irritable (improving)  Motor Activity:Normal: Yes  Motor Activity: Decreased  Interview Behavior: Cooperative, Evasive  Preception: [place] to Person, [place] to Time, [place] to Place  Attention:Normal: No  Attention: Distractible  Thought Processes: Circumstantial  Thought Content:Normal: No  Thought Content: Preoccupations, Paranoia  Hallucinations:  (pt denies)  Delusions: Yes  Delusions: Other(See Comment) (pt believes his brother and sister in law are witches and warlocks)  Memory:Normal: No  Memory: Poor Recent, Poor Remote  Insight and Judgment: No  Insight and Judgment: Poor Judgment, Poor Insight, Unrealistic  Present Suicidal Ideation: No  Present Homicidal Ideation: No      Suicide Risk CSSR-S:  1) Within the past month, have you wished you were dead or wished you could go to sleep and not wake up? : No  2) Have you actually had any thoughts of killing yourself? : No  6) Have you ever done anything, started to do anything, or prepared to do anything to end your life?: No      PLAN/TREATMENT RECOMMENDATIONS UPDATE: Patient will take medication as prescribed, eat 75% of meals, attend groups, participate in milieu activities, participate in treatment team and care planning for discharge and follow up.             Celia Kaur RN

## 2021-10-18 PROCEDURE — 99233 SBSQ HOSP IP/OBS HIGH 50: CPT | Performed by: PSYCHIATRY & NEUROLOGY

## 2021-10-18 PROCEDURE — 6370000000 HC RX 637 (ALT 250 FOR IP)

## 2021-10-18 PROCEDURE — 6370000000 HC RX 637 (ALT 250 FOR IP): Performed by: PSYCHIATRY & NEUROLOGY

## 2021-10-18 PROCEDURE — 1240000000 HC EMOTIONAL WELLNESS R&B

## 2021-10-18 RX ORDER — BENZTROPINE MESYLATE 1 MG/1
TABLET ORAL
Status: COMPLETED
Start: 2021-10-18 | End: 2021-10-18

## 2021-10-18 RX ORDER — BENZTROPINE MESYLATE 1 MG/1
1 TABLET ORAL 2 TIMES DAILY
Status: DISCONTINUED | OUTPATIENT
Start: 2021-10-18 | End: 2021-10-19

## 2021-10-18 RX ADMIN — ACETAMINOPHEN 650 MG: 325 TABLET ORAL at 20:27

## 2021-10-18 RX ADMIN — BENZTROPINE MESYLATE 1 MG: 1 TABLET ORAL at 05:51

## 2021-10-18 RX ADMIN — ACETAMINOPHEN 650 MG: 325 TABLET ORAL at 05:25

## 2021-10-18 ASSESSMENT — PAIN DESCRIPTION - LOCATION: LOCATION: OTHER (COMMENT)

## 2021-10-18 ASSESSMENT — PAIN SCALES - GENERAL
PAINLEVEL_OUTOF10: 1
PAINLEVEL_OUTOF10: 3
PAINLEVEL_OUTOF10: 4

## 2021-10-18 NOTE — BH NOTE
Requested to use the phone. Able to verbalize without difficulty. No outburst while on the phone. Gordo Arango put me on the psych hilario\", Noted saying \"Dr. Hesham Webb knows I can't take anti psychic medications\".

## 2021-10-18 NOTE — BH NOTE
Voice raspy and high pitched. Pressured speech. Drinking liquids and swallowing without difficulty. Ambulating with steady gait. Rambling speech.

## 2021-10-18 NOTE — PLAN OF CARE
Problem: Anger Management/Homicidal Ideation:  Goal: Able to display appropriate communication and problem solving  Description: Able to display appropriate communication and problem solving  Note: Constant verbally intrusive. Frequent pressured speech, flight of idea's and verbal aggression     Problem: Anger Management/Homicidal Ideation:  Goal: Absence of angry outbursts  Description: Absence of angry outbursts  Outcome: Ongoing  Note: Will use foul language and loud tone of voice to communication. Needs redirected. Problem: Altered Mood, Deterioration in Function:  Goal: Ability to perform activities of daily living will improve  Description: Ability to perform activities of daily living will improve  Outcome: Ongoing     Problem: Altered Mood, Psychotic Behavior:  Goal: Able to demonstrate trust by eating, participating in treatment and following staff's direction  Description: Able to demonstrate trust by eating, participating in treatment and following staff's direction  Outcome: Ongoing  Note: Diet and fluids taken good. Refusing any mediation. Problem: Altered Mood, Psychotic Behavior:  Goal: Ability to interact with others will improve  Description: Ability to interact with others will improve  Constant verbalization. Verbally intrusive with episodes of verbal aggression.   Outcome: Ongoing

## 2021-10-18 NOTE — PROGRESS NOTES
Patient sitting in day room making verbal threats \" I am going to kill Anel, that karlene! He is a piece of work. \" Patient focused on where he is going to go stating \" I bet they are sending me to [place]. \" Asked this writer \" What are you doing, emailing Joan Garza? \" Continues to talk about his brother being a warlock and his sister in law being a witch. States \"my younger brother is going to kill my mother by smothering her with a pillow. \"

## 2021-10-18 NOTE — FLOWSHEET NOTE
10/18/21 1449   Encounter Summary   Services provided to: Patient   Referral/Consult From: Patient   Continue Visiting   (10/18 initial, listened and offered sppt)   Complexity of Encounter Moderate   Length of Encounter 30 minutes   Routine   Type Initial   Assessment Approachable; Anxious; Angry   Intervention Active listening;Sustaining presence/ Ministry of presence; Discussed illness/injury and it's impact; Discussed relationship with God;Discussed belief system/Denominational practices/giovana   Outcome Engaged in conversation;Expressed feelings/needs/concerns;Venting emotion

## 2021-10-18 NOTE — PROGRESS NOTES
Patient began exhibiting what appears to be EPS symptoms as evidenced by lip smacking, throat noises, sticking tongue out and snorting. Placed call to Dr. Syed Smith to report patient status. Received new order for Cogentin 1 mg PO BID. Patient took medication without difficulty. Will monitor. Medication effective. Patient requested Livalo (listed in home medications). Explained this medication has not been ordered but will consult psychiatrist on call. Per Dr. Syed Smith need to verify if patient has been taking this medication before ordering and make Dr. Lillie Canavan aware. Spoke with Wilbur Rodríguez (patient's guardian) and she gave her consent to give the Cogentin 1 mg BID.

## 2021-10-18 NOTE — BH NOTE
Demanding discharge. Sat and discussed his not being discharged today and MD would see him in the am. \"Well he can fucking do it by skype\". \"I'll kill that _____ ______. \" \"I'll bring the whole place down\". \"Their getting ready to send me to [place], aren't they? \". Inform this was not known. Remains seated in common area.

## 2021-10-18 NOTE — PLAN OF CARE
Problem: Anger Management/Homicidal Ideation:  Goal: Able to display appropriate communication and problem solving  Description: Able to display appropriate communication and problem solving  10/17/2021 2206 by María Palacio RN  Outcome: Ongoing     Problem: Anger Management/Homicidal Ideation:  Goal: Absence of angry outbursts  Description: Absence of angry outbursts  10/17/2021 2206 by María Palacio RN  Outcome: Ongoing     Problem: Altered Mood, Deterioration in Function:  Goal: Ability to perform activities of daily living will improve  Description: Ability to perform activities of daily living will improve  10/17/2021 2206 by María Palacio RN  Outcome: Ongoing     Problem: Altered Mood, Psychotic Behavior:  Goal: Able to demonstrate trust by eating, participating in treatment and following staff's direction  Description: Able to demonstrate trust by eating, participating in treatment and following staff's direction  10/17/2021 2206 by María Palacio RN  Outcome: Ongoing     Problem: Altered Mood, Psychotic Behavior:  Goal: Ability to interact with others will improve  Description: Ability to interact with others will improve  10/17/2021 2206 by María Palacio RN  Outcome: Ongoing     Problem: Anxiety:  Goal: Level of anxiety will decrease  Description: Level of anxiety will decrease  10/17/2021 2206 by María Palacio RN  Outcome: Ongoing     Patient asleep at the start of shift. He woke up for snack and was able to sit with 2 peers and engaged in conversation appropriately. He denies SI/HI/AVH. He refused his scheduled Trazodone. He has had no angry outburst so far this shift. He did state he needed to be ready for discharge early tomorrow due to having an appointment for his car. He also stated he was worried about his mother and needed to be sure she was getting enough to eat. Patient retired to his room early and noted to be resting.

## 2021-10-18 NOTE — BH NOTE
Constant intrusion to peers and staff. Redirected with some resistance. Attempted to discuss a medication that could help him with the anger/agitation. \"I told them I can't take mood stabilizers or anti psychotic's\". Did eat 100% of meal and is drinking fluids weill. Requested to make phone call to his mother. Noted asking her for a [employer] phone number. Then yelled loudly into the phone to his mother. \"You call him. I'm not. Your crazy. You may never see me again\". He then abruptly pull the phone cord out of the huber ending the call.

## 2021-10-18 NOTE — BH NOTE
Noted talking to an alleged law office. Appropriate tone used. Noted expressing his sister is a witch and brother in law a warlock and spoke of black magic.

## 2021-10-19 PROCEDURE — 6370000000 HC RX 637 (ALT 250 FOR IP): Performed by: PSYCHIATRY & NEUROLOGY

## 2021-10-19 PROCEDURE — 99233 SBSQ HOSP IP/OBS HIGH 50: CPT | Performed by: PSYCHIATRY & NEUROLOGY

## 2021-10-19 PROCEDURE — 1240000000 HC EMOTIONAL WELLNESS R&B

## 2021-10-19 RX ORDER — OLANZAPINE 10 MG/1
10 TABLET ORAL EVERY 8 HOURS PRN
Status: DISCONTINUED | OUTPATIENT
Start: 2021-10-19 | End: 2021-10-27 | Stop reason: HOSPADM

## 2021-10-19 RX ADMIN — ACETAMINOPHEN 650 MG: 325 TABLET ORAL at 04:52

## 2021-10-19 ASSESSMENT — PAIN SCALES - GENERAL: PAINLEVEL_OUTOF10: 3

## 2021-10-19 NOTE — PLAN OF CARE
Problem: Anger Management/Homicidal Ideation:  Goal: Able to display appropriate communication and problem solving  Description: Able to display appropriate communication and problem solving  Outcome: Not Met This Shift  Was threatening \"all hell breaking loose  if he was not \"out of here by 5 o'clock\". Ate dinner & trying to call his mom @ present. Refused to believe that anyone spoke to his mother on Friday.  \"She is under the influence of demons\"

## 2021-10-19 NOTE — FLOWSHEET NOTE
10/19/21 1426   Encounter Summary   Services provided to: Patient   Referral/Consult From: Patient   Continue Visiting   (10/19 follow up, listened and sppt)   Complexity of Encounter Moderate   Length of Encounter 15 minutes   Routine   Type Follow up   Assessment Approachable;Calm   Intervention Active listening;Sustaining presence/ Ministry of presence   Outcome Engaged in conversation;Expressed feelings/needs/concerns; Shared reminiscences

## 2021-10-19 NOTE — PROGRESS NOTES
Pt cleaned up his garbage from table then approached staff at desk and apologized for behaviors earlier in evening. He then asked if staff thought he was faking and if they've seen worse than him.

## 2021-10-19 NOTE — BH NOTE
Case management note:  Writer met with this patient to discuss patient care episode and to recommend medication for serious TD. Patient was agitated      Patient lacks insight and attributes most of his maladies to black magic. When asked if he had a mental health condition patient reports no. When asked if he has insight into his mental health condition he reports yes. When asked if his behavior can be seen as aggressive the patient reports no that he is not aggressive and does not believe that his behavioral ought to be seen as such, while yelling and pointing at this writer. Writer used de-escalation techniques by active listening, validating his concerns, whispering to lower the decibel level of the conversation as it was disruptive to the rest of the milieu. Writer's primary goal is to encourage this patient to initiate Ingrezza medication for his pervasive TD. Patient has a very difficult time using speech due to his TD and it seems to be making this patient very physically uncomfortable.     Snow Alcala

## 2021-10-19 NOTE — PLAN OF CARE
Problem: Anger Management/Homicidal Ideation:  Goal: Absence of angry outbursts  Description: Absence of angry outbursts  Outcome: Ongoing  Less agitated, verbal, when peers went to other side or into their rooms.  Eating breakfast.

## 2021-10-19 NOTE — PROGRESS NOTES
Department of Psychiatry  Progress Note    Patient's chart was reviewed. Discussed with treatment team. Met with patient. SUBJECTIVE:      When first approached, told me he wanted to kill me because we put him back on an antipsychotic. Eventually agreed to to meet and his anger faded. Continues with severely delusional and paranoid thinking, irritability, and impaired insight and judgment. Believes his brother is a [place] and wants to kill their mother. When asked what he would do if he saw his brother at their mother's house, said \"kill him. \"    Received 400mg of Abilify on 10/16. Discussed med for TD again at length; he remains uninterested despite repeated conversations about their efficacy and side effect profile. ROS:   Patient has new complaints: yes - see above  Sleeping adequately:  No:   Appetite adequate: Yes  Engaged in programming: No:    OBJECTIVE:  VITALS:  BP (!) 167/85   Pulse 85   Temp 97.1 °F (36.2 °C) (Temporal)   Resp 18   Ht 6' 1\" (1.854 m)   Wt 240 lb (108.9 kg)   SpO2 95%   BMI 31.66 kg/m²     Mental Status Examination:    Appearance: poor grooming and hygiene, TD  Behavior/Attitude toward examiner:   fair eye contact  Speech: loud, not pressured  Mood:  \"terrible\"  Affect:  irritable.       Thought processes:  superficially organized   Thought Content: no SI, + HI, delusional and paranoid  Perceptions: +RTIS  Attention: attention span and concentration were intact to interview   Abstraction: intact  Cognition:  Alert and oriented to person, place, time, and situation, recall intact  Insight: impaired    Judgment: impaired    Medication:  Scheduled:   benztropine  1 mg Oral BID    traZODone  50 mg Oral Nightly        PRN:  hydrOXYzine, artificial tears, acetaminophen, polyethylene glycol, LORazepam, OLANZapine, OLANZapine     FORMULATION:  This is a domiciled, never , psychiatrically  disabled 55-year-old with a history of schizophrenia who was brought by  squad to our emergency department with increasing psychotic symptoms in  the setting of treatment nonadherence. The patient presents severely  delusional and paranoid and requires inpatient stabilization and  treatment.     Principal Problem:    Schizophrenia ([address]  Active Problems:    Essential hypertension    Hyperlipidemia    Obesity (BMI 30.0-34. 9)    Tardive dyskinesia  Resolved Problems:    * No resolved hospital problems. *     PLAN:  1. Admitted to inpatient psychiatry for stabilization and treatment. 2.  On admission, ordered q.15-minute checks for safety, programming, and  p.r.n. medication for anxiety, agitation and insomnia. Ordered Abilify Maintena but not able to give it until 10/16. 3.  Internal Medicine consult for admission. HLD  - cont fish oil  - hold statin as pt is intolerant to most statins and Livalo is not on formulary - pt may have someone bring in his Livalo if he would like to take it during his admission    4. Collateral information obtained.      ESTIMATED LENGTH OF STAY:  10-15 days. The patient is voluntary by  Guardian. Total time with patient was 35 minutes and more than 50 % of that time was spent counseling the patient on their symptoms, treatment, and expected goals.      Cathleen Cummins MD

## 2021-10-20 PROCEDURE — 2500000003 HC RX 250 WO HCPCS: Performed by: PSYCHIATRY & NEUROLOGY

## 2021-10-20 PROCEDURE — 99233 SBSQ HOSP IP/OBS HIGH 50: CPT | Performed by: PSYCHIATRY & NEUROLOGY

## 2021-10-20 PROCEDURE — 1240000000 HC EMOTIONAL WELLNESS R&B

## 2021-10-20 RX ADMIN — OLANZAPINE 10 MG: 10 INJECTION, POWDER, FOR SOLUTION INTRAMUSCULAR at 18:53

## 2021-10-20 NOTE — PROGRESS NOTES
Patient did not sleep tonight and only went in his room to the bathroom as needed. He stated that he was not going to sleep \"in that bed\". He was generally pleasant with staff with no angry outbursts or unsafe behaviors. He sat in a chair near the nursing station and talked off and on all shift. He declined medications for sleep.

## 2021-10-20 NOTE — PLAN OF CARE
Problem: Anger Management/Homicidal Ideation:  Goal: Absence of angry outbursts  Description: Absence of angry outbursts  Outcome: Ongoing     Problem: Altered Mood, Psychotic Behavior:  Goal: Able to demonstrate trust by eating, participating in treatment and following staff's direction  Description: Able to demonstrate trust by eating, participating in treatment and following staff's direction  Outcome: Ongoing     Patient was eating breakfast, then urinated in his glass and bowl. When directed to go to his room, patient refused and said it was \"dirty\"! Patient stated that he is not staying here anymore and will not go to sleep or into that room. After lunch patient became irritable and angry, he rapidly went from tears and apologies to anger and verbalizing \"fuck you\" multiple times. Patient verbalized that he has a devil in his head who comes out. Patient denies hearing the devil talk.
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
## 2021-10-20 NOTE — BH NOTE
Patient angry and wanting to call the police. Patient stating,\"I'm going to kill him with my bear hands that doctor. That doctor cant keep me here. \" Referring to Dr. David Castellon. Patient continues to refer to the devil that is in his head.

## 2021-10-20 NOTE — PLAN OF CARE
Nursing shift report 1900 to present- Patient has been sitting in the milieu all shift only going to his room to the bathroom as needed. He stated that he is not going to sleep tonight. He has been overall pleasant with this staff member with no angry outbursts noted. He is hyper verbal with mumbling sometimes rapid speech, tangential and flight of ideas. He denies SI  HI and AVH but appears to be RTIS talking to self almost constantly. He is paranoid and delusional talking about warlocks, witches, lucifer and people being possessed. He has made statements about being afraid saying\" something is going to \" go down\" here tonight. He refused his scheduled trazodone and PRN ativan offered to him. He stated that all psych meds cause him to have tardive dyskinesia. He did make a couple phone calls to his mother and appeared to be talking to her pleasantly. He has been verbally reassured and redirected PRN. He did accept food given to him by staff and ate snack. He did not attend group. He is focused on being discharged. No complaints of pain noted and no unsafe behaviors noted.    Problem: Anger Management/Homicidal Ideation:  Goal: Absence of angry outbursts  Description: Absence of angry outbursts  Outcome: Ongoing     Problem: Altered Mood, Psychotic Behavior:  Goal: Able to demonstrate trust by eating, participating in treatment and following staff's direction  Description: Able to demonstrate trust by eating, participating in treatment and following staff's direction  Outcome: Ongoing     Problem: Pain:  Goal: Pain level will decrease  Description: Pain level will decrease  Outcome: Ongoing

## 2021-10-20 NOTE — PROGRESS NOTES
Purposeful Rounding    Patient Location: Patient room    Patient willing to engage in conversation: No    Presentation/behavior: Other resting in bed -eyes closed*    Affect: CLEMENTE- patient sleeping    Concerns reported: No    PRN medications given: No    Environmental assessment: Room free from clutter, Clear path to bathroom  and No safety hazards noted    Fall prevention interventions in place: N/A    Daily [place] Fall Risk Score: 35    Daily Lux Fall Risk Score: 0      Electronically signed by Fish Mora RN on 10/20/21 at 7:58 PM EDT

## 2021-10-20 NOTE — BH NOTE
Patient requested to use phone to call mother, patient attempted to call the [facility] department. Unsure of who patient was talking to but patient was stating\" I need the [facility] Department because I am being held against my will at a [facility].\"

## 2021-10-20 NOTE — PROGRESS NOTES
Department of Psychiatry  Progress Note    Patient's chart was reviewed. Discussed with treatment team. Met with patient. SUBJECTIVE:      Remains psychotic - no insight and impaired judgment. RTIS for most of the day. Continues to decline suggestions of po medication including for TD    ROS:   Patient has new complaints: no  Sleeping adequately:  No:   Appetite adequate: Yes  Engaged in programming: No:    OBJECTIVE:  VITALS:  BP (!) 142/84   Pulse 85   Temp 98 °F (36.7 °C) (Tympanic)   Resp 20   Ht 6' 1\" (1.854 m)   Wt 240 lb (108.9 kg)   SpO2 95%   BMI 31.66 kg/m²     Mental Status Examination:    Appearance: poor grooming and hygiene, TD  Behavior/Attitude toward examiner:   fair eye contact  Speech: loud, not pressured  Mood:  \"ok\"  Affect:  irritable. Thought processes:  superficially organized   Thought Content: no SI, + HI, delusional and paranoid  Perceptions: +RTIS  Attention: attention span and concentration were intact to interview   Abstraction: intact  Cognition:  Alert and oriented to person, place, time, and situation, recall intact  Insight: impaired    Judgment: impaired    Medication:  Scheduled:   traZODone  50 mg Oral Nightly        PRN:  OLANZapine, hydrOXYzine, artificial tears, acetaminophen, polyethylene glycol, LORazepam, OLANZapine     FORMULATION:  This is a domiciled, never , psychiatrically  disabled 75-year-old with a history of schizophrenia who was brought by  squad to our emergency department with increasing psychotic symptoms in  the setting of treatment nonadherence. The patient presents severely  delusional and paranoid and requires inpatient stabilization and  treatment.     Principal Problem:    Schizophrenia ([address]  Active Problems:    Essential hypertension    Hyperlipidemia    Obesity (BMI 30.0-34. 9)    Tardive dyskinesia  Resolved Problems:    * No resolved hospital problems. *     PLAN:  1.   Admitted to inpatient psychiatry for stabilization and treatment. 2.  On admission, ordered q.15-minute checks for safety, programming, and  p.r.n. medication for anxiety, agitation and insomnia. 10/16 - Maintena 400mg IM given. 10/19 - Continues to decline suggestions of po medication including for TD    3. Internal Medicine consult for admission. HLD  - cont fish oil  - hold statin as pt is intolerant to most statins and Livalo is not on formulary - pt may have someone bring in his Livalo if he would like to take it during his admission    4. Collateral information obtained.      ESTIMATED LENGTH OF STAY:  10-15 days. The patient is voluntary by  Guardian. Total time with patient was 35 minutes and more than 50 % of that time was spent counseling the patient on their symptoms, treatment, and expected goals.      Kilo Nova MD

## 2021-10-21 PROCEDURE — 6370000000 HC RX 637 (ALT 250 FOR IP): Performed by: PSYCHIATRY & NEUROLOGY

## 2021-10-21 PROCEDURE — 99233 SBSQ HOSP IP/OBS HIGH 50: CPT | Performed by: PSYCHIATRY & NEUROLOGY

## 2021-10-21 PROCEDURE — 1240000000 HC EMOTIONAL WELLNESS R&B

## 2021-10-21 RX ORDER — OLANZAPINE 10 MG/1
10 INJECTION, POWDER, LYOPHILIZED, FOR SOLUTION INTRAMUSCULAR NIGHTLY
Status: DISCONTINUED | OUTPATIENT
Start: 2021-10-21 | End: 2021-10-23

## 2021-10-21 RX ORDER — OLANZAPINE 10 MG/1
10 TABLET ORAL NIGHTLY
Status: DISCONTINUED | OUTPATIENT
Start: 2021-10-21 | End: 2021-10-23

## 2021-10-21 RX ADMIN — OLANZAPINE 10 MG: 10 TABLET, FILM COATED ORAL at 21:43

## 2021-10-21 ASSESSMENT — PAIN SCALES - GENERAL
PAINLEVEL_OUTOF10: 0
PAINLEVEL_OUTOF10: 0

## 2021-10-21 NOTE — PROGRESS NOTES
Purposeful Rounding    Patient Location: Patient room    Patient willing to engage in conversation: No    Presentation/behavior: Other Asleep*    Affect: CLEMENTE-asleep    Concerns reported: No    PRN medications given: Nl    Environmental assessment: Room free from clutter, Clear path to bathroom  and No safety hazards noted    Fall prevention interventions in place: N/A    Daily [place] Fall Risk Score: 40    Daily Lux Fall Risk Score: 0      Electronically signed by Nathan Rehman RN on 10/20/21 at 8:52 PM EDT

## 2021-10-21 NOTE — FLOWSHEET NOTE
Purposeful Rounding    Patient Location: Day room    Patient willing to engage in conversation: Yes    Presentation/behavior: Anxious, Agitated and Cooperative    Affect: Anxious and Irritable    Concerns reported: none    PRN medications given: none    Environmental assessment: Room free from clutter, Clear path to bathroom , Adequate lighting and No safety hazards noted    Fall prevention interventions in place: Lighting appropriate, Room free of clutter and Clear path to bathroom    Daily Cur[place] Fall Risk Score: 69    Daily Lux Fall Risk Score: 15      Electronically signed by Goldie Kearney RN on 10/21/21 at 8:34 AM EDT

## 2021-10-21 NOTE — FLOWSHEET NOTE
Purposeful Rounding    Patient Location: Day room    Patient willing to engage in conversation: Yes    Presentation/behavior: Cooperative    Affect: Irritable    Concerns reported: none    PRN medications given: none    Environmental assessment: Room free from clutter, Clear path to bathroom , Adequate lighting and Stay with me protocol    Fall prevention interventions in place: Yellow non-skid socks on, Lighting appropriate, Room free of clutter and Clear path to bathroom    Daily Tabitha Fall Risk Score: 69    Daily Lux Fall Risk Score: 15      Electronically signed by Irving Simental RN on 10/21/21 at 3:01 PM EDT

## 2021-10-21 NOTE — PROGRESS NOTES
Department of Psychiatry  Progress Note    Patient's chart was reviewed. Discussed with treatment team. Met with patient. SUBJECTIVE:      Remains psychotic and agitated. Received IM medication last night due to severe agitation and near fist-fight with peer. Using a loud voice - threatening to kill me. Presentation is consistent with past episodes. ROS:   Patient has new complaints: no  Sleeping adequately:  No:   Appetite adequate: Yes  Engaged in programming: No:    OBJECTIVE:  VITALS:  /79   Pulse 91   Temp 97.7 °F (36.5 °C) (Temporal)   Resp 18   Ht 6' 1\" (1.854 m)   Wt 240 lb (108.9 kg)   SpO2 96%   BMI 31.66 kg/m²     Mental Status Examination:    Appearance: poor grooming and hygiene, TD  Behavior/Attitude toward examiner:   fair eye contact  Speech: loud, not pressured  Mood:  \"terrible\"  Affect:  irritable. Thought processes:  disorganized  Thought Content: no SI, + HI, delusional and paranoid  Perceptions: +RTIS  Attention: attention span and concentration were intact to interview   Abstraction: intact  Cognition:  Alert and oriented to person, place, time, and situation, recall intact  Insight: impaired    Judgment: impaired    Medication:  Scheduled:   traZODone  50 mg Oral Nightly        PRN:  OLANZapine, hydrOXYzine, artificial tears, acetaminophen, polyethylene glycol, LORazepam, OLANZapine     FORMULATION:  This is a domiciled, never , psychiatrically  disabled 75-year-old with a history of schizophrenia who was brought by  squad to our emergency department with increasing psychotic symptoms in  the setting of treatment nonadherence. The patient presents severely  delusional and paranoid and requires inpatient stabilization and  treatment.     Principal Problem:    Schizophrenia ([address]  Active Problems:    Essential hypertension    Hyperlipidemia    Obesity (BMI 30.0-34. 9)    Tardive dyskinesia  Resolved Problems:    * No resolved hospital problems.  * PLAN:  1. Admitted to inpatient psychiatry for stabilization and treatment. 2.  On admission, ordered q.15-minute checks for safety, programming, and  p.r.n. medication for anxiety, agitation and insomnia. 10/16 - Maintena 400mg IM given. 10/19 - Continues to decline suggestions of po medication including for TD  10/20 - consider PO medication with IM backup to supplement Abilify. 10/21 - Zyprexa 10mg QHS PO/IM to supplement Maintena for acute stabiliation. 3.  Internal Medicine consult for admission. HLD  - cont fish oil  - hold statin as pt is intolerant to most statins and Livalo is not on formulary - pt may have someone bring in his Livalo if he would like to take it during his admission    4. Collateral information obtained.      ESTIMATED LENGTH OF STAY:  10-15 days. The patient is voluntary by  Guardian. Total time with patient was 35 minutes and more than 50 % of that time was spent counseling the patient on their symptoms, treatment, and expected goals.      Jorge L Argueta MD

## 2021-10-21 NOTE — PLAN OF CARE
Patient was sitting in front of the team station at 19:35, with rapid pressured speech & was anxious & agitated. \"I want to go home. \" Patient was unable to listen to another staff person, when she was trying to inform patient, that he needed to listen to staff & cooperate, before he could go home. Patient declined to allow writer to check his vitals. \"You can't get it anyway. \" Patient declined to talk to writer,\" God will strike you down\" Patient did go to his room at 19:40 & continued to talk to himself loudly & moan. Patient appeared resting in bed with his eyes closed at 20:00. Patient was awake & in the bathroom ar 21:30 & was urinating/ Patient was back in bed, with his eyes closed at 21:45.  Mara Ridley R.N.

## 2021-10-21 NOTE — PLAN OF CARE
Problem: Anger Management/Homicidal Ideation:  Goal: Able to display appropriate communication and problem solving  Description: Able to display appropriate communication and problem solving  10/21/2021 1054 by Rosemary Ashton RN  Outcome: Ongoing     Problem: Anger Management/Homicidal Ideation:  Goal: Absence of angry outbursts  Description: Absence of angry outbursts  10/21/2021 1054 by Rosemary Ashton RN  Outcome: Ongoing     Problem: Altered Mood, Psychotic Behavior:  Goal: Ability to interact with others will improve  Description: Ability to interact with others will improve  10/21/2021 1054 by Rosemary Ashton RN  Outcome: Ongoing  Pt has remained free from falls and injury so far this shift. A&Ox3. Negative to situation. Denies SI/AVH. +RTIS. +HI toward brother. Verbal and loud but redirectable. Threats of urinating in a cup or on his food \"for attention\". Ambulates independently with steady gait. Watching tv at this time, talking to self. Nonskid footwear on. Will continue to monitor.

## 2021-10-21 NOTE — PROGRESS NOTES
Purposeful Rounding    Patient Location: Patient room    Patient willing to engage in conversation: No    Presentation/behavior: Other Asleep*    Affect: Patient sleeping    Concerns reported: No    PRN medications given: No    Environmental assessment: Room free from clutter, Clear path to bathroom  and No safety hazards noted    Fall prevention interventions in place:  N/A    Daily [place] Fall Risk Score: 40    Daily Lux Fall Risk Score: 0      Electronically signed by Ruma Richardson RN on 10/21/21 at 12:13 AM EDT

## 2021-10-21 NOTE — BH NOTE
Steve Ghosh, , with [facility] called for update on pt. Asked to speak to pt as well but pt declined. Transferred to [facility].

## 2021-10-21 NOTE — PROGRESS NOTES
Department of Psychiatry  Progress Note    Patient's chart was reviewed. Discussed with treatment team. Met with patient. SUBJECTIVE:      continues severely psychotic - no insight and impaired judgment. Presentation is consistent with past episodes. Continues to decline suggestions of po medication to supplement Maintena. ROS:   Patient has new complaints: no  Sleeping adequately:  No:   Appetite adequate: Yes  Engaged in programming: No:    OBJECTIVE:  VITALS:  /79   Pulse 91   Temp 97.7 °F (36.5 °C) (Temporal)   Resp 18   Ht 6' 1\" (1.854 m)   Wt 240 lb (108.9 kg)   SpO2 96%   BMI 31.66 kg/m²     Mental Status Examination:    Appearance: poor grooming and hygiene, TD  Behavior/Attitude toward examiner:   fair eye contact  Speech: loud, not pressured  Mood:  \"terrible\"  Affect:  irritable. Thought processes:  disorganized  Thought Content: no SI, + HI, delusional and paranoid  Perceptions: +RTIS  Attention: attention span and concentration were intact to interview   Abstraction: intact  Cognition:  Alert and oriented to person, place, time, and situation, recall intact  Insight: impaired    Judgment: impaired    Medication:  Scheduled:   traZODone  50 mg Oral Nightly        PRN:  OLANZapine, hydrOXYzine, artificial tears, acetaminophen, polyethylene glycol, LORazepam, OLANZapine     FORMULATION:  This is a domiciled, never , psychiatrically  disabled 75-year-old with a history of schizophrenia who was brought by  squad to our emergency department with increasing psychotic symptoms in  the setting of treatment nonadherence. The patient presents severely  delusional and paranoid and requires inpatient stabilization and  treatment.     Principal Problem:    Schizophrenia ([address]  Active Problems:    Essential hypertension    Hyperlipidemia    Obesity (BMI 30.0-34. 9)    Tardive dyskinesia  Resolved Problems:    * No resolved hospital problems. *     PLAN:  1.   Admitted to inpatient psychiatry for stabilization and treatment. 2.  On admission, ordered q.15-minute checks for safety, programming, and  p.r.n. medication for anxiety, agitation and insomnia. 10/16 - Maintena 400mg IM given. 10/19 - Continues to decline suggestions of po medication including for TD  10/20 - consider PO medication with IM backup to supplement Abilify. 3.  Internal Medicine consult for admission. HLD  - cont fish oil  - hold statin as pt is intolerant to most statins and Livalo is not on formulary - pt may have someone bring in his Livalo if he would like to take it during his admission    4. Collateral information obtained.      ESTIMATED LENGTH OF STAY:  10-15 days. The patient is voluntary by  Guardian. Total time with patient was 35 minutes and more than 50 % of that time was spent counseling the patient on their symptoms, treatment, and expected goals.      Seema Mejia MD

## 2021-10-21 NOTE — PROGRESS NOTES
Purposeful Rounding    Patient Location: Day room    Patient willing to engage in conversation: No    Presentation/behavior: Anxious, Disorganized and Resistive    Affect: Labile, irritable, Anxious and Restless    Concerns reported: Patient asked for ice water & was given it.     PRN medications given: No    Environmental assessment: Room free from clutter, Clear path to bathroom  and No safety hazards noted    Fall prevention interventions in place: N//A    Daily Tabitha Fall Risk Score: 40    Daily Sandy Fall Risk Score: 0      Electronically signed by Ramesh Mina RN on 10/21/21 at 4:12 AM EDT

## 2021-10-21 NOTE — PROGRESS NOTES
Patient has been awake since 03:35 & has been out in the dayroom. Patient appeared anxious, irritable, with rapid pressured speech at intervals. Patient, with loosed disorganized speech. Patient reported that he wants to go home. Patient also reported that his shower is too filthy, for him too take a shower in. Writer insttructed patient, that housekeeping, can clean it, later in the morning. \"It won't help, it will still be filthy. \" Patient later stated,\"You don't know how frustrated I am, to be around the demonized people. \" Patient has a snack 2 cups of water per request. Patient went to his room & is sitting in his room, with his arms crossed. Patient continued to talk to himself at intervals. Patient was encourage to try to sleep. \"I slept over over 4 hours. I don't need anymore sleep. \" Tilford Nageotte Jump,R.N.

## 2021-10-21 NOTE — FLOWSHEET NOTE
Purposeful Rounding    Patient Location: Day room    Patient willing to engage in conversation: Yes    Presentation/behavior: Disruptive    Affect: irritable    Concerns reported: No    PRN medications given: declined    Environmental assessment: Room free from clutter    Fall prevention interventions in place: Room free of clutter    Daily G[place] Fall Risk Score: 69    Daily Lux Fall Risk Score: 15    Electronically signed by Yaquelin Love RN on 10/21/21 at 5:31 PM EDT

## 2021-10-21 NOTE — FLOWSHEET NOTE
Purposeful Rounding    Patient Location: Day room    Patient willing to engage in conversation: Yes    Presentation/behavior: Imulsive    Affect: Irritable    Concerns reported: none    PRN medications given: none    Environmental assessment: Room free from clutter, Clear path to bathroom , Adequate lighting and No safety hazards noted    Fall prevention interventions in place: Yellow non-skid socks on, Lighting appropriate, Room free of clutter and Clear path to bathroom    Daily [place] Fall Risk Score: 69    Daily Lux Fall Risk Score: 15      Electronically signed by Teo Mccormick RN on 10/21/21 at 1:50 PM EDT

## 2021-10-21 NOTE — FLOWSHEET NOTE
Purposeful Rounding    Patient Location: Day room    Patient willing to engage in conversation: Yes    Presentation/behavior: Anxious, Agitated and Disruptive    Affect: Anxious and Irritable    Concerns reported: none    PRN medications given: none    Environmental assessment: Room free from clutter, Clear path to bathroom , Adequate lighting and No safety hazards noted    Fall prevention interventions in place: Lighting appropriate, Room free of clutter and Clear path to bathroom    Daily [place] Fall Risk Score: 69    Daily Lux Fall Risk Score: 15      Electronically signed by Cynthia Leahy RN on 10/21/21 at 10:40 AM EDT

## 2021-10-21 NOTE — FLOWSHEET NOTE
Purposeful Rounding    Patient Location: Day room    Patient willing to engage in conversation: Yes    Presentation/behavior: Guarded/Suspicious, Attention Seeking and Disruptive    Affect: Anxious and Irritable    Concerns reported: none    PRN medications given: none    Environmental assessment: Room free from clutter, Clear path to bathroom , Adequate lighting and No safety hazards noted    Fall prevention interventions in place: Lighting appropriate, Room free of clutter and Clear path to bathroom    Daily [place] Fall Risk Score: 69    Daily Lux Fall Risk Score: 15      Electronically signed by Darrick Tee RN on 10/21/21 at 9:30 AM EDT

## 2021-10-21 NOTE — FLOWSHEET NOTE
Purposeful Rounding    Patient Location: Day room    Patient willing to engage in conversation: No    Presentation/behavior: Disruptive    Affect: Angry    Concerns reported: no    PRN medications given: declined    Environmental assessment: Room free from clutter    Fall prevention interventions in place: Clear path to bathroom    Daily [place] Fall Risk Score: 69    Daily Lux Fall Risk Score: 15      Electronically signed by Perri Mosley RN on 10/21/21 at 6:30 PM EDT

## 2021-10-21 NOTE — FLOWSHEET NOTE
Purposeful Rounding    Patient Location: Day Room    Patient willing to engage in conversation: Yes    Presentation/behavior: Anxious, Agitated    Affect: Anxious, Irritable    Concerns reported: none    PRN medications given: none    Environmental assessment: Room free from clutter, Clear path to bathroom , Adequate lighting and No safety hazards noted    Fall prevention interventions in place: Lighting appropriate, Room free of clutter and Clear path to bathroom    Daily Tabitha Fall Risk Score: 69    Daily Lux Fall Risk Score: 15      Electronically signed by Swapnil Dozier RN on 10/21/21 at 7:49 AM EDT

## 2021-10-21 NOTE — PROGRESS NOTES
Patient asking for livalo. \"I haven't had it since I've been here. \" Patient was instructed that the pharmacy, doesn't carry it & he could ask family to bring it in. \"I only have my mother & she doesn't drive. \"Patient has been drinking lots of water & now asked & was given milk. \"I'll flush out the zyprexa. \" Og Ridley R.N.

## 2021-10-22 PROCEDURE — 2500000003 HC RX 250 WO HCPCS: Performed by: PSYCHIATRY & NEUROLOGY

## 2021-10-22 PROCEDURE — 6370000000 HC RX 637 (ALT 250 FOR IP): Performed by: PSYCHIATRY & NEUROLOGY

## 2021-10-22 PROCEDURE — 99233 SBSQ HOSP IP/OBS HIGH 50: CPT | Performed by: PSYCHIATRY & NEUROLOGY

## 2021-10-22 PROCEDURE — 2580000003 HC RX 258

## 2021-10-22 PROCEDURE — 1240000000 HC EMOTIONAL WELLNESS R&B

## 2021-10-22 PROCEDURE — 6370000000 HC RX 637 (ALT 250 FOR IP): Performed by: STUDENT IN AN ORGANIZED HEALTH CARE EDUCATION/TRAINING PROGRAM

## 2021-10-22 RX ADMIN — WHITE PETROLATUM 57.7 %-MINERAL OIL 31.9 % EYE OINTMENT: at 02:15

## 2021-10-22 RX ADMIN — WATER 20 ML: 1 INJECTION INTRAMUSCULAR; INTRAVENOUS; SUBCUTANEOUS at 21:10

## 2021-10-22 RX ADMIN — OLANZAPINE 10 MG: 10 INJECTION, POWDER, LYOPHILIZED, FOR SOLUTION INTRAMUSCULAR at 21:12

## 2021-10-22 RX ADMIN — WHITE PETROLATUM 57.7 %-MINERAL OIL 31.9 % EYE OINTMENT: at 03:03

## 2021-10-22 ASSESSMENT — PAIN SCALES - GENERAL: PAINLEVEL_OUTOF10: 0

## 2021-10-22 NOTE — PLAN OF CARE
Pt alert angry and yelling this morning, cursing, threatening to kill Night Shift Nurse. Sat down with pt, asked him to talk to me calmly, all issues resolved at this time. Pt eats 100% of his meal, showers, setting watching TV at this time. Pt denies SI,HI,AVH, states\"I know I yell a lot but I really wouldn't hurt anyone. Pt complies with care and medication, contracts for safety, no distress noted at this time.

## 2021-10-22 NOTE — PLAN OF CARE
Problem: Altered Mood, Psychotic Behavior:  Goal: Ability to interact with others will improve  Description: Ability to interact with others will improve  10/21/2021 2022 by Brissa Muñiz RN  Outcome: Ongoing  10/21/2021 1054 by Luana Alfaro RN  Outcome: Ongoing     Pt continues to be easily agitated and labile without any provocation. Reinforced behavioral expectations throughout the evening. Problem: Anger Management/Homicidal Ideation:  Goal: Able to display appropriate communication and problem solving  Description: Able to display appropriate communication and problem solving  10/21/2021 2022 by Brissa Muñiz RN  Outcome: Not Met This Shift  10/21/2021 1054 by Luana Alfaro RN  Outcome: Ongoing     Problem: Anger Management/Homicidal Ideation:  Goal: Absence of angry outbursts  Description: Absence of angry outbursts  10/21/2021 2022 by Brissa Muñiz RN  Outcome: Not Met This Shift  10/21/2021 1054 by Luana Alfaro RN  Outcome: Ongoing   Pt has had multiple verbally aggressive outbursts as of this note but is redirectable. Pt will often angrily espouse delusional content, then immediately follow it with \"I'm done, that's it, I'll keep my mouth shut. \"  Pt had one outburst where he began physically posturing but was easily redirectable. Will continue to reinforce behavioral expectations as needed.

## 2021-10-22 NOTE — BH NOTE
Pt has continued to quietly talk to himself, laughing at times. Pt not yelling or agitated watching tv quietly no distress noted at this time.

## 2021-10-22 NOTE — PROGRESS NOTES
Spoke to Kishor, patient Kaylie Gupta was told about zyprexa and she related that her son is allergic to antipsychotics. Explained to patients guardian that the dose was ordered and that he tolerated the dose earlier in his stay in the [facility]. Kishor gave verbal permission over the phone to offer the medication as ordered. Duy DEAN RN also spoke to patients mother and obtained verbal permission to give medications as ordered.

## 2021-10-22 NOTE — PROGRESS NOTES
Pt approached the desk and apologized for his behavior earlier, saying \"I'm sorry, I trust you. .. I'll give you a reference to anyone who asks. \"  Pt continues to fixate on his eyes and eye drops but has been calm & in control. He reports relief from PRN ophthalmic ointment.

## 2021-10-22 NOTE — PROGRESS NOTES
Department of Psychiatry  Progress Note    Patient's chart was reviewed. Discussed with treatment team. Met with patient. SUBJECTIVE:      Less agitated and loud today but remains psychotic. Managed to cheek Zyprexa last night and has been carrying it around in his pocket today. He eventually gave it back. \"You tried to medicate the holy spirit, I'm a warrior. \"    ROS:   Patient has new complaints: no  Sleeping adequately:  No:   Appetite adequate: Yes  Engaged in programming: No    OBJECTIVE:  VITALS:  BP (!) 149/66   Pulse 99   Temp 98.3 °F (36.8 °C) (Temporal)   Resp 16   Ht 6' 1\" (1.854 m)   Wt 240 lb (108.9 kg)   SpO2 97%   BMI 31.66 kg/m²     Mental Status Examination:    Appearance: poor grooming and hygiene, TD  Behavior/Attitude toward examiner:   fair eye contact  Speech: less loud, not pressured  Mood:  \"terrible\"  Affect:  irritable      Thought processes:  disorganized  Thought Content: no SI, + HI, delusional and paranoid  Perceptions: +RTIS  Attention: attention span and concentration were intact to interview   Abstraction: intact  Cognition:  Alert and oriented to person, place, time, and situation, recall intact  Insight: impaired    Judgment: impaired    Medication:  Scheduled:   OLANZapine  10 mg Oral Nightly    Or    OLANZapine  10 mg IntraMUSCular Nightly        PRN:  OLANZapine, hydrOXYzine, artificial tears, acetaminophen, polyethylene glycol, LORazepam, OLANZapine     FORMULATION:  This is a domiciled, never , psychiatrically  disabled 58-year-old with a history of schizophrenia who was brought by  squad to our emergency department with increasing psychotic symptoms in  the setting of treatment nonadherence. The patient presents severely  delusional and paranoid and requires inpatient stabilization and  treatment.     Principal Problem:    Schizophrenia ([address]  Active Problems:    Essential hypertension    Hyperlipidemia    Obesity (BMI 30.0-34. 9)    Tardive dyskinesia  Resolved Problems:    * No resolved hospital problems. *     PLAN:  1. Admitted to inpatient psychiatry for stabilization and treatment. 2.  On admission, ordered q.15-minute checks for safety, programming, and  p.r.n. medication for anxiety, agitation and insomnia. 10/16 - Maintena 400mg IM given. 10/19 - Continues to decline suggestions of po medication including for TD  10/20 - consider PO medication with IM backup to supplement Abilify. 10/21 - Zyprexa 10mg QHS PO/IM to supplement Maintena for acute stabiliation. 3.  Internal Medicine consult for admission. HLD  - cont fish oil  - hold statin as pt is intolerant to most statins and Livalo is not on formulary - pt may have someone bring in his Livalo if he would like to take it during his admission    4. Collateral information obtained.      ESTIMATED LENGTH OF STAY:  10-15 days. The patient is voluntary by Guardian. Total time with patient was 35 minutes and more than 50 % of that time was spent counseling the patient on their symptoms, treatment, and expected goals.      Ian Wheatley MD

## 2021-10-22 NOTE — BH NOTE
Pt has spent the time since previous note whispering to himself in the day area, occasionally raising his voice to an audible volume to be overheard by staff, and attempting to menace staff. Pt began muttering \"You're going to die, little girl. [place] Crumble Caitlyn Crumble \" When asked who pt was talking to, he looked at this writer & said \"You, you're going to get hurt. \"  Pt has not made any additional overtly threatening statements or gestures as of this note.

## 2021-10-22 NOTE — PROGRESS NOTES
Patient requested and was given artificial tears opthalmic ointment for bilateral eye irritation. He stated that there is something in her irritating his eyes and this place is filthy. He was verbally reassured.

## 2021-10-23 PROCEDURE — 1240000000 HC EMOTIONAL WELLNESS R&B

## 2021-10-23 PROCEDURE — 6360000002 HC RX W HCPCS: Performed by: PSYCHIATRY & NEUROLOGY

## 2021-10-23 PROCEDURE — 6370000000 HC RX 637 (ALT 250 FOR IP): Performed by: STUDENT IN AN ORGANIZED HEALTH CARE EDUCATION/TRAINING PROGRAM

## 2021-10-23 PROCEDURE — 99233 SBSQ HOSP IP/OBS HIGH 50: CPT | Performed by: PSYCHIATRY & NEUROLOGY

## 2021-10-23 RX ORDER — FLUPHENAZINE HYDROCHLORIDE 10 MG/1
10 TABLET ORAL NIGHTLY
Status: DISCONTINUED | OUTPATIENT
Start: 2021-10-23 | End: 2021-10-24

## 2021-10-23 RX ORDER — FLUPHENAZINE HYDROCHLORIDE 2.5 MG/ML
10 INJECTION, SOLUTION INTRAMUSCULAR NIGHTLY PRN
Status: DISCONTINUED | OUTPATIENT
Start: 2021-10-23 | End: 2021-10-24

## 2021-10-23 RX ADMIN — WHITE PETROLATUM 57.7 %-MINERAL OIL 31.9 % EYE OINTMENT: at 08:06

## 2021-10-23 RX ADMIN — FLUPHENAZINE HYDROCHLORIDE 10 MG: 2.5 INJECTION, SOLUTION INTRAMUSCULAR at 22:47

## 2021-10-23 RX ADMIN — WHITE PETROLATUM 57.7 %-MINERAL OIL 31.9 % EYE OINTMENT: at 19:33

## 2021-10-23 RX ADMIN — WHITE PETROLATUM 57.7 %-MINERAL OIL 31.9 % EYE OINTMENT: at 13:09

## 2021-10-23 RX ADMIN — WHITE PETROLATUM 57.7 %-MINERAL OIL 31.9 % EYE OINTMENT: at 10:42

## 2021-10-23 ASSESSMENT — PAIN SCALES - GENERAL: PAINLEVEL_OUTOF10: 0

## 2021-10-23 NOTE — PLAN OF CARE
Problem: Altered Mood, Psychotic Behavior:  Goal: Able to verbalize reality based thinking  Description: Able to verbalize reality based thinking  Outcome: Ongoing   Awake. Alert. Laughing hysterically. Pleasant.

## 2021-10-23 NOTE — PLAN OF CARE
Problem: Altered Mood, Psychotic Behavior:  Goal: Able to demonstrate trust by eating, participating in treatment and following staff's direction  Description: Able to demonstrate trust by eating, participating in treatment and following staff's direction  Outcome: Ongoing  Increasingly agitated throughout the afternoon. Refuses to believe that Prolixin comes in pill form , despite pictures or typed information.

## 2021-10-23 NOTE — PLAN OF CARE
Patient alert and oriented x 3. Patient sitting in the dayroom watching T.V. Patient denies SI/HI/A/V/H but appears to be RTIS. Patient talking to himself in the dayroom. Patient stated, \"I will take my Zyprexa. \" Patient acted like he was putting the pill in his mouth but this writer find it in his left hand. Patient loud and yelling at staff. Patient medicated with Zyprexa 10 mg IM in the right deltoid. Patient tolerated injection well. Patient went back to his room. No c/o's voiced present.

## 2021-10-23 NOTE — PROGRESS NOTES
Patient was given zyprexa po. He attempted to not take medication by placing it in his hand. Attending RN able to secure medication from patients hand. Due to concern of patient diverting his medication he was provided zyprexa 10 mg IM and medication was administered to right deltoid  Muscle mass. Reminded patient that his mother has given approval for use of zyprexa and that he hasn't had any untoward effects from the drug previously. When I brought the injection to the patient in dayroom, patient dropped his pants and bent over exposing his buttocks and genitals. He spread his cheeks and said just \"give me the medication in the ass.'. I told patient that we could inject him in his exposed muscle but at this point he changed his mind and asked for the medication in the arm. IM Medication was administered without issue.

## 2021-10-23 NOTE — BH NOTE
Patient awake and sitting in the dayroom talking and laughing to himself. Patient was getting loud at one point but easily redirected by staff.

## 2021-10-24 PROCEDURE — 1240000000 HC EMOTIONAL WELLNESS R&B

## 2021-10-24 PROCEDURE — 6370000000 HC RX 637 (ALT 250 FOR IP): Performed by: PSYCHIATRY & NEUROLOGY

## 2021-10-24 RX ORDER — FLUPHENAZINE HYDROCHLORIDE 10 MG/1
10 TABLET ORAL NIGHTLY
Status: DISCONTINUED | OUTPATIENT
Start: 2021-10-24 | End: 2021-10-27 | Stop reason: HOSPADM

## 2021-10-24 RX ORDER — FLUPHENAZINE HYDROCHLORIDE 2.5 MG/ML
10 INJECTION, SOLUTION INTRAMUSCULAR NIGHTLY
Status: DISCONTINUED | OUTPATIENT
Start: 2021-10-24 | End: 2021-10-27 | Stop reason: HOSPADM

## 2021-10-24 RX ADMIN — POLYETHYLENE GLYCOL (3350) 17 G: 17 POWDER, FOR SOLUTION ORAL at 23:40

## 2021-10-24 RX ADMIN — LORAZEPAM 2 MG: 2 TABLET ORAL at 16:22

## 2021-10-24 RX ADMIN — FLUPHENAZINE HYDROCHLORIDE 10 MG: 10 TABLET, FILM COATED ORAL at 21:57

## 2021-10-24 RX ADMIN — HYDROXYZINE PAMOATE 50 MG: 50 CAPSULE ORAL at 16:24

## 2021-10-24 NOTE — PLAN OF CARE
Problem: Anger Management/Homicidal Ideation:  Goal: Able to display appropriate communication and problem solving  Description: Able to display appropriate communication and problem solving  Outcome: Ongoing     Problem: Altered Mood, Psychotic Behavior:  Goal: Able to verbalize reality based thinking  Description: Able to verbalize reality based thinking  Outcome: Ongoing     Problem: Altered Mood, Psychotic Behavior:  Goal: Compliance with prescribed medication regimen will improve  Description: Compliance with prescribed medication regimen will improve  Outcome: Ongoing     Pt continues to be difficult to engage in reality based conversation with and has verbalized multiple paranoid and grandiose delusions. Pt sat in the TV area during the evening and was observed RTIS. Pt had several angry outbursts with no identifiable trigger, but was not physically aggressive during any of them. Pt refused PO Prolixin, saying that it was , and was given Prolixin IM per orders.

## 2021-10-24 NOTE — PROGRESS NOTES
Department of Psychiatry  Progress Note    Patient's chart was reviewed. Discussed with treatment team. Met with patient. SUBJECTIVE:      More agitated and loud today - continues to express multipd delusional ideas and paranoid thougths though they seem less prominent. Says he's a \"mole\" from the [facility] [employer] department. Appears he took olanzapine last night. When I met with him, he asked for alternative sto Zyprexa. We went through the list and he agreed to try Prolixin instead because he's never been on it. ROS:   Patient has new complaints: no  Sleeping adequately:  No:   Appetite adequate: Yes  Engaged in programming: No    OBJECTIVE:  VITALS:  /78   Pulse 83   Temp 98 °F (36.7 °C) (Oral)   Resp 18   Ht 6' 1\" (1.854 m)   Wt 240 lb (108.9 kg)   SpO2 94%   BMI 31.66 kg/m²     Mental Status Examination:    Appearance: poor grooming and hygiene, TD  Behavior/Attitude toward examiner:   fair eye contact  Speech: + loud,   Mood:  \"I need out of here, this place is going to be demolished\"  Affect:  irritable      Thought processes:  disorganized  Thought Content: no SI, + HI, delusional and paranoid  Perceptions: +RTIS  Attention: attention span and concentration were intact to interview   Abstraction: intact  Cognition:  Alert and oriented to person, place, time, and situation, recall intact  Insight: impaired    Judgment: impaired    Medication:  Scheduled:   fluPHENAZine HCl  10 mg Oral Nightly        PRN:  fluPHENAZine HCl, OLANZapine, hydrOXYzine, artificial tears, acetaminophen, polyethylene glycol, LORazepam, OLANZapine     FORMULATION:  This is a domiciled, never , psychiatrically  disabled 78-year-old with a history of schizophrenia who was brought by  squad to our emergency department with increasing psychotic symptoms in  the setting of treatment nonadherence.   The patient presents severely  delusional and paranoid and requires inpatient stabilization and  treatment.     Principal Problem:    Schizophrenia ([facility] [address]  Active Problems:    Hyperlipidemia    Obesity (BMI 30.0-34. 9)    Tardive dyskinesia  Resolved Problems:    * No resolved hospital problems. *     PLAN:  1. Admitted to inpatient psychiatry for stabilization and treatment. 2.  On admission, ordered q.15-minute checks for safety, programming, and  p.r.n. medication for anxiety, agitation and insomnia. 10/16 - Maintena 400mg IM given. 10/19 - Continues to decline suggestions of po medication including for TD  10/20 - consider PO medication with IM backup to supplement Abilify. 10/21 - Zyprexa 10mg QHS PO/IM to supplement Maintena for acute stabiliation. 10/23 - discontinue scheduled Zyprexa. Start Prolixin 10mg PO/IM to supplement Maintena for acute stabilization. 3.  Internal Medicine consult for admission. HLD  - cont fish oil  - hold statin as pt is intolerant to most statins and Livalo is not on formulary - pt may have someone bring in his Livalo if he would like to take it during his admission    4. Collateral information obtained.      ESTIMATED LENGTH OF STAY:  10-15 days. The patient is voluntary by Guardian. Total time with patient was 35 minutes and more than 50 % of that time was spent counseling the patient on their symptoms, treatment, and expected goals.      Cathleen Cummins MD

## 2021-10-24 NOTE — PROGRESS NOTES
Spoke to [employer] - patient's guardian to explain prolixin and received permission over the phone to give medication if her son refuses to take po meds. Telephone consent obtained by this RN and patients attending RN.

## 2021-10-24 NOTE — PLAN OF CARE
Calmer and more cooperative today. RTIS noted. Speaking with mother more appropriately. +eating, +sleeping. Denies SI/HI. Oriented x 4.

## 2021-10-24 NOTE — BH NOTE
Pt slept well throughout the night. No behavioral issues overnight. Will continue to monitor pt per orders.

## 2021-10-25 PROCEDURE — 99233 SBSQ HOSP IP/OBS HIGH 50: CPT | Performed by: PSYCHIATRY & NEUROLOGY

## 2021-10-25 PROCEDURE — 6370000000 HC RX 637 (ALT 250 FOR IP): Performed by: PSYCHIATRY & NEUROLOGY

## 2021-10-25 PROCEDURE — 1240000000 HC EMOTIONAL WELLNESS R&B

## 2021-10-25 RX ADMIN — LORAZEPAM 2 MG: 2 TABLET ORAL at 09:06

## 2021-10-25 RX ADMIN — FLUPHENAZINE HYDROCHLORIDE 10 MG: 10 TABLET, FILM COATED ORAL at 20:45

## 2021-10-25 RX ADMIN — LORAZEPAM 2 MG: 2 TABLET ORAL at 20:45

## 2021-10-25 RX ADMIN — WHITE PETROLATUM 57.7 %-MINERAL OIL 31.9 % EYE OINTMENT: at 16:17

## 2021-10-25 RX ADMIN — WHITE PETROLATUM 57.7 %-MINERAL OIL 31.9 % EYE OINTMENT: at 00:07

## 2021-10-25 RX ADMIN — HYDROXYZINE PAMOATE 50 MG: 50 CAPSULE ORAL at 20:45

## 2021-10-25 RX ADMIN — HYDROXYZINE PAMOATE 50 MG: 50 CAPSULE ORAL at 00:24

## 2021-10-25 RX ADMIN — LORAZEPAM 2 MG: 2 TABLET ORAL at 00:24

## 2021-10-25 RX ADMIN — WHITE PETROLATUM 57.7 %-MINERAL OIL 31.9 % EYE OINTMENT: at 07:07

## 2021-10-25 RX ADMIN — HYDROXYZINE PAMOATE 50 MG: 50 CAPSULE ORAL at 09:06

## 2021-10-25 NOTE — PLAN OF CARE
Pt anxious, restless, talking to self most of morning with angry outbursts and tearful at times. Pt showers, and attends ADL'S without encouragement of assistance. Pt appetite good eating 100% of each meal. Pt pleasant with peers talkative and appropriate.

## 2021-10-25 NOTE — PLAN OF CARE
Problem: Anger Management/Homicidal Ideation:  Goal: Absence of angry outbursts  Description: Absence of angry outbursts  10/24/2021 2242 by Steph Turcios RN  Outcome: Met This Shift  10/24/2021 1320 by Farris Meckel, RN  Outcome: Ongoing  Pt has been calmer and more cooperative than he was previously. He has an irritable edge, but has not been verbally or physically menacing or aggressive. Problem: Altered Mood, Psychotic Behavior:  Goal: Able to verbalize reality based thinking  Description: Able to verbalize reality based thinking  10/24/2021 2242 by Steph Turcios RN  Outcome: Ongoing  10/24/2021 1320 by Farris Meckel, RN  Outcome: Ongoing   Pt still exhibits poor reality testing. He denies AH, yet says he hears \"the voice of God, that's where all voices come from\". He denies SI/HI and was able to leave a calm and appropriate voicemail for his brother.

## 2021-10-26 PROCEDURE — 1240000000 HC EMOTIONAL WELLNESS R&B

## 2021-10-26 PROCEDURE — 6370000000 HC RX 637 (ALT 250 FOR IP): Performed by: PSYCHIATRY & NEUROLOGY

## 2021-10-26 PROCEDURE — 99233 SBSQ HOSP IP/OBS HIGH 50: CPT | Performed by: PSYCHIATRY & NEUROLOGY

## 2021-10-26 RX ORDER — DIPHENHYDRAMINE HCL 25 MG
25 TABLET ORAL EVERY 4 HOURS PRN
Status: DISCONTINUED | OUTPATIENT
Start: 2021-10-26 | End: 2021-10-27 | Stop reason: HOSPADM

## 2021-10-26 RX ADMIN — LORAZEPAM 2 MG: 2 TABLET ORAL at 20:08

## 2021-10-26 RX ADMIN — HYDROXYZINE PAMOATE 50 MG: 50 CAPSULE ORAL at 08:06

## 2021-10-26 RX ADMIN — FLUPHENAZINE HYDROCHLORIDE 10 MG: 10 TABLET, FILM COATED ORAL at 20:04

## 2021-10-26 RX ADMIN — DIPHENHYDRAMINE HCL 25 MG: 25 TABLET ORAL at 20:07

## 2021-10-26 RX ADMIN — POLYETHYLENE GLYCOL (3350) 17 G: 17 POWDER, FOR SOLUTION ORAL at 17:27

## 2021-10-26 RX ADMIN — LORAZEPAM 2 MG: 2 TABLET ORAL at 08:06

## 2021-10-26 NOTE — PROGRESS NOTES
30.0-34. 9)    Tardive dyskinesia  Resolved Problems:    * No resolved hospital problems. *     PLAN:  1. Admitted to inpatient psychiatry for stabilization and treatment. 2.  On admission, ordered q.15-minute checks for safety, programming, and  p.r.n. medication for anxiety, agitation and insomnia. 10/16 - Maintena 400mg IM given. 10/19 - Continues to decline suggestions of po medication including for TD  10/20 - consider PO medication with IM backup to supplement Abilify. 10/21 - Zyprexa 10mg QHS PO/IM to supplement Maintena for acute stabiliation. 10/23 - discontinued scheduled Zyprexa. Started Prolixin 10mg PO/IM to supplement Maintena for acute stabilization. 3.  Internal Medicine consult for admission. HLD  - cont fish oil  - hold statin as pt is intolerant to most statins and Livalo is not on formulary - pt may have someone bring in his Livalo if he would like to take it during his admission    4. Collateral information obtained.      ESTIMATED LENGTH OF STAY:  10-15 days. The patient is voluntary by Guardian. Total time with patient was 35 minutes and more than 50 % of that time was spent counseling the patient on their symptoms, treatment, and expected goals.      Emeka Justice MD

## 2021-10-26 NOTE — CARE COORDINATION
[facility]  Treatment Team Note  Review Date & Time: 10/26/21 0910                             Patient was not in treatment team        Status EXAM:   Status and Exam  Normal: No  Facial Expression: Brightened, Sad  Affect: Unstable  Level of Consciousness: Alert  Mood:Normal: No  Mood: Depressed, Anxious, Labile  Motor Activity:Normal: Yes  Motor Activity: Decreased  Interview Behavior: Evasive  Preception: [place] to Person, [place] to Time, [place] to Place  Attention:Normal: Yes  Thought Content:Normal: Yes  Hallucinations: None  Delusions: No  Memory:Normal: Yes  Insight and Judgment: No  Insight and Judgment: Poor Judgment, Poor Insight  Present Suicidal Ideation: No  Present Homicidal Ideation: No        Suicide Risk CSSR-S:  1) Within the past month, have you wished you were dead or wished you could go to sleep and not wake up? : No  2) Have you actually had any thoughts of killing yourself? : No  6) Have you ever done anything, started to do anything, or prepared to do anything to end your life?: No        PLAN/TREATMENT RECOMMENDATIONS UPDATE: Patient will take medication as prescribed, eat 75% of meals, attend groups, participate in milieu activities, participate in treatment team and care planning for discharge and follow up.

## 2021-10-27 VITALS
HEART RATE: 77 BPM | DIASTOLIC BLOOD PRESSURE: 93 MMHG | TEMPERATURE: 98.4 F | HEIGHT: 73 IN | OXYGEN SATURATION: 94 % | WEIGHT: 240 LBS | SYSTOLIC BLOOD PRESSURE: 136 MMHG | BODY MASS INDEX: 31.81 KG/M2 | RESPIRATION RATE: 20 BRPM

## 2021-10-27 PROCEDURE — 5130000000 HC BRIDGE APPOINTMENT

## 2021-10-27 PROCEDURE — 6370000000 HC RX 637 (ALT 250 FOR IP): Performed by: PSYCHIATRY & NEUROLOGY

## 2021-10-27 PROCEDURE — 6360000002 HC RX W HCPCS: Performed by: PSYCHIATRY & NEUROLOGY

## 2021-10-27 PROCEDURE — 90686 IIV4 VACC NO PRSV 0.5 ML IM: CPT | Performed by: PSYCHIATRY & NEUROLOGY

## 2021-10-27 PROCEDURE — G0008 ADMIN INFLUENZA VIRUS VAC: HCPCS | Performed by: PSYCHIATRY & NEUROLOGY

## 2021-10-27 PROCEDURE — 99239 HOSP IP/OBS DSCHRG MGMT >30: CPT | Performed by: PSYCHIATRY & NEUROLOGY

## 2021-10-27 RX ORDER — LORAZEPAM 2 MG/1
2 TABLET ORAL 2 TIMES DAILY PRN
Qty: 15 TABLET | Refills: 0 | Status: SHIPPED | OUTPATIENT
Start: 2021-10-27 | End: 2021-11-11

## 2021-10-27 RX ORDER — FLUPHENAZINE HYDROCHLORIDE 10 MG/1
10 TABLET ORAL NIGHTLY
Qty: 30 TABLET | Refills: 0 | Status: ON HOLD | OUTPATIENT
Start: 2021-10-27 | End: 2022-03-03 | Stop reason: HOSPADM

## 2021-10-27 RX ORDER — ARIPIPRAZOLE 400 MG
400 KIT INTRAMUSCULAR ONCE
Qty: 1 EACH | Refills: 2 | Status: ON HOLD | OUTPATIENT
Start: 2021-11-13 | End: 2022-03-03 | Stop reason: HOSPADM

## 2021-10-27 RX ADMIN — DIPHENHYDRAMINE HCL 25 MG: 25 TABLET ORAL at 07:58

## 2021-10-27 RX ADMIN — LORAZEPAM 2 MG: 2 TABLET ORAL at 07:58

## 2021-10-27 RX ADMIN — INFLUENZA A VIRUS A/VICTORIA/2570/2019 IVR-215 (H1N1) ANTIGEN (PROPIOLACTONE INACTIVATED), INFLUENZA A VIRUS A/CAMBODIA/E0826360/2020 IVR-224 (H3N2) ANTIGEN (PROPIOLACTONE INACTIVATED), INFLUENZA B VIRUS B/VICTORIA/705/2018 BVR-11 ANTIGEN (PROPIOLACTONE INACTIVATED), INFLUENZA B VIRUS B/PHUKET/3073/2013 BVR-1B ANTIGEN (PROPIOLACTONE INACTIVATED) 0.5 ML: 15; 15; 15; 15 INJECTION, SUSPENSION INTRAMUSCULAR at 13:32

## 2021-10-27 NOTE — BH NOTE
Purposeful Rounding    Patient Location: Day room    Patient willing to engage in conversation: Yes    Presentation/behavior: Anxious, Disruptive and Cooperative    Affect: Labile, irritable and Anxious    Concerns reported: acts fearful of a select peer    PRN medications given: Ativan and Benadryl    Environmental assessment: Room free from clutter, Clear path to bathroom , Adequate lighting and No safety hazards noted    Fall prevention interventions in place: Lighting appropriate, Room free of clutter and Clear path to bathroom    Daily Tabitha Fall Risk Score: 79    Daily Lux Fall Risk Score: 0      Electronically signed by Madalynn Hodgkin, RN on 10/27/21 at 4:40 AM EDT

## 2021-10-27 NOTE — BH NOTE
Purposeful Rounding    Patient Location: Patient room    Patient willing to engage in conversation: No    Presentation/behavior: Other asleep*    Affect: Neutral/Euthymic(normal)    Concerns reported: none    PRN medications given: none    Environmental assessment: Room free from clutter, Clear path to bathroom , Adequate lighting and No safety hazards noted    Fall prevention interventions in place: Lighting appropriate, Room free of clutter and Clear path to bathroom    Daily Tabitha Fall Risk Score: 79    Daily Lux Fall Risk Score: 0      Electronically signed by Janene Shen RN on 10/27/21 at 4:44 AM EDT

## 2021-10-27 NOTE — BH NOTE
[facility]  Discharge Note    Pt discharged with followings belongings:   Dentures: None  Vision - Corrective Lenses: Glasses  Hearing Aid: None  Jewelry: None  Clothing: Footwear, Shirt, Undergarments (Comment)  Were All Patient Medications Collected?: Not Applicable  Other Valuables: None   Valuables sent home with patient or returned to patient. Patient education on aftercare instructions: yes. Information faxed to [employer]  by this writer  at 2:00 PM .Patient verbalize understanding of AVS:  yes.     Status EXAM upon discharge:  Status and Exam  Normal: Yes  Facial Expression: Exaggerated, Hostile  Affect: Appropriate  Level of Consciousness: Alert  Mood:Normal: Yes  Mood: Anxious, Labile  Motor Activity:Normal: Yes  Motor Activity: Decreased  Interview Behavior: Cooperative  Preception: [place] to Person, [place]  to Time, [place] to Place  Attention:Normal: Yes  Attention: Distractible  Thought Processes: Perseveration  Thought Content:Normal: No  Thought Content: Paranoia  Hallucinations: None  Delusions: No  Delusions: Persecution, Obsessions  Memory:Normal: No  Memory: Confabulation  Insight and Judgment: No  Insight and Judgment: Poor Judgment, Poor Insight  Present Suicidal Ideation: No  Present Homicidal Ideation: No      Metabolic Screening:    Lab Results   Component Value Date    LABA1C 5.3 10/15/2021       Lab Results   Component Value Date    CHOL 155 10/15/2021    CHOL 162 07/29/2020    CHOL 209 (H) 06/05/2019    CHOL 193 02/27/2019    CHOL 255 (H) 06/01/2018    CHOL 215 (H) 03/19/2018    CHOL 148 08/31/2017     Lab Results   Component Value Date    TRIG 82 10/15/2021    TRIG 135 07/29/2020    TRIG 239 (H) 06/05/2019    TRIG 181 (H) 02/27/2019    TRIG 177 (H) 06/01/2018    TRIG 212 (H) 03/19/2018    TRIG 112 08/31/2017     Lab Results   Component Value Date    HDL 42 10/15/2021    HDL 48 07/29/2020    HDL 45 06/05/2019    HDL 46 02/27/2019    HDL 50 06/01/2018    HDL 46 03/19/2018    HDL 50 08/31/2017     No components found for: [facility]  Lab Results   Component Value Date    LABVLDL 16 10/15/2021    LABVLDL 27 07/29/2020    LABVLDL 48 06/05/2019    LABVLDL 36 02/27/2019    LABVLDL 35 06/01/2018    LABVLDL 42 03/19/2018    LABVLDL 22 08/31/2017       Carroll Najera RN    Bridge Appointment completed: Reviewed Discharge Instructions with patient. Patient verbalizes understanding and agreement with the discharge plan using the teachback method.      Referral for Outpatient Tobacco Cessation Counseling, upon discharge (soledad X if applicable and completed):    ( )  Hospital staff assisted patient to call Quit Line or faxed referral                                   during hospitalization                  (X)  Recognizing danger situations (included triggers and roadblocks), if not completed on admission                    (X)  Coping skills (new ways to manage stress, exercise, relaxation techniques, changing routine, distraction), if not completed on admission                                                           (X)  Basic information about quitting (benefits of quitting, techniques in how to quit, available resources, if not completed on admission  ( ) Referral for counseling faxed to [facility]   ( ) Patient refused referral  ( ) Patient refused counseling  ( ) Patient refused smoking cessation medication upon discharge    Vaccinations (soledad X if applicable and completed):  ( ) Patient states already received influenza vaccine elsewhere  ( ) Patient received influenza vaccine during this hospitalization  (X) Patient refused influenza vaccine at this time

## 2021-10-27 NOTE — BH NOTE
Purposeful Rounding    Patient Location: Patient room    Patient willing to engage in conversation: No    Presentation/behavior: Other asleep*    Affect: Neutral/Euthymic(normal)    Concerns reported: none    PRN medications given: none    Environmental assessment: Room free from clutter, Clear path to bathroom , Adequate lighting and No safety hazards noted    Fall prevention interventions in place: Lighting appropriate, Room free of clutter and Clear path to bathroom    Daily Tabitha Fall Risk Score: 79    Daily Lux Fall Risk Score: 0      Electronically signed by Caridad Fonseca RN on 10/27/21 at 4:47 AM EDT

## 2021-10-27 NOTE — GROUP NOTE
Group Therapy Note    Date: 10/27/2021    Group Start Time: 1000  Group End Time: 5400  Group Topic: Psychoeducation    [address] [facility],[address], [place]        Group Therapy Note    Attendees: 9    Participants learned about \"How to feel your feelings. \"       Notes:  Betsy Monk attended group today and was engaged and participated throughout. Status After Intervention:  Improved    Participation Level:  Active Listener and Interactive    Participation Quality: Attentive, Sharing and Intrusive      Speech:  loud      Thought Process/Content: Linear      Affective Functioning: Congruent      Mood: euthymic      Level of consciousness:  Alert and Attentive      Response to Learning: Able to verbalize/acknowledge new learning and Able to retain information      Endings: None Reported    Modes of Intervention: Education, Support, Socialization and Activity      Discipline Responsible: /Counselor      Signature:  JASEN Schulte

## 2021-10-27 NOTE — PLAN OF CARE
Stephen Zhu  was visible on the unit early in the evening. He and another pt were aggravated with one another. Peer was upset because Stephen Zhu was rambling and continued to do so after he was asked politely to stop. Stephen Zhu denies SI/HI/AVH. States he is going home in the AM. He is med compliant PO. Requested PRN Ativan 2 mg and Benadryl 25 mg @ 2007. Has been resting well. Will continue to monitor.

## 2021-10-27 NOTE — SUICIDE SAFETY PLAN
SAFETY PLAN    A suicide Safety Plan is a document that supports someone when they are having thoughts of suicide. Warning Signs that indicate a suicidal crisis may be developing: What (situations, thoughts, feelings, body sensations, behaviors, etc.) do you experience that lets you know you are beginning to think about suicide? 1. Mom wont give me the car keys   2. Mean, angry  3. Short tempered    Internal Coping Strategies:  What things can I do (relaxation techniques, hobbies, physical activities, etc.) to take my mind off my problems without contacting another person? 1. Talk to mom  2. Talk to big brother  3. Gym to walk out    People and social settings that provide distraction: Who can I call or where can I go to distract me? 1. Name: Sena Cuadra [phone]  2. Name: Akshat Boogieashlye [phone]  3. Place: walk in the woods [place]  4. Place: play on my computer or [address] whom I can ask for help: Who can I call when I need help - for example, friends, family, clergy, someone else? 1. Name: Volodymyr Worley [phone]  2. Name: Ai Mathur  3. Name: [place] [id number] or [address] [place] [address] I can contact during a crisis: Who can I call for help - for example, my doctor, my psychiatrist, my psychologist, a mental health provider, a suicide hotline? 1. Clinician Name: Volodymyr Worley      Clinician Pager or Emergency Contact #: [phone]    2. Clinician Name: Yazmin Frias      Clinician Pager or Emergency Contact #: [phone]    3. Suicide Prevention Lifeline: 8-076-469-TALK (1997)    4. [address] [place] [address] Emergency Services -  for example, [facility] suicide hotline, [address]      Emergency Services Address: [address], [place]      Emergency Services Phone: [phone]    Making the environment safe: How can I make my environment (house/apartment/living space) safer?  For example, can I remove guns, medications, and other items? 1. Remove my little brother and sister  2.  Put cameras everywhere

## 2021-10-27 NOTE — PROGRESS NOTES
Department of Psychiatry  Progress Note    Patient's chart was reviewed. Discussed with treatment team. Met with patient and spoke with his mother (guardian). SUBJECTIVE:      Continues to improve. Discussed with his guardian (mother) at length. She has noticed improvement too but is worried about him adhering to treatment after discharge. Discussed likely discharge tomorrow of continues to make gains. ROS:   Patient has new complaints: no  Sleeping adequately:  Better   Appetite adequate: Yes  Engaged in programming: some    OBJECTIVE:  VITALS:  BP (!) 136/93   Pulse 77   Temp 98.4 °F (36.9 °C) (Temporal)   Resp 20   Ht 6' 1\" (1.854 m)   Wt 240 lb (108.9 kg)   SpO2 94%   BMI 31.66 kg/m²     Mental Status Examination:    Appearance: poor grooming and hygiene, TD  Behavior/Attitude toward examiner:   fair eye contact; more appropriate with me  Speech: lower volume. Mood:  \"fine\"  Affect: less irritable      Thought processes:  more organized   Thought Content: no SI, no HI, less delusional and paranoid  Perceptions: less RTIS  Attention: attention span and concentration were intact to interview   Abstraction: intact  Cognition:  Alert and oriented to person, place, time, and situation, recall intact  Insight: impaired    Judgment: improving    Medication:  Scheduled:   fluPHENAZine HCl  10 mg Oral Nightly    Or    fluPHENAZine HCl  10 mg IntraMUSCular Nightly        PRN:  diphenhydrAMINE, OLANZapine, artificial tears, acetaminophen, polyethylene glycol, LORazepam, OLANZapine     FORMULATION:  This is a domiciled, never , psychiatrically  disabled 49-year-old with a history of schizophrenia who was brought by  squad to our emergency department with increasing psychotic symptoms in  the setting of treatment nonadherence.   The patient presents severely  delusional and paranoid and requires inpatient stabilization and  treatment.     Principal Problem:    Schizophrenia ([facility] [address]  Active Problems:    Hyperlipidemia    Obesity (BMI 30.0-34. 9)    Tardive dyskinesia  Resolved Problems:    * No resolved hospital problems. *     PLAN:  1. Admitted to inpatient psychiatry for stabilization and treatment. 2.  On admission, ordered q.15-minute checks for safety, programming, and  p.r.n. medication for anxiety, agitation and insomnia. 10/16 - Maintena 400mg IM given. 10/19 - Continues to decline suggestions of po medication including for TD  10/20 - consider PO medication with IM backup to supplement Abilify. 10/21 - Zyprexa 10mg QHS PO/IM to supplement Maintena for acute stabiliation. 10/23 - discontinued scheduled Zyprexa. Started Prolixin 10mg PO/IM to supplement Maintena for acute stabilization. 3.  Internal Medicine consult for admission. HLD  - cont fish oil  - hold statin as pt is intolerant to most statins and Livalo is not on formulary - pt may have someone bring in his Livalo if he would like to take it during his admission    4. Collateral information obtained.      ESTIMATED LENGTH OF STAY:  10-15 days. The patient is voluntary by Guardian. [employer] DC tomorrow. Total time with patient was 35 minutes and more than 50 % of that time was spent counseling the patient on their symptoms, treatment, and expected goals.      Rossy Anand MD

## 2021-11-12 ENCOUNTER — HOSPITAL ENCOUNTER (OUTPATIENT)
Dept: NURSING | Age: 61
Setting detail: INFUSION SERIES
End: 2021-11-12
Payer: COMMERCIAL

## 2022-02-26 ENCOUNTER — HOSPITAL ENCOUNTER (INPATIENT)
Age: 62
LOS: 4 days | Discharge: HOME | DRG: 750 | End: 2022-03-03
Attending: STUDENT IN AN ORGANIZED HEALTH CARE EDUCATION/TRAINING PROGRAM | Admitting: PSYCHIATRY & NEUROLOGY
Payer: COMMERCIAL

## 2022-02-26 DIAGNOSIS — F20.9 SCHIZOPHRENIA, UNSPECIFIED TYPE (HCC): ICD-10-CM

## 2022-02-26 DIAGNOSIS — E87.6 HYPOKALEMIA: ICD-10-CM

## 2022-02-26 DIAGNOSIS — Z91.199 MEDICALLY NONCOMPLIANT: ICD-10-CM

## 2022-02-26 DIAGNOSIS — R46.89 AGGRESSIVE BEHAVIOR, ADULT: Primary | ICD-10-CM

## 2022-02-26 PROCEDURE — 99284 EMERGENCY DEPT VISIT MOD MDM: CPT

## 2022-02-26 PROCEDURE — 96372 THER/PROPH/DIAG INJ SC/IM: CPT

## 2022-02-26 PROCEDURE — 96365 THER/PROPH/DIAG IV INF INIT: CPT

## 2022-02-26 PROCEDURE — 6360000002 HC RX W HCPCS

## 2022-02-26 PROCEDURE — 96375 TX/PRO/DX INJ NEW DRUG ADDON: CPT

## 2022-02-26 PROCEDURE — 6360000002 HC RX W HCPCS: Performed by: EMERGENCY MEDICINE

## 2022-02-26 RX ORDER — CHLORTHALIDONE 25 MG/1
TABLET ORAL
COMMUNITY
Start: 2022-01-27 | End: 2022-08-15

## 2022-02-26 RX ORDER — DIPHENHYDRAMINE HYDROCHLORIDE 50 MG/ML
25 INJECTION INTRAMUSCULAR; INTRAVENOUS ONCE
Status: COMPLETED | OUTPATIENT
Start: 2022-02-26 | End: 2022-02-26

## 2022-02-26 RX ORDER — LORAZEPAM 2 MG/ML
2 INJECTION INTRAMUSCULAR ONCE
Status: COMPLETED | OUTPATIENT
Start: 2022-02-26 | End: 2022-02-26

## 2022-02-26 RX ORDER — CETIRIZINE HYDROCHLORIDE, PSEUDOEPHEDRINE HYDROCHLORIDE 5; 120 MG/1; MG/1
TABLET, FILM COATED, EXTENDED RELEASE ORAL
COMMUNITY
Start: 2022-02-24 | End: 2022-08-15

## 2022-02-26 RX ORDER — FLUTICASONE PROPIONATE 50 MCG
SPRAY, SUSPENSION (ML) NASAL
COMMUNITY
Start: 2022-02-22

## 2022-02-26 RX ORDER — LORAZEPAM 2 MG/ML
2 INJECTION INTRAMUSCULAR ONCE
Status: COMPLETED | OUTPATIENT
Start: 2022-02-27 | End: 2022-02-26

## 2022-02-26 RX ORDER — LORAZEPAM 2 MG/ML
INJECTION INTRAMUSCULAR
Status: COMPLETED
Start: 2022-02-26 | End: 2022-02-26

## 2022-02-26 RX ORDER — HALOPERIDOL 5 MG/ML
5 INJECTION INTRAMUSCULAR ONCE
Status: COMPLETED | OUTPATIENT
Start: 2022-02-26 | End: 2022-02-26

## 2022-02-26 RX ADMIN — LORAZEPAM 2 MG: 2 INJECTION INTRAMUSCULAR at 23:47

## 2022-02-26 RX ADMIN — LORAZEPAM 2 MG: 2 INJECTION INTRAMUSCULAR; INTRAVENOUS at 22:53

## 2022-02-26 RX ADMIN — DIPHENHYDRAMINE HYDROCHLORIDE 25 MG: 50 INJECTION, SOLUTION INTRAMUSCULAR; INTRAVENOUS at 22:53

## 2022-02-26 RX ADMIN — LORAZEPAM 2 MG: 2 INJECTION INTRAMUSCULAR; INTRAVENOUS at 23:47

## 2022-02-26 RX ADMIN — HALOPERIDOL LACTATE 5 MG: 5 INJECTION, SOLUTION INTRAMUSCULAR at 22:53

## 2022-02-27 PROBLEM — R09.81 SINUS CONGESTION: Status: ACTIVE | Noted: 2022-02-27

## 2022-02-27 PROBLEM — E87.6 HYPOKALEMIA: Status: ACTIVE | Noted: 2022-02-27

## 2022-02-27 LAB
A/G RATIO: 1.7 (ref 1.1–2.2)
ACETAMINOPHEN LEVEL: <5 UG/ML (ref 10–30)
ALBUMIN SERPL-MCNC: 4 G/DL (ref 3.4–5)
ALP BLD-CCNC: 58 U/L (ref 40–129)
ALT SERPL-CCNC: 35 U/L (ref 10–40)
AMPHETAMINE SCREEN, URINE: POSITIVE
ANION GAP SERPL CALCULATED.3IONS-SCNC: 11 MMOL/L (ref 3–16)
ANION GAP SERPL CALCULATED.3IONS-SCNC: 11 MMOL/L (ref 3–16)
AST SERPL-CCNC: 51 U/L (ref 15–37)
BARBITURATE SCREEN URINE: ABNORMAL
BASOPHILS ABSOLUTE: 0 K/UL (ref 0–0.2)
BASOPHILS RELATIVE PERCENT: 0.6 %
BENZODIAZEPINE SCREEN, URINE: ABNORMAL
BILIRUB SERPL-MCNC: 0.5 MG/DL (ref 0–1)
BILIRUBIN URINE: NEGATIVE
BLOOD, URINE: NEGATIVE
BUN BLDV-MCNC: 15 MG/DL (ref 7–20)
BUN BLDV-MCNC: 17 MG/DL (ref 7–20)
CALCIUM SERPL-MCNC: 9 MG/DL (ref 8.3–10.6)
CALCIUM SERPL-MCNC: 9.1 MG/DL (ref 8.3–10.6)
CANNABINOID SCREEN URINE: ABNORMAL
CHLORIDE BLD-SCNC: 100 MMOL/L (ref 99–110)
CHLORIDE BLD-SCNC: 102 MMOL/L (ref 99–110)
CLARITY: CLEAR
CO2: 26 MMOL/L (ref 21–32)
CO2: 27 MMOL/L (ref 21–32)
COCAINE METABOLITE SCREEN URINE: ABNORMAL
COLOR: YELLOW
CREAT SERPL-MCNC: 0.8 MG/DL (ref 0.8–1.3)
CREAT SERPL-MCNC: 1 MG/DL (ref 0.8–1.3)
EOSINOPHILS ABSOLUTE: 0.1 K/UL (ref 0–0.6)
EOSINOPHILS RELATIVE PERCENT: 1.4 %
ETHANOL: NORMAL MG/DL (ref 0–0.08)
GFR AFRICAN AMERICAN: >60
GFR AFRICAN AMERICAN: >60
GFR NON-AFRICAN AMERICAN: >60
GFR NON-AFRICAN AMERICAN: >60
GLUCOSE BLD-MCNC: 108 MG/DL (ref 70–99)
GLUCOSE BLD-MCNC: 93 MG/DL (ref 70–99)
GLUCOSE URINE: NEGATIVE MG/DL
HCT VFR BLD CALC: 47.7 % (ref 40.5–52.5)
HEMOGLOBIN: 16.2 G/DL (ref 13.5–17.5)
INFLUENZA A: NOT DETECTED
INFLUENZA B: NOT DETECTED
KETONES, URINE: 15 MG/DL
LEUKOCYTE ESTERASE, URINE: NEGATIVE
LYMPHOCYTES ABSOLUTE: 0.9 K/UL (ref 1–5.1)
LYMPHOCYTES RELATIVE PERCENT: 11.6 %
Lab: ABNORMAL
MAGNESIUM: 2.2 MG/DL (ref 1.8–2.4)
MCH RBC QN AUTO: 30.2 PG (ref 26–34)
MCHC RBC AUTO-ENTMCNC: 34 G/DL (ref 31–36)
MCV RBC AUTO: 88.9 FL (ref 80–100)
METHADONE SCREEN, URINE: ABNORMAL
MICROSCOPIC EXAMINATION: ABNORMAL
MONOCYTES ABSOLUTE: 0.6 K/UL (ref 0–1.3)
MONOCYTES RELATIVE PERCENT: 7.9 %
NEUTROPHILS ABSOLUTE: 5.9 K/UL (ref 1.7–7.7)
NEUTROPHILS RELATIVE PERCENT: 78.5 %
NITRITE, URINE: NEGATIVE
OPIATE SCREEN URINE: ABNORMAL
OXYCODONE URINE: ABNORMAL
PDW BLD-RTO: 13.8 % (ref 12.4–15.4)
PH UA: 7
PH UA: 7 (ref 5–8)
PHENCYCLIDINE SCREEN URINE: ABNORMAL
PLATELET # BLD: 230 K/UL (ref 135–450)
PMV BLD AUTO: 7.7 FL (ref 5–10.5)
POTASSIUM REFLEX MAGNESIUM: 2.9 MMOL/L (ref 3.5–5.1)
POTASSIUM SERPL-SCNC: 3.1 MMOL/L (ref 3.5–5.1)
PROPOXYPHENE SCREEN: ABNORMAL
PROTEIN UA: NEGATIVE MG/DL
RBC # BLD: 5.37 M/UL (ref 4.2–5.9)
SALICYLATE, SERUM: <0.3 MG/DL (ref 15–30)
SARS-COV-2 RNA, RT PCR: NOT DETECTED
SODIUM BLD-SCNC: 138 MMOL/L (ref 136–145)
SODIUM BLD-SCNC: 139 MMOL/L (ref 136–145)
SPECIFIC GRAVITY UA: 1.02 (ref 1–1.03)
TOTAL PROTEIN: 6.4 G/DL (ref 6.4–8.2)
TSH SERPL DL<=0.05 MIU/L-ACNC: 1.45 UIU/ML (ref 0.27–4.2)
URINE REFLEX TO CULTURE: ABNORMAL
URINE TYPE: ABNORMAL
UROBILINOGEN, URINE: 0.2 E.U./DL
WBC # BLD: 7.5 K/UL (ref 4–11)

## 2022-02-27 PROCEDURE — 83036 HEMOGLOBIN GLYCOSYLATED A1C: CPT

## 2022-02-27 PROCEDURE — 6360000002 HC RX W HCPCS: Performed by: STUDENT IN AN ORGANIZED HEALTH CARE EDUCATION/TRAINING PROGRAM

## 2022-02-27 PROCEDURE — 36415 COLL VENOUS BLD VENIPUNCTURE: CPT

## 2022-02-27 PROCEDURE — 6370000000 HC RX 637 (ALT 250 FOR IP): Performed by: PHYSICIAN ASSISTANT

## 2022-02-27 PROCEDURE — 80061 LIPID PANEL: CPT

## 2022-02-27 PROCEDURE — 80053 COMPREHEN METABOLIC PANEL: CPT

## 2022-02-27 PROCEDURE — 82077 ASSAY SPEC XCP UR&BREATH IA: CPT

## 2022-02-27 PROCEDURE — 1240000000 HC EMOTIONAL WELLNESS R&B

## 2022-02-27 PROCEDURE — 6370000000 HC RX 637 (ALT 250 FOR IP): Performed by: PSYCHIATRY & NEUROLOGY

## 2022-02-27 PROCEDURE — 84443 ASSAY THYROID STIM HORMONE: CPT

## 2022-02-27 PROCEDURE — 87636 SARSCOV2 & INF A&B AMP PRB: CPT

## 2022-02-27 PROCEDURE — 99221 1ST HOSP IP/OBS SF/LOW 40: CPT | Performed by: PHYSICIAN ASSISTANT

## 2022-02-27 PROCEDURE — 6360000002 HC RX W HCPCS: Performed by: PSYCHIATRY & NEUROLOGY

## 2022-02-27 PROCEDURE — 80307 DRUG TEST PRSMV CHEM ANLYZR: CPT

## 2022-02-27 PROCEDURE — 83735 ASSAY OF MAGNESIUM: CPT

## 2022-02-27 PROCEDURE — 81003 URINALYSIS AUTO W/O SCOPE: CPT

## 2022-02-27 PROCEDURE — 80179 DRUG ASSAY SALICYLATE: CPT

## 2022-02-27 PROCEDURE — 80143 DRUG ASSAY ACETAMINOPHEN: CPT

## 2022-02-27 PROCEDURE — 85025 COMPLETE CBC W/AUTO DIFF WBC: CPT

## 2022-02-27 RX ORDER — MAGNESIUM HYDROXIDE/ALUMINUM HYDROXICE/SIMETHICONE 120; 1200; 1200 MG/30ML; MG/30ML; MG/30ML
30 SUSPENSION ORAL EVERY 6 HOURS PRN
Status: DISCONTINUED | OUTPATIENT
Start: 2022-02-27 | End: 2022-03-03 | Stop reason: HOSPADM

## 2022-02-27 RX ORDER — IBUPROFEN 400 MG/1
400 TABLET ORAL EVERY 6 HOURS PRN
Status: DISCONTINUED | OUTPATIENT
Start: 2022-02-27 | End: 2022-02-28

## 2022-02-27 RX ORDER — DIPHENHYDRAMINE HYDROCHLORIDE 50 MG/ML
50 INJECTION INTRAMUSCULAR; INTRAVENOUS EVERY 4 HOURS PRN
Status: DISCONTINUED | OUTPATIENT
Start: 2022-02-27 | End: 2022-02-28

## 2022-02-27 RX ORDER — POLYETHYLENE GLYCOL 3350 17 G
2 POWDER IN PACKET (EA) ORAL
Status: DISCONTINUED | OUTPATIENT
Start: 2022-02-27 | End: 2022-03-03 | Stop reason: HOSPADM

## 2022-02-27 RX ORDER — FLUPHENAZINE HYDROCHLORIDE 10 MG/1
10 TABLET ORAL NIGHTLY
Status: DISCONTINUED | OUTPATIENT
Start: 2022-02-27 | End: 2022-02-28

## 2022-02-27 RX ORDER — OLANZAPINE 10 MG/1
10 TABLET ORAL EVERY 8 HOURS PRN
Status: DISCONTINUED | OUTPATIENT
Start: 2022-02-27 | End: 2022-03-03 | Stop reason: HOSPADM

## 2022-02-27 RX ORDER — TRAZODONE HYDROCHLORIDE 50 MG/1
50 TABLET ORAL NIGHTLY PRN
Status: DISCONTINUED | OUTPATIENT
Start: 2022-02-27 | End: 2022-02-28

## 2022-02-27 RX ORDER — FLUTICASONE PROPIONATE 50 MCG
1 SPRAY, SUSPENSION (ML) NASAL DAILY
Status: DISCONTINUED | OUTPATIENT
Start: 2022-02-27 | End: 2022-03-03 | Stop reason: HOSPADM

## 2022-02-27 RX ORDER — OLANZAPINE 10 MG/1
10 INJECTION, POWDER, LYOPHILIZED, FOR SOLUTION INTRAMUSCULAR EVERY 8 HOURS PRN
Status: DISCONTINUED | OUTPATIENT
Start: 2022-02-27 | End: 2022-03-03 | Stop reason: HOSPADM

## 2022-02-27 RX ORDER — POTASSIUM CHLORIDE 7.45 MG/ML
10 INJECTION INTRAVENOUS
Status: COMPLETED | OUTPATIENT
Start: 2022-02-27 | End: 2022-02-27

## 2022-02-27 RX ORDER — CETIRIZINE HYDROCHLORIDE 10 MG/1
10 TABLET ORAL DAILY
Status: DISCONTINUED | OUTPATIENT
Start: 2022-02-27 | End: 2022-03-01

## 2022-02-27 RX ORDER — HYDROXYZINE PAMOATE 50 MG/1
50 CAPSULE ORAL 3 TIMES DAILY PRN
Status: DISCONTINUED | OUTPATIENT
Start: 2022-02-27 | End: 2022-02-28

## 2022-02-27 RX ORDER — ACETAMINOPHEN 325 MG/1
650 TABLET ORAL EVERY 4 HOURS PRN
Status: DISCONTINUED | OUTPATIENT
Start: 2022-02-27 | End: 2022-02-28

## 2022-02-27 RX ORDER — CETIRIZINE HYDROCHLORIDE, PSEUDOEPHEDRINE HYDROCHLORIDE 5; 120 MG/1; MG/1
1 TABLET, FILM COATED, EXTENDED RELEASE ORAL DAILY
Status: DISCONTINUED | OUTPATIENT
Start: 2022-02-27 | End: 2022-02-27 | Stop reason: CLARIF

## 2022-02-27 RX ORDER — PSEUDOEPHEDRINE HCL 120 MG/1
120 TABLET, FILM COATED, EXTENDED RELEASE ORAL EVERY 12 HOURS PRN
Status: DISCONTINUED | OUTPATIENT
Start: 2022-02-27 | End: 2022-03-01

## 2022-02-27 RX ORDER — MAGNESIUM SULFATE 1 G/100ML
1000 INJECTION INTRAVENOUS ONCE
Status: COMPLETED | OUTPATIENT
Start: 2022-02-27 | End: 2022-02-27

## 2022-02-27 RX ORDER — POTASSIUM CHLORIDE 20 MEQ/1
40 TABLET, EXTENDED RELEASE ORAL 2 TIMES DAILY WITH MEALS
Status: COMPLETED | OUTPATIENT
Start: 2022-02-27 | End: 2022-02-28

## 2022-02-27 RX ADMIN — CETIRIZINE HYDROCHLORIDE 10 MG: 10 TABLET ORAL at 15:33

## 2022-02-27 RX ADMIN — POTASSIUM CHLORIDE 10 MEQ: 7.46 INJECTION, SOLUTION INTRAVENOUS at 05:39

## 2022-02-27 RX ADMIN — ACETAMINOPHEN 650 MG: 325 TABLET ORAL at 19:35

## 2022-02-27 RX ADMIN — MAGNESIUM SULFATE HEPTAHYDRATE 1000 MG: 1 INJECTION, SOLUTION INTRAVENOUS at 02:05

## 2022-02-27 RX ADMIN — FLUTICASONE PROPIONATE 1 SPRAY: 50 SPRAY, METERED NASAL at 12:47

## 2022-02-27 RX ADMIN — POTASSIUM CHLORIDE 40 MEQ: 1500 TABLET, EXTENDED RELEASE ORAL at 18:09

## 2022-02-27 RX ADMIN — POTASSIUM CHLORIDE 10 MEQ: 7.46 INJECTION, SOLUTION INTRAVENOUS at 04:36

## 2022-02-27 RX ADMIN — DIPHENHYDRAMINE HYDROCHLORIDE 50 MG: 50 INJECTION INTRAMUSCULAR; INTRAVENOUS at 20:36

## 2022-02-27 RX ADMIN — POTASSIUM CHLORIDE 10 MEQ: 7.46 INJECTION, SOLUTION INTRAVENOUS at 03:30

## 2022-02-27 RX ADMIN — POTASSIUM CHLORIDE 10 MEQ: 7.46 INJECTION, SOLUTION INTRAVENOUS at 04:30

## 2022-02-27 ASSESSMENT — SLEEP AND FATIGUE QUESTIONNAIRES
SLEEP PATTERN: INSOMNIA
SLEEP PATTERN: UTA
DO YOU HAVE DIFFICULTY SLEEPING: YES
DO YOU USE A SLEEP AID: NO

## 2022-02-27 ASSESSMENT — PAIN SCALES - GENERAL
PAINLEVEL_OUTOF10: 5
PAINLEVEL_OUTOF10: 0

## 2022-02-27 ASSESSMENT — LIFESTYLE VARIABLES: HISTORY_ALCOHOL_USE: NO

## 2022-02-27 NOTE — ED NOTES
Pt cursing at staff and threw water on NP. Pt uncooperative.  New order received     Lisandro Nuñez RN  02/26/22 6211
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
## 2022-02-27 NOTE — PROGRESS NOTES
[facility]  Admission Note     Admission Type:   Admission Type: Voluntary (voluntary from [place] ED)    Reason for admission:  Reason for Admission: agitation and aggressive behavior    PATIENT STRENGTHS:  Strengths: No significant Physical Illness    Patient Strengths and Limitations:  Limitations: Unrealistic self-view    Addictive Behavior:   Addictive Behavior  In the past 3 months, have you felt or has someone told you that you have a problem with:  : None  Do you have a history of Chemical Use?: No  Do you have a history of Alcohol Use?: No  Do you have a history of Street Drug Abuse?: No  Histroy of Prescripton Drug Abuse?: No    Medical Problems:   Past Medical History:   Diagnosis Date    Elevated liver enzymes 9/1/2017    Hypertension     Pure hypercholesterolemia 8/31/2017       Status EXAM:  Status and Exam  Normal: No  Facial Expression: Avoids Gaze,Hostile  Affect: Blunt  Level of Consciousness: Alert  Mood:Normal: No  Mood: Angry  Motor Activity:Normal: Yes  Interview Behavior: Aggressive,Irritable  Preception: [place] to Person (refused to answer)  Attention:Normal: Yes  Thought Content:Normal: No  Hallucinations: Unable to assess  Delusions: No  Memory:Normal: Yes  Insight and Judgment: No  Insight and Judgment: Poor Judgment,Poor Insight  Present Suicidal Ideation: No  Present Homicidal Ideation: No    Tobacco Screening:  Practical Counseling, on admission, soledad X, if applicable and completed (first 3 are required if patient doesn't refuse):            ( )  Recognizing danger situations (included triggers and roadblocks)                    ( )  Coping skills (new ways to manage stress, exercise, relaxation techniques, changing routine, distraction)                                                           ( )  Basic information about quitting (benefits of quitting, techniques in how to quit, available resources  ( ) Referral for counseling faxed to [facility] ( ) Patient refused counseling  ( ) Patient has not smoked in the last 30 days    Metabolic Screening:    Lab Results   Component Value Date    LABA1C 5.3 10/15/2021       Lab Results   Component Value Date    CHOL 155 10/15/2021    CHOL 162 07/29/2020    CHOL 209 (H) 06/05/2019    CHOL 193 02/27/2019    CHOL 255 (H) 06/01/2018    CHOL 215 (H) 03/19/2018    CHOL 148 08/31/2017     Lab Results   Component Value Date    TRIG 82 10/15/2021    TRIG 135 07/29/2020    TRIG 239 (H) 06/05/2019    TRIG 181 (H) 02/27/2019    TRIG 177 (H) 06/01/2018    TRIG 212 (H) 03/19/2018    TRIG 112 08/31/2017     Lab Results   Component Value Date    HDL 42 10/15/2021    HDL 48 07/29/2020    HDL 45 06/05/2019    HDL 46 02/27/2019    HDL 50 06/01/2018    HDL 46 03/19/2018    HDL 50 08/31/2017     No components found for: LDLCAL  Lab Results   Component Value Date    LABVLDL 16 10/15/2021    LABVLDL 27 07/29/2020    LABVLDL 48 06/05/2019    LABVLDL 36 02/27/2019    LABVLDL 35 06/01/2018    LABVLDL 42 03/19/2018    LABVLDL 22 08/31/2017         Body mass index is 33.91 kg/m². BP Readings from Last 2 Encounters:   02/27/22 129/72   10/27/21 (!) 136/93           Pt admitted with followings belongings:  Dental Appliances: None  Vision - Corrective Lenses: Glasses  Hearing Aid: None  Jewelry: None  Clothing: Footwear,Shirt,Pants  Were All Patient Medications Collected?: Not Applicable  Other Valuables: Other (Comment) (drivers license put in the safe)     Patient's home medications were n/a. Patient oriented to surroundings and program expectations and copy of patient rights given. Received admission packet:  yes. Consents reviewed, signed yes but guardian. Refused yes.  Patient verbalize understanding:  no.  Patient education on precautions: yes                   Pablo Barraza RN
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
## 2022-02-27 NOTE — H&P
Hospital Medicine History & Physical      PCP: JOSE Casey    Date of Admission: 2/26/2022    Date of Service: Pt seen/examined on  2/27/2022     Chief Complaint:    Chief Complaint   Patient presents with   [facility]     Pt beligerent and yelling at staff. Pt cursing and uncooperative         History Of Present Illness: The patient is a 59 y.o. male with schizophrenia, HTN, HLD who presented to [facility] ER for combative behavior. Patient was seen and evaluated in the ED by the ED medical provider, patient was medically cleared for admission to [facility] at [facility]. This note serves as an admission medical H&P. Tobacco use: former   ETOH use:  denies   Illicit drug use:  UDS+ amphetamines - he has been taking medications for sinuses at home - zyrtec D on list     Patient c/o nasal congestion   Past Medical History:        Diagnosis Date    Elevated liver enzymes 9/1/2017    Hypertension     Pure hypercholesterolemia 8/31/2017       Past Surgical History:        Procedure Laterality Date    NOSE SURGERY      deviated septum       Medications Prior to Admission:    Prior to Admission medications    Medication Sig Start Date End Date Taking?  Authorizing Provider   ALLERGY RELIEF D 5-120 MG per extended release tablet  2/24/22   Historical Provider, MD   chlorthalidone (HYGROTON) 25 MG tablet  1/27/22   Historical Provider, MD   fluticasone [facility]) 50 MCG/ACT nasal spray  2/22/22   Historical Provider, MD   ARIPiprazole ER (ABILIFY MAINTENA) 400 MG SRER Inject 400 mg into the muscle once for 1 dose NEXT DOSE DUE 11/13 11/13/21 11/13/21  Marcia Vines MD   fluPHENAZine HCl (PROLIXIN) 10 MG tablet Take 1 tablet by mouth nightly 10/27/21 11/26/21  Marcia Vines MD   LIVALO 2 MG TABS tablet TAKE 1/2 TABLET BY MOUTH AT BEDTIME  10/6/20   Chip Philip MD       Allergies:  Clozaril [clozapine], Crestor [rosuvastatin], Haldol [haloperidol], Invega [paliperidone er], Klonopin [clonazepam], Lipitor [atorvastatin], Mevacor [lovastatin], Olanzapine, Rexulti [brexpiprazole], Simvastatin, Thiothixene, and Vraylar [cariprazine hcl]    Social History:  The patient currently lives at home with mother     TOBACCO:   reports that he quit smoking about 42 years ago. His smoking use included cigarettes. He has a 0.40 pack-year smoking history. He has never used smokeless tobacco.  ETOH:   reports no history of alcohol use. Family History:   Positive as follows:        Problem Relation Age of Onset    Hypertension Mother     Heart Failure Mother 80    Prostate Cancer Father 52    Cancer Father        REVIEW OF SYSTEMS:       Constitutional: Negative for fever   HENT: Negative for sore throat   + nasal and sinus congestion   Eyes: Negative for redness   Respiratory: Negative  for dyspnea, cough   Cardiovascular: Negative for chest pain   Gastrointestinal: Negative for vomiting, diarrhea   Genitourinary: Negative for hematuria   Musculoskeletal: Negative for arthralgias   Skin: Negative for rash   Neurological: Negative for syncope    Hematological: Negative for easy bruising/bleeding   Psychiatric/Behavorial: Per psychiatry team evaluation     PHYSICAL EXAM:    /72   Pulse 80   Temp 97.5 °F (36.4 °C) (Tympanic)   Resp 18   Ht 6' (1.829 m)   Wt 250 lb (113.4 kg)   SpO2 95%   BMI 33.91 kg/m²     Gen: No distress. Alert. Eyes: PERRL. No sclera icterus. No conjunctival injection. ENT: No discharge. Pharynx clear. +rhinorrhea   Neck: No JVD. Trachea midline. Resp: No accessory muscle use. No crackles. No wheezes. No rhonchi. CV: Regular rate. Regular rhythm. No murmur. No rub. No edema. GI: Non-tender. Non-distended. Normal bowel sounds. Skin: Warm and dry. No nodule on exposed extremities. No rash on exposed extremities. M/S: No cyanosis. No joint deformity. No clubbing. Neuro: Awake. No focal neurologic deficit on exam.  Cranial nerves II through XII intact. Psych: Per psychiatry team evaluation     CBC:   Recent Labs     02/27/22 0045   WBC 7.5   HGB 16.2   HCT 47.7   MCV 88.9        BMP:   Recent Labs     02/27/22  0045 02/27/22  0929    139   K 2.9* 3.1*    102   CO2 27 26   BUN 17 15   CREATININE 1.0 0.8     LIVER PROFILE:   Recent Labs     02/27/22 0045   AST 51*   ALT 35   BILITOT 0.5   ALKPHOS 58     PT/INR: No results for input(s): PROTIME, INR in the last 72 hours. APTT: No results for input(s): APTT in the last 72 hours.   UA:  Recent Labs     02/27/22  0601   COLORU Yellow   PHUR 7.0  7.0   CLARITYU Clear   SPECGRAV 1.020   LEUKOCYTESUR Negative   UROBILINOGEN 0.2   BILIRUBINUR Negative   BLOODU Negative   GLUCOSEU Negative          U/A:    Lab Results   Component Value Date    COLORU Yellow 02/27/2022    WBCUA 0-2 10/01/2017    RBCUA 0-2 10/01/2017    MUCUS 3+ 10/01/2017    BACTERIA Rare 10/01/2017    CLARITYU Clear 02/27/2022    SPECGRAV 1.020 02/27/2022    LEUKOCYTESUR Negative 02/27/2022    BLOODU Negative 02/27/2022    GLUCOSEU Negative 02/27/2022         EKG:  I have reviewed the EKG with the following interpretation:   none    RADIOLOGY  none    ASSESSMENT/PLAN:  #Schizophrenia   - per psychiatry team    #Hypokalemia   - diuretic not reordered  - replacement given  - repeat BMP 2/28    #HTN  - BP stable, chlorthalidone on med list not reordered    #HLD  - per chart review  - no statin on med list    #Sinus congestion  - zyrtec and flonase    Jordan Miller PA-C   2/27/2022 12:15 PM

## 2022-02-27 NOTE — ED NOTES
Pt asleep now. resp easy and even. No distress.       Charo LandryPunxsutawney Area Hospital  02/27/22 [id number]
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
## 2022-02-27 NOTE — ED PROVIDER NOTES
[facility]        Pt Name: Mickey Little  MRN: [id number]  [place] 1960  Date of evaluation: 2/26/2022  Provider: SAULO Whitt - CNP  PCP: JOSE Fuchs  ED Attending: No att. providers found    [address] [place] [address]       Chief Complaint   Patient presents with   [facility]     Pt beligerent and yelling at staff. Pt cursing and uncooperative       HISTORY OF PRESENT ILLNESS   (Location/Symptom, Timing/Onset, Context/Setting, Quality, Duration, Modifying Factors, Severity)  Note limiting factors. Mickey Little is a 59 y.o. male for aggressive behavior. Onset was today. Context includes patient was brought in by police EMS and level crisis for the patient being violent and uncooperative. Alleviating factors include nothing. Aggravating factors include nothing nothing. Pain is 0/10. nothing has been used for pain today. Nursing Notes were all reviewed and agreed with or any disagreements were addressed  in the HPI. REVIEW OF SYSTEMS  (2-9 systems for level 4, 10 or more for level 5)     Review of Systems   Unable to perform ROS: Psychiatric disorder       Positivesand Pertinent negatives as per HPI. Except as noted above in the ROS, all other systems were reviewed and negative.        PAST MEDICAL HISTORY     Past Medical History:   Diagnosis Date    Elevated liver enzymes 9/1/2017    Hypertension     Pure hypercholesterolemia 8/31/2017         SURGICAL HISTORY       Past Surgical History:   Procedure Laterality Date    NOSE SURGERY      deviated septum         CURRENT MEDICATIONS       Current Discharge Medication List      CONTINUE these medications which have NOT CHANGED    Details   ALLERGY RELIEF D 5-120 MG per extended release tablet       chlorthalidone (HYGROTON) 25 MG tablet       fluticasone (FLONASE) 50 MCG/ACT nasal spray       ARIPiprazole ER (ABILIFY MAINTENA) 400 MG SRER Inject 400 mg into the muscle once for 1 dose NEXT DOSE DUE   Qty: 1 each, Refills: 2    Associated Diagnoses: Schizophrenia, unspecified type (HCC)      fluPHENAZine HCl (PROLIXIN) 10 MG tablet Take 1 tablet by mouth nightly  Qty: 30 tablet, Refills: 0      LIVALO 2 MG TABS tablet TAKE 1/2 TABLET BY MOUTH AT BEDTIME   Qty: 45 tablet, Refills: 0    Associated Diagnoses: Pure hypercholesterolemia               ALLERGIES     Clozaril [clozapine], Crestor [rosuvastatin], Haldol [haloperidol], Invega [paliperidone er], Klonopin [clonazepam], Lipitor [atorvastatin], Mevacor [lovastatin], Olanzapine, Rexulti [brexpiprazole], Simvastatin, Thiothixene, and Vraylar [cariprazine hcl]    FAMILY HISTORY       Family History   Problem Relation Age of Onset    Hypertension Mother     Heart Failure Mother 80    Prostate Cancer Father 52    Cancer Father          SOCIAL HISTORY       Social History     Socioeconomic History    Marital status: Unknown     Spouse name: None    Number of children: 1    Years of education: 15    Highest education level: None   Occupational History     Employer: UNEMPLOYED   Tobacco Use    Smoking status: Former Smoker     Packs/day: 0.10     Years: 4.00     Pack years: 0.40     Types: Cigarettes     Quit date: 1980     Years since quittin.1    Smokeless tobacco: Never Used    Tobacco comment: 1 pk per year - social smoker, didn't smoke much at all    Vaping Use    Vaping Use: Never used   Substance and Sexual Activity    Alcohol use: No    Drug use: No     Comment: Pt states he does \"natural stuff\"    Sexual activity: Never   Other Topics Concern    None   Social History Narrative    None     Social Determinants of Health     Financial Resource Strain:     Difficulty of Paying Living Expenses: Not on file   Food Insecurity:     Worried About Running Out of Food in the Last Year: Not on file    Carlos of Food in the Last Year: Not on file   Transportation Needs:     Lack of Transportation (Medical): Not on file    Lack of Transportation (Non-Medical): Not on file   Physical Activity:     Days of Exercise per Week: Not on file    Minutes of Exercise per Session: Not on file   Stress:     Feeling of Stress : Not on file   Social Connections:     Frequency of Communication with Friends and Family: Not on file    Frequency of Social Gatherings with Friends and Family: Not on file    Attends Anabaptist Services: Not on file    Active Member of [address] [place] [address] [employer] or Organizations: Not on file    Attends Club or Organization Meetings: Not on file    Marital Status: Not on file   Intimate Partner Violence:     Fear of Current or Ex-Partner: Not on file    Emotionally Abused: Not on file    Physically Abused: Not on file    Sexually Abused: Not on file   Housing Stability:     Unable to Pay for Housing in the Last Year: Not on file    Number of [place] in the Last Year: Not on file    Unstable Housing in the Last Year: Not on file       SCREENINGS    Isiah Coma Scale  Eye Opening: Spontaneous  Best Verbal Response: Oriented  Best Motor Response: Obeys commands  Isiah Coma Scale Score: 15        PHYSICAL EXAM    (up to 7 for level 4, 8 ormore for level 5)     ED Triage Vitals   BP Temp Temp src Pulse Resp SpO2 Height Weight   -- -- -- -- -- -- -- --       Physical Exam  Constitutional:       Appearance: He is well-developed. HENT:      Head: Normocephalic and atraumatic. Cardiovascular:      Rate and Rhythm: Normal rate. Pulmonary:      Effort: Pulmonary effort is normal. No respiratory distress. Abdominal:      General: There is no distension. Palpations: Abdomen is soft. Musculoskeletal:         General: Normal range of motion. Cervical back: Normal range of motion. Skin:     General: Skin is warm and dry. Neurological:      Mental Status: He is alert and oriented to person, place, and time. Psychiatric:         Mood and Affect: Mood is elated. Affect is angry. Speech: Speech is rapid and pressured. Behavior: Behavior is agitated, aggressive and hyperactive. Thought Content: Thought content does not include homicidal or suicidal ideation. Thought content does not include homicidal or suicidal plan.          DIAGNOSTIC RESULTS   LABS:    Labs Reviewed   CBC WITH AUTO DIFFERENTIAL - Abnormal; Notable for the following components:       Result Value    Lymphocytes Absolute 0.9 (*)     All other components within normal limits    Narrative:     Performed at:  [facility] [address],  [place], [facility]   Phone [phone]   COMPREHENSIVE METABOLIC PANEL W/ REFLEX TO MG FOR LOW K - Abnormal; Notable for the following components:    Potassium reflex Magnesium 2.9 (*)     Glucose 108 (*)     AST 51 (*)     All other components within normal limits    Narrative:     Shubhamshai Orly  SCEJONEL tel. [phone],  Chemistry results called to and read back by julia vick rn, 02/27/2022  01:36, by Perla Young  Performed at:  [place] [address],  [place], [facility]   Phone [phone]   URINALYSIS WITH REFLEX TO CULTURE - Abnormal; Notable for the following components:    Ketones, Urine 15 (*)     All other components within normal limits    Narrative:     Performed at:  [facility]) [facility] [address],  [place], [facility]   Phone [phone]   [address] - Abnormal; Notable for the following components:    Amphetamine Screen, Urine POSITIVE (*)     All other components within normal limits    Narrative:     Performed at:  [facility]) [facility] [address],  [place], [facility]   Phone [phone]   ACETAMINOPHEN LEVEL - Abnormal; Notable for the following components:    Acetaminophen Level <5 (*)     All other components within normal limits    Narrative:     Performed at:  [facility]  [facility] [address],  [place], [facility]   Phone [phone]   SALICYLATE LEVEL - Abnormal; Notable for the following components:    Salicylate, Serum <6.1 (*)     All other components within normal limits    Narrative:     Performed at:  [facility] [address],  [place], [facility]   Phone [phone]   BASIC METABOLIC PANEL - Abnormal; Notable for the following components:    Potassium 3.1 (*)     All other components within normal limits    Narrative:     Performed at:  [facility] [address],  [place], [facility]   Phone [phone]    Narrative:     Performed at:  [place] [address],  [place], [facility]   Phone [phone]   ETHANOL    Narrative:     Performed at:  [place] [address],  [place], [facility]   Phone [phone]   MAGNESIUM    Narrative:     [facility] tel. [phone],  Chemistry results called to and read back by julia vick rn, 02/27/2022  01:36, by Catrachita Artis  Performed at:  [facility] [address],  [place], [facility]   Phone [phone]   TSH    Narrative:     Performed at:  [place] [address],  [place], [facility]   Phone [phone]       All other labs were within normal range or not returned as of this dictation. EKG: All EKG's are interpreted by the Emergency Department Physician who either signs or Co-signs this chart in the absence of a cardiologist.  Please see their note for interpretation of EKG. RADIOLOGY:         Interpretation per the Radiologist below, if available at the time of this note:    No orders to display     No results found.       PROCEDURES   Unless otherwise noted below, none     Procedures    CRITICAL CARE TIME N/A    CONSULTS:  None      EMERGENCY DEPARTMENT COURSE and DIFFERENTIAL DIAGNOSIS/MDM:   Vitals:    Vitals:    02/27/22 0236 02/27/22 0256 02/27/22 0730 02/27/22 0946   BP:   98/61 129/72   Pulse: 73 74  80   Resp: 20 21 16 18   Temp:   98 °F (36.7 °C) 97.5 °F (36.4 °C)   TempSrc:   Oral Tympanic   SpO2:   100% 95%   Weight:    250 lb (113.4 kg)   Height:    6' (1.829 m)       Patient was given the following medications:  Medications   acetaminophen (TYLENOL) tablet 650 mg (has no administration in time range)   ibuprofen (ADVIL;MOTRIN) tablet 400 mg (has no administration in time range)   magnesium hydroxide (MILK OF MAGNESIA) 400 MG/5ML suspension 30 mL (has no administration in time range)   nicotine polacrilex (COMMIT) lozenge 2 mg (has no administration in time range)   aluminum & magnesium hydroxide-simethicone (MAALOX) 200-200-20 MG/5ML suspension 30 mL (has no administration in time range)   hydrOXYzine (VISTARIL) capsule 50 mg (has no administration in time range)   OLANZapine (ZYPREXA) tablet 10 mg (has no administration in time range)     Or   OLANZapine (ZYPREXA) injection 10 mg (has no administration in time range)   sterile water injection 2.1 mL (has no administration in time range)   diphenhydrAMINE (BENADRYL) injection 50 mg (has no administration in time range)   traZODone (DESYREL) tablet 50 mg (has no administration in time range)   fluPHENAZine HCl (PROLIXIN) tablet 10 mg (has no administration in time range)   fluticasone (FLONASE) 50 MCG/ACT nasal spray 1 spray (1 spray Each Nostril Given 2/27/22 1247)   potassium chloride (KLOR-CON M) extended release tablet 40 mEq (has no administration in time range)   pseudoephedrine (SUDAFED 12 HR) extended release tablet 120 mg (has no administration in time range)     And   cetirizine (ZYRTEC) tablet 10 mg (10 mg Oral Given 2/27/22 1533)   LORazepam (ATIVAN) injection 2 mg (2 mg IntraVENous Given 2/26/22 0852)   diphenhydrAMINE (BENADRYL) injection 25 mg (25 mg IntraVENous Given 2/26/22 2253)   haloperidol lactate (HALDOL) injection 5 mg (5 mg IntraMUSCular Given 2/26/22 2253)   LORazepam (ATIVAN) injection 2 mg (2 mg IntraMUSCular Given 2/26/22 5147)   potassium chloride 10 mEq/100 mL IVPB (Peripheral Line) (0 mEq IntraVENous Stopped 2/27/22 4275)   magnesium sulfate 1000 mg in dextrose 5% 100 mL IVPB (0 mg IntraVENous Stopped 2/27/22 0305)       Patient was seen and evaluated by marcelo. Patient here for concerns for agitation and belligerent behavior. Patient has a extensive psychiatric history and is well-known to the behavioral health department and frequently presents to the ED in this manner due to noncompliance with taking his medications. On exam the patient is awake and alert belligerent and yelling. Patient was medicated upon arriving to the ED. On reevaluation the patient is calmer and has been provided with a drink. On a third evaluation the patient through his soda at me. He was subsequently given additional medications. Lab work has yet to be obtained. Dr. Singleton Favorite to follow-up with lab work and final disposition of the patient. The patient tolerated their visit well. They were seen and evaluated by the attending physician, No att. providers found who agreed with the assessment and plan. The patient and / or the family were informed of the results of any tests, a time was given to answer questions, a plan was proposed and they agreed with plan. FINAL IMPRESSION      1. Aggressive behavior, adult    2. Medically noncompliant    3. Hypokalemia          DISPOSITION/PLAN   DISPOSITION        PATIENT REFERRED TO:  No follow-up provider specified.     DISCHARGE MEDICATIONS:  Current Discharge Medication List          DISCONTINUED MEDICATIONS:  Current Discharge Medication List                 (Please note that portions of this note were completed with a voice recognition program.  Efforts were made to edit the dictations but occasionally words are mis-transcribed.)    SAULO Drake CNP (electronically signed)       SAULO Drake CNP  02/27/22 9822

## 2022-02-28 LAB
ANION GAP SERPL CALCULATED.3IONS-SCNC: 10 MMOL/L (ref 3–16)
BUN BLDV-MCNC: 14 MG/DL (ref 7–20)
CALCIUM SERPL-MCNC: 9.1 MG/DL (ref 8.3–10.6)
CHLORIDE BLD-SCNC: 104 MMOL/L (ref 99–110)
CHOLESTEROL, TOTAL: 204 MG/DL (ref 0–199)
CO2: 17 MMOL/L (ref 21–32)
CREAT SERPL-MCNC: 0.9 MG/DL (ref 0.8–1.3)
GFR AFRICAN AMERICAN: >60
GFR NON-AFRICAN AMERICAN: >60
GLUCOSE BLD-MCNC: 93 MG/DL (ref 70–99)
HDLC SERPL-MCNC: 48 MG/DL (ref 40–60)
LDL CHOLESTEROL CALCULATED: 140 MG/DL
SODIUM BLD-SCNC: 131 MMOL/L (ref 136–145)
TRIGL SERPL-MCNC: 81 MG/DL (ref 0–150)
VLDLC SERPL CALC-MCNC: 16 MG/DL

## 2022-02-28 PROCEDURE — 1240000000 HC EMOTIONAL WELLNESS R&B

## 2022-02-28 PROCEDURE — 80048 BASIC METABOLIC PNL TOTAL CA: CPT

## 2022-02-28 PROCEDURE — 6370000000 HC RX 637 (ALT 250 FOR IP): Performed by: PHYSICIAN ASSISTANT

## 2022-02-28 PROCEDURE — 99223 1ST HOSP IP/OBS HIGH 75: CPT | Performed by: PSYCHIATRY & NEUROLOGY

## 2022-02-28 PROCEDURE — 6370000000 HC RX 637 (ALT 250 FOR IP): Performed by: PSYCHIATRY & NEUROLOGY

## 2022-02-28 RX ORDER — DIPHENHYDRAMINE HCL 25 MG
50 TABLET ORAL EVERY 8 HOURS PRN
Status: DISCONTINUED | OUTPATIENT
Start: 2022-02-28 | End: 2022-03-03 | Stop reason: HOSPADM

## 2022-02-28 RX ORDER — LORAZEPAM 2 MG/1
2 TABLET ORAL EVERY 6 HOURS PRN
Status: DISCONTINUED | OUTPATIENT
Start: 2022-02-28 | End: 2022-03-03 | Stop reason: HOSPADM

## 2022-02-28 RX ORDER — ACETAMINOPHEN 325 MG/1
650 TABLET ORAL EVERY 4 HOURS PRN
Status: DISCONTINUED | OUTPATIENT
Start: 2022-02-28 | End: 2022-03-01

## 2022-02-28 RX ADMIN — FLUTICASONE PROPIONATE 1 SPRAY: 50 SPRAY, METERED NASAL at 07:57

## 2022-02-28 RX ADMIN — IBUPROFEN 400 MG: 400 TABLET, FILM COATED ORAL at 05:46

## 2022-02-28 RX ADMIN — POTASSIUM CHLORIDE 40 MEQ: 1500 TABLET, EXTENDED RELEASE ORAL at 07:57

## 2022-02-28 RX ADMIN — CETIRIZINE HYDROCHLORIDE 10 MG: 10 TABLET ORAL at 07:57

## 2022-02-28 ASSESSMENT — PAIN - FUNCTIONAL ASSESSMENT: PAIN_FUNCTIONAL_ASSESSMENT: ACTIVITIES ARE NOT PREVENTED

## 2022-02-28 ASSESSMENT — PAIN DESCRIPTION - FREQUENCY: FREQUENCY: INTERMITTENT

## 2022-02-28 ASSESSMENT — PAIN DESCRIPTION - PROGRESSION: CLINICAL_PROGRESSION: GRADUALLY WORSENING

## 2022-02-28 ASSESSMENT — PAIN DESCRIPTION - LOCATION: LOCATION: KNEE

## 2022-02-28 ASSESSMENT — PAIN SCALES - GENERAL: PAINLEVEL_OUTOF10: 5

## 2022-02-28 ASSESSMENT — PAIN DESCRIPTION - DESCRIPTORS: DESCRIPTORS: ACHING

## 2022-02-28 ASSESSMENT — PAIN DESCRIPTION - ORIENTATION: ORIENTATION: RIGHT;LEFT

## 2022-02-28 ASSESSMENT — PAIN DESCRIPTION - PAIN TYPE: TYPE: ACUTE PAIN

## 2022-02-28 ASSESSMENT — PAIN DESCRIPTION - ONSET: ONSET: GRADUAL

## 2022-02-28 NOTE — PROGRESS NOTES
Patient requested and recieved benadryl 50 IM to right shoulder muscle mass. He said that he needed to slow down that he felt restless. Will monitor effectiveness.

## 2022-02-28 NOTE — PROGRESS NOTES
Purposeful Rounding    Patient Location: Patient room    Patient willing to engage in conversation: No    Presentation/behavior: sleeping    Affect: sleeping    Concerns reported: no    PRN medications given: no    Environmental assessment: Room free from clutter, Clear path to bathroom  and Adequate lighting    Fall prevention interventions in place: Lighting appropriate, Room free of clutter and Clear path to bathroom    Daily [place] Fall Risk Score: 77    Daily Lux Fall Risk Score: 15 low      Electronically signed by Ozie Cranker, RN on 2/28/22 at 12:58 AM EST

## 2022-02-28 NOTE — PLAN OF CARE
Problem: Pain:  Description: Pain management should include both nonpharmacologic and pharmacologic interventions. Goal: Pain level will decrease  Description: Pain level will decrease  Outcome: Ongoing  Goal: Control of acute pain  Description: Control of acute pain  Outcome: Ongoing  Goal: Control of chronic pain  Description: Control of chronic pain  Outcome: Ongoing     Problem: Anxiety:  Goal: Level of anxiety will decrease  Description: Level of anxiety will decrease  Outcome: Ongoing     Problem: Anger Management/Homicidal Ideation:  Goal: Able to display appropriate communication and problem solving  Description: Able to display appropriate communication and problem solving  Outcome: Ongoing  Goal: Absence of angry outbursts  Description: Absence of angry outbursts  Outcome: Ongoing  Goal: Absence of homicidal ideation  Description: Absence of homicidal ideation  Outcome: Ongoing  Goal: Participates in care planning  Description: Participates in care planning  Outcome: Ongoing   Patient denied SI/HI,A/V hallucinations. He refused to take prolixin this evening. He did request and received benadryl 50 mg IM for c/o restlessness, c/o increase in motor activity, with good effect. No angry outbursts this evening. He is noted to be loud and boisterous. Patient able to attend group and participated appropriately. Safety checks continue Q15 minutes through out the shift.

## 2022-02-28 NOTE — PROGRESS NOTES
Purposeful Rounding    Patient Location: Day room    Patient willing to engage in conversation: Yes    Presentation/behavior: Anxious, Guarded/Suspicious and Resistive    Affect: irritable and Restless    Concerns reported: none    PRN medications given: no    Environmental assessment: Room free from clutter, Clear path to bathroom  and Adequate lighting    Fall prevention interventions in place: Lighting appropriate, Room free of clutter and Clear path to bathroom    Daily [place] Fall Risk Score: 77    Daily Lux Fall Risk Score: 15 low      Electronically signed by Carlos Alberto Awad RN on 2/27/22 at 10:31 PM EST

## 2022-02-28 NOTE — PROGRESS NOTES
Purposeful Rounding     Patient Location: Patient room     Patient willing to engage in conversation: No     Presentation/behavior: sleeping     Affect: sleeping     Concerns reported: no     PRN medications given: no     Environmental assessment: Room free from clutter, Clear path to bathroom  and Adequate lighting     Fall prevention interventions in place: Lighting appropriate, Room free of clutter and Clear path to bathroom     Daily [place] Fall Risk Score: 77     Daily Lux Fall Risk Score: 15 low

## 2022-02-28 NOTE — FLOWSHEET NOTE
Group Therapy Note    Date: 2/28/2022  Start Time: 1300  End Time:  1400  Number of Participants: 11    Type of Group: Music Group    Notes:  Pt present for early part of Music Group. Pt participated, while present, by making a song selections. Pt called out by physician after 15 minutes in group, which prompted Pt to begin shouting and swearing at physician while leaving room.     Participation Level: Interactive    Participation Quality: Inappropriate      Speech:  normal and loud      Affective Functioning: Exaggerated      Endings: None Reported    Modes of Intervention: Support, Socialization, Activity and Media      Discipline Responsible: Chaplain Izabela Fairchild       02/28/22 5668   Encounter Summary   Services provided to: Patient   Continue Visiting   (2/28 Music Group)   Complexity of Encounter Moderate   Length of Encounter 15 minutes

## 2022-02-28 NOTE — PLAN OF CARE
Problem: Anger Management/Homicidal Ideation:  Goal: Absence of angry outbursts  Description: Absence of angry outbursts  Outcome: Ongoing  Patients has been loud, irritable, labile and refusing medications. Provider requested writer to obtain collateral from patients mother who is Legal Guardian. Patients mother  [facility] reports patient has been off meds since he last was discharged from [facility]. She also states he did not follow up on getting connected with mental health services that was recommended for patient. [facility] [place] [facility] feels patient was doing \"Okay\" until about a week ago when she noticed him acting bizarre. [facility] states she gives consent to treat and agrees to medication provider Dr. Isadora Richard has ordered Mary William and Iona Major Im injections. Order placed in Epic for medication. Medication is unavailble at this time but is order and expected to arrive tomorrow Tuesday 3/1 or Wednesday 3/2. Will continue to monitor for safety.

## 2022-02-28 NOTE — CARE COORDINATION
[facility]  Treatment Team Note  Day 1    Review Date & Time: 0900  2/28/2022    Patient was not in treatment team      Status EXAM:   Status and Exam  Normal: No  Facial Expression: Elevated,Hostile  Affect: Blunt  Level of Consciousness: Alert  Mood:Normal: No  Mood: Anxious,Irritable,Helpless  Motor Activity:Normal: Yes  Interview Behavior: Cooperative,Evasive,Impulsive,Irritable  Preception: [place] to Person,[place] to Place (not to time or situation)  Attention:Normal: No  Attention: Unable to Concentrate  Thought Processes: Flt.of Ideas  Thought Content:Normal: No  Thought Content: Paranoia  Hallucinations: Unable to assess (patient denied)  Delusions: Yes  Delusions: Grandeur,Persecution  Memory:Normal: No  Memory: Poor Recent  Insight and Judgment: No  Insight and Judgment: Poor Judgment,Poor Insight  Present Suicidal Ideation: No  Present Homicidal Ideation: No      Suicide Risk CSSR-S:  1) Within the past month, have you wished you were dead or wished you could go to sleep and not wake up? : No  2) Have you actually had any thoughts of killing yourself? : No  6) Have you ever done anything, started to do anything, or prepared to do anything to end your life?: No      PLAN/TREATMENT RECOMMENDATIONS UPDATE: Patient will take medication as prescribed, eat 75% of meals, attend groups, participate in milieu activities, participate in treatment team and care planning for discharge and follow up. Patient is a Voluntary admission per Guardian.      Elijah Anthony RN

## 2022-02-28 NOTE — BH NOTE
Call from lab saying blood just drawn had hemolyzed and could not be used for BMP. Pt would not allow the blood to be redrawn. He was loud and cursing. Then pt asked to go to the other side for group. Told him it was not group time and that he couldn't go because he had just been loud and disruptive. Will pass along to day nurse.

## 2022-02-28 NOTE — PROGRESS NOTES
Patient able to attend last group of the evening. He participated appropriately. Medication is noted to be effective.

## 2022-02-28 NOTE — BH NOTE
Patients mother Shakir Jiménez would like to be notified when Piper Dixons and Aristada injections are avalabile and to be given.

## 2022-02-28 NOTE — BH NOTE
Patient threatening to call 911 stating he is not staying at the hospital tonight. Multiple complaints of \"tardive. \" No signs and/or symptoms of TD present at this time.

## 2022-02-28 NOTE — GROUP NOTE
Group Therapy Note    Date: 2/28/2022    Group Start Time: 1000  Group End Time: 0703  Group Topic: [facility], [place]        Group Therapy Note    Music Therapy group consisted of sol analysis and group process on challenging negative perceptions/outlooks. Verbal discussion contained talk of past, present, and future expectations from self and others in support structure. Following verbal processing pts used reflective writing to create a window of life, with each of 4 panes representing a different part: positive past, positive present, positive future self, and positive future expectations from others    Attendees: 9         Notes:  Rosa Mark was initially present in group session, removed from group due to consistently disruptive behaviors while asking repeatedly for a , mentioning \"They'll kill us all here\", and other negative delusional beliefs.     Status After Intervention:  Unchanged    Participation Level: Monopolizing    Participation Quality: Inappropriate      Speech:  pressured and loud      Thought Process/Content: Delusional  Flight of ideas      Affective Functioning: Exaggerated      Mood: elevated      Level of consciousness:  Preoccupied      Response to Learning: Progressing to goal      Endings: None Reported    Modes of Intervention: Education, Support, Exploration, Clarifying, Activity and Media      Discipline Responsible: Psychoeducational Specialist      Signature:  Paulino Nguyen MM, MT-BC

## 2022-03-01 LAB
ESTIMATED AVERAGE GLUCOSE: 102.5 MG/DL
HBA1C MFR BLD: 5.2 %

## 2022-03-01 PROCEDURE — 99233 SBSQ HOSP IP/OBS HIGH 50: CPT | Performed by: PSYCHIATRY & NEUROLOGY

## 2022-03-01 PROCEDURE — 1240000000 HC EMOTIONAL WELLNESS R&B

## 2022-03-01 PROCEDURE — 6370000000 HC RX 637 (ALT 250 FOR IP): Performed by: PSYCHIATRY & NEUROLOGY

## 2022-03-01 RX ORDER — ACETAMINOPHEN 325 MG/1
650 TABLET ORAL EVERY 8 HOURS PRN
Status: DISCONTINUED | OUTPATIENT
Start: 2022-03-01 | End: 2022-03-03 | Stop reason: HOSPADM

## 2022-03-01 RX ORDER — CETIRIZINE HYDROCHLORIDE 10 MG/1
10 TABLET ORAL DAILY
Status: DISCONTINUED | OUTPATIENT
Start: 2022-03-01 | End: 2022-03-03 | Stop reason: HOSPADM

## 2022-03-01 RX ORDER — IBUPROFEN 600 MG/1
600 TABLET ORAL EVERY 8 HOURS PRN
Status: DISCONTINUED | OUTPATIENT
Start: 2022-03-01 | End: 2022-03-03 | Stop reason: HOSPADM

## 2022-03-01 RX ORDER — PSEUDOEPHEDRINE HYDROCHLORIDE 30 MG/1
60 TABLET ORAL EVERY 6 HOURS PRN
Status: DISCONTINUED | OUTPATIENT
Start: 2022-03-01 | End: 2022-03-03 | Stop reason: HOSPADM

## 2022-03-01 RX ADMIN — DIPHENHYDRAMINE HCL 50 MG: 25 TABLET ORAL at 03:44

## 2022-03-01 RX ADMIN — ACETAMINOPHEN 650 MG: 325 TABLET ORAL at 04:36

## 2022-03-01 RX ADMIN — ACETAMINOPHEN 650 MG: 325 TABLET ORAL at 12:20

## 2022-03-01 RX ADMIN — FLUTICASONE PROPIONATE 1 SPRAY: 50 SPRAY, METERED NASAL at 10:01

## 2022-03-01 RX ADMIN — CETIRIZINE HYDROCHLORIDE 10 MG: 10 TABLET ORAL at 10:01

## 2022-03-01 RX ADMIN — PSEUDOEPHEDRINE HCL 60 MG: 30 TABLET, FILM COATED ORAL at 03:18

## 2022-03-01 RX ADMIN — DIPHENHYDRAMINE HCL 50 MG: 25 TABLET ORAL at 12:20

## 2022-03-01 RX ADMIN — DIPHENHYDRAMINE HCL 50 MG: 25 TABLET ORAL at 21:11

## 2022-03-01 RX ADMIN — IBUPROFEN 600 MG: 600 TABLET ORAL at 21:11

## 2022-03-01 RX ADMIN — LORAZEPAM 2 MG: 2 TABLET ORAL at 21:11

## 2022-03-01 RX ADMIN — LORAZEPAM 2 MG: 2 TABLET ORAL at 12:20

## 2022-03-01 RX ADMIN — LORAZEPAM 2 MG: 2 TABLET ORAL at 04:07

## 2022-03-01 ASSESSMENT — PAIN DESCRIPTION - DESCRIPTORS
DESCRIPTORS: ACHING
DESCRIPTORS: ACHING

## 2022-03-01 ASSESSMENT — PAIN DESCRIPTION - FREQUENCY
FREQUENCY: CONTINUOUS
FREQUENCY: CONTINUOUS

## 2022-03-01 ASSESSMENT — PAIN DESCRIPTION - ONSET: ONSET: ON-GOING

## 2022-03-01 ASSESSMENT — PAIN - FUNCTIONAL ASSESSMENT
PAIN_FUNCTIONAL_ASSESSMENT: PREVENTS OR INTERFERES SOME ACTIVE ACTIVITIES AND ADLS
PAIN_FUNCTIONAL_ASSESSMENT: PREVENTS OR INTERFERES SOME ACTIVE ACTIVITIES AND ADLS

## 2022-03-01 ASSESSMENT — PAIN DESCRIPTION - ORIENTATION
ORIENTATION: RIGHT;LEFT
ORIENTATION: LEFT;RIGHT

## 2022-03-01 ASSESSMENT — PAIN DESCRIPTION - PROGRESSION
CLINICAL_PROGRESSION: GRADUALLY WORSENING
CLINICAL_PROGRESSION: GRADUALLY WORSENING

## 2022-03-01 ASSESSMENT — PAIN SCALES - GENERAL
PAINLEVEL_OUTOF10: 6
PAINLEVEL_OUTOF10: 5
PAINLEVEL_OUTOF10: 6
PAINLEVEL_OUTOF10: 6

## 2022-03-01 ASSESSMENT — PAIN DESCRIPTION - LOCATION
LOCATION: KNEE
LOCATION: KNEE

## 2022-03-01 ASSESSMENT — PAIN DESCRIPTION - PAIN TYPE
TYPE: CHRONIC PAIN
TYPE: CHRONIC PAIN

## 2022-03-01 NOTE — GROUP NOTE
Group Therapy Note    Date: 3/1/2022    Group Start Time: 1600  Group End Time: 1630  Group Topic: Healthy Living/Wellness    [address], RN        Group Therapy Note    Attendees: 6/20         Patient's Goal:  To learn about the health benefits of meditation and to participate in a meditation.     Status After Intervention:  Unchanged    Participation Level: Interactive    Participation Quality: Appropriate      Speech:  loud      Thought Process/Content: Linear      Affective Functioning: Exaggerated      Mood: elevated      Level of consciousness:  Alert      Response to Learning: Progressing to goal      Endings: None Reported    Modes of Intervention: Clarifying and Activity      Discipline Responsible: Registered Nurse      Signature:  Marielena Esposito RN

## 2022-03-01 NOTE — PROGRESS NOTES
Patient came to desk requesting medication for increased tardive dyskinesia. Patient noted to be slurring words but was not in any distress. Patient educated on anxiety and how those symptoms could display. Patient stated, \"the medication they gave me overnight worked great, I need Benadryl, Ativan, and Tylenol to help me. \" Patient rates his pain and symptoms are a 6 on a 1-10 scale. Patient given Benadryl, Ativan and Tylenol given per order.   See [facility]

## 2022-03-01 NOTE — GROUP NOTE
Group Therapy Note    Date: 3/1/2022    Group Start Time: 11:00 AM  Group End Time: 11:45 AM  Group Topic: RACHELE Shafer        Group Therapy Note    Attendees: 10       Patient's Goal: Pt will participate in discussion and writing activity on self-compassion.      Notes: Pt was at times disruptive during group and appeared to be RTIS. Pt was redirectable.      Status After Intervention:  Improved    Participation Level: None    Participation Quality: Inappropriate, Intrusive and Resistant      Speech:  loud      Thought Process/Content: Hallucinating      Affective Functioning: Blunted      Mood: elevated      Level of consciousness:  Alert      Response to Learning: Resistant      Endings: None Reported    Modes of Intervention: Socialization, Exploration and Activity      Discipline Responsible: /Counselor      Signature:  RACHELE Cummings

## 2022-03-01 NOTE — PROGRESS NOTES
Patient was back out to the team station at 03:40 & said that he would take the benadryl shot. Patient instructed that it was discontinued & the benadryl pills was ordered. Patient was given benadryl 50 mg po at 03:44. Patient kept saying that he needed 5 benadryl pills instead of the ordered dose of 2 tablets of benadryl 25 mg. Patient was noted with loose & disorganized thoughts. Patient continued to talk loudly at intervals & said his brother was a warlock & his sister-in-law was a witch. Patient was also talking about demons. Patient's speech was high pitch & difficult to understand at intervals.  Winnie Ridley R.N.

## 2022-03-01 NOTE — PLAN OF CARE
Problem: Pain:  Goal: Pain level will decrease  Description: Pain level will decrease  3/1/2022 1503 by Serjio Weber RN  Outcome: Ongoing     Problem: Pain:  Goal: Control of acute pain  Description: Control of acute pain  Outcome: Ongoing     Problem: Pain:  Goal: Control of chronic pain  Description: Control of chronic pain  3/1/2022 1503 by Serjio Weber RN  Outcome: Ongoing     Problem: Anxiety:  Goal: Level of anxiety will decrease  Description: Level of anxiety will decrease  Outcome: Ongoing     Problem: Anger Management/Homicidal Ideation:  Goal: Able to display appropriate communication and problem solving  Description: Able to display appropriate communication and problem solving  Outcome: Ongoing     Problem: Anger Management/Homicidal Ideation:  Goal: Absence of angry outbursts  Description: Absence of angry outbursts  Outcome: Ongoing     Problem: Anger Management/Homicidal Ideation:  Goal: Absence of homicidal ideation  Description: Absence of homicidal ideation  Outcome: Ongoing     Problem: Anger Management/Homicidal Ideation:  Goal: Participates in care planning  Description: Participates in care planning  Outcome: Ongoing     Patient visible on unit. Mostly isolated to self, but did sit in the milieu at times and talked to 1 peer. He attended and participated in group and was appropriate only needing some redirection. He was compliant with scheduled medications and utilized PRN medications of Benadryl, Ativan and Tylenol which he stated was effective. He is alert and oriented x 3, not oriented to situation. Continues to state he does not know why the police brought him in. He did not have any angry outbursts this shift. He was cooperative but evasive during interview. He continues to state that medications cause him to have tardive dyskinesia which requires him to have PRN medications.   He didn't show any signs or symptoms of dyskinesia, but did have increased anxiety at times which seem to calm after utilizing PRN's. He denies SI/HI/AVH,  but does state that God tells him that his brother is a Karlyne Ground and seems to be RTIS at times.

## 2022-03-01 NOTE — PROGRESS NOTES
Patient to the team station at 02:45 with complaint of not being able to breathe. Respirations 16. Patient asked for claritan, for his allergies & was instructed that sudafed was ordered. Pharmacist was notified that sudafed was not in the omnicell. Patient was given sudafed 60 mg po at 03:18. Patient started yelling, cursing & focusing on Dr. Yaniv Sousa & how he was treated in the emergency room. \"I'm still recovering, from what they gave me in the emergency room. \" Patient returned to his room, & was yelling & talking to himself. Patient was cursing at [facility], then later apologized. Patient declined off of ativan, Patient also declined offer benadryl, that patient took the previous night. \"I don't want the god damn benadryl. I can't take ativan. \" Patient was unable to state why he couldn't take ativan.  Jos Ridley R.N.

## 2022-03-01 NOTE — PLAN OF CARE
Patient was given tylenol 650 mg po per request for complaint of bilateral leg pain. Patient initially asked for motrin, but was instructed, that it had been discontinued.  Angelic Ridley R.N.

## 2022-03-01 NOTE — PLAN OF CARE
Patient has been mostly regressed to his room & bed  Patient yelling out at the beginning of the shift. Patient declined 1:1. Patient has appeared to be asleep(resting in bed, his eyes closed & quiet), since 20:15.  Winnie Ridley R.N.

## 2022-03-01 NOTE — FLOWSHEET NOTE
Purposeful Rounding    Patient Location: Nurses station    Patient willing to engage in conversation: Yes    Presentation/behavior: Cooperative    Affect: Flat/blunted    Concerns reported: requesting toothpaste, toothbrush, and mouth wash. Items provided to patient.    PRN medications given: n/a    Environmental assessment: Room free from clutter, Clear path to bathroom  and No safety hazards noted    Fall prevention interventions in place: Yellow non-skid socks on    Daily [place] Fall Risk Score: 73    Daily Lux Fall Risk Score: 15-low      Electronically signed by Leslee Matute RN on 3/1/22 at 11:17 AM EST

## 2022-03-01 NOTE — FLOWSHEET NOTE
Purposeful Rounding    Patient Location: Patient room    Patient willing to engage in conversation: No    Presentation/behavior: Other resting*    Affect: Neutral/Euthymic(normal)    Concerns reported: none, patient noted to be resting, eyes closed, respirations even and esasy    PRN medications given: none    Environmental assessment: Room free from clutter, Clear path to bathroom  and No safety hazards noted    Fall prevention interventions in place: Yellow non-skid socks on    Daily [place] Fall Risk Score: 69    Daily Lux Fall Risk Score: 15-low      Electronically signed by Jo Ann Alonso RN on 3/1/22 at 4:15 PM EST

## 2022-03-01 NOTE — GROUP NOTE
Group Therapy Note    Date: 3/1/2022    Group Start Time: 1300  Group End Time: 1350  Group Topic: Recreational    [id number] [employer]        Group Therapy Note    Attendees: 15    Group members broke up into teams and engaged in multiple games of 300 [employer]. Notes:  Marytedrobertstephen Braswelln attended group for the full duration. Kaia Pennington sat quietly and colored a piece of paper during the group. Status After Intervention:  Improved    Participation Level:  Active Listener    Participation Quality: Appropriate      Speech:  pressured      Thought Process/Content: Linear      Affective Functioning: Congruent      Mood: euthymic      Level of consciousness:  Alert      Response to Learning: Able to verbalize current knowledge/experience      Endings: None Reported    Modes of Intervention: Activity      Discipline Responsible: Behavorial Health Tech      Signature:  SHY Smith
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
## 2022-03-01 NOTE — FLOWSHEET NOTE
Purposeful Rounding    Patient Location: Day room    Patient willing to engage in conversation: Yes    Presentation/behavior: Anxious    Affect: Labile    Concerns reported: none, pt stating medications are working on his tardive dyskinsia. PRN medications effective.      PRN medications given: none    Environmental assessment: Room free from clutter, Clear path to bathroom  and No safety hazards noted    Fall prevention interventions in place: Yellow non-skid socks on    Daily Tabitha Fall Risk Score: 69    Daily Lux Fall Risk Score: 15-low      Electronically signed by Noreen Little RN on 3/1/22 at 1:26 PM EST

## 2022-03-01 NOTE — PROGRESS NOTES
Patient was sitting in a chair in the dayroom & kept asking for cogentin & was instructed that it was not ordered & that benadryl was ordered instead. \"I need both. \" Patient was again encourage to take ativan for his anxiety. \"I need cogentin. Patient later stated,\"I'll try the ativan. \" Patient was given ativan 2 mg po at 03:07. Patient was encouraged to think of something pleasant. Writer turn on the television to the [employer] channel, which was playing relaxing music.  Alec Ridley R.N.

## 2022-03-01 NOTE — FLOWSHEET NOTE
Purposeful Rounding    Patient Location: Patient room    Patient willing to engage in conversation: No    Presentation/behavior: Other resting*    Affect: Neutral/Euthymic(normal)    Concerns reported: none, pt noted to be resting, eyes closed, respirations even and easy    PRN medications given: n/a    Environmental assessment: Room free from clutter, Clear path to bathroom  and No safety hazards noted    Fall prevention interventions in place: Yellow non-skid socks on    Daily Tabitha Fall Risk Score: 73    Daily Lux Fall Risk Score: 15-low    Electronically signed by Leslee Matute RN on 3/1/22 at 8:51 AM EST

## 2022-03-01 NOTE — BH NOTE
Spoke to Lawson Thomas in pharmacy who states that patients Debbora [place] is ordered however is not in house yet. Dr. Paige Goltz aware.

## 2022-03-01 NOTE — PROGRESS NOTES
Purposeful Rounding    Patient Location: Patient room    Patient willing to engage in conversation: No    Presentation/behavior: Anxious and Disruptive    Affect: irritable    Concerns reported: None    PRN medications given: No    Environmental assessment: Room free from clutter, Clear path to bathroom  and No safety hazards noted    Fall prevention interventions in place: N/A    Daily [place] Fall Risk Score: 73    Daily Lux Fall Risk Score: 15      Electronically signed by Madison Goldsmith RN on 3/1/22 at 2:36 AM EST

## 2022-03-02 PROBLEM — E87.1 HYPONATREMIA: Status: ACTIVE | Noted: 2022-03-02

## 2022-03-02 LAB
ANION GAP SERPL CALCULATED.3IONS-SCNC: 9 MMOL/L (ref 3–16)
BUN BLDV-MCNC: 15 MG/DL (ref 7–20)
CALCIUM SERPL-MCNC: 9.5 MG/DL (ref 8.3–10.6)
CHLORIDE BLD-SCNC: 102 MMOL/L (ref 99–110)
CO2: 27 MMOL/L (ref 21–32)
CREAT SERPL-MCNC: 1 MG/DL (ref 0.8–1.3)
GFR AFRICAN AMERICAN: >60
GFR NON-AFRICAN AMERICAN: >60
GLUCOSE BLD-MCNC: 118 MG/DL (ref 70–99)
POTASSIUM SERPL-SCNC: 4.2 MMOL/L (ref 3.5–5.1)
SODIUM BLD-SCNC: 138 MMOL/L (ref 136–145)

## 2022-03-02 PROCEDURE — 80048 BASIC METABOLIC PNL TOTAL CA: CPT

## 2022-03-02 PROCEDURE — 6370000000 HC RX 637 (ALT 250 FOR IP): Performed by: PSYCHIATRY & NEUROLOGY

## 2022-03-02 PROCEDURE — 99233 SBSQ HOSP IP/OBS HIGH 50: CPT | Performed by: PSYCHIATRY & NEUROLOGY

## 2022-03-02 PROCEDURE — 1240000000 HC EMOTIONAL WELLNESS R&B

## 2022-03-02 PROCEDURE — 36415 COLL VENOUS BLD VENIPUNCTURE: CPT

## 2022-03-02 RX ADMIN — LORAZEPAM 2 MG: 2 TABLET ORAL at 06:11

## 2022-03-02 RX ADMIN — FLUTICASONE PROPIONATE 1 SPRAY: 50 SPRAY, METERED NASAL at 08:43

## 2022-03-02 RX ADMIN — LORAZEPAM 2 MG: 2 TABLET ORAL at 14:32

## 2022-03-02 RX ADMIN — DIPHENHYDRAMINE HCL 50 MG: 25 TABLET ORAL at 14:32

## 2022-03-02 RX ADMIN — IBUPROFEN 600 MG: 600 TABLET ORAL at 14:32

## 2022-03-02 RX ADMIN — IBUPROFEN 600 MG: 600 TABLET ORAL at 06:11

## 2022-03-02 RX ADMIN — DIPHENHYDRAMINE HCL 50 MG: 25 TABLET ORAL at 06:11

## 2022-03-02 RX ADMIN — LORAZEPAM 2 MG: 2 TABLET ORAL at 22:36

## 2022-03-02 RX ADMIN — CETIRIZINE HYDROCHLORIDE 10 MG: 10 TABLET ORAL at 08:43

## 2022-03-02 RX ADMIN — DIPHENHYDRAMINE HCL 50 MG: 25 TABLET ORAL at 22:36

## 2022-03-02 RX ADMIN — IBUPROFEN 600 MG: 600 TABLET ORAL at 22:36

## 2022-03-02 ASSESSMENT — PAIN SCALES - GENERAL
PAINLEVEL_OUTOF10: 7
PAINLEVEL_OUTOF10: 5
PAINLEVEL_OUTOF10: 5

## 2022-03-02 NOTE — BH NOTE
[facility]  Treatment Team Note  Review Date & Time: 3/2/22  0903    Patient was not in treatment team      Status EXAM:   Status and Exam  Normal: No  Facial Expression: Exaggerated  Affect: Constricted  Level of Consciousness: Alert  Mood:Normal: No  Mood: Labile,Suspicious,Irritable  Motor Activity:Normal: Yes  Motor Activity: Decreased  Interview Behavior: Irritable,Impulsive  Preception: [place] to Person,[place] to Time,[place] to Place  Attention:Normal: No  Attention: Distractible,Unable to Concentrate  Thought Processes: Perseveration  Thought Content:Normal: No  Thought Content: Preoccupations  Hallucinations: None (Denies, but observed talking to self)  Delusions: No  Delusions: Obsessions  Memory:Normal: No  Memory: Poor Remote  Insight and Judgment: No  Insight and Judgment: Poor Judgment,Poor Insight  Present Suicidal Ideation: No  Present Homicidal Ideation: No      Suicide Risk CSSR-S:  1) Within the past month, have you wished you were dead or wished you could go to sleep and not wake up? : No  2) Have you actually had any thoughts of killing yourself? : No  6) Have you ever done anything, started to do anything, or prepared to do anything to end your life?: No      PLAN/TREATMENT RECOMMENDATIONS UPDATE: Patient will take medication as prescribed, eat 75% of meals, attend groups, participate in milieu activities, participate in treatment team and care planning for discharge and follow up.             Bhavesh Dempsey RN

## 2022-03-02 NOTE — BH NOTE
Spoke with Inpatient pharmacy [facility] about pt's Maishaa Esperance. Per [facility] shipment was supposed to arrive today with medication, will follow up later to verify if both the [address] (674mg) and the Aristata (1064mg) are available.
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
## 2022-03-02 NOTE — BH NOTE
Patient requesting medication for his Tardive Dyskinesia. Patient is slurring his words at times. Patient yelling out demanding his medications. Patient rates his anxiety 8/10. Patient c/o's of pain in his bilateral knees 7/10. Patient requesting and demanding  his Benadryl. Patient medicated with Ativan 2 mg po for anxiety, Motrin 600 mg po for bilateral knee, and Benadryl 50 mg po. Patient went back to his room.

## 2022-03-02 NOTE — FLOWSHEET NOTE
Purposeful Rounding    Patient Location: Day room    Patient willing to engage in conversation: Yes    Presentation/behavior: Cooperative and Pleasant    Affect: Brightens with interaction    Concerns reported: None    PRN medications given: None    Environmental assessment: No safety hazards noted    Fall prevention interventions in place: Yellow non-skid socks on    Daily [place] Fall Risk Score: 49    Daily Lux Fall Risk Score: Low risk      Electronically signed by Lonne Olszewski, RN on 3/1/22 at 8:23 PM EST

## 2022-03-02 NOTE — BH NOTE
Group Therapy Note    Date: 3/1/2022  Start Time: 20:00  End Time:  21:00  Number of Participants: 12    Type of Group: Recreational  Wrap up    Aiden Ta Information  Module Name:  /  Session Number:  /    Patient's Goal:  I don,t belong here    Notes:  /    Status After Intervention:  Unchanged    Participation Level: Monopolizing    Participation Quality: Inappropriate      Speech:  pressured      Thought Process/Content: Delusional  Flight of ideas      Affective Functioning: Exaggerated      Mood: angry, anxious and irritable      Level of consciousness:  Alert and Preoccupied      Response to Learning: Resistant      Endings: None Reported    Modes of Intervention: Socialization and Problem-solving      Discipline Responsible: BehavCommunities for [employer] Health Tech      Signature:  Roney Ordaz

## 2022-03-02 NOTE — PROGRESS NOTES
Department of Psychiatry  Progress Note    Patient's chart was reviewed. Discussed with treatment team. Met with patient. SUBJECTIVE:      Remains severely psychotic - delusional, disorganized, and RTIS. No insight. Aristada initiation pending. ROS:   Patient has new complaints: no  Sleeping adequately:  Yes   Appetite adequate: Yes  Engaged in programming: some    OBJECTIVE:  VITALS:  /73   Pulse 71   Temp 97.7 °F (36.5 °C) (Temporal)   Resp 16   Ht 6' (1.829 m)   Wt 250 lb (113.4 kg)   SpO2 98%   BMI 33.91 kg/m²     Mental Status Examination:    Appearance: in milieu. Fair hygiene    Behavior/Attitude toward examiner:  Suspicious, paranoid, irritable. Speech:  Non-pressured. Elevated volume. Mood:  \"F. Rometta Favre Rometta Favre Elmon Castellani! \"  Affect:  Irritable. Thought processes:  Disorganized  Thought Content: no SI, no HI, +paranoid, +delusional   Perceptions: + RTIS  Attention: impaired  Abstraction: impaired  Cognition: impaired  Insight: impaired   Judgment: impaired     Medication:  Scheduled:   NONFORMULARY 674 mg  674 mg IntraMUSCular Once    NONFORMULARY 1,064 mg  1,064 mg IntraMUSCular Q30 Days    cetirizine  10 mg Oral Daily    fluticasone  1 spray Each Nostril Daily        PRN:  pseudoephedrine, acetaminophen, ibuprofen, LORazepam, diphenhydrAMINE, magnesium hydroxide, nicotine polacrilex, aluminum & magnesium hydroxide-simethicone, OLANZapine **OR** OLANZapine, sterile water     FORMULATION  This is a well known domiciled, never , psychiatrically  disabled 58-year-old with  schizophrenia, who was brought by  police to our emergency department with worsening psychotic symptoms and agitated in the setting of treatment nonadherence.  The patient presents severely  delusional and paranoid and requires inpatient stabilization and  treatment.     Principal Problem:    Schizophrenia ([address]  Active Problems:    Essential hypertension    Hyperlipidemia    Tardive dyskinesia    Hypokalemia Sinus congestion    Hyponatremia  Resolved Problems:    * No resolved hospital problems. *     1. Admitted to inpatient adult psychiatry for stabilization and treatment. 2.  On admission, ordered Dinorah Im injections, q15min checks, programming, and prn medication for anxiety, agitation, and insomnia. 3/1/2022 - Aristada Initio and Aristada pending. 3. Internal medicine consult for admission. #Hypokalemia   - diuretic not reordered  - replacement given  - repeat BMP 2/28 didn't including potassium. Shows hyponatremia    #HTN  - BP stable, chlorthalidone on med list not reordered     #HLD  - per chart review  - no statin on med list     #Sinus congestion  - zyrtec and flonase    4. Collateral collected. 5. ELOS 10-15 days. Voluntary by guardian.        Total time with patient was 35 minutes and more than 50 % of that time was spent counseling the patient on their symptoms, treatment, and expected goals.      Charmaine Maldonado MD

## 2022-03-02 NOTE — BH NOTE
[facility] Shift Note     Shift: Day    Meals: 90% of all meals. Sleep: No issues reported. Is the Patient Isaura for Safety: Yes If no, what intervention?: n/a    Thought Process: Perseverating  Thought Content: Preoccupations    Hallucinations: Yes Explain: Denies by pt, however, he observed talking to self at times. Delusions: Yes Explain: Pt continues to verbalize he believes his brother and sister are a warlock and a witch. Groups Attended: Rosa Flores was observed participating in several groups this shift. Medication Compliant: Yes. PRNs Given:Benadryl x2, Ativan x2, Motrin x2    Narrative: Rosa Flores was anxious and labile this shift, he was at times cooperative and appropriate with staff and peers but then would demonstrate impulsive behaviors and shout at staff but was easily redirectable. Pt was started on SALVADOR this shift, pt needed encouragement from MD and this staff to take medication, pt was cooperative with administration.

## 2022-03-03 VITALS
RESPIRATION RATE: 18 BRPM | SYSTOLIC BLOOD PRESSURE: 124 MMHG | BODY MASS INDEX: 33.86 KG/M2 | HEART RATE: 77 BPM | OXYGEN SATURATION: 96 % | TEMPERATURE: 98 F | DIASTOLIC BLOOD PRESSURE: 69 MMHG | WEIGHT: 250 LBS | HEIGHT: 72 IN

## 2022-03-03 PROBLEM — E87.1 HYPONATREMIA: Status: RESOLVED | Noted: 2022-03-02 | Resolved: 2022-03-03

## 2022-03-03 PROBLEM — E87.6 HYPOKALEMIA: Status: RESOLVED | Noted: 2022-02-27 | Resolved: 2022-03-03

## 2022-03-03 PROCEDURE — 99239 HOSP IP/OBS DSCHRG MGMT >30: CPT | Performed by: PSYCHIATRY & NEUROLOGY

## 2022-03-03 PROCEDURE — 6370000000 HC RX 637 (ALT 250 FOR IP): Performed by: PSYCHIATRY & NEUROLOGY

## 2022-03-03 PROCEDURE — 5130000000 HC BRIDGE APPOINTMENT

## 2022-03-03 RX ORDER — ARIPIPRAZOLE LAUROXIL 1064 MG/3.9ML
1064 INJECTION, SUSPENSION, EXTENDED RELEASE INTRAMUSCULAR
Qty: 1.8 ML | Refills: 2 | Status: SHIPPED | OUTPATIENT
Start: 2022-04-27 | End: 2022-04-27

## 2022-03-03 RX ORDER — LORAZEPAM 2 MG/1
2 TABLET ORAL 2 TIMES DAILY PRN
Qty: 10 TABLET | Refills: 0 | Status: SHIPPED | OUTPATIENT
Start: 2022-03-03 | End: 2022-03-03

## 2022-03-03 RX ORDER — LORAZEPAM 1 MG/1
2 TABLET ORAL 2 TIMES DAILY PRN
Qty: 20 TABLET | Refills: 0 | Status: SHIPPED | OUTPATIENT
Start: 2022-03-03 | End: 2022-03-24

## 2022-03-03 RX ADMIN — LORAZEPAM 2 MG: 2 TABLET ORAL at 09:34

## 2022-03-03 RX ADMIN — FLUTICASONE PROPIONATE 1 SPRAY: 50 SPRAY, METERED NASAL at 08:50

## 2022-03-03 RX ADMIN — CETIRIZINE HYDROCHLORIDE 10 MG: 10 TABLET ORAL at 08:51

## 2022-03-03 RX ADMIN — LORAZEPAM 2 MG: 2 TABLET ORAL at 16:20

## 2022-03-03 RX ADMIN — IBUPROFEN 600 MG: 600 TABLET ORAL at 09:35

## 2022-03-03 RX ADMIN — DIPHENHYDRAMINE HCL 50 MG: 25 TABLET ORAL at 16:20

## 2022-03-03 RX ADMIN — IBUPROFEN 600 MG: 600 TABLET ORAL at 16:20

## 2022-03-03 RX ADMIN — DIPHENHYDRAMINE HCL 50 MG: 25 TABLET ORAL at 09:34

## 2022-03-03 ASSESSMENT — PAIN SCALES - GENERAL
PAINLEVEL_OUTOF10: 6
PAINLEVEL_OUTOF10: 4

## 2022-03-03 ASSESSMENT — PAIN DESCRIPTION - PROGRESSION: CLINICAL_PROGRESSION: GRADUALLY WORSENING

## 2022-03-03 ASSESSMENT — PAIN DESCRIPTION - LOCATION: LOCATION: KNEE

## 2022-03-03 ASSESSMENT — PAIN DESCRIPTION - DESCRIPTORS: DESCRIPTORS: ACHING

## 2022-03-03 ASSESSMENT — PAIN - FUNCTIONAL ASSESSMENT: PAIN_FUNCTIONAL_ASSESSMENT: PREVENTS OR INTERFERES SOME ACTIVE ACTIVITIES AND ADLS

## 2022-03-03 ASSESSMENT — PAIN DESCRIPTION - ORIENTATION: ORIENTATION: LEFT;RIGHT

## 2022-03-03 ASSESSMENT — PAIN DESCRIPTION - FREQUENCY: FREQUENCY: CONTINUOUS

## 2022-03-03 ASSESSMENT — PAIN DESCRIPTION - PAIN TYPE: TYPE: CHRONIC PAIN

## 2022-03-03 NOTE — PROGRESS NOTES
Patient's anxiety increasing, patient requesting medication for what he says is tardive dyskinesia. Patient is noted to be rolling his tongue, clearing his throat and becoming more anxious. He also stated that his leg pain was a 7 on a 1-10 scale. Patient given Benadryl, Ativan and Ibuprofen per order.   See [facility]

## 2022-03-03 NOTE — FLOWSHEET NOTE
Purposeful Rounding    Patient Location: Day room    Patient willing to engage in conversation: Yes    Presentation/behavior: Cooperative    Affect: Neutral/Euthymic(normal)    Concerns reported: asking when cab will be here for his discharge.     PRN medications given: n/a    Environmental assessment: Room free from clutter, Clear path to bathroom  and No safety hazards noted    Fall prevention interventions in place: Yellow non-skid socks on    Daily [place] Fall Risk Score: 71    Daily [place] Fall Risk Score: 15-low      Electronically signed by Jossie Jason RN on 3/3/22 at 12:44 PM EST

## 2022-03-03 NOTE — PLAN OF CARE
[facility]  Discharge Note    Pt discharged with followings belongings:   Dental Appliances: None  Vision - Corrective Lenses: Glasses  Hearing Aid: None  Jewelry: None  Clothing: Footwear,Shirt,Pants  Were All Patient Medications Collected?: Not Applicable  Other Valuables: Other (Comment) (drivers license put in the safe)   Valuables sent home with patient. Patient education on aftercare by charge at 5:59 PM .Patient verbalize understanding of AVS: guardian contacted to go over AVS she verbalized understanding.   Status EXAM upon discharge:  Status and Exam  Normal: No  Facial Expression: Exaggerated  Affect: Constricted  Level of Consciousness: Alert  Mood:Normal: No  Mood: Labile,Irritable  Motor Activity:Normal: Yes  Motor Activity: Decreased  Interview Behavior: Impulsive,Irritable  Preception: [place] to Person,[place] to Time  Attention:Normal: No  Attention: Distractible  Thought Processes: Perseveration  Thought Content:Normal: No  Thought Content: Preoccupations  Hallucinations: None (but does seem to RTIS at times)  Delusions: No  Delusions: Obsessions  Memory:Normal: No  Memory: Poor Remote  Insight and Judgment: No  Insight and Judgment: Poor Judgment,Poor Insight  Present Suicidal Ideation: No  Present Homicidal Ideation: No      Metabolic Screening:    Lab Results   Component Value Date    LABA1C 5.2 02/27/2022       Lab Results   Component Value Date    CHOL 204 (H) 02/27/2022    CHOL 155 10/15/2021    CHOL 162 07/29/2020    CHOL 209 (H) 06/05/2019    CHOL 193 02/27/2019    CHOL 255 (H) 06/01/2018    CHOL 215 (H) 03/19/2018    CHOL 148 08/31/2017     Lab Results   Component Value Date    TRIG 81 02/27/2022    TRIG 82 10/15/2021    TRIG 135 07/29/2020    TRIG 239 (H) 06/05/2019    TRIG 181 (H) 02/27/2019    TRIG 177 (H) 06/01/2018    TRIG 212 (H) 03/19/2018    TRIG 112 08/31/2017     Lab Results   Component Value Date    HDL 48 02/27/2022    HDL 42 10/15/2021    HDL 48 07/29/2020    HDL 45 06/05/2019    HDL 46 02/27/2019    HDL 50 06/01/2018    HDL 46 03/19/2018    HDL 50 08/31/2017     No components found for: LDLCAL  Lab Results   Component Value Date    LABVLDL 16 02/27/2022    LABVLDL 16 10/15/2021    LABVLDL 27 07/29/2020    LABVLDL 48 06/05/2019    LABVLDL 36 02/27/2019    LABVLDL 35 06/01/2018    LABVLDL 42 03/19/2018    LABVLDL 22 08/31/2017       Eneida Rincon RN     Bridge Appointment completed: Reviewed Discharge Instructions with patient. Patient verbalizes understanding and agreement with the discharge plan using the teachback method. Referral for Outpatient Tobacco Cessation Counseling, upon discharge (soledad X if applicable and completed):    ( )  Hospital staff assisted patient to call Quit Line or faxed referral                                   during hospitalization                  ( )  Recognizing danger situations (included triggers and roadblocks), if not completed on admission                    ( )  Coping skills (new ways to manage stress, exercise, relaxation techniques, changing routine, distraction), if not completed on admission                                                           ( )  Basic information about quitting (benefits of quitting, techniques in how to quit, available resources, if not completed on admission  ( ) Referral for counseling faxed to [facility]   ( ) Patient refused referral  ( ) Patient refused counseling  (x ) Patient refused smoking cessation medication upon discharge    Vaccinations (soledad X if applicable and completed):   (x ) Patient states already received influenza vaccine elsewhere  ( ) Patient received influenza vaccine during this hospitalization  ( ) Patient refused influenza vaccine at this time
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
## 2022-03-03 NOTE — FLOWSHEET NOTE
Purposeful Rounding    Patient Location: Patient room    Patient willing to engage in conversation: Yes    Presentation/behavior: Cooperative    Affect: Neutral/Euthymic(normal)    Concerns reported: none    PRN medications given: none    Environmental assessment: Room free from clutter, Clear path to bathroom , Adequate lighting and No safety hazards noted    Fall prevention interventions in place: Yellow non-skid socks on    Daily [place] Fall Risk Score: 49    Daily [place] Fall Risk Score: 15      Electronically signed by Chelsey Elam RN on 3/3/22 at 1:08 AM EST

## 2022-03-03 NOTE — BH NOTE
Purposeful Rounding    Patient Location: Nurses station    Patient willing to engage in conversation: Yes    Presentation/behavior: Guarded/Suspicious and Imulsive    Affect: Labile and Constricted    Concerns reported: None    PRN medications given: None    Environmental assessment: No safety hazards noted    Fall prevention interventions in place: Yellow non-skid socks on    Daily [place] Fall Risk Score: 49    Daily Lux Fall Risk Score: Low risk      Electronically signed by Pancho Galvan RN on 3/2/22 at 8:12 PM EST

## 2022-03-03 NOTE — PLAN OF CARE
Patient alert and oriented x 2 disoriented to place. Patient denies SI/HI/A/V/H. Patient visible in the dayroom keeps to himself. Patient attended and participated in wrap up group. Patient doesn't have HS medications scheduled. No angry outbursts noted. Patient eating a snack at present. No co's voiced at present.

## 2022-03-03 NOTE — PROGRESS NOTES
Patient came to desk, focused on needing medication for tardive dyskinesia. No symptoms noted. Patient states Benadryl and Ativan work well for him. Patient is noted to be anxious, and was slurring his words. He also complains of knee pain and he requested Ibuprofen for pain. Patient given Benadryl, Ativan and Ibuprofen per order.   See [facility]

## 2022-03-03 NOTE — FLOWSHEET NOTE
Purposeful Rounding     Patient Location: Patient room     Patient willing to engage in conversation: No     Presentation/behavior: Other pt sleeping*     Affect: Neutral/Euthymic(normal)     Concerns reported: none     PRN medications given: none     Environmental assessment: Room free from clutter, Clear path to bathroom , Adequate lighting and No safety hazards noted     Fall prevention interventions in place: Yellow non-skid socks on     Daily [place] Fall Risk Score: 49     Daily [place] Fall Risk Score:15

## 2022-03-03 NOTE — FLOWSHEET NOTE
Purposeful Rounding     Patient Location: Patient room     Patient willing to engage in conversation: No     Presentation/behavior: Other pt sleeping*     Affect: Neutral/Euthymic(normal)     Concerns reported: none     PRN medications given: none     Environmental assessment: Room free from clutter, Clear path to bathroom , Adequate lighting and No safety hazards noted     Fall prevention interventions in place: Yellow non-skid socks on     Daily [place] Fall Risk Score: 49     Daily [place] Fall Risk Score:15

## 2022-03-03 NOTE — BH NOTE
Patient given Benadryl 50mg for EPS per request, Motrin 600mg given for back pain per request  Ativan 2mg given for sleep per request.

## 2022-03-03 NOTE — GROUP NOTE
Group Therapy Note    Date: 3/2/2022    Group Start Time: 2000  Group End Time: 2030  Group Topic: Wrap-Up    [facility] [address]        Group Therapy Note    Attendees: 9       Patient's Goal:  Pt was invited to wrap up group to talk about the day and what is needed to have a successful rest of the night. Talked about rules of the unit and answer any questions. Notes: Talked about pt's goal which was to [facility] a good day\". Pt states he has been having good days \"since I got my injection in my leg\". Pt spoke clearer and was able to be redirected if needed. Status After Intervention:  Improved    Participation Level:  Active Listener    Participation Quality: Appropriate and Attentive      Speech:  normal      Thought Process/Content: Logical  Linear      Affective Functioning: Congruent      Mood: anxious      Level of consciousness:  Alert and Oriented x4      Response to Learning: Able to verbalize current knowledge/experience and Capable of insight      Endings: None Reported    Modes of Intervention: Support      Discipline Responsible: /Counselor      Signature:  Naomi Barrientos

## 2022-03-03 NOTE — FLOWSHEET NOTE
Purposeful Rounding    Patient Location: Patient room    Patient willing to engage in conversation: No    Presentation/behavior: Other pt sleeping*    Affect: Neutral/Euthymic(normal)    Concerns reported: none    PRN medications given: none    Environmental assessment: Room free from clutter, Clear path to bathroom , Adequate lighting and No safety hazards noted    Fall prevention interventions in place: Yellow non-skid socks on    Daily [place] Fall Risk Score: 49    Daily Lux Fall Risk Score:15      Electronically signed by Teofilo Sanchez RN on 3/3/22 at 1:09 AM EST

## 2022-03-03 NOTE — GROUP NOTE
Group Therapy Note    Date: 3/3/2022    Group Start Time: 1000  Group End Time: 6809  Group Topic: Psychoeducation    [address] [facility],[address], [address] [facility]        Group Therapy Note    Attendees: 8      Notes:  Faby Berkowitz attended group but was called out early and did not return.     Status After Intervention:  Unchanged    Participation Level: Monopolizing    Participation Quality: Resistant      Speech:  loud      Thought Process/Content: Linear      Affective Functioning: Incongruent      Mood: irritable      Level of consciousness:  Preoccupied      Response to Learning: Resistant      Endings: None Reported    Modes of Intervention: Education and Activity      Discipline Responsible: /Counselor      Signature:  JASEN Almaguer

## 2022-03-03 NOTE — PLAN OF CARE
Patient's guardian notified of paitent's discharge. Discharge after visit summary gone over with Leatha-guardian, she verbalized understanding.

## 2022-03-03 NOTE — FLOWSHEET NOTE
Purposeful Rounding    Patient Location: Day room    Patient willing to engage in conversation: Yes    Presentation/behavior: Cooperative and Pleasant    Affect: Brightens with interaction    Concerns reported: none    PRN medications given: n/a    Environmental assessment: Room free from clutter, Clear path to bathroom  and No safety hazards noted    Fall prevention interventions in place: Yellow non-skid socks on    Daily [place] Fall Risk Score: 49    Daily [place] Fall Risk Score: 15      Electronically signed by Ricardo Covarrubias RN on 3/3/22 at 8:12 AM EST

## 2022-03-03 NOTE — FLOWSHEET NOTE
Purposeful Rounding     Patient Location: Patient room     Patient willing to engage in conversation: No     Presentation/behavior: Other pt sleeping*     Affect: Neutral/Euthymic(normal)     Concerns reported: none     PRN medications given: none     Environmental assessment: Room free from clutter, Clear path to bathroom , Adequate lighting and No safety hazards noted     Fall prevention interventions in place: Yellow non-skid socks on     Daily [place] Fall Risk Score: 49     Daily Lux Fall Risk Score:15
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
## 2022-04-27 ENCOUNTER — FOLLOWUP TELEPHONE ENCOUNTER (OUTPATIENT)
Dept: PSYCHIATRY | Age: 62
End: 2022-04-27

## 2022-04-27 NOTE — TELEPHONE ENCOUNTER
Reminder call placed for appointment tomorrow. Pt stated he wanted to cancel appointment d/t to the shot \"suffocating\" him. Pt legal guardian/mother and sister was on the line as well begging the pt to get the injection. Writer asked pt to come in and speak with Dr. Susie Mcclure tomorrow before making the decision to stop injections. Family stated they would try to get him here.

## 2022-05-04 NOTE — BH NOTE
PATIENT IS BACK IN BED FROM BATHROOM. VOIDED 300ML. PATIENT IS LAYING ON HER
RIGHT SIDE WITH PILLOW BEHIND.
CALL LIGHT IN REACH. NO FURTHER NEEDS AT THIS TIME. Spoke with pt's mother, Markell Weber, who is guardian, she gave consent for pt to receive Abilify Jose Armando Jara . She is aware that he does not want the injection but she states she wants staff to administer it despite his refusal. Verified consent from guardian with Lanette Fermin [facility].

## 2022-08-14 ENCOUNTER — HOSPITAL ENCOUNTER (INPATIENT)
Age: 62
LOS: 15 days | Discharge: HOME | DRG: 750 | End: 2022-08-30
Attending: STUDENT IN AN ORGANIZED HEALTH CARE EDUCATION/TRAINING PROGRAM | Admitting: PSYCHIATRY & NEUROLOGY
Payer: COMMERCIAL

## 2022-08-14 DIAGNOSIS — F29 PSYCHOSIS, UNSPECIFIED PSYCHOSIS TYPE (HCC): Primary | ICD-10-CM

## 2022-08-14 DIAGNOSIS — F20.9 SCHIZOPHRENIA, UNSPECIFIED TYPE (HCC): ICD-10-CM

## 2022-08-14 LAB
A/G RATIO: 2.1 (ref 1.1–2.2)
ACETAMINOPHEN LEVEL: <5 UG/ML (ref 10–30)
ALBUMIN SERPL-MCNC: 4.9 G/DL (ref 3.4–5)
ALP BLD-CCNC: 68 U/L (ref 40–129)
ALT SERPL-CCNC: 30 U/L (ref 10–40)
ANION GAP SERPL CALCULATED.3IONS-SCNC: 13 MMOL/L (ref 3–16)
AST SERPL-CCNC: 45 U/L (ref 15–37)
BASOPHILS ABSOLUTE: 0 K/UL (ref 0–0.2)
BASOPHILS RELATIVE PERCENT: 0.2 %
BILIRUB SERPL-MCNC: 0.5 MG/DL (ref 0–1)
BUN BLDV-MCNC: 19 MG/DL (ref 7–20)
CALCIUM SERPL-MCNC: 9.8 MG/DL (ref 8.3–10.6)
CHLORIDE BLD-SCNC: 101 MMOL/L (ref 99–110)
CO2: 27 MMOL/L (ref 21–32)
CREAT SERPL-MCNC: 1.2 MG/DL (ref 0.8–1.3)
EOSINOPHILS ABSOLUTE: 0 K/UL (ref 0–0.6)
EOSINOPHILS RELATIVE PERCENT: 0.3 %
ETHANOL: NORMAL MG/DL (ref 0–0.08)
GFR AFRICAN AMERICAN: >60
GFR NON-AFRICAN AMERICAN: >60
GLUCOSE BLD-MCNC: 103 MG/DL (ref 70–99)
HCT VFR BLD CALC: 53.8 % (ref 40.5–52.5)
HEMOGLOBIN: 18 G/DL (ref 13.5–17.5)
INFLUENZA A: NOT DETECTED
INFLUENZA B: NOT DETECTED
LYMPHOCYTES ABSOLUTE: 0.6 K/UL (ref 1–5.1)
LYMPHOCYTES RELATIVE PERCENT: 6.9 %
MCH RBC QN AUTO: 30.3 PG (ref 26–34)
MCHC RBC AUTO-ENTMCNC: 33.6 G/DL (ref 31–36)
MCV RBC AUTO: 90.3 FL (ref 80–100)
MONOCYTES ABSOLUTE: 0.6 K/UL (ref 0–1.3)
MONOCYTES RELATIVE PERCENT: 6.6 %
NEUTROPHILS ABSOLUTE: 7.4 K/UL (ref 1.7–7.7)
NEUTROPHILS RELATIVE PERCENT: 86 %
PDW BLD-RTO: 13.6 % (ref 12.4–15.4)
PLATELET # BLD: 221 K/UL (ref 135–450)
PMV BLD AUTO: 7.9 FL (ref 5–10.5)
POTASSIUM SERPL-SCNC: 3.4 MMOL/L (ref 3.5–5.1)
RBC # BLD: 5.96 M/UL (ref 4.2–5.9)
SALICYLATE, SERUM: <0.3 MG/DL (ref 15–30)
SARS-COV-2 RNA, RT PCR: NOT DETECTED
SODIUM BLD-SCNC: 141 MMOL/L (ref 136–145)
TOTAL PROTEIN: 7.2 G/DL (ref 6.4–8.2)
WBC # BLD: 8.6 K/UL (ref 4–11)

## 2022-08-14 PROCEDURE — 96372 THER/PROPH/DIAG INJ SC/IM: CPT

## 2022-08-14 PROCEDURE — 80179 DRUG ASSAY SALICYLATE: CPT

## 2022-08-14 PROCEDURE — 82077 ASSAY SPEC XCP UR&BREATH IA: CPT

## 2022-08-14 PROCEDURE — 83036 HEMOGLOBIN GLYCOSYLATED A1C: CPT

## 2022-08-14 PROCEDURE — 80143 DRUG ASSAY ACETAMINOPHEN: CPT

## 2022-08-14 PROCEDURE — 85025 COMPLETE CBC W/AUTO DIFF WBC: CPT

## 2022-08-14 PROCEDURE — 84443 ASSAY THYROID STIM HORMONE: CPT

## 2022-08-14 PROCEDURE — 6360000002 HC RX W HCPCS: Performed by: STUDENT IN AN ORGANIZED HEALTH CARE EDUCATION/TRAINING PROGRAM

## 2022-08-14 PROCEDURE — 87636 SARSCOV2 & INF A&B AMP PRB: CPT

## 2022-08-14 PROCEDURE — 80053 COMPREHEN METABOLIC PANEL: CPT

## 2022-08-14 PROCEDURE — 36415 COLL VENOUS BLD VENIPUNCTURE: CPT

## 2022-08-14 PROCEDURE — 80061 LIPID PANEL: CPT

## 2022-08-14 PROCEDURE — 99285 EMERGENCY DEPT VISIT HI MDM: CPT

## 2022-08-14 RX ORDER — DIPHENHYDRAMINE HYDROCHLORIDE 50 MG/ML
50 INJECTION INTRAMUSCULAR; INTRAVENOUS ONCE
Status: COMPLETED | OUTPATIENT
Start: 2022-08-14 | End: 2022-08-14

## 2022-08-14 RX ORDER — DROPERIDOL 2.5 MG/ML
2.5 INJECTION, SOLUTION INTRAMUSCULAR; INTRAVENOUS ONCE
Status: COMPLETED | OUTPATIENT
Start: 2022-08-14 | End: 2022-08-14

## 2022-08-14 RX ORDER — DIAZEPAM 5 MG/ML
10 INJECTION, SOLUTION INTRAMUSCULAR; INTRAVENOUS ONCE
Status: COMPLETED | OUTPATIENT
Start: 2022-08-14 | End: 2022-08-14

## 2022-08-14 RX ADMIN — DIAZEPAM 10 MG: 10 INJECTION, SOLUTION INTRAMUSCULAR; INTRAVENOUS at 15:58

## 2022-08-14 RX ADMIN — DIPHENHYDRAMINE HYDROCHLORIDE 50 MG: 50 INJECTION, SOLUTION INTRAMUSCULAR; INTRAVENOUS at 15:58

## 2022-08-14 RX ADMIN — DROPERIDOL 2.5 MG: 2.5 INJECTION, SOLUTION INTRAMUSCULAR; INTRAVENOUS at 15:58

## 2022-08-14 NOTE — ED NOTES
Patient refusing to change into safety gown. Refusing to give urine sample, refusing to give allow staff to obtain blood, refusing to go into room. Patient cursing at staff and screaming threatening staff. Patient given medications for agitation per Tera Lesch RN charge ED.       Za Mondragon RN  08/14/22 5487       Za Mondragon RN  08/14/22 5687

## 2022-08-14 NOTE — ED NOTES
This nurse entered room and requested to draw patient blood. Patient screamed \" get the fuck away from and longed toward nurse\". This nurse stepped out of room.       Deana Ingram RN  08/14/22 8347

## 2022-08-14 NOTE — ED NOTES
Pt currently resting in B3. No signs of distress. Will continue to monitor.       RACHELE Frost  08/14/22 7008

## 2022-08-14 NOTE — ED NOTES
Pt refuses to change out or put on hospital bracelet.       Nadia [facility], SYLVIA  08/14/22 1540
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
## 2022-08-14 NOTE — ED NOTES
Patient came back out of room and requested to use phone to speak with mom. Patient however threatening to call 911. Patient continues to curse so placed call to mom for him to hear she was ok after making comments she was dead. Call placed to mom and put on speaker. Patient started screaming at mother stating \" Rogene Curling and YOUR DEAD and IM NOT COMING BACK. Notified mother patient was to agitated at this time to talk and attempted to calm patient.       Erica Nuñez RN  08/14/22 3431

## 2022-08-14 NOTE — ED NOTES
Patient asleep. Awoken patient for blood draw. Patient tolerated well. Blood obtained and sent to lab.       Quoc Edwards RN  08/14/22 8080

## 2022-08-14 NOTE — ED NOTES
Pt brought back to [facility] by mobile crisis. Pt is currently sitting in B5 chairs- loud- yelling- erratic.       Anna Sapp, RACHELE  08/14/22 5804

## 2022-08-14 NOTE — ED NOTES
Patient came out of room to use restroom. Patient when asked if he can give urine sample started cursing and screaming at this nurse. Patient used restroom then walked out and started screaming again. Patient went back into room. Will continue to attempt to assess patient and monitor patient.       Rony Inman RN  08/14/22 1948

## 2022-08-14 NOTE — ED NOTES
Covid swab obtained and sent to lab. Awaiting urine sample. Patient asleep at this time.       Herlinda Blair RN  08/14/22 9075

## 2022-08-14 NOTE — ED NOTES
Updated Dr. Salvador Vences about patient presentation. To attempt to medically clear and at this time manage patient in [facility]. No beds on [facility] at this time.       Deana Ingram RN  08/14/22 6547

## 2022-08-15 LAB
AMPHETAMINE SCREEN, URINE: ABNORMAL
BARBITURATE SCREEN URINE: ABNORMAL
BENZODIAZEPINE SCREEN, URINE: POSITIVE
CANNABINOID SCREEN URINE: ABNORMAL
COCAINE METABOLITE SCREEN URINE: ABNORMAL
Lab: ABNORMAL
METHADONE SCREEN, URINE: ABNORMAL
OPIATE SCREEN URINE: ABNORMAL
OXYCODONE URINE: ABNORMAL
PH UA: 6
PHENCYCLIDINE SCREEN URINE: ABNORMAL
PROPOXYPHENE SCREEN: ABNORMAL
TSH SERPL DL<=0.05 MIU/L-ACNC: 1.08 UIU/ML (ref 0.27–4.2)

## 2022-08-15 PROCEDURE — 6360000002 HC RX W HCPCS: Performed by: STUDENT IN AN ORGANIZED HEALTH CARE EDUCATION/TRAINING PROGRAM

## 2022-08-15 PROCEDURE — 1240000000 HC EMOTIONAL WELLNESS R&B

## 2022-08-15 PROCEDURE — 6370000000 HC RX 637 (ALT 250 FOR IP): Performed by: EMERGENCY MEDICINE

## 2022-08-15 PROCEDURE — 80307 DRUG TEST PRSMV CHEM ANLYZR: CPT

## 2022-08-15 PROCEDURE — 96372 THER/PROPH/DIAG INJ SC/IM: CPT

## 2022-08-15 RX ORDER — ACETAMINOPHEN 325 MG/1
650 TABLET ORAL EVERY 4 HOURS PRN
Status: DISCONTINUED | OUTPATIENT
Start: 2022-08-15 | End: 2022-08-16

## 2022-08-15 RX ORDER — MAGNESIUM HYDROXIDE/ALUMINUM HYDROXICE/SIMETHICONE 120; 1200; 1200 MG/30ML; MG/30ML; MG/30ML
30 SUSPENSION ORAL EVERY 6 HOURS PRN
Status: DISCONTINUED | OUTPATIENT
Start: 2022-08-15 | End: 2022-08-30 | Stop reason: HOSPADM

## 2022-08-15 RX ORDER — TRAZODONE HYDROCHLORIDE 50 MG/1
50 TABLET ORAL NIGHTLY PRN
Status: DISCONTINUED | OUTPATIENT
Start: 2022-08-15 | End: 2022-08-16

## 2022-08-15 RX ORDER — IBUPROFEN 400 MG/1
400 TABLET ORAL EVERY 6 HOURS PRN
Status: DISCONTINUED | OUTPATIENT
Start: 2022-08-15 | End: 2022-08-16

## 2022-08-15 RX ORDER — POLYETHYLENE GLYCOL 3350 17 G
2 POWDER IN PACKET (EA) ORAL
Status: DISCONTINUED | OUTPATIENT
Start: 2022-08-15 | End: 2022-08-30 | Stop reason: HOSPADM

## 2022-08-15 RX ORDER — HYDROXYZINE 50 MG/1
50 TABLET, FILM COATED ORAL 3 TIMES DAILY PRN
Status: DISCONTINUED | OUTPATIENT
Start: 2022-08-15 | End: 2022-08-16

## 2022-08-15 RX ORDER — FLUTICASONE PROPIONATE 50 MCG
1 SPRAY, SUSPENSION (ML) NASAL DAILY
Status: DISCONTINUED | OUTPATIENT
Start: 2022-08-15 | End: 2022-08-30 | Stop reason: HOSPADM

## 2022-08-15 RX ORDER — DIAZEPAM 5 MG/ML
10 INJECTION, SOLUTION INTRAMUSCULAR; INTRAVENOUS ONCE
Status: COMPLETED | OUTPATIENT
Start: 2022-08-15 | End: 2022-08-15

## 2022-08-15 RX ORDER — DIPHENHYDRAMINE HYDROCHLORIDE 50 MG/ML
50 INJECTION INTRAMUSCULAR; INTRAVENOUS EVERY 4 HOURS PRN
Status: DISCONTINUED | OUTPATIENT
Start: 2022-08-15 | End: 2022-08-18

## 2022-08-15 RX ORDER — OLANZAPINE 5 MG/1
5 TABLET ORAL EVERY 4 HOURS PRN
Status: DISCONTINUED | OUTPATIENT
Start: 2022-08-15 | End: 2022-08-18

## 2022-08-15 RX ORDER — DIAZEPAM 5 MG/1
10 TABLET ORAL ONCE
Status: COMPLETED | OUTPATIENT
Start: 2022-08-15 | End: 2022-08-15

## 2022-08-15 RX ADMIN — DIAZEPAM 10 MG: 10 INJECTION, SOLUTION INTRAMUSCULAR; INTRAVENOUS at 00:18

## 2022-08-15 RX ADMIN — DIAZEPAM 10 MG: 5 TABLET ORAL at 08:15

## 2022-08-15 ASSESSMENT — LIFESTYLE VARIABLES
HOW OFTEN DO YOU HAVE A DRINK CONTAINING ALCOHOL: NEVER
HOW MANY STANDARD DRINKS CONTAINING ALCOHOL DO YOU HAVE ON A TYPICAL DAY: PATIENT DOES NOT DRINK

## 2022-08-15 ASSESSMENT — SLEEP AND FATIGUE QUESTIONNAIRES
DO YOU HAVE DIFFICULTY SLEEPING: NO
DO YOU USE A SLEEP AID: NO

## 2022-08-15 ASSESSMENT — PAIN - FUNCTIONAL ASSESSMENT: PAIN_FUNCTIONAL_ASSESSMENT: NONE - DENIES PAIN

## 2022-08-15 NOTE — ED NOTES
Patient continues to rest quietly in bed at this time.  Repositions self at intervals,     Ezequiel Kingston RN  08/14/22 9236

## 2022-08-15 NOTE — ED NOTES
Patient remains in room, resting quietly in bed. repositions self to position of comfort.      Dev Puentes RN  08/14/22 9416

## 2022-08-15 NOTE — ED NOTES
Report obtained from Millie Smith RN. Patient is resting quietly in bed at this time.      Rolando Damon RN  08/14/22 3153

## 2022-08-15 NOTE — ED NOTES
Vital signs obtained and entered into Epic. Pt continue to ramble and at times is very difficult to understand due to garbled speech. Mood remains labile and behavior unpredictable. Cooperative with vital signs but then became upset I would not prop the door open. Writer attempted to explain whey the door could not be propped open to no avail. He yelled, \"fuck you! \" And then shoved the plastic chair towards the door. Writer put the chair back and exited the room as he continued to scream profanities.       Ronaldo Ross RN  08/15/22 6163
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
## 2022-08-15 NOTE — ED NOTES
Patient is noted to be awake. He was offered and ate a sandwich, he was provided fluid. He remains awake and sitting in chair near the door of  his room. He is noted to be talking to himself and occasionally using his hands to make a point to himself. He denied any needs.      Kevin Hammonds RN  08/15/22 9389

## 2022-08-15 NOTE — ED NOTES
Patient is noted to be resting quietly in bed at this time. No Signs of distress noted.      Mario Alberto Rivera RN  08/14/22 7024

## 2022-08-15 NOTE — ED NOTES
Pt provided with ice water. He remains labile and is talking to himself with rambling, garbled speech. After much encouragement, pt was agreeable to remove shorts with strings and they were locked in assigned locker. Awaiting bed on [facility]. Will continue to monitor pt.      [address], [place]  08/15/22 1007

## 2022-08-15 NOTE — ED NOTES
Pt has been observed talking to himself and gesturing at the walls, at times raising his voice. He has a hx of becoming quite agitated very easily and at times physically aggressive. Call placed to Dr Felicitas Yip for recommendations on medications to manage his active psychosis [facility] on [facility]. Arielle Osorio RN  08/15/22 8532    Received a return call from Dr Felicitas Yip who recommends Valium 10mg IM or PO since that was effective last night. Dr Neda Jo made aware of DR Garcia's recommendation. Arielle Osorio RN  08/15/22 0804    Valium 10mg administered per order. Pt took the medication without incident.         Arielle Osorio RN  08/15/22 2004

## 2022-08-15 NOTE — ED NOTES
Patient is noted to be resting quietly in bed at this time. Medication is noted to be effective.      Gi Ayala RN  08/15/22 6877

## 2022-08-15 NOTE — BH NOTE
doesn't refuse):            ( ) Recognizing danger situations (included triggers and roadblocks)                    ( ) Coping skills (new ways to manage stress,relaxation techniques, changing routine, distraction)                                                           ( ) Basic information about quitting (benefits of quitting, techniques in how to quit, available resources  ( ) Referral for counseling faxed to [facility]                                                                                                                   ( ) Patient refused counseling  ( x) Patient has not smoked in the last 30 days    Metabolic Screening:    Lab Results   Component Value Date    LABA1C 5.2 02/27/2022       Lab Results   Component Value Date    CHOL 204 (H) 02/27/2022    CHOL 155 10/15/2021    CHOL 162 07/29/2020    CHOL 209 (H) 06/05/2019    CHOL 193 02/27/2019    CHOL 255 (H) 06/01/2018    CHOL 215 (H) 03/19/2018    CHOL 148 08/31/2017     Lab Results   Component Value Date    TRIG 81 02/27/2022    TRIG 82 10/15/2021    TRIG 135 07/29/2020    TRIG 239 (H) 06/05/2019    TRIG 181 (H) 02/27/2019    TRIG 177 (H) 06/01/2018    TRIG 212 (H) 03/19/2018    TRIG 112 08/31/2017     Lab Results   Component Value Date    HDL 48 02/27/2022    HDL 42 10/15/2021    HDL 48 07/29/2020    HDL 45 06/05/2019    HDL 46 02/27/2019    HDL 50 06/01/2018    HDL 46 03/19/2018    HDL 50 08/31/2017     No components found for: LDLCAL  Lab Results   Component Value Date    LABVLDL 16 02/27/2022    LABVLDL 16 10/15/2021    LABVLDL 27 07/29/2020    LABVLDL 48 06/05/2019    LABVLDL 36 02/27/2019    LABVLDL 35 06/01/2018    LABVLDL 42 03/19/2018    LABVLDL 22 08/31/2017         Body mass index is 31.66 kg/m².     BP Readings from Last 2 Encounters:   08/15/22 103/61   03/03/22 124/69           Pt admitted with followings belongings:  Dental Appliances: None  Vision - Corrective Lenses: Eyeglasses (in locker)  Hearing Aid: None  Jewelry: None  Body Piercings Removed: N/A  Clothing: Pants, Shirt, Undergarments  Other Valuables:  Other (Comment) (duffle bag put in locker)    Akilah Cooney RN

## 2022-08-15 NOTE — ED NOTES
Patient continues to rest quietly in bed.  Respirations are even and easy     Kevin Hammonds RN  08/14/22 4940

## 2022-08-15 NOTE — ED NOTES
Patient remains in room, continues to talk to himself, at times he can be heard from the team station in the [facility]. He denied any needs.      Kevin Hammonds RN  08/15/22 2739
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
## 2022-08-15 NOTE — ED NOTES
Patient continues to talk to himself while sitting in his assigned room.      Javier Oneil RN  08/15/22 0706

## 2022-08-16 PROBLEM — F29 PSYCHOSIS (HCC): Status: ACTIVE | Noted: 2022-08-16

## 2022-08-16 LAB
CHOLESTEROL, TOTAL: 213 MG/DL (ref 0–199)
HDLC SERPL-MCNC: 49 MG/DL (ref 40–60)
LDL CHOLESTEROL CALCULATED: 140 MG/DL
TRIGL SERPL-MCNC: 118 MG/DL (ref 0–150)
VLDLC SERPL CALC-MCNC: 24 MG/DL

## 2022-08-16 PROCEDURE — 6370000000 HC RX 637 (ALT 250 FOR IP): Performed by: PSYCHIATRY & NEUROLOGY

## 2022-08-16 PROCEDURE — 99223 1ST HOSP IP/OBS HIGH 75: CPT | Performed by: PSYCHIATRY & NEUROLOGY

## 2022-08-16 PROCEDURE — 99221 1ST HOSP IP/OBS SF/LOW 40: CPT | Performed by: NURSE PRACTITIONER

## 2022-08-16 PROCEDURE — 1240000000 HC EMOTIONAL WELLNESS R&B

## 2022-08-16 RX ORDER — DIAZEPAM 10 MG/1
10 TABLET ORAL ONCE
Status: COMPLETED | OUTPATIENT
Start: 2022-08-16 | End: 2022-08-16

## 2022-08-16 RX ORDER — DIAZEPAM 10 MG/1
10 TABLET ORAL 2 TIMES DAILY PRN
Status: DISCONTINUED | OUTPATIENT
Start: 2022-08-16 | End: 2022-08-18

## 2022-08-16 RX ORDER — CHLORTHALIDONE 25 MG/1
25 TABLET ORAL DAILY
Status: DISCONTINUED | OUTPATIENT
Start: 2022-08-17 | End: 2022-08-30 | Stop reason: HOSPADM

## 2022-08-16 RX ORDER — DIAZEPAM 5 MG/ML
10 INJECTION, SOLUTION INTRAMUSCULAR; INTRAVENOUS ONCE
Status: DISCONTINUED | OUTPATIENT
Start: 2022-08-16 | End: 2022-08-16

## 2022-08-16 RX ORDER — LORAZEPAM 2 MG/1
2 TABLET ORAL 2 TIMES DAILY PRN
Status: DISCONTINUED | OUTPATIENT
Start: 2022-08-16 | End: 2022-08-16

## 2022-08-16 RX ORDER — ACETAMINOPHEN 325 MG/1
650 TABLET ORAL EVERY 4 HOURS PRN
Status: DISCONTINUED | OUTPATIENT
Start: 2022-08-16 | End: 2022-08-30 | Stop reason: HOSPADM

## 2022-08-16 RX ADMIN — MAGNESIUM HYDROXIDE 30 ML: 400 SUSPENSION ORAL at 12:01

## 2022-08-16 RX ADMIN — DIAZEPAM 10 MG: 10 TABLET ORAL at 10:25

## 2022-08-16 NOTE — H&P
Psychiatry History and Physical     Admission Date:    8/14/2022    Identification: This is a well known domiciled, never , psychiatrically  disabled 57-year-old with  schizophrenia, who was brought by  police to our emergency department with worsening psychotic symptoms and agitation in the setting of treatment nonadherence. SOI:  Patient; guardian; focused record review. CC:  \"I was kidnapped. \"    HPI:   Mr. Burk Lover presents severely disorganized, delusional, agitated, and impaired. He stabilized here earlier this year on [place] but did not get follow-up treatment after discharge and his symptoms returned. Per the [facility] note:  patient presents to the ED  on a SOB after threatening his brother. Pt brought in on SOB for erratic behavior and threatening brother. In February, pt refused his injection of Abilify, and has been decompensating since this time. He lives with his elderly mother, who is his POA. SOB states he has not been sleeping and has been having increased paranoia. He believes his brother killed his mother. Pt is very disorganized, speech is nonsensical.  After being ask why brought he came in today he responded, \" I'm pissed at [employer], I got brain damage, I'm a buckeye, deactivated my medical card. \"  Then a few seconds later he stated, \"[employer] 5[employer] stocks, in my throat and chest.  Benadryl is the cure but I draw the line at 20. Mack Larson is a liar, Thank you. \"  This writer was unable to obtain much information from this patient. Pt states he is not suicidal and wants to go home. Past Psychiatric History:    He has been admitted here four times (last here in  Six months ago). , [place], and also in [place] for schizophrenia. He does have a history of outpatient treatment through [facility] and our OP. When psychotic, he has a history of aggressive behavior.   He's been on multiple medications including Clozaril, Invega, olanzapine, and most other antipsychotics. Past Medical History:  Past Medical History:   Diagnosis Date    Elevated liver enzymes 9/1/2017    Hypertension     Pure hypercholesterolemia 8/31/2017   No known traumatic brain injuries, seizures or major surgeries. TD    Home Medications:  None. non-adherent     Chemical Dependency History:   none     Family Mental Health Hx:    None. No suicides. Social Hx:   Never . disabled. He lives with his mother. She is his guardian. He has no kids. ROS:  does not describe or endorse recent headaches, changes in vision, shortness of breath, chest pain, neurological problems, skin problems, muscle aches, GI problems, or bleeding problems, and was moving his extremities without difficulty. PE:    /84   Pulse 82   Temp 98.2 °F (36.8 °C) (Oral)   Resp 18   Ht 6' 1\" (1.854 m)   Wt 240 lb (108.9 kg)   SpO2 96%   BMI 31.66 kg/m²       Motor / Gait: normal gait and station. Had TD    Mental Status Examination:    Appearance: in milieu. Fair hygiene    Behavior/Attitude toward examiner:  Suspicious, paranoid, irritable. Speech:  Non-pressured. Elevated volume. Mood:  \"get away\"  Affect:  Irritable. Thought processes:  Disorganized  Thought Content: no SI, no HI, +paranoid, +delusional   Perceptions: + RTIS  Attention: impaired  Abstraction: impaired  Cognition: impaired  Insight: impaired  Judgment: impaired    LAB: Reviewed all labs obtained during admission to date. Formulation: This is a well known domiciled, never , psychiatrically  disabled 57-year-old with  schizophrenia, who was brought by  police to our emergency department with worsening psychotic symptoms and agitation in the setting of treatment nonadherence. Dx:  Primary Psychiatric (DSM V) Diagnosis: schizophrenia   Secondary Psychiatric (DSM V) Diagnoses: none  Chemical Dependency Diagnoses: none  Tardive dyskinesia. Essential hypertension. Hyperlipidemia. Plan:  1.   Admit to inpatient adult psychiatry for stabilization and treatment. 2. Order Aileen Shady and Aristada Im injections, q15min checks, programming, and prn medication for anxiety, agitation, and insomnia. 3. Internal medicine consult for admission. 4. Collateral collected. 5. ELOS 10-15 days. Voluntary by guardian. Spent > 70 minutes face to face with patient of which >50% was spent counseling and providing education regarding diagnosis, treatment options, and prognosis.     Sotero Marks MD  Staff Psychiatrist

## 2022-08-16 NOTE — PLAN OF CARE
[facility]  Treatment Team Note  Review Date & Time: 08/16/22  1120    Patient was not in treatment team      Status EXAM:   Mental Status and Behavioral Exam  Normal: No  Level of Assistance: Independent/Self  Facial Expression: Elevated  Affect: Inappropriate, Unstable  Level of Consciousness: Alert  Frequency of Checks: 4 times per hour, close  Mood:Normal: No  Mood: Suspicious, Irritable  Motor Activity:Normal: No  Motor Activity: Increased, Agitated  Eye Contact: Good  Observed Behavior: Agitated, Preoccupied  Sexual Misconduct History: Past - no  Preception: [place] to person, [place] to time, [place] to place  Attention:Normal: No  Attention: Distractible, Unable to concentrate  Thought Processes: Flight of ideas, Tangential, Loose association  Thought Content:Normal: No  Thought Content: Delusions  Depression Symptoms: No problems reported or observed. Anxiety Symptoms: No problems reported or observed. Rebeka Symptoms: Flight of ideas, Increased energy, Labile, Less need to sleep, Poor judgment, Pressured speech  Hallucinations: Auditory (comment), Visual (comment)  Delusions: Yes  Delusions: Paranoid, Persecutory, Other (comment) (Political)  Memory:Normal: No  Memory: Confabulation  Insight and Judgment: No  Insight and Judgment: Poor judgment, Poor insight, Unrealistic      Suicide Risk CSSR-S:  1) Within the past month, have you wished you were dead or wished you could go to sleep and not wake up? : No  2) Have you actually had any thoughts of killing yourself? : No  6) Have you ever done anything, started to do anything, or prepared to do anything to end your life?: No      PLAN/TREATMENT RECOMMENDATIONS UPDATE:       Patient will take medication as prescribed, eat 75% of meals, attend groups, participate in milieu activities, participate in treatment team and care planning for discharge and follow up.      Sharlene Sparks RN

## 2022-08-16 NOTE — BH NOTE
Patients mother Danni Sanchez called to check on Luis Griggsglenis. Danni Sanchez has a new phone number [phone]. Danni Sanchez stated she would call back tomorrow and did not want to wake patient if sleeping.

## 2022-08-16 NOTE — BH NOTE
Patients Brother Erica Leggett and Renny Joseph wife called to inform Dr Pendleton Client that patients mother Bert Burton is receiving hospice care at home with a visiting nurse coming in 3 days a week. Voicing concerns that patient would not be appropriate to be in home while hospice care givers are there and they feel like this is something they would like to discuss with DR Pendleton Client and for patient to stay here until they feel he is stable to return home in possibly 2-3 weeks. Sister In-law proceed to state she has little hope in any of these medications helping patient as \" none of them seem to work\" and that last time the police was called to the residence police spoke with them in terms of future treatment/guardianship of patient. Writer informed both Brother and sister in-law there is no information that can be given on how long or expected discharge date at this time.  Sister in-law proceed to say they will be calling to speak to Dr Pendleton Client about how long and why patient needs to stay in hospital.

## 2022-08-16 NOTE — PLAN OF CARE
Problem: Anxiety  Goal: Will report anxiety at manageable levels  Description: INTERVENTIONS:  1. Administer medication as ordered  2. Teach and rehearse alternative coping skills  3. Provide emotional support with 1:1 interaction with staff  Outcome: Progressing     Problem: Depression/Self Harm  Goal: Effect of psychiatric condition will be minimized and patient will be protected from self harm  Description: INTERVENTIONS:  1. Assess impact of patient's symptoms on level of functioning, self care needs and offer support as indicated  2. Assess patient/family knowledge of depression, impact on illness and need for teaching  3. Provide emotional support, presence and reassurance  4. Assess for possible suicidal thoughts or ideation. If patient expresses suicidal thoughts or statements do not leave alone, initiate Suicide Precautions, move to a room close to the nursing station and obtain sitter  5.  Initiate consults as appropriate with Mental Health Professional, Spiritual Care, Psychosocial CNS, and consider a recommendation to the LIP for a Psychiatric Consultation  Outcome: Progressing

## 2022-08-16 NOTE — PROGRESS NOTES
Hanny Justice was responding to internal stimulation the entire evening. He constantly spoke, but was very difficult to understand. Everything that could be understood was nonsensical. He stated that his urine was black. He asked where his  was and why he didn't get his phone call. He spoke about Trump and  [place]. He was also fixated on medications and stated that he did not like Ativan. He spoke with intense hatred of his brother. It is not easy to redirect him to another topic and/or another activity. No scheduled medications & no PRNs requested. Expressed desire to go to group in the morning. He did not go to sleep until 0345, but then slept soundly.

## 2022-08-16 NOTE — PLAN OF CARE
Problem: Anxiety  Goal: Will report anxiety at manageable levels  Description: INTERVENTIONS:  1. Administer medication as ordered  2. Teach and rehearse alternative coping skills  3. Provide emotional support with 1:1 interaction with staff  Outcome: Not Progressing     Problem: Depression/Self Harm  Goal: Effect of psychiatric condition will be minimized and patient will be protected from self harm  Description: INTERVENTIONS:  1. Assess impact of patient's symptoms on level of functioning, self care needs and offer support as indicated  2. Assess patient/family knowledge of depression, impact on illness and need for teaching  3. Provide emotional support, presence and reassurance  4. Assess for possible suicidal thoughts or ideation. If patient expresses suicidal thoughts or statements do not leave alone, initiate Suicide Precautions, move to a room close to the nursing station and obtain sitter  5. Initiate consults as appropriate with Mental Health Professional, Spiritual Care, Psychosocial CNS, and consider a recommendation to the LIP for a Psychiatric Consultation  Outcome: Progressing         08/16/22 1156   Mental Status and Behavioral Exam   Normal No   Level of Assistance Independent/Self   Affect Unstable; Inappropriate   Level of Consciousness Alert   Frequency of Checks 4 times per hour, close   Mood:Normal No   Motor Activity:Normal No   Eye Contact Good   Sexual Misconduct History Past - no   Preception [place] to person;[place] to place   Attention:Normal No   Attention Distractible   Thought Processes Flight of ideas; Loose association; Tangential   Thought Content:Normal No   Thought Content Delusions;Preoccupations   Depression Symptoms No problems reported or observed. Anxiety Symptoms No problems reported or observed. Rebeka Symptoms Flight of ideas;Grandiosity; Increased energy; Labile; Less need to sleep; Poor judgment;Pressured speech   Hallucinations Auditory (comment)   Delusions Yes Delusions Paranoid   Memory:Normal No   Memory Confabulation   Insight and Judgment No   Insight and Judgment Poor judgment;Poor insight;Unrealistic    Desean Balderrama has been up all shift in dayroom. Patient has been irritable at times cussing and yelling. Patient ordered and received Aristada Initio 675mg IM to left gluteal and Aristada 1,064mg IM to right gluteal @1609. Staff and security near by for concern and support for patient. Patient agreeable and tolerated well. Patient resting in bed with eyes closed at this time. Respirations easy and equal. Will continue to monitor for safety.

## 2022-08-16 NOTE — H&P
Hospital Medicine History & Physical      PCP: JOSE Troncoso    Date of Admission: 8/14/2022    Date of Service: Pt seen/examined on 08/16/22     Chief Complaint:    Chief Complaint   Patient presents with    [id number] in by police & mobile crisis for paranoid delusions & threats to kill his brother. Patient arrived delusional and agitated. History Of Present Illness: The patient is a 57 y.o. male with schizophrenia, HTN, HLD, elevated liver enzymes who presented to [facility] for agitation and parnoid delusions. Patient was seen and evaluated in the ED by the ED medical provider, patient was medically cleared for admission to [facility] at [facility]. This note serves as an admission medical H&P. Tobacco use: former  ETOH use: denies  Illicit drug use: denies    HPI limited due to patient's current  paranoid delusions. Past Medical History:        Diagnosis Date    Elevated liver enzymes 9/1/2017    Hypertension     Pure hypercholesterolemia 8/31/2017       Past Surgical History:        Procedure Laterality Date    NOSE SURGERY      deviated septum       Medications Prior to Admission:    Prior to Admission medications    Medication Sig Start Date End Date Taking? Authorizing Provider   fluticasone (FLONASE) 50 MCG/ACT nasal spray  2/22/22   Historical Provider, MD   chlorthalidone (HYGROTON) 25 MG tablet  1/27/22 8/15/22  Historical Provider, MD   LIVALO 2 MG TABS tablet TAKE 1/2 TABLET BY MOUTH AT BEDTIME  10/6/20   Dayanara Suresh MD       Allergies:  Clozaril [clozapine], Crestor [rosuvastatin], Haldol [haloperidol], Invega [paliperidone er], Klonopin [clonazepam], Lipitor [atorvastatin], Mevacor [lovastatin], Olanzapine, Rexulti [brexpiprazole], Simvastatin, Thiothixene, and Katarzyna [place] [cariprazine hcl]    Social History:  The patient currently lives home     TOBACCO:   reports that he quit smoking about 42 years ago. His smoking use included cigarettes.  He has a 0.40 pack-year smoking history. He has never used smokeless tobacco.  ETOH:   reports no history of alcohol use. Family History:   Positive as follows:        Problem Relation Age of Onset    Hypertension Mother     Heart Failure Mother 80    Prostate Cancer Father 52    Cancer Father        REVIEW OF SYSTEMS:     Constitutional: Negative for fever   HENT: Negative for sore throat   Eyes: Negative for redness   Respiratory: Negative  for dyspnea, cough   Cardiovascular: Negative for chest pain   Gastrointestinal: Negative for vomiting, diarrhea   Musculoskeletal: Negative for arthralgias   Neurological: Negative for syncope    Psychiatric/Behavorial: Per psychiatry team evaluation     PHYSICAL EXAM:    /84   Pulse 82   Temp 98.2 °F (36.8 °C) (Oral)   Resp 18   Ht 6' 1\" (1.854 m)   Wt 240 lb (108.9 kg)   SpO2 96%   BMI 31.66 kg/m²     Gen: No distress. Alert. Appears older than stated age  Eyes: PERRL. No sclera icterus. No conjunctival injection. ENT: No discharge. Pharynx clear. Neck: No JVD. Trachea midline. Resp: No accessory muscle use. No crackles. No wheezes. No rhonchi. CV: Regular rate. Regular rhythm. No murmur. No rub. No edema. GI: Non-tender. Non-distended. Normal bowel sounds. Skin: Warm and dry. No nodule on exposed extremities. No rash on exposed extremities. M/S: No cyanosis. No joint deformity. No clubbing. Neuro: Awake. No focal neurologic deficit on exam.  Cranial nerves II through XII intact. Patient is able to ambulate without difficulty. Psych: Per psychiatry team evaluation     CBC:   Recent Labs     08/14/22  1740   WBC 8.6   HGB 18.0*   HCT 53.8*   MCV 90.3        BMP:   Recent Labs     08/14/22  1740      K 3.4*      CO2 27   BUN 19   CREATININE 1.2     LIVER PROFILE:   Recent Labs     08/14/22  1740   AST 45*   ALT 30   BILITOT 0.5   ALKPHOS 68     PT/INR: No results for input(s): PROTIME, INR in the last 72 hours.   APTT: No results for input(s):

## 2022-08-16 NOTE — FLOWSHEET NOTE
08/16/22 1355   Encounter Summary   Encounter Overview/Reason  Initial Encounter;Spiritual/Emotional Needs   Service Provided For: Patient   Referral/Consult From: [address] [facility] Family members   Last Encounter    (8/16 initial, listened and sppt)   Complexity of Encounter Moderate   Begin Time 1330   End Time  1356   Total Time Calculated 26 min

## 2022-08-17 LAB
ANION GAP SERPL CALCULATED.3IONS-SCNC: 10 MMOL/L (ref 3–16)
BASOPHILS ABSOLUTE: 0 K/UL (ref 0–0.2)
BASOPHILS RELATIVE PERCENT: 0.5 %
BUN BLDV-MCNC: 15 MG/DL (ref 7–20)
CALCIUM SERPL-MCNC: 9.6 MG/DL (ref 8.3–10.6)
CHLORIDE BLD-SCNC: 107 MMOL/L (ref 99–110)
CO2: 27 MMOL/L (ref 21–32)
CREAT SERPL-MCNC: 1 MG/DL (ref 0.8–1.3)
EOSINOPHILS ABSOLUTE: 0.3 K/UL (ref 0–0.6)
EOSINOPHILS RELATIVE PERCENT: 4 %
ESTIMATED AVERAGE GLUCOSE: 105.4 MG/DL
GFR AFRICAN AMERICAN: >60
GFR NON-AFRICAN AMERICAN: >60
GLUCOSE BLD-MCNC: 115 MG/DL (ref 70–99)
HBA1C MFR BLD: 5.3 %
HCT VFR BLD CALC: 48.9 % (ref 40.5–52.5)
HEMOGLOBIN: 16.4 G/DL (ref 13.5–17.5)
LYMPHOCYTES ABSOLUTE: 1.6 K/UL (ref 1–5.1)
LYMPHOCYTES RELATIVE PERCENT: 20 %
MCH RBC QN AUTO: 30.4 PG (ref 26–34)
MCHC RBC AUTO-ENTMCNC: 33.6 G/DL (ref 31–36)
MCV RBC AUTO: 90.3 FL (ref 80–100)
MONOCYTES ABSOLUTE: 0.7 K/UL (ref 0–1.3)
MONOCYTES RELATIVE PERCENT: 8.4 %
NEUTROPHILS ABSOLUTE: 5.4 K/UL (ref 1.7–7.7)
NEUTROPHILS RELATIVE PERCENT: 67.1 %
PDW BLD-RTO: 13.6 % (ref 12.4–15.4)
PLATELET # BLD: 249 K/UL (ref 135–450)
PMV BLD AUTO: 8.1 FL (ref 5–10.5)
POTASSIUM REFLEX MAGNESIUM: 3.8 MMOL/L (ref 3.5–5.1)
RBC # BLD: 5.42 M/UL (ref 4.2–5.9)
SODIUM BLD-SCNC: 144 MMOL/L (ref 136–145)
WBC # BLD: 8 K/UL (ref 4–11)

## 2022-08-17 PROCEDURE — 80048 BASIC METABOLIC PNL TOTAL CA: CPT

## 2022-08-17 PROCEDURE — 36415 COLL VENOUS BLD VENIPUNCTURE: CPT

## 2022-08-17 PROCEDURE — 6360000002 HC RX W HCPCS: Performed by: PSYCHIATRY & NEUROLOGY

## 2022-08-17 PROCEDURE — 6370000000 HC RX 637 (ALT 250 FOR IP): Performed by: PSYCHIATRY & NEUROLOGY

## 2022-08-17 PROCEDURE — 99233 SBSQ HOSP IP/OBS HIGH 50: CPT | Performed by: PSYCHIATRY & NEUROLOGY

## 2022-08-17 PROCEDURE — 1240000000 HC EMOTIONAL WELLNESS R&B

## 2022-08-17 PROCEDURE — 85025 COMPLETE CBC W/AUTO DIFF WBC: CPT

## 2022-08-17 PROCEDURE — 6370000000 HC RX 637 (ALT 250 FOR IP): Performed by: NURSE PRACTITIONER

## 2022-08-17 RX ORDER — DIPHENHYDRAMINE HCL 25 MG
50 TABLET ORAL ONCE
Status: COMPLETED | OUTPATIENT
Start: 2022-08-17 | End: 2022-08-17

## 2022-08-17 RX ORDER — LORAZEPAM 2 MG/1
2 TABLET ORAL ONCE
Status: COMPLETED | OUTPATIENT
Start: 2022-08-17 | End: 2022-08-17

## 2022-08-17 RX ORDER — CHLORPROMAZINE HYDROCHLORIDE 25 MG/ML
50 INJECTION INTRAMUSCULAR ONCE
Status: COMPLETED | OUTPATIENT
Start: 2022-08-17 | End: 2022-08-18

## 2022-08-17 RX ADMIN — DIPHENHYDRAMINE HYDROCHLORIDE 50 MG: 50 INJECTION, SOLUTION INTRAMUSCULAR; INTRAVENOUS at 22:16

## 2022-08-17 RX ADMIN — LORAZEPAM 2 MG: 2 TABLET ORAL at 09:17

## 2022-08-17 RX ADMIN — CHLORTHALIDONE 25 MG: 25 TABLET ORAL at 07:55

## 2022-08-17 RX ADMIN — FLUTICASONE PROPIONATE 1 SPRAY: 50 SPRAY, METERED NASAL at 07:42

## 2022-08-17 RX ADMIN — ACETAMINOPHEN 650 MG: 325 TABLET ORAL at 04:10

## 2022-08-17 RX ADMIN — MAGNESIUM HYDROXIDE 30 ML: 400 SUSPENSION ORAL at 10:44

## 2022-08-17 RX ADMIN — DIAZEPAM 10 MG: 10 TABLET ORAL at 06:04

## 2022-08-17 RX ADMIN — ACETAMINOPHEN 650 MG: 325 TABLET ORAL at 18:16

## 2022-08-17 RX ADMIN — DIPHENHYDRAMINE HCL 50 MG: 25 TABLET ORAL at 09:18

## 2022-08-17 ASSESSMENT — PAIN - FUNCTIONAL ASSESSMENT: PAIN_FUNCTIONAL_ASSESSMENT: ACTIVITIES ARE NOT PREVENTED

## 2022-08-17 ASSESSMENT — PAIN DESCRIPTION - DESCRIPTORS
DESCRIPTORS: ACHING;THROBBING
DESCRIPTORS: ACHING

## 2022-08-17 ASSESSMENT — PAIN SCALES - GENERAL
PAINLEVEL_OUTOF10: 10
PAINLEVEL_OUTOF10: 5

## 2022-08-17 ASSESSMENT — PAIN DESCRIPTION - ORIENTATION: ORIENTATION: POSTERIOR

## 2022-08-17 ASSESSMENT — PAIN DESCRIPTION - LOCATION
LOCATION: HEAD
LOCATION: HEAD

## 2022-08-17 NOTE — BH NOTE
Patient has been irritable and labile cussing and yelling. Patient requested and received @ 0918 po benadryl 50mg and Ativan 2mg po per one time order  from provider Dr. Jamey eSna.

## 2022-08-17 NOTE — PROGRESS NOTES
Behavioral Services  Medicare Certification Upon Admission    I certify that this patient's inpatient psychiatric hospital admission is medically necessary for:    [x] (1) Treatment which could reasonably be expected to improve this patient's condition,       [x] (2) Or for diagnostic study;     AND     [x](2) The inpatient psychiatric services are provided while the individual is under the care of a physician and are included in the individualized plan of care.     Estimated length of stay/service 8-10 days    Plan for post-hospital care incomplete     Electronically signed by Carolyn Morelos MD on 8/17/2022 at 11:02 AM

## 2022-08-17 NOTE — PLAN OF CARE
Problem: Rebeka  Goal: Will exhibit normal sleep and speech and no impulsivity  Description: INTERVENTIONS:  1. Administer medication as ordered  2. Set limits on impulsive behavior  3. Make attempts to decrease external stimuli as possible  8/17/2022 1259 by Andrea Prieto LPN  Outcome: Progressing       Problem: Psychosis  Goal: Will report no hallucinations or delusions  Description: INTERVENTIONS:  1. Administer medication as  ordered  2. Assist with reality testing to support increasing orientation  3. Assess if patient's hallucinations or delusions are encouraging self harm or harm to others and intervene as appropriate  8/17/2022 0622 by Justyna Louie RN  Outcome: Progressing       Problem: Behavior  Goal: Pt/Family maintain appropriate behavior and adhere to behavioral management agreement, if implemented  Description: INTERVENTIONS:  1. Assess patient/family's coping skills and  non-compliant behavior (including use of illegal substances)  2. Notify security of behavior or suspected illegal substances which indicate the need for search of the family and/or belongings  3. Encourage verbalization of thoughts and concerns in a socially appropriate manner  4. Utilize positive, consistent limit setting strategies supporting safety of patient, staff and others  5. Encourage participation in the decision making process about the behavioral management agreement  6. If a visitor's behavior poses a threat to safety call refer to organization policy. 7. Initiate consult with , Psychosocial CNS, Spiritual Care as appropriate    Outcome: Not Progressing   Regine Pleitez has been wake since start of shift out in dayroom with pressured speech, shouting loudly RTIS. Patient has agitated and labile shouting \"Braden is a warlock using black magic on my mother. .. He's trying ot kill her and get my money\". Patient in has been medication compliant. Will continue to monitor for safety.

## 2022-08-17 NOTE — PLAN OF CARE
Patient has been asleep most of the shift. Patient woke up briefly at 00:50, went to the bathroom & returned to sleep. Daylin Ridley R.N.  Problem: Anxiety  Goal: Will report anxiety at manageable levels  Description: INTERVENTIONS:  1. Administer medication as ordered  2. Teach and rehearse alternative coping skills  3.  Provide emotional support with 1:1 interaction with staff  8/17/2022 0114 by José Betts RN  Outcome: Progressing  8/16/2022 1827 by Christiano Estrada LPN  Outcome: Not Progressing

## 2022-08-17 NOTE — PLAN OF CARE
Patient continued to escalate, with loud rapid pressured speech & patient appeared to be hyperventilating. Patient was valium 10 mg, po at 06:04. Patient did allow  to draw blood. Problem: Anxiety  Goal: Will report anxiety at manageable levels  Description: INTERVENTIONS:  1. Administer medication as ordered  2. Teach and rehearse alternative coping skills  3. Provide emotional support with 1:1 interaction with staff  8/17/2022 0114 by José Betts RN  Outcome: Progressing  8/16/2022 1827 by Christiano Estrada LPN  Outcome: Not Progressing     Problem: Rebeka  Goal: Will exhibit normal sleep and speech and no impulsivity  Description: INTERVENTIONS:  1. Administer medication as ordered  2. Set limits on impulsive behavior  3. Make attempts to decrease external stimuli as possible  8/17/2022 0622 by José Betts RN  Outcome: Progressing  8/17/2022 0416 by José Betts RN  Outcome: Not Progressing     Problem: Psychosis  Goal: Will report no hallucinations or delusions  Description: INTERVENTIONS:  1. Administer medication as  ordered  2. Assist with reality testing to support increasing orientation  3. Assess if patient's hallucinations or delusions are encouraging self harm or harm to others and intervene as appropriate  8/17/2022 0622 by José Betts RN  Outcome: Progressing  8/17/2022 0416 by José Betts RN  Outcome: Not Progressing     Problem: Behavior  Goal: Pt/Family maintain appropriate behavior and adhere to behavioral management agreement, if implemented  Description: INTERVENTIONS:  1. Assess patient/family's coping skills and  non-compliant behavior (including use of illegal substances)  2. Notify security of behavior or suspected illegal substances which indicate the need for search of the family and/or belongings  3. Encourage verbalization of thoughts and concerns in a socially appropriate manner  4.  Utilize positive, consistent limit setting strategies supporting safety of patient, staff and others  5. Encourage participation in the decision making process about the behavioral management agreement  6. If a visitor's behavior poses a threat to safety call refer to organization policy.   7. Initiate consult with , Psychosocial CNS, Spiritual Care as appropriate  Outcome: Not Progressing

## 2022-08-17 NOTE — PROGRESS NOTES
Department of Psychiatry  Progress Note    Patient's chart was reviewed. Discussed with treatment team. Met with patient. SUBJECTIVE:      Received Vernette Ing and Cleven Lima yesterday without incident. Remains severely psychotic and impaired. Near constant RTIS. Verbally but not physically aggressive. ROS:   Patient has new complaints: no  Sleeping adequately:  No:   Appetite adequate: Yes  Engaged in programming: No    OBJECTIVE:  VITALS:  /77   Pulse 76   Temp 97.1 °F (36.2 °C) (Temporal)   Resp 20   Ht 6' 1\" (1.854 m)   Wt 240 lb (108.9 kg)   SpO2 95%   BMI 31.66 kg/m²     Mental Status Examination:    Appearance: in milieu. Fair hygiene    Behavior/Attitude toward examiner:  Suspicious, paranoid, irritable. Speech:  Non-pressured. Elevated volume. Mood:  \"get away\"  Affect:  Irritable. Thought processes:  Disorganized  Thought Content: no SI, no HI, +paranoid, +delusional   Perceptions: + RTIS  Attention: impaired  Abstraction: impaired  Cognition: impaired  Insight: impaired  Judgment: impaired    Medication:  Scheduled:   chlorthalidone  25 mg Oral Daily    fluticasone  1 spray Each Nostril Daily    pitavastatin  1 mg Oral Nightly        PRN:  acetaminophen, diazePAM, magnesium hydroxide, nicotine polacrilex, aluminum & magnesium hydroxide-simethicone, OLANZapine **OR** OLANZapine (ZyPREXA) in sterile water IntraMUSCular, diphenhydrAMINE       Formulation: This is a well known domiciled, never , psychiatrically  disabled 57-year-old with  schizophrenia, who was brought by  police to our emergency department with worsening psychotic symptoms and agitation in the setting of treatment nonadherence. Principal Problem:    Schizophrenia ([address]  Active Problems:    Essential hypertension    Pure hypercholesterolemia    Hyperlipidemia    Tardive dyskinesia    Hypokalemia  Resolved Problems:    * No resolved hospital problems. *     Plan:  1.   Admitted to inpatient adult psychiatry for stabilization and treatment. 2. On admission, ordered Dinorah Im injections, q15min checks, programming, and prn medication for anxiety, agitation, and insomnia. 3. Internal medicine consult for admission. Hypokalemia  - mild at 3.4  - encourage PO  - repeat BMP in am      HTN  - nursing to verify home medication  - pt stated he is taking a water pill at home, discussed with nursing ok to resume tomorrow once verified. - monitor     HLD  - Continue statin       Elevated Liver Enzymes  - known history  - stable     4. Collateral collected. 5. ELOS 10-15 days. Voluntary by guardian. Total time with patient was 35 minutes and more than 50 % of that time was spent counseling the patient on their symptoms, treatment, and expected goals.      Jorge L Argueta MD

## 2022-08-18 ENCOUNTER — APPOINTMENT (OUTPATIENT)
Dept: GENERAL RADIOLOGY | Age: 62
DRG: 750 | End: 2022-08-18
Payer: COMMERCIAL

## 2022-08-18 LAB
EKG ATRIAL RATE: 80 BPM
EKG DIAGNOSIS: NORMAL
EKG P AXIS: 54 DEGREES
EKG P-R INTERVAL: 156 MS
EKG Q-T INTERVAL: 364 MS
EKG QRS DURATION: 84 MS
EKG QTC CALCULATION (BAZETT): 419 MS
EKG R AXIS: -56 DEGREES
EKG T AXIS: -21 DEGREES
EKG VENTRICULAR RATE: 80 BPM

## 2022-08-18 PROCEDURE — 71045 X-RAY EXAM CHEST 1 VIEW: CPT

## 2022-08-18 PROCEDURE — 6370000000 HC RX 637 (ALT 250 FOR IP): Performed by: PSYCHIATRY & NEUROLOGY

## 2022-08-18 PROCEDURE — 93010 ELECTROCARDIOGRAM REPORT: CPT | Performed by: INTERNAL MEDICINE

## 2022-08-18 PROCEDURE — 99233 SBSQ HOSP IP/OBS HIGH 50: CPT | Performed by: PSYCHIATRY & NEUROLOGY

## 2022-08-18 PROCEDURE — 93005 ELECTROCARDIOGRAM TRACING: CPT | Performed by: NURSE PRACTITIONER

## 2022-08-18 PROCEDURE — 2500000003 HC RX 250 WO HCPCS: Performed by: PSYCHIATRY & NEUROLOGY

## 2022-08-18 PROCEDURE — 1240000000 HC EMOTIONAL WELLNESS R&B

## 2022-08-18 PROCEDURE — 6370000000 HC RX 637 (ALT 250 FOR IP): Performed by: NURSE PRACTITIONER

## 2022-08-18 RX ORDER — DIPHENHYDRAMINE HCL 25 MG
50 TABLET ORAL ONCE
Status: COMPLETED | OUTPATIENT
Start: 2022-08-18 | End: 2022-08-18

## 2022-08-18 RX ORDER — DOXYCYCLINE HYCLATE 100 MG
100 TABLET ORAL EVERY 12 HOURS SCHEDULED
Status: COMPLETED | OUTPATIENT
Start: 2022-08-18 | End: 2022-08-23

## 2022-08-18 RX ORDER — DIPHENHYDRAMINE HCL 25 MG
50 TABLET ORAL 3 TIMES DAILY PRN
Status: DISCONTINUED | OUTPATIENT
Start: 2022-08-18 | End: 2022-08-30 | Stop reason: HOSPADM

## 2022-08-18 RX ORDER — LORAZEPAM 2 MG/1
2 TABLET ORAL 3 TIMES DAILY PRN
Status: DISCONTINUED | OUTPATIENT
Start: 2022-08-18 | End: 2022-08-18

## 2022-08-18 RX ORDER — LORAZEPAM 2 MG/1
2 TABLET ORAL ONCE
Status: COMPLETED | OUTPATIENT
Start: 2022-08-18 | End: 2022-08-18

## 2022-08-18 RX ORDER — LORAZEPAM 2 MG/1
2 TABLET ORAL DAILY PRN
Status: DISCONTINUED | OUTPATIENT
Start: 2022-08-18 | End: 2022-08-30 | Stop reason: HOSPADM

## 2022-08-18 RX ORDER — LORAZEPAM 2 MG/1
2 TABLET ORAL 2 TIMES DAILY
Status: DISCONTINUED | OUTPATIENT
Start: 2022-08-18 | End: 2022-08-22

## 2022-08-18 RX ADMIN — DIPHENHYDRAMINE HCL 50 MG: 25 TABLET ORAL at 10:15

## 2022-08-18 RX ADMIN — ACETAMINOPHEN 650 MG: 325 TABLET ORAL at 09:56

## 2022-08-18 RX ADMIN — LORAZEPAM 2 MG: 2 TABLET ORAL at 10:15

## 2022-08-18 RX ADMIN — LORAZEPAM 2 MG: 2 TABLET ORAL at 19:38

## 2022-08-18 RX ADMIN — ACETAMINOPHEN 650 MG: 325 TABLET ORAL at 13:52

## 2022-08-18 RX ADMIN — DIPHENHYDRAMINE HCL 50 MG: 25 TABLET ORAL at 19:38

## 2022-08-18 RX ADMIN — ALUMINUM HYDROXIDE, MAGNESIUM HYDROXIDE, AND SIMETHICONE 30 ML: 200; 200; 20 SUSPENSION ORAL at 18:07

## 2022-08-18 RX ADMIN — ACETAMINOPHEN 650 MG: 325 TABLET ORAL at 19:38

## 2022-08-18 RX ADMIN — FLUTICASONE PROPIONATE 1 SPRAY: 50 SPRAY, METERED NASAL at 09:57

## 2022-08-18 RX ADMIN — CHLORPROMAZINE HYDROCHLORIDE 50 MG: 25 INJECTION INTRAMUSCULAR at 05:46

## 2022-08-18 RX ADMIN — CHLORTHALIDONE 25 MG: 25 TABLET ORAL at 09:56

## 2022-08-18 RX ADMIN — DOXYCYCLINE HYCLATE 100 MG: 100 TABLET, COATED ORAL at 13:52

## 2022-08-18 RX ADMIN — MAGNESIUM HYDROXIDE 30 ML: 400 SUSPENSION ORAL at 10:36

## 2022-08-18 ASSESSMENT — PAIN DESCRIPTION - LOCATION
LOCATION: HEAD
LOCATION: HEAD

## 2022-08-18 ASSESSMENT — PAIN SCALES - GENERAL
PAINLEVEL_OUTOF10: 6
PAINLEVEL_OUTOF10: 0
PAINLEVEL_OUTOF10: 2
PAINLEVEL_OUTOF10: 0
PAINLEVEL_OUTOF10: 0

## 2022-08-18 NOTE — PLAN OF CARE
Patient observed in room resting upon assessment,Woke up and became verbally aggressive,yelling and demanding for a brown T.Shirt. Patient c/o to yell and cuss and fixated on his brother saying his brother killed his mother and with so much flight of ideas jumping from one topic to another and cussing out staff. Patient cannot be re-direct able nor console  but c/o to be demanding and rude. Will intervene as required. .    Problem: Behavior  Goal: Pt/Family maintain appropriate behavior and adhere to behavioral management agreement, if implemented  Description: INTERVENTIONS:  1. Assess patient/family's coping skills and  non-compliant behavior (including use of illegal substances)  2. Notify security of behavior or suspected illegal substances which indicate the need for search of the family and/or belongings  3. Encourage verbalization of thoughts and concerns in a socially appropriate manner  4. Utilize positive, consistent limit setting strategies supporting safety of patient, staff and others  5. Encourage participation in the decision making process about the behavioral management agreement  6. If a visitor's behavior poses a threat to safety call refer to organization policy.   7. Initiate consult with , Psychosocial CNS, Spiritual Care as appropriate  Outcome: Not Progressing

## 2022-08-18 NOTE — BH NOTE
Patient is resting quietly in bed at this time with eyes closed respirations are easy and equal. Will continue to  monitor for safety.

## 2022-08-18 NOTE — PROGRESS NOTES
Department of Psychiatry  Progress Note    Patient's chart was reviewed. Discussed with treatment team. Met with patient. SUBJECTIVE:      Challenging  night last night - agitated, delusional, awake. Remains severely psychotic and impaired. constant RTIS. Verbally but not physically aggressive. ROS:   Patient has new complaints: no  Sleeping adequately:  No:   Appetite adequate: Yes  Engaged in programming: No    OBJECTIVE:  VITALS:  /86   Pulse (!) 105   Temp 98.7 °F (37.1 °C) (Oral)   Resp 20   Ht 6' 1\" (1.854 m)   Wt 240 lb (108.9 kg)   SpO2 94%   BMI 31.66 kg/m²     Mental Status Examination:    Appearance: in milieu. Poor hygiene    Behavior/Attitude toward examiner:  Suspicious, paranoid, irritable. Speech:  Non-pressured. Elevated volume. Mood:  \"fine I'm out of here today\"  Affect:  Irritable. Thought processes:  Disorganized  Thought Content: no SI, no HI, +paranoid, +delusional   Perceptions: + RTIS  Attention: impaired  Abstraction: impaired  Cognition: impaired  Insight: impaired  Judgment: impaired    Medication:  Scheduled:   LORazepam  2 mg Oral Once    diphenhydrAMINE  50 mg Oral Once    chlorthalidone  25 mg Oral Daily    fluticasone  1 spray Each Nostril Daily    pitavastatin  1 mg Oral Nightly        PRN:  LORazepam, [DISCONTINUED] OLANZapine **OR** OLANZapine (ZyPREXA) in sterile water IntraMUSCular, diphenhydrAMINE, acetaminophen, magnesium hydroxide, nicotine polacrilex, aluminum & magnesium hydroxide-simethicone       Formulation: This is a well known domiciled, never , psychiatrically  disabled 57-year-old with  schizophrenia, who was brought by  police to our emergency department with worsening psychotic symptoms and agitation in the setting of treatment nonadherence.     Principal Problem:    Schizophrenia ([address]  Active Problems:    Essential hypertension    Pure hypercholesterolemia    Hyperlipidemia    Tardive dyskinesia    Hypokalemia  Resolved

## 2022-08-18 NOTE — PROGRESS NOTES
Pt complaining about not being able to breathe while screaming profanities at staff. Writer checked pt's pulse ox. Pulse 105 O2 94%. Pt took off the sensor and threw it and continued to scream profanities at staff.

## 2022-08-18 NOTE — PROGRESS NOTES
Pt has been screaming off and on for roughly the past hour. Pt has been calling the previous nurse racial slurs and other insults. Offered benedryl IM by the previous nurse, which pt accepted. IM benedryl administered. Will monitor.

## 2022-08-18 NOTE — BH NOTE
Patient c/o to insult writer and at this time demanding for Ativan,Yelling out loud that he does not take no psychotic medication but demanding for Ativan at this time. Writer notified Psychiatrist on call;Dr Geovanny Kiser. Dr Geovanny Kiser wants  Thorazine I.M medication to be given rather than Ativan medication.

## 2022-08-18 NOTE — BH NOTE
Patient woke up this morning and walked up to the team station yelling;\"Nigga,what is the time? \"  Patient in room 5 shouted; \"That is so rude,you don't talk to people like that\". Samantha Madison c/o to yell. insult and threatening-Thorazine 50mg I.M administered at this time with 2 Security present. Patient began to yell and cuss. \"Nigga,I don't take psychotic medication\". Medication administered hands on. Patient  spitted on security during med-administration. Patient asked to go to his room and limit setting enforced. Will monitor for effectiveness and c/o to re-direct Patient as needed.

## 2022-08-18 NOTE — PROGRESS NOTES
Pt laying in bed at start of shift. Offgoing nurse administered PRN tylenol, benedryl, and ativan by pt request before leaving.

## 2022-08-18 NOTE — PROGRESS NOTES
Pt currently in room, screaming at another pt (who is upset and crying because of his outburst) to \"shut the fuck up! \" Staff monitoring. Other pt being moved to other side of unit.

## 2022-08-18 NOTE — FLOWSHEET NOTE
Was c/o trouble breathing on previous shift, but uncooperative with care and agitated. Then went to room and slept. When writer assumed care, pt slept until 36. On waking, pt was coop with VS, compliant with sched meds. C/o headache and anxiety. Tylenol 650mg PO administered for headache. Offered Valium, pt refused and requested benadryl and Ativan. Discussed with Dr. Jimbo Rai. Obtained order for and administered Ativan 2mg PO and Benadryl 50mg PO. Pt accepted. MOM also administered per pt request. At this time, pt was very anxious and MEWs score 3. -125   Pt then began c/o trouble breathing, pt noted to be making attempts at slow deep breathing.  O2 93. See notification doc below. New order for ekg and cxr entered. Pt returned to room to rest.          08/18/22 0950   Vital Signs   Temp 98.3 °F (36.8 °C)   Temp Source Oral   Heart Rate (!) 115   Heart Rate Source Monitor   Resp 18   /68   BP Location Left upper arm   BP Method Automatic   MAP (Calculated) 85   Patient Position Sitting   Level of Consciousness Alert (0)   MEWS Score 3   Pain Assessment   Pain Assessment 0-10   Pain Level 6   Pain Location Head   Oxygen Therapy   SpO2 96 %   O2 Device None (Room air)          08/18/22 1045   Neurological   Level of Consciousness Alert (0)   Rhythm Interpretation   Heart Rate (!) 140   Treatment Team Notification   Reason for Communication Abnormal vitals; Evaluate   Team Member Name MARYANN Chu   Treatment Team Role Advanced Practice Nurse   Method of Communication Secure Message   Response See orders   Notification Time [id number]

## 2022-08-18 NOTE — PLAN OF CARE
Pt a/ox self, place, time, somewhat to situation. Continues to be compliant with VS, meds, imaging and tests. Accepted abx. +RTIS at times, talking to unseen person, not agitated. Pt made several calls to his mother today asking appropriately, using appropriate tone of voice--even apologized to this nurse for being loud briefly. Returned phone when requested. Pt also watched TV and tolerated being told he would not be discharged today very well. Pt continues to believe brother is warlock that wants to kill his mother. Brother called and expressed concern that, if discharged prior to [address] being fully effective, pt would prevent others, including nursing staff from accessing and providing care to their dying mother. This evening pt made some nonsensical religiously-oriented statements and then made some racially charged statements. Pt was redirected from topic. Remains free from fall, harm, aggressive/disruptive behaviors. /61   Pulse 76   Temp 98.3 °F (36.8 °C) (Oral)   Resp 20   Ht 6' 1\" (1.854 m)   Wt 240 lb (108.9 kg)   SpO2 95%   BMI 31.66 kg/m²     Problem: Anxiety  Goal: Will report anxiety at manageable levels  Description: INTERVENTIONS:  1. Administer medication as ordered  2. Teach and rehearse alternative coping skills  3. Provide emotional support with 1:1 interaction with staff  Outcome: Progressing     Problem: Depression/Self Harm  Goal: Effect of psychiatric condition will be minimized and patient will be protected from self harm  Description: INTERVENTIONS:  1. Assess impact of patient's symptoms on level of functioning, self care needs and offer support as indicated  2. Assess patient/family knowledge of depression, impact on illness and need for teaching  3. Provide emotional support, presence and reassurance  4. Assess for possible suicidal thoughts or ideation.  If patient expresses suicidal thoughts or statements do not leave alone, initiate Suicide

## 2022-08-19 PROCEDURE — 6370000000 HC RX 637 (ALT 250 FOR IP): Performed by: PSYCHIATRY & NEUROLOGY

## 2022-08-19 PROCEDURE — 6370000000 HC RX 637 (ALT 250 FOR IP): Performed by: NURSE PRACTITIONER

## 2022-08-19 PROCEDURE — 99233 SBSQ HOSP IP/OBS HIGH 50: CPT | Performed by: PSYCHIATRY & NEUROLOGY

## 2022-08-19 PROCEDURE — 1240000000 HC EMOTIONAL WELLNESS R&B

## 2022-08-19 RX ADMIN — DOXYCYCLINE HYCLATE 100 MG: 100 TABLET, COATED ORAL at 08:48

## 2022-08-19 RX ADMIN — ACETAMINOPHEN 650 MG: 325 TABLET ORAL at 04:42

## 2022-08-19 RX ADMIN — FLUTICASONE PROPIONATE 1 SPRAY: 50 SPRAY, METERED NASAL at 08:52

## 2022-08-19 RX ADMIN — LORAZEPAM 2 MG: 2 TABLET ORAL at 15:07

## 2022-08-19 RX ADMIN — DIPHENHYDRAMINE HCL 50 MG: 25 TABLET ORAL at 15:07

## 2022-08-19 RX ADMIN — MAGNESIUM HYDROXIDE 30 ML: 400 SUSPENSION ORAL at 15:16

## 2022-08-19 RX ADMIN — LORAZEPAM 2 MG: 2 TABLET ORAL at 08:44

## 2022-08-19 RX ADMIN — LORAZEPAM 2 MG: 2 TABLET ORAL at 23:08

## 2022-08-19 RX ADMIN — DIPHENHYDRAMINE HCL 50 MG: 25 TABLET ORAL at 08:48

## 2022-08-19 RX ADMIN — DOXYCYCLINE HYCLATE 100 MG: 100 TABLET, COATED ORAL at 23:08

## 2022-08-19 RX ADMIN — CHLORTHALIDONE 25 MG: 25 TABLET ORAL at 08:51

## 2022-08-19 ASSESSMENT — PAIN - FUNCTIONAL ASSESSMENT: PAIN_FUNCTIONAL_ASSESSMENT: ACTIVITIES ARE NOT PREVENTED

## 2022-08-19 ASSESSMENT — PAIN DESCRIPTION - LOCATION: LOCATION: HEAD

## 2022-08-19 ASSESSMENT — PAIN SCALES - GENERAL
PAINLEVEL_OUTOF10: 6
PAINLEVEL_OUTOF10: 0

## 2022-08-19 ASSESSMENT — PAIN DESCRIPTION - DESCRIPTORS: DESCRIPTORS: ACHING

## 2022-08-19 NOTE — BH NOTE
Patients sisters Bee Daigle called stating she lives in [place] and is wanting to leave a message for provider asking for provider to keep patient as long as possible. Reporting she does not feel it is the best interest of their mother who is guardian of Serg Castillo. As patients mother has chest tube drain and is receiving hospice care with visiting nurses are coming out to their home and feels Serg Castillo can not return home \"for fear of how he might act \". Bee Daigle also stated they do not know or have a plan for who may want to apply for guardianship stating \" I can't I live in [place] and not in the same state. . And I don't believe my brother Elke Marin will and all I know is right now he can't come home right now and guardianship doesn't mean anything anyway\".   Char[facility] number [phone]

## 2022-08-19 NOTE — PLAN OF CARE
Problem: Anxiety  Goal: Will report anxiety at manageable levels  Description: INTERVENTIONS:  1. Administer medication as ordered  2. Teach and rehearse alternative coping skills  3. Provide emotional support with 1:1 interaction with staff  Outcome: Progressing     Problem: Depression/Self Harm  Goal: Effect of psychiatric condition will be minimized and patient will be protected from self harm  Description: INTERVENTIONS:  1. Assess impact of patient's symptoms on level of functioning, self care needs and offer support as indicated  2. Assess patient/family knowledge of depression, impact on illness and need for teaching  3. Provide emotional support, presence and reassurance  4. Assess for possible suicidal thoughts or ideation. If patient expresses suicidal thoughts or statements do not leave alone, initiate Suicide Precautions, move to a room close to the nursing station and obtain sitter  5. Initiate consults as appropriate with Mental Health Professional, Spiritual Care, Psychosocial CNS, and consider a recommendation to the LIP for a Psychiatric Consultation  Outcome: Progressing      08/19/22 1346   Mental Status and Behavioral Exam   Normal No   Level of Assistance Independent/Self   Facial Expression Flat   Affect Blunt   Level of Consciousness Alert   Frequency of Checks 4 times per hour, close   Mood:Normal No   Mood Anxious   Motor Activity:Normal Yes   Eye Contact Fair   Observed Behavior Cooperative   Sexual Misconduct History Current - no   Preception Whiteface to person;Whiteface to place   Attention:Normal No   Attention Distractible   Thought Processes Flight of ideas   Thought Content:Normal No   Thought Content Preoccupations;Delusions   Depression Symptoms No problems reported or observed.    Anxiety Symptoms Generalized   Rebeka Symptoms Poor judgment   Hallucinations Auditory (comment)   Delusions Yes   Memory:Normal No   Memory Confabulation   Insight and Judgment No   Insight and Judgment Poor judgment;Poor insight

## 2022-08-19 NOTE — PROGRESS NOTES
Tylenol given per request d/t a HA. Pt denies ice/heat therapy. He is calm and cooperative at this time but has underlying irritability and is asking for d/c stating he has Darien Less been here five days\" emotional support provided. Pt return to bed stating he is going to meditate.

## 2022-08-19 NOTE — PROGRESS NOTES
Writer brought dinner tray to pt, pt responded \"put it down right there and get out of my face. You make me sick! \" Mohini Real left try on bed side table as requested by pt.

## 2022-08-19 NOTE — PLAN OF CARE
Problem: Psychosis  Goal: Will report no hallucinations or delusions  Description: INTERVENTIONS:  1. Administer medication as  ordered  2. Assist with reality testing to support increasing orientation  3. Assess if patient's hallucinations or delusions are encouraging self harm or harm to others and intervene as appropriate  Outcome: Not Progressing     Problem: Anxiety  Goal: Will report anxiety at manageable levels  Description: INTERVENTIONS:  1. Administer medication as ordered  2. Teach and rehearse alternative coping skills  3. Provide emotional support with 1:1 interaction with staff  8/18/2022 2155 by Eloisa Rai RN  Outcome: Progressing  8/18/2022 1812 by Mike Dumont RN  Outcome: Progressing     Problem: Depression/Self Harm  Goal: Effect of psychiatric condition will be minimized and patient will be protected from self harm  Description: INTERVENTIONS:  1. Assess impact of patient's symptoms on level of functioning, self care needs and offer support as indicated  2. Assess patient/family knowledge of depression, impact on illness and need for teaching  3. Provide emotional support, presence and reassurance  4. Assess for possible suicidal thoughts or ideation. If patient expresses suicidal thoughts or statements do not leave alone, initiate Suicide Precautions, move to a room close to the nursing station and obtain sitter  5.  Initiate consults as appropriate with Mental Health Professional, Spiritual Care, Psychosocial CNS, and consider a recommendation to the LIP for a Psychiatric Consultation  8/18/2022 2155 by Eloisa Rai RN  Outcome: Progressing  8/18/2022 1812 by Mike Dumont RN  Outcome: Progressing     Problem: Pain  Goal: Verbalizes/displays adequate comfort level or baseline comfort level  8/18/2022 2155 by Eloisa Rai RN  Outcome: Progressing  8/18/2022 1812 by Mike Dumont RN  Outcome: Progressing     Problem: Rebeka  Goal: Will exhibit normal sleep and speech and no impulsivity  Description: INTERVENTIONS:  1. Administer medication as ordered  2. Set limits on impulsive behavior  3. Make attempts to decrease external stimuli as possible  Outcome: Progressing     Problem: Behavior  Goal: Pt/Family maintain appropriate behavior and adhere to behavioral management agreement, if implemented  Description: INTERVENTIONS:  1. Assess patient/family's coping skills and  non-compliant behavior (including use of illegal substances)  2. Notify security of behavior or suspected illegal substances which indicate the need for search of the family and/or belongings  3. Encourage verbalization of thoughts and concerns in a socially appropriate manner  4. Utilize positive, consistent limit setting strategies supporting safety of patient, staff and others  5. Encourage participation in the decision making process about the behavioral management agreement  6. If a visitor's behavior poses a threat to safety call refer to organization policy. 7. Initiate consult with , Psychosocial CNS, Spiritual Care as appropriate  Outcome: Progressing     Problem: Psychosis  Goal: Will report no hallucinations or delusions  Description: INTERVENTIONS:  1. Administer medication as  ordered  2. Assist with reality testing to support increasing orientation  3. Assess if patient's hallucinations or delusions are encouraging self harm or harm to others and intervene as appropriate  Outcome: Not Progressing   Pt not labile currently. Has had a better day than yesterday, sleeping at start of shift. Pleasant, cooperative. Offgoing nurse gave ativan, benedryl, and tylenol and he has been sleeping since then. Remains delusional, but denies SI/HI and CFS.

## 2022-08-19 NOTE — GROUP NOTE
Group Therapy Note    Date: 8/19/2022    Group Start Time: 1100  Group End Time: 0048  Group Topic: Psychoeducation    [facility]        Group Therapy Note    Topic: The Cycle of Anxiety, Distraction vs Releasing coping skills    Attendees: 6       Notes:  Radha Balta was present during session, requiring constant redirection to group topics while hyperverbal and unfocused.      Status After Intervention:  Decompensated    Participation Level: Monopolizing    Participation Quality: Inappropriate and Intrusive      Speech:  pressured and loud      Thought Process/Content: Flight of ideas      Affective Functioning: Exaggerated      Mood: elevated      Level of consciousness:  Preoccupied      Response to Learning: Resistant      Endings: None Reported    Modes of Intervention: Education, Support, Socialization, Exploration, Clarifying, and Problem-solving      Discipline Responsible: Psychoeducational Specialist      Signature:  Michelle Fleming MM, MT-BC

## 2022-08-19 NOTE — PROGRESS NOTES
Contacted pt brother per pharmacy request to ask if pt cholesterol medication can be brought in from home. Pt brother agreed to bring in medication.

## 2022-08-19 NOTE — GROUP NOTE
Group Therapy Note    Date: 8/19/2022    Group Start Time: 1000  Group End Time: 9027  Group Topic: Cognitive Skills    [id number] [address] [[email or web address]        Group Therapy Note    Attendees: 7    Group members broke into groups and engaged in multiple games of UGE. Notes:  Kaycee Corona attended group for the full duration. Kaycee Corona remained engaged and interacted approrpiately with other members of the group. At times Kaycee Corona got off topic, but was easily able to be redirected. Status After Intervention:  Improved    Participation Level:  Active Listener, Interactive, and Monopolizing    Participation Quality: Appropriate      Speech:  pressured      Thought Process/Content: Linear      Affective Functioning: Exaggerated      Mood: elevated      Level of consciousness:  Alert      Response to Learning: Able to verbalize current knowledge/experience      Endings: None Reported    Modes of Intervention: Socialization, Exploration, and Activity      Discipline Responsible: Behavorial Health Tech      Signature:  SHY Benitez

## 2022-08-20 PROBLEM — E87.6 HYPOKALEMIA: Status: RESOLVED | Noted: 2022-02-27 | Resolved: 2022-08-20

## 2022-08-20 PROCEDURE — 6370000000 HC RX 637 (ALT 250 FOR IP): Performed by: NURSE PRACTITIONER

## 2022-08-20 PROCEDURE — 1240000000 HC EMOTIONAL WELLNESS R&B

## 2022-08-20 PROCEDURE — 6370000000 HC RX 637 (ALT 250 FOR IP): Performed by: PSYCHIATRY & NEUROLOGY

## 2022-08-20 RX ORDER — OLANZAPINE 10 MG/1
10 TABLET, ORALLY DISINTEGRATING ORAL EVERY 8 HOURS PRN
Status: DISCONTINUED | OUTPATIENT
Start: 2022-08-20 | End: 2022-08-27

## 2022-08-20 RX ADMIN — DIPHENHYDRAMINE HCL 50 MG: 25 TABLET ORAL at 13:24

## 2022-08-20 RX ADMIN — DOXYCYCLINE HYCLATE 100 MG: 100 TABLET, COATED ORAL at 20:12

## 2022-08-20 RX ADMIN — MAGNESIUM HYDROXIDE 30 ML: 400 SUSPENSION ORAL at 20:15

## 2022-08-20 RX ADMIN — LORAZEPAM 2 MG: 2 TABLET ORAL at 20:12

## 2022-08-20 RX ADMIN — LORAZEPAM 2 MG: 2 TABLET ORAL at 07:56

## 2022-08-20 RX ADMIN — DIPHENHYDRAMINE HCL 50 MG: 25 TABLET ORAL at 07:55

## 2022-08-20 RX ADMIN — DOXYCYCLINE HYCLATE 100 MG: 100 TABLET, COATED ORAL at 07:56

## 2022-08-20 RX ADMIN — LORAZEPAM 2 MG: 2 TABLET ORAL at 13:24

## 2022-08-20 RX ADMIN — FLUTICASONE PROPIONATE 1 SPRAY: 50 SPRAY, METERED NASAL at 07:56

## 2022-08-20 RX ADMIN — DIPHENHYDRAMINE HCL 50 MG: 25 TABLET ORAL at 20:12

## 2022-08-20 RX ADMIN — CHLORTHALIDONE 25 MG: 25 TABLET ORAL at 07:55

## 2022-08-20 NOTE — PROGRESS NOTES
Patient to the team station & demanded benadryl & atIvan for EPS. Patient was instructed that it was too early for the ativan. Patient yelled,\"I have to take them both together. \" Patient declined offer of benadryl.  Huang Ridley R.N.

## 2022-08-20 NOTE — PLAN OF CARE
Patient appeared to be sleeping until 23:05. Patient vitals were checked at that time & 21:00 scheduled medications were given & patient was cooperative. Patient started screaming, when asked where his medication, livalo came from & writer stated that his brother, Ward Esquivel brought it, earlier, on the previous shift. \"I'll kill him with my bare hands. \" Patient came out to the dayroom & was given a snack & orange juice per request. Patient continued to focus on his brother, Ward Esquivel. \"He is a warlock & has done black magic on me. He killed our mother. \" Patient agitated & continued to yell. Patient asked to watched television at 00:00 & was instructed that the television went off at 11 pm & could watch it at 7am. Patient Lizbeth Hives" & returns to his room. Patient also reported that he fired Dr. Joan Garza 3 times & that he should of been discharged yesterday. Problem: Rebeka  Goal: Will exhibit normal sleep and speech and no impulsivity  Description: INTERVENTIONS:  1. Administer medication as ordered  2. Set limits on impulsive behavior  3. Make attempts to decrease external stimuli as possible  Outcome: Not Progressing     Problem: Psychosis  Goal: Will report no hallucinations or delusions  Description: INTERVENTIONS:  1. Administer medication as  ordered  2. Assist with reality testing to support increasing orientation  3. Assess if patient's hallucinations or delusions are encouraging self harm or harm to others and intervene as appropriate  Outcome: Not Progressing     Problem: Behavior  Goal: Pt/Family maintain appropriate behavior and adhere to behavioral management agreement, if implemented  Description: INTERVENTIONS:  1. Assess patient/family's coping skills and  non-compliant behavior (including use of illegal substances)  2. Notify security of behavior or suspected illegal substances which indicate the need for search of the family and/or belongings  3.  Encourage verbalization of thoughts and concerns in a socially appropriate manner  4. Utilize positive, consistent limit setting strategies supporting safety of patient, staff and others  5. Encourage participation in the decision making process about the behavioral management agreement  6. If a visitor's behavior poses a threat to safety call refer to organization policy.   7. Initiate consult with , Psychosocial CNS, Spiritual Care as appropriate  Outcome: Not Progressing

## 2022-08-20 NOTE — BH NOTE
Able to attend group without behavior. Some intrusion noted. Ate noon meal with peers on the larger side of unit. Requested Ativan and Benadryl and accepted the response that it was to early to repeat. Reported taking \"I take a lot more at home\".

## 2022-08-20 NOTE — BH NOTE
\"Give me a toothbrush, tooth paste and mouth wash, NOW\". Yelling and demanding. Diet and fluids taken good. Sat at table on the small side to eat meal. Brought tray to the team station, when finished eating, and slammed the tray on the counter. Compliant receiving scheduled medication. 6516 received Benadryl 50 mg as requested. Currently lying on his bed in his assigned room.

## 2022-08-20 NOTE — PROGRESS NOTES
08/20/22 1418   Encounter Summary   Encounter Overview/Reason  Spiritual/Emotional Needs   Service Provided For: Patient   Referral/Consult From: Patient   Last Encounter    (8/20 follow up, listened and sppt)   Complexity of Encounter Moderate   Begin Time 1255   End Time  1310   Total Time Calculated 15 min

## 2022-08-20 NOTE — PROGRESS NOTES
Department of Psychiatry  AttendingProgress Note  Chief Complaint: Schizophrenia    Patient's chart was reviewed and collaborated with  about the treatment plan. SUBJECTIVE:    Pt irritable on the unit today. On the phone with mother when I arrived, explaining how his brother was going to kill her. Pt able to go on the large side today, remains loud and at times inappropriate. Able to be redirected by staff. I appraoched pt to speak with him and he told me to \"get the hell away\". Patient is feeling unchanged. Suicidal ideation:  denies suicidal ideation. Patient does not have medication side effects. ROS: Patient has new complaints: no  Sleeping adequately:  Yes   Appetite adequate: Yes  Attending groups: No:   Visitors:No    OBJECTIVE    Physical  VITALS:  BP (!) 136/91   Pulse 87   Temp 97 °F (36.1 °C) (Oral)   Resp 16   Ht 6' 1\" (1.854 m)   Wt 240 lb (108.9 kg)   SpO2 98%   BMI 31.66 kg/m²     Mental Status Examination:  Patients appearance was well-appearing, hospital attire, and in chair. Thoughts are Wirt. Homicidal ideations none. No abnormal movements, tics or mannerisms. Memory intact Aims 0. Concentration Poor. Alert and oriented X 4. Insight and Judgement paranoid ideations. Patient was uncooperative.  Patient gait normal. Mood irritable, affect agitation Hallucinations Absent, suicidal ideations no specific plan to harm self Speech loud    Data  Labs:   Admission on 08/14/2022   Component Date Value Ref Range Status    Acetaminophen Level 08/14/2022 <5 (A) 10 - 30 ug/mL Final    Comment: Therapeutic Range: 10.0-30.0 ug/mL  Toxic: >=150 ug/mL      WBC 08/14/2022 8.6  4.0 - 11.0 K/uL Final    RBC 08/14/2022 5.96 (A) 4.20 - 5.90 M/uL Final    Hemoglobin 08/14/2022 18.0 (A) 13.5 - 17.5 g/dL Final    Hematocrit 08/14/2022 53.8 (A) 40.5 - 52.5 % Final    MCV 08/14/2022 90.3  80.0 - 100.0 fL Final    MCH 08/14/2022 30.3  26.0 - 34.0 pg Final    MCHC 08/14/2022 33.6 as defined by Roche. Value may be falsely increased. Suggest reorder and recollection if  clinically indicated. Ethanol Lvl 08/14/2022 None Detected  mg/dL Final    Comment:    None Detected  Conversion factor:  100 mg/dl = .100 g/dl  For Medical Purposes Only      Salicylate, Serum 93/[birth year]/1906 <0.3 (A) 15.0 - 30.0 mg/dL Final    Comment: Therapeutic Range: 15.0-30.0 mg/dL  Toxic: >30.0 mg/dL      Amphetamine Screen, Urine 08/15/2022 Neg  Negative <1000ng/mL Final    Barbiturate Screen, Ur 08/15/2022 Neg  Negative <200 ng/mL Final    Benzodiazepine Screen, Urine 08/15/2022 POSITIVE (A) Negative <200 ng/mL Final    Cannabinoid Scrn, Ur 08/15/2022 Neg  Negative <50 ng/mL Final    Cocaine Metabolite Screen, Urine 08/15/2022 Neg  Negative <300 ng/mL Final    Opiate Scrn, Ur 08/15/2022 Neg  Negative <300 ng/mL Final    Comment: \"Therapeutic levels of pain medication, especially oxycontin and synthetic  opioids, may not be detected by this Methodology. Pain management screen  panel  Drug panel-PM-Hi Res Ur, Interp (PAIN) should be considered for drug  monitoring \". PCP Screen, Urine 08/15/2022 Neg  Negative <25 ng/mL Final    Methadone Screen, Urine 08/15/2022 Neg  Negative <300 ng/mL Final    Propoxyphene Scrn, Ur 08/15/2022 Neg  Negative <300 ng/mL Final    Oxycodone Urine 08/15/2022 Neg  Negative <100 ng/ml Final    pH, UA 08/15/2022 6.0   Final    Comment: Urine pH less than 5.0 or greater than 8.0 may indicate sample adulteration. Another sample should be collected if clinically  indicated. Drug Screen Comment: 08/15/2022 see below   Final    Comment: This method is a screening test to detect only these drug  classes as part of a medical workup. Confirmatory testing  by another method should be ordered if clinically indicated.       SARS-CoV-2 RNA, RT PCR 08/14/2022 NOT DETECTED  NOT DETECTED Final    Comment: Not Detected results do not preclude SARS-CoV-2 infection and  should not be used as the sole basis for patient management  decisions. Results must be combined with clinical observations,  patient history, and epidemiological information. Testing was performed using CHE TRAV SARS-CoV-2 and Influenza A/B  nucleic acid assay. This test is a multiplex Real-Time Reverse  Transcriptase Polymerase Chain Reaction (RT-PCR)-based in vitro  diagnostic test intended for the qualitative detection of nucleic  acids from SARS-CoV-2, influenza A, and influenza B in nasopharyngeal  and nasal swab specimens for use under the FDAs Emergency Use  Authorization (EUA) only.     Patient Fact Sheet:  FindDrives.pl  Provider Fact Sheet: FindDrives.pl  EUA: FindDrives.pl  IFU: FindDrives.pl    Methodology:  RT-PCR      INFLUENZA A 08/14/2022 NOT DETECTED  NOT DETECTED Final    INFLUENZA B 08/14/2022 NOT DETECTED  NOT DETECTED Final    TSH 08/14/2022 1.08  0.27 - 4.20 uIU/mL Final    Cholesterol, Total 08/14/2022 213 (A) 0 - 199 mg/dL Final    Triglycerides 08/14/2022 118  0 - 150 mg/dL Final    HDL 08/14/2022 49  40 - 60 mg/dL Final    LDL Calculated 08/14/2022 140 (A) <100 mg/dL Final    VLDL Cholesterol Calculated 08/14/2022 24  Not Established mg/dL Final    Hemoglobin A1C 08/14/2022 5.3  See comment % Final    Comment: Comment:  Diagnosis of Diabetes: > or = 6.5%  Increased risk of diabetes (Prediabetes): 5.7-6.4%  Glycemic Control: Nonpregnant Adults: <7.0%                    Pregnant: <6.0%        eAG 08/14/2022 105.4  mg/dL Final    WBC 08/17/2022 8.0  4.0 - 11.0 K/uL Final    RBC 08/17/2022 5.42  4.20 - 5.90 M/uL Final    Hemoglobin 08/17/2022 16.4  13.5 - 17.5 g/dL Final    Hematocrit 08/17/2022 48.9  40.5 - 52.5 % Final    MCV 08/17/2022 90.3  80.0 - 100.0 fL Final    MCH 08/17/2022 30.4  26.0 - 34.0 pg Final    MCHC 08/17/2022 33.6  31.0 - 36.0 g/dL Final    RDW 08/17/2022 13.6  12.4 - 15.4 % Final Platelets 05/75/2869 249  135 - 450 K/uL Final    MPV 08/17/2022 8.1  5.0 - 10.5 fL Final    Neutrophils % 08/17/2022 67.1  % Final    Lymphocytes % 08/17/2022 20.0  % Final    Monocytes % 08/17/2022 8.4  % Final    Eosinophils % 08/17/2022 4.0  % Final    Basophils % 08/17/2022 0.5  % Final    Neutrophils Absolute 08/17/2022 5.4  1.7 - 7.7 K/uL Final    Lymphocytes Absolute 08/17/2022 1.6  1.0 - 5.1 K/uL Final    Monocytes Absolute 08/17/2022 0.7  0.0 - 1.3 K/uL Final    Eosinophils Absolute 08/17/2022 0.3  0.0 - 0.6 K/uL Final    Basophils Absolute 08/17/2022 0.0  0.0 - 0.2 K/uL Final    Sodium 08/17/2022 144  136 - 145 mmol/L Final    Potassium reflex Magnesium 08/17/2022 3.8  3.5 - 5.1 mmol/L Final    Chloride 08/17/2022 107  99 - 110 mmol/L Final    CO2 08/17/2022 27  21 - 32 mmol/L Final    Anion Gap 08/17/2022 10  3 - 16 Final    Glucose 08/17/2022 115 (A) 70 - 99 mg/dL Final    BUN 08/17/2022 15  7 - 20 mg/dL Final    Creatinine 08/17/2022 1.0  0.8 - 1.3 mg/dL Final    GFR Non- 08/17/2022 >60  >60 Final    Comment: >60 mL/min/1.73m2 EGFR, calc. for ages 25 and older using the  MDRD formula (not corrected for weight), is valid for stable  renal function. GFR  08/17/2022 >60  >60 Final    Comment: Chronic Kidney Disease: less than 60 ml/min/1.73 sq.m. Kidney Failure: less than 15 ml/min/1.73 sq.m. Results valid for patients 18 years and older.       Calcium 08/17/2022 9.6  8.3 - 10.6 mg/dL Final    Ventricular Rate 08/18/2022 80  BPM Final    Atrial Rate 08/18/2022 80  BPM Final    P-R Interval 08/18/2022 156  ms Final    QRS Duration 08/18/2022 84  ms Final    Q-T Interval 08/18/2022 364  ms Final    QTc Calculation (Bazett) 08/18/2022 419  ms Final    P Axis 08/18/2022 54  degrees Final    R Axis 08/18/2022 -56  degrees Final    T Axis 08/18/2022 -21  degrees Final    Diagnosis 08/18/2022 Normal sinus rhythmLeft anterior fascicular blockAbnormal ECGWhen compared with ECG of 11-SEP-2020 14:24,Premature atrial complexes are no longer PresentConfirmed by Savage Huerta MD, [address] ([id number]) on 8/18/2022 4:33:21 PM   Final            Medications  Current Facility-Administered Medications: OLANZapine zydis (ZYPREXA) disintegrating tablet 10 mg, 10 mg, Oral, Q8H PRN  [DISCONTINUED] OLANZapine (ZYPREXA) tablet 5 mg, 5 mg, Oral, Q4H PRN **OR** OLANZapine (ZYPREXA) 10 mg in sterile water 2 mL injection, 10 mg, IntraMUSCular, TID PRN  diphenhydrAMINE (BENADRYL) tablet 50 mg, 50 mg, Oral, TID PRN  LORazepam (ATIVAN) tablet 2 mg, 2 mg, Oral, Daily PRN  LORazepam (ATIVAN) tablet 2 mg, 2 mg, Oral, BID  doxycycline hyclate (VIBRA-TABS) tablet 100 mg, 100 mg, Oral, 2 times per day  acetaminophen (TYLENOL) tablet 650 mg, 650 mg, Oral, Q4H PRN  chlorthalidone (HYGROTON) tablet 25 mg, 25 mg, Oral, Daily  magnesium hydroxide (MILK OF MAGNESIA) 400 MG/5ML suspension 30 mL, 30 mL, Oral, Daily PRN  nicotine polacrilex (COMMIT) lozenge 2 mg, 2 mg, Oral, Q1H PRN  aluminum & magnesium hydroxide-simethicone (MAALOX) 200-200-20 MG/5ML suspension 30 mL, 30 mL, Oral, Q6H PRN  fluticasone (FLONASE) 50 MCG/ACT nasal spray 1 spray, 1 spray, Each Nostril, Daily  pitavastatin (LIVALO) tablet 1 mg (Patient Supplied), 1 mg, Oral, Nightly    ASSESSMENT AND PLAN    Principal Problem:    Schizophrenia ([address]  Active Problems:    Essential hypertension    Pure hypercholesterolemia    Hyperlipidemia    Tardive dyskinesia  Resolved Problems:    Hypokalemia       1. Patient's symptoms   show no change  2. Probable discharge is next week  3. Discharge planning is incomplete  4. Suicidal ideation is better  5. Total time with patient was 40 minutes and more than 50 % of that time was spent counseling the patient on their symptoms, treatment and expected goals.      Isadora Mc, PMHNP-BC

## 2022-08-20 NOTE — PLAN OF CARE
Problem: Anxiety  Goal: Will report anxiety at manageable levels  Description: INTERVENTIONS:  1. Administer medication as ordered  2. Teach and rehearse alternative coping skills  3. Provide emotional support with 1:1 interaction with staff  8/20/2022 7049 by Raquel Arndt LPN  Outcome: Progressing  Flowsheets (Taken 8/20/2022 0809)  Will report anxiety at manageable levels:   Administer medication as ordered   Teach and rehearse alternative coping skills     Problem: Depression/Self Harm  Goal: Effect of psychiatric condition will be minimized and patient will be protected from self harm  Description: INTERVENTIONS:  1. Assess impact of patient's symptoms on level of functioning, self care needs and offer support as indicated  2. Assess patient/family knowledge of depression, impact on illness and need for teaching  3. Provide emotional support, presence and reassurance  4. Assess for possible suicidal thoughts or ideation. If patient expresses suicidal thoughts or statements do not leave alone, initiate Suicide Precautions, move to a room close to the nursing station and obtain sitter  5. Initiate consults as appropriate with Mental Health Professional, Spiritual Care, Psychosocial CNS, and consider a recommendation to the LIP for a Psychiatric Consultation  8/20/2022 0821 by Raquel Arndt LPN  Outcome: Progressing     Problem: Pain  Goal: Verbalizes/displays adequate comfort level or baseline comfort level  Outcome: Progressing     Problem: Rebeka  Goal: Will exhibit normal sleep and speech and no impulsivity  Description: INTERVENTIONS:  1. Administer medication as ordered  2. Set limits on impulsive behavior  3. Make attempts to decrease external stimuli as possible  8/20/2022 2187 by Raquel Arndt LPN  Outcome: Progressing     Problem: Rebeka  Goal: Will exhibit normal sleep and speech and no impulsivity  Description: INTERVENTIONS:  1. Administer medication as ordered  2.  Set limits on impulsive
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
## 2022-08-20 NOTE — GROUP NOTE
Group Therapy Note    Date: 8/20/2022    Group Start Time: 1100  Group End Time: 1150  Group Topic: Cognitive Skills    [id number], [place]        Group Therapy Note    Attendees: 8       Patient's Goal:    Patient will understand the benefits of gratitude and will identify things that they are grateful for    Notes:  Patients participated in gratitude scavenger hunt activity and identified 10 things that they can be grateful for. Mila Tolliver was very focused on talking about his medications and required frequent redirection, but he did participate in the group activity.      Status After Intervention:  Improved    Participation Level: Interactive and Monopolizing    Participation Quality: Intrusive      Speech:  slurred      Thought Process/Content: Flight of ideas      Affective Functioning: Exaggerated      Mood: anxious      Level of consciousness:  Inattentive      Response to Learning: Progressing to goal      Endings: None Reported    Modes of Intervention: Education      Discipline Responsible: /Counselor      Signature:  JASEN Nick

## 2022-08-20 NOTE — BH NOTE
Verbally aggressive \"Fuck you. I hate white women\". Stated this after receiving orange juice that he requested. \"Where's my Ativan and benadryl. Informed of med schedule. Refused vital signs to be obtained at this time.

## 2022-08-20 NOTE — PROGRESS NOTES
Pt awake and irritable. Requesting food and drinks. Loud and boisterous but cooperative at this time. Pt fixated on brother Hetal Like and the idea he is trying to kill their mother for their inheritance. Emotional support provided. Pt also states \"I have tardive dyskinesia I cant take anti psychotics. \"

## 2022-08-21 PROCEDURE — 6370000000 HC RX 637 (ALT 250 FOR IP): Performed by: PSYCHIATRY & NEUROLOGY

## 2022-08-21 PROCEDURE — 1240000000 HC EMOTIONAL WELLNESS R&B

## 2022-08-21 PROCEDURE — 6370000000 HC RX 637 (ALT 250 FOR IP): Performed by: NURSE PRACTITIONER

## 2022-08-21 RX ADMIN — MAGNESIUM HYDROXIDE 30 ML: 400 SUSPENSION ORAL at 20:37

## 2022-08-21 RX ADMIN — DIPHENHYDRAMINE HCL 50 MG: 25 TABLET ORAL at 20:38

## 2022-08-21 RX ADMIN — CHLORTHALIDONE 25 MG: 25 TABLET ORAL at 09:30

## 2022-08-21 RX ADMIN — DOXYCYCLINE HYCLATE 100 MG: 100 TABLET, COATED ORAL at 09:30

## 2022-08-21 RX ADMIN — DOXYCYCLINE HYCLATE 100 MG: 100 TABLET, COATED ORAL at 20:37

## 2022-08-21 RX ADMIN — DIPHENHYDRAMINE HCL 50 MG: 25 TABLET ORAL at 09:30

## 2022-08-21 RX ADMIN — ACETAMINOPHEN 650 MG: 325 TABLET ORAL at 17:44

## 2022-08-21 RX ADMIN — LORAZEPAM 2 MG: 2 TABLET ORAL at 09:30

## 2022-08-21 RX ADMIN — FLUTICASONE PROPIONATE 1 SPRAY: 50 SPRAY, METERED NASAL at 09:30

## 2022-08-21 RX ADMIN — LORAZEPAM 2 MG: 2 TABLET ORAL at 20:37

## 2022-08-21 RX ADMIN — ACETAMINOPHEN 650 MG: 325 TABLET ORAL at 09:31

## 2022-08-21 ASSESSMENT — PAIN SCALES - GENERAL
PAINLEVEL_OUTOF10: 1
PAINLEVEL_OUTOF10: 3
PAINLEVEL_OUTOF10: 4
PAINLEVEL_OUTOF10: 0

## 2022-08-21 ASSESSMENT — PAIN DESCRIPTION - LOCATION: LOCATION: HEAD

## 2022-08-21 ASSESSMENT — PAIN DESCRIPTION - DESCRIPTORS: DESCRIPTORS: ACHING

## 2022-08-21 NOTE — PLAN OF CARE
Patient was resting at beginning of shift, when awake patient snacked and called his mom twice. Patient became upset with mom shouting \" Fuck you\" both times. Doesn't want to go back home. Raising voice and cursing about Dr. David Castellon. Paranoid about hospital treatment. Problem: Psychosis  Goal: Will report no hallucinations or delusions  Description: INTERVENTIONS:  1. Administer medication as  ordered  2. Assist with reality testing to support increasing orientation  3. Assess if patient's hallucinations or delusions are encouraging self harm or harm to others and intervene as appropriate  8/21/2022 1959 by Brenda Steinberg LPN  Outcome: Not Progressing     Problem: Anxiety  Goal: Will report anxiety at manageable levels  Description: INTERVENTIONS:  1. Administer medication as ordered  2. Teach and rehearse alternative coping skills  3. Provide emotional support with 1:1 interaction with staff  8/21/2022 1959 by Brenda Steinberg LPN  Outcome: Progressing     Problem: Pain  Goal: Verbalizes/displays adequate comfort level or baseline comfort level  8/21/2022 1959 by Brenda Steinberg LPN  Outcome: Progressing     Problem: Psychosis  Goal: Will report no hallucinations or delusions  Description: INTERVENTIONS:  1. Administer medication as  ordered  2. Assist with reality testing to support increasing orientation  3.  Assess if patient's hallucinations or delusions are encouraging self harm or harm to others and intervene as appropriate  8/21/2022 1959 by Brenda Steinberg LPN  Outcome: Not Progressing  8/21/2022 1739 by Bridgette Miranda RN  Outcome: Progressing
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
## 2022-08-21 NOTE — PLAN OF CARE
Problem: Anxiety  Goal: Will report anxiety at manageable levels  Description: INTERVENTIONS:  1. Administer medication as ordered  2. Teach and rehearse alternative coping skills  3. Provide emotional support with 1:1 interaction with staff  Outcome: Progressing     Problem: Depression/Self Harm  Goal: Effect of psychiatric condition will be minimized and patient will be protected from self harm  Description: INTERVENTIONS:  1. Assess impact of patient's symptoms on level of functioning, self care needs and offer support as indicated  2. Assess patient/family knowledge of depression, impact on illness and need for teaching  3. Provide emotional support, presence and reassurance  4. Assess for possible suicidal thoughts or ideation. If patient expresses suicidal thoughts or statements do not leave alone, initiate Suicide Precautions, move to a room close to the nursing station and obtain sitter  5. Initiate consults as appropriate with Mental Health Professional, Spiritual Care, Psychosocial CNS, and consider a recommendation to the LIP for a Psychiatric Consultation  Outcome: Progressing  Flowsheets (Taken 8/21/2022 2733)  Effect of psychiatric condition will be minimized and patient will be protected from self harm: Provide emotional support, presence and reassurance     Problem: Pain  Goal: Verbalizes/displays adequate comfort level or baseline comfort level  Outcome: Progressing     Problem: Rebeka  Goal: Will exhibit normal sleep and speech and no impulsivity  Description: INTERVENTIONS:  1. Administer medication as ordered  2. Set limits on impulsive behavior  3. Make attempts to decrease external stimuli as possible  Outcome: Progressing     Problem: Psychosis  Goal: Will report no hallucinations or delusions  Description: INTERVENTIONS:  1. Administer medication as  ordered  2. Assist with reality testing to support increasing orientation  3.  Assess if patient's hallucinations or delusions are encouraging self harm or harm to others and intervene as appropriate  Outcome: Progressing     Problem: Behavior  Goal: Pt/Family maintain appropriate behavior and adhere to behavioral management agreement, if implemented  Description: INTERVENTIONS:  1. Assess patient/family's coping skills and  non-compliant behavior (including use of illegal substances)  2. Notify security of behavior or suspected illegal substances which indicate the need for search of the family and/or belongings  3. Encourage verbalization of thoughts and concerns in a socially appropriate manner  4. Utilize positive, consistent limit setting strategies supporting safety of patient, staff and others  5. Encourage participation in the decision making process about the behavioral management agreement  6. If a visitor's behavior poses a threat to safety call refer to organization policy. 7. Initiate consult with , Psychosocial CNS, Spiritual Care as appropriate  Outcome: Progressing   Pt alert and oriented, napping much of the day. Appetite good. Requested Tylenol this morning for headache with Benadryl and Ativan for TD. Medication effective. Pt requested Tylenol at 1730 for body aches. Able to make needs known.

## 2022-08-21 NOTE — PLAN OF CARE
Patient calmer and quieter with less yelling. Fixated, preoccupied. Some RTIS noted. +med    Problem: Anxiety  Goal: Will report anxiety at manageable levels  Description: INTERVENTIONS:  1. Administer medication as ordered  2. Teach and rehearse alternative coping skills  3. Provide emotional support with 1:1 interaction with staff  8/20/2022 2106 by Paola Hobson LPN  Outcome: Not Progressing     Problem: Pain  Goal: Verbalizes/displays adequate comfort level or baseline comfort level  8/20/2022 2106 by Paola Hobson LPN  Outcome: Progressing     Problem: Behavior  Goal: Pt/Family maintain appropriate behavior and adhere to behavioral management agreement, if implemented  Description: INTERVENTIONS:  1. Assess patient/family's coping skills and  non-compliant behavior (including use of illegal substances)  2. Notify security of behavior or suspected illegal substances which indicate the need for search of the family and/or belongings  3. Encourage verbalization of thoughts and concerns in a socially appropriate manner  4. Utilize positive, consistent limit setting strategies supporting safety of patient, staff and others  5. Encourage participation in the decision making process about the behavioral management agreement  6. If a visitor's behavior poses a threat to safety call refer to organization policy. 7. Initiate consult with , Psychosocial CNS, Spiritual Care as appropriate  Outcome: Progressing     Problem: Anxiety  Goal: Will report anxiety at manageable levels  Description: INTERVENTIONS:  1. Administer medication as ordered  2. Teach and rehearse alternative coping skills  3.  Provide emotional support with 1:1 interaction with staff  8/20/2022 2106 by Paola Hobson LPN  Outcome: Not Progressing  8/20/2022 0821 by Tank Jones LPN  Outcome: Progressing  Flowsheets (Taken 8/20/2022 0809)  Will report anxiety at manageable levels:   Administer medication as ordered   Teach and rehearse alternative coping skills

## 2022-08-22 PROCEDURE — 6370000000 HC RX 637 (ALT 250 FOR IP): Performed by: NURSE PRACTITIONER

## 2022-08-22 PROCEDURE — 1240000000 HC EMOTIONAL WELLNESS R&B

## 2022-08-22 PROCEDURE — 6370000000 HC RX 637 (ALT 250 FOR IP): Performed by: PSYCHIATRY & NEUROLOGY

## 2022-08-22 PROCEDURE — 99233 SBSQ HOSP IP/OBS HIGH 50: CPT | Performed by: PSYCHIATRY & NEUROLOGY

## 2022-08-22 RX ORDER — DOCUSATE SODIUM 100 MG/1
100 CAPSULE, LIQUID FILLED ORAL DAILY
Status: DISCONTINUED | OUTPATIENT
Start: 2022-08-22 | End: 2022-08-30 | Stop reason: HOSPADM

## 2022-08-22 RX ORDER — POLYETHYLENE GLYCOL 3350 17 G/17G
17 POWDER, FOR SOLUTION ORAL DAILY
Status: DISCONTINUED | OUTPATIENT
Start: 2022-08-22 | End: 2022-08-30 | Stop reason: HOSPADM

## 2022-08-22 RX ORDER — LORAZEPAM 1 MG/1
1 TABLET ORAL 2 TIMES DAILY
Status: DISCONTINUED | OUTPATIENT
Start: 2022-08-22 | End: 2022-08-25

## 2022-08-22 RX ADMIN — DIPHENHYDRAMINE HCL 50 MG: 25 TABLET ORAL at 05:41

## 2022-08-22 RX ADMIN — POLYETHYLENE GLYCOL (3350) 17 G: 17 POWDER, FOR SOLUTION ORAL at 12:38

## 2022-08-22 RX ADMIN — CHLORTHALIDONE 25 MG: 25 TABLET ORAL at 09:25

## 2022-08-22 RX ADMIN — DOXYCYCLINE HYCLATE 100 MG: 100 TABLET, COATED ORAL at 09:24

## 2022-08-22 RX ADMIN — DOXYCYCLINE HYCLATE 100 MG: 100 TABLET, COATED ORAL at 20:54

## 2022-08-22 RX ADMIN — FLUTICASONE PROPIONATE 1 SPRAY: 50 SPRAY, METERED NASAL at 09:24

## 2022-08-22 RX ADMIN — LORAZEPAM 2 MG: 2 TABLET ORAL at 05:41

## 2022-08-22 RX ADMIN — DIPHENHYDRAMINE HCL 50 MG: 25 TABLET ORAL at 23:36

## 2022-08-22 RX ADMIN — DOCUSATE SODIUM 100 MG: 100 CAPSULE, LIQUID FILLED ORAL at 12:38

## 2022-08-22 RX ADMIN — DIPHENHYDRAMINE HCL 50 MG: 25 TABLET ORAL at 15:35

## 2022-08-22 NOTE — PROGRESS NOTES
08/22/22 1450   Encounter Summary   Encounter Overview/Reason  Spiritual/Emotional Needs;Crisis   Service Provided For: Patient   Referral/Consult From: Patient   Last Encounter    (8/22 follow up, emotional distress, sppt and prayer)   Complexity of Encounter High   Begin Time 1420   End Time  1436   Total Time Calculated 16 min

## 2022-08-22 NOTE — PROGRESS NOTES
Department of Psychiatry  Progress Note    Patient's chart was reviewed. Discussed with treatment team. Met with patient. SUBJECTIVE:      Remains convinced his brother is a Aby Roueli and wants to kill their mother. Overall, less agitated, less RTIS, and more organized. No insight. Impaired judgement. ROS:   Patient has new complaints: no  Sleeping adequately:  No:   Appetite adequate: Yes  Engaged in programming: No    OBJECTIVE:  VITALS:  /62   Pulse 76   Temp 99.3 °F (37.4 °C) (Temporal)   Resp 20   Ht 6' 1\" (1.854 m)   Wt 240 lb (108.9 kg)   SpO2 92%   BMI 31.66 kg/m²     Mental Status Examination:    Appearance: in milieu. Poor hygiene    Behavior/Attitude toward examiner:  Suspicious, paranoid, irritable. Speech:  Non-pressured. Elevated volume. Mood:  \"ok\"  Affect:  less irritable. Thought processes:  less disorganized  Thought Content: no SI, no HI, +paranoid, +delusional   Perceptions: less RTIS  Attention: impaired  Abstraction: impaired  Cognition: impaired  Insight: impaired  Judgment: impaired    Medication:  Scheduled:   polyethylene glycol  17 g Oral Daily    docusate sodium  100 mg Oral Daily    LORazepam  2 mg Oral BID    doxycycline hyclate  100 mg Oral 2 times per day    chlorthalidone  25 mg Oral Daily    fluticasone  1 spray Each Nostril Daily    pitavastatin  1 mg Oral Nightly        PRN:  OLANZapine zydis, [DISCONTINUED] OLANZapine **OR** OLANZapine (ZyPREXA) in sterile water IntraMUSCular, diphenhydrAMINE, LORazepam, acetaminophen, magnesium hydroxide, nicotine polacrilex, aluminum & magnesium hydroxide-simethicone       Formulation: This is a well known domiciled, never , psychiatrically  disabled 57-year-old with  schizophrenia, who was brought by  police to our emergency department with worsening psychotic symptoms and agitation in the setting of treatment nonadherence.     Principal Problem:    Schizophrenia [facility])  Active Problems:    Essential hypertension    Pure hypercholesterolemia    Hyperlipidemia    Tardive dyskinesia  Resolved Problems:    Hypokalemia     Plan:  1. Admitted to inpatient adult psychiatry for stabilization and treatment. 2. On admission, ordered Dinorah Im injections, q15min checks, programming, and prn medication for anxiety, agitation, and insomnia. 8/28/2022 - changed prns to ativan, benadryl, and olanzapine. Scheduled ativan 2mg BID in the short term. 8/22/2022 - reduce scheduled ativan to 1mg BID. 3. Internal medicine consult for admission. Hypokalemia - resolved on repeat. - mild at 3.4  - encourage PO     HTN  - nursing to verify home medication  - pt stated he is taking a water pill at home, discussed with nursing ok to resume tomorrow once verified. - monitor     HLD  - Continue statin       Elevated Liver Enzymes  - known history  - stable     4. Collateral collected. 5. ELOS 10-15 days. Voluntary by guardian. Total time with patient was 35 minutes and more than 50 % of that time was spent counseling the patient on their symptoms, treatment, and expected goals.      Iker Platt MD

## 2022-08-22 NOTE — PLAN OF CARE
Problem: Depression/Self Harm  Goal: Effect of psychiatric condition will be minimized and patient will be protected from self harm  Description: INTERVENTIONS:  1. Assess impact of patient's symptoms on level of functioning, self care needs and offer support as indicated  2. Assess patient/family knowledge of depression, impact on illness and need for teaching  3. Provide emotional support, presence and reassurance  4. Assess for possible suicidal thoughts or ideation. If patient expresses suicidal thoughts or statements do not leave alone, initiate Suicide Precautions, move to a room close to the nursing station and obtain sitter  5. Initiate consults as appropriate with Mental Health Professional, Spiritual Care, Psychosocial CNS, and consider a recommendation to the LIP for a Psychiatric Consultation  Outcome: Progressing     Problem: Rebeka  Goal: Will exhibit normal sleep and speech and no impulsivity  Description: INTERVENTIONS:  1. Administer medication as ordered  2. Set limits on impulsive behavior  3. Make attempts to decrease external stimuli as possible  Outcome: Jos Roman has been tearful at times shouting out \" I can't believe I am still here. .. I want to go home. Nuvia Rivera is a warlock and is gonna kill my mother\". .. Patient has been medication compliant and currently denies SI or HI. Patient is irritable and labile at times telling staff \" fuck you I can't believe you are keeping me here\". Will continue to monitor for safety.

## 2022-08-23 PROCEDURE — 1240000000 HC EMOTIONAL WELLNESS R&B

## 2022-08-23 PROCEDURE — 6370000000 HC RX 637 (ALT 250 FOR IP): Performed by: PSYCHIATRY & NEUROLOGY

## 2022-08-23 PROCEDURE — 6370000000 HC RX 637 (ALT 250 FOR IP): Performed by: NURSE PRACTITIONER

## 2022-08-23 PROCEDURE — 99233 SBSQ HOSP IP/OBS HIGH 50: CPT | Performed by: PSYCHIATRY & NEUROLOGY

## 2022-08-23 RX ADMIN — FLUTICASONE PROPIONATE 1 SPRAY: 50 SPRAY, METERED NASAL at 08:01

## 2022-08-23 RX ADMIN — DIPHENHYDRAMINE HCL 50 MG: 25 TABLET ORAL at 07:33

## 2022-08-23 RX ADMIN — LORAZEPAM 2 MG: 2 TABLET ORAL at 01:32

## 2022-08-23 RX ADMIN — LORAZEPAM 1 MG: 1 TABLET ORAL at 07:33

## 2022-08-23 RX ADMIN — CHLORTHALIDONE 25 MG: 25 TABLET ORAL at 07:33

## 2022-08-23 RX ADMIN — DOCUSATE SODIUM 100 MG: 100 CAPSULE, LIQUID FILLED ORAL at 07:34

## 2022-08-23 RX ADMIN — DOXYCYCLINE HYCLATE 100 MG: 100 TABLET, COATED ORAL at 07:34

## 2022-08-23 RX ADMIN — POLYETHYLENE GLYCOL (3350) 17 G: 17 POWDER, FOR SOLUTION ORAL at 08:01

## 2022-08-23 NOTE — PLAN OF CARE
Problem: Anxiety  Goal: Will report anxiety at manageable levels  Description: INTERVENTIONS:  1. Administer medication as ordered  2. Teach and rehearse alternative coping skills  3. Provide emotional support with 1:1 interaction with staff  8/23/2022 2821 by Shirlene Candelaria RN  Outcome: Progressing  Flowsheets (Taken 8/23/2022 1549)  Will report anxiety at manageable levels:   Administer medication as ordered   Teach and rehearse alternative coping skills   Provide emotional support with 1:1 interaction with staff  8/22/2022 2113 by Merna Bowen RN  Outcome: Not Progressing     Problem: Depression/Self Harm  Goal: Effect of psychiatric condition will be minimized and patient will be protected from self harm  Description: INTERVENTIONS:  1. Assess impact of patient's symptoms on level of functioning, self care needs and offer support as indicated  2. Assess patient/family knowledge of depression, impact on illness and need for teaching  3. Provide emotional support, presence and reassurance  4. Assess for possible suicidal thoughts or ideation. If patient expresses suicidal thoughts or statements do not leave alone, initiate Suicide Precautions, move to a room close to the nursing station and obtain sitter  5. Initiate consults as appropriate with Mental Health Professional, Spiritual Care, Psychosocial CNS, and consider a recommendation to the LIP for a Psychiatric Consultation  8/23/2022 8598 by Shirlene Candelaria RN  Outcome: Progressing  Flowsheets (Taken 8/23/2022 0848)  Effect of psychiatric condition will be minimized and patient will be protected from self harm:   Provide emotional support, presence and reassurance   Assess for suicidal thoughts or ideation.  If patient expresses suicidal thoughts or statements do not leave alone, initiate Suicide Precautions, move near nurse station, obtain sitter  8/22/2022 2113 by Merna Bowen RN  Outcome: Progressing     Problem: Pain  Goal: Verbalizes/displays Harriet Esquivel, RN  Outcome: Progressing  Flowsheets (Taken 8/23/2022 4290)  Will report anxiety at manageable levels:   Administer medication as ordered   Teach and rehearse alternative coping skills   Provide emotional support with 1:1 interaction with staff  8/22/2022 2113 by La Harris, RN  Outcome: Not Progressing   Pt alert and oriented x2, cooperative with care. Slept very little if any last evening. Pt continues to RTIS. Redirectable. Compliant with medications. Appetite very good.

## 2022-08-23 NOTE — PROGRESS NOTES
Pt making verbal threats to \"throw a chair through the tv\" and states \" I hate your guts, get out of my face. I hate all woman.  Woman have sex with all the angels, all the uncircumcised angels\"

## 2022-08-23 NOTE — BH NOTE
Pt has calmed down for the past few hours. Spiritual Care sat with pt and talked this calmed patient down further. Pt states he feels better but, states he plans to cuss Dr. Jimbo Rai out tomorrow. RN encouraged pt to set a more positive goal but pt is not interested in doing so. Pt states he is just confused.

## 2022-08-23 NOTE — PROGRESS NOTES
Pt has been using racial slurs all night, he stated that the patient in room 5 \"knows better than to come out of his room around me, he better stay in his room while I am here\". Dragan Torres was asked to return to his room when he became loud and started making racists remarks towards another pt. Dragan Torres did go to his room as asked but once inside, he started screaming at writer about writer being lucifer and when writer tried to redirect pt and ask him to stop screaming so as not to disturb the other patients, carolina screamed \"fuck you\".

## 2022-08-23 NOTE — PLAN OF CARE
Problem: Anxiety  Goal: Will report anxiety at manageable levels  Description: INTERVENTIONS:  1. Administer medication as ordered  2. Teach and rehearse alternative coping skills  3. Provide emotional support with 1:1 interaction with staff  Outcome: Not Progressing     Problem: Depression/Self Harm  Goal: Effect of psychiatric condition will be minimized and patient will be protected from self harm  Description: INTERVENTIONS:  1. Assess impact of patient's symptoms on level of functioning, self care needs and offer support as indicated  2. Assess patient/family knowledge of depression, impact on illness and need for teaching  3. Provide emotional support, presence and reassurance  4. Assess for possible suicidal thoughts or ideation. If patient expresses suicidal thoughts or statements do not leave alone, initiate Suicide Precautions, move to a room close to the nursing station and obtain sitter  5. Initiate consults as appropriate with Mental Health Professional, Spiritual Care, Psychosocial CNS, and consider a recommendation to the LIP for a Psychiatric Consultation  8/22/2022 2113 by Durel Homans, RN  Outcome: Progressing     Problem: Pain  Goal: Verbalizes/displays adequate comfort level or baseline comfort level  Outcome: Progressing     Problem: Anxiety  Goal: Will report anxiety at manageable levels  Description: INTERVENTIONS:  1. Administer medication as ordered  2. Teach and rehearse alternative coping skills  3. Provide emotional support with 1:1 interaction with staff  Outcome: Not Progressing     Pt denies SI, refused scheduled ativan but took other scheduled meds, very tearful and states \"I am not going to make it through the night they are going to kill me. \" When asked to elaborated, pt would not. Pt was redirectable when screaming at Crystal Lover and was able to stop screaming and calm himself down.  Withdrawn to room

## 2022-08-24 PROCEDURE — 6370000000 HC RX 637 (ALT 250 FOR IP): Performed by: NURSE PRACTITIONER

## 2022-08-24 PROCEDURE — 1240000000 HC EMOTIONAL WELLNESS R&B

## 2022-08-24 PROCEDURE — 99233 SBSQ HOSP IP/OBS HIGH 50: CPT | Performed by: PSYCHIATRY & NEUROLOGY

## 2022-08-24 PROCEDURE — 6370000000 HC RX 637 (ALT 250 FOR IP)

## 2022-08-24 PROCEDURE — 6370000000 HC RX 637 (ALT 250 FOR IP): Performed by: PSYCHIATRY & NEUROLOGY

## 2022-08-24 RX ORDER — GUAIFENESIN/DEXTROMETHORPHAN 100-10MG/5
5 SYRUP ORAL EVERY 4 HOURS PRN
Status: DISCONTINUED | OUTPATIENT
Start: 2022-08-24 | End: 2022-08-30 | Stop reason: HOSPADM

## 2022-08-24 RX ADMIN — GUAIFENESIN AND DEXTROMETHORPHAN 5 ML: 100; 10 SYRUP ORAL at 22:49

## 2022-08-24 RX ADMIN — POLYETHYLENE GLYCOL (3350) 17 G: 17 POWDER, FOR SOLUTION ORAL at 10:55

## 2022-08-24 RX ADMIN — CHLORTHALIDONE 25 MG: 25 TABLET ORAL at 10:55

## 2022-08-24 RX ADMIN — DOCUSATE SODIUM 100 MG: 100 CAPSULE, LIQUID FILLED ORAL at 10:56

## 2022-08-24 RX ADMIN — GUAIFENESIN AND DEXTROMETHORPHAN 5 ML: 100; 10 SYRUP ORAL at 15:01

## 2022-08-24 RX ADMIN — FLUTICASONE PROPIONATE 1 SPRAY: 50 SPRAY, METERED NASAL at 10:56

## 2022-08-24 NOTE — PLAN OF CARE
Patient labile. Patient refused PM dose of Ativan. Patient continues to be fixated on Dr. Talya Gutiérrez stating,he is holding him here for no reason and making him take medication he doesn't need. Patient needs frequent redirection, difficult to redirect at times.

## 2022-08-24 NOTE — PROGRESS NOTES
Patient states \"I'm not schizophrenic that  has schizophrenia. The [facility] police department kidnaps me every 6 months and brings me to the psych hilario. \" Patient fixated on  and [facility] police department. Difficult to redirect.

## 2022-08-24 NOTE — PROGRESS NOTES
Called to offer support to pt. Writer did not understand pt's line of thought. Pt talked about reading his bible ([employer]), how he wants to save the world, and some political statements. Offered listening presence. Available to follow up when pt's health is better.

## 2022-08-24 NOTE — BH NOTE
This writer assisted pt with shaving his face using electric razor. At time of presentation in pt's bathroom, this writer observed pt's sink filled with soiled paper towels. He was not allowed to shave his face until he cleaned his bathroom. This was done successfully, requiring multiple instances of redirection to tasks while pt continued perseverating on Dr. Dragan Kraus and pt's dx of schizophrenia. Bathroom was cleaned successfully by pt, face was shaved successfully with assistance from this writer.     Tru Yin, MM, MT-BC

## 2022-08-24 NOTE — PROGRESS NOTES
Department of Psychiatry  Progress Note    Patient's chart was reviewed. Discussed with treatment team. Met with patient. SUBJECTIVE:      \"You're a warlock too, Doctor, Elke Marin is going to kill my mother. \"    Less agitated and less RTIS. No insight. Impaired judgement. ROS:   Patient has new complaints: no  Sleeping adequately:  No:   Appetite adequate: Yes  Engaged in programming: No    OBJECTIVE:  VITALS:  BP (!) 142/76   Pulse 85   Temp 98.4 °F (36.9 °C) (Oral)   Resp 18   Ht 6' 1\" (1.854 m)   Wt 240 lb (108.9 kg)   SpO2 93%   BMI 31.66 kg/m²     Mental Status Examination:    Appearance: in milieu. Poor hygiene    Behavior/Attitude toward examiner:  Suspicious, paranoid, irritable. Speech:  Non-pressured. Elevated volume. Mood:  \"ok\"  Affect:  less irritable. Thought processes:  less disorganized  Thought Content: no SI, no HI, +paranoid, +delusional   Perceptions: less RTIS  Attention: impaired  Abstraction: impaired  Cognition: impaired  Insight: impaired  Judgment: impaired    Medication:  Scheduled:   polyethylene glycol  17 g Oral Daily    docusate sodium  100 mg Oral Daily    LORazepam  1 mg Oral BID    chlorthalidone  25 mg Oral Daily    fluticasone  1 spray Each Nostril Daily    pitavastatin  1 mg Oral Nightly        PRN:  OLANZapine zydis, [DISCONTINUED] OLANZapine **OR** OLANZapine (ZyPREXA) in sterile water IntraMUSCular, diphenhydrAMINE, LORazepam, acetaminophen, magnesium hydroxide, nicotine polacrilex, aluminum & magnesium hydroxide-simethicone       Formulation: This is a well known domiciled, never , psychiatrically  disabled 57-year-old with  schizophrenia, who was brought by  police to our emergency department with worsening psychotic symptoms and agitation in the setting of treatment nonadherence.     Principal Problem:    Schizophrenia ([address]  Active Problems:    Essential hypertension    Pure hypercholesterolemia    Tardive dyskinesia  Resolved Problems: Hypokalemia     Plan:  1. Admitted to inpatient adult psychiatry for stabilization and treatment. 2. On admission, ordered Dinorah Im injections, q15min checks, programming, and prn medication for anxiety, agitation, and insomnia. 8/28/2022 - changed prns to ativan, benadryl, and olanzapine. Scheduled ativan 2mg BID in the short term. 8/22/2022 - reduced scheduled ativan to 1mg BID. 3. Internal medicine consult for admission. Hypokalemia - resolved on repeat. - mild at 3.4  - encourage PO     HTN  - nursing to verify home medication  - pt stated he is taking a water pill at home, discussed with nursing ok to resume tomorrow once verified. - monitor     HLD  - Continue statin       Elevated Liver Enzymes  - known history  - stable     4. Collateral collected. 5. ELOS 10-15 days. Voluntary by guardian. Total time with patient was 35 minutes and more than 50 % of that time was spent counseling the patient on their symptoms, treatment, and expected goals.      Seema Mejia MD

## 2022-08-25 PROCEDURE — 1240000000 HC EMOTIONAL WELLNESS R&B

## 2022-08-25 PROCEDURE — 6370000000 HC RX 637 (ALT 250 FOR IP)

## 2022-08-25 PROCEDURE — 99233 SBSQ HOSP IP/OBS HIGH 50: CPT | Performed by: PSYCHIATRY & NEUROLOGY

## 2022-08-25 RX ADMIN — GUAIFENESIN AND DEXTROMETHORPHAN 5 ML: 100; 10 SYRUP ORAL at 21:31

## 2022-08-25 NOTE — PLAN OF CARE
Problem: Anxiety  Goal: Will report anxiety at manageable levels  Description: INTERVENTIONS:  1. Administer medication as ordered  2. Teach and rehearse alternative coping skills  3. Provide emotional support with 1:1 interaction with staff  Outcome: Progressing     Problem: Depression/Self Harm  Goal: Effect of psychiatric condition will be minimized and patient will be protected from self harm  Description: INTERVENTIONS:  1. Assess impact of patient's symptoms on level of functioning, self care needs and offer support as indicated  2. Assess patient/family knowledge of depression, impact on illness and need for teaching  3. Provide emotional support, presence and reassurance  4. Assess for possible suicidal thoughts or ideation. If patient expresses suicidal thoughts or statements do not leave alone, initiate Suicide Precautions, move to a room close to the nursing station and obtain sitter  5. Initiate consults as appropriate with Mental Health Professional, Spiritual Care, Psychosocial CNS, and consider a recommendation to the LIP for a Psychiatric Consultation  Outcome: Progressing     Problem: Pain  Goal: Verbalizes/displays adequate comfort level or baseline comfort level  Outcome: Progressing     Problem: Behavior  Goal: Pt/Family maintain appropriate behavior and adhere to behavioral management agreement, if implemented  Description: INTERVENTIONS:  1. Assess patient/family's coping skills and  non-compliant behavior (including use of illegal substances)  2. Notify security of behavior or suspected illegal substances which indicate the need for search of the family and/or belongings  3. Encourage verbalization of thoughts and concerns in a socially appropriate manner  4. Utilize positive, consistent limit setting strategies supporting safety of patient, staff and others  5. Encourage participation in the decision making process about the behavioral management agreement  6.  If a visitor's behavior poses a threat to safety call refer to organization policy. 7. Initiate consult with , Psychosocial CNS, Spiritual Care as appropriate  Outcome: Progressing     Problem: Rebeka  Goal: Will exhibit normal sleep and speech and no impulsivity  Description: INTERVENTIONS:  1. Administer medication as ordered  2. Set limits on impulsive behavior  3. Make attempts to decrease external stimuli as possible  Outcome: Not Progressing     Problem: Psychosis  Goal: Will report no hallucinations or delusions  Description: INTERVENTIONS:  1. Administer medication as  ordered  2. Assist with reality testing to support increasing orientation  3.  Assess if patient's hallucinations or delusions are encouraging self harm or harm to others and intervene as appropriate  Outcome: Not Progressing

## 2022-08-25 NOTE — PROGRESS NOTES
Kemal Busch refused his scheduled Ativan this evening. He had a pleasant conversation with his mom. He also asked this writer several times why he wasn't d/c last Thursday or Friday and then went on a rant about Dr. David Castellon. Requested to speak to a  tomorrow. RTIS all night. Did not sleep.  Conversations/rants were racist and sexual.

## 2022-08-25 NOTE — PROGRESS NOTES
Radha Gifford is coughing less, and when asked if he felt better, he replied \"yes - my cough is gone\". Medication noted to be effective.

## 2022-08-25 NOTE — PROGRESS NOTES
Department of Psychiatry  Progress Note    Patient's chart was reviewed. Discussed with treatment team. Met with patient. SUBJECTIVE:      Much more agitated today. Increased RTIS and delusional statements. No insight. Impaired judgement. Has declined schedule ativan the last 24hours. No prns given. ROS:   Patient has new complaints: no  Sleeping adequately:  No:   Appetite adequate: Yes  Engaged in programming: No    OBJECTIVE:  VITALS:  BP (!) 133/94   Pulse 90   Temp 97.3 °F (36.3 °C) (Temporal)   Resp 20   Ht 6' 1\" (1.854 m)   Wt 240 lb (108.9 kg)   SpO2 93%   BMI 31.66 kg/m²     Mental Status Examination:    Appearance: in milieu. Poor hygiene    Behavior/Attitude toward examiner:  paranoid, irritable. Speech:  Non-pressured. Elevated volume. Mood:  \"terrible\"  Affect:  more irritable. Thought processes: more disorganized  Thought Content: no SI, no HI, +paranoid, +delusional   Perceptions: increased RTIS  Attention: impaired  Abstraction: impaired  Cognition: impaired  Insight: impaired  Judgment: impaired    Medication:  Scheduled:   polyethylene glycol  17 g Oral Daily    docusate sodium  100 mg Oral Daily    LORazepam  1 mg Oral BID    chlorthalidone  25 mg Oral Daily    fluticasone  1 spray Each Nostril Daily    pitavastatin  1 mg Oral Nightly        PRN:  OLANZapine (ZyPREXA) in sterile water IntraMUSCular, guaiFENesin-dextromethorphan, OLANZapine zydis, diphenhydrAMINE, LORazepam, acetaminophen, magnesium hydroxide, nicotine polacrilex, aluminum & magnesium hydroxide-simethicone       Formulation: This is a well known domiciled, never , psychiatrically  disabled 57-year-old with  schizophrenia, who was brought by  police to our emergency department with worsening psychotic symptoms and agitation in the setting of treatment nonadherence.     Principal Problem:    Schizophrenia ([address]  Active Problems:    Essential hypertension    Pure hypercholesterolemia Tardive dyskinesia  Resolved Problems:    Hypokalemia     Plan:  1. Admitted to inpatient adult psychiatry for stabilization and treatment. 2. On admission, ordered Dinorah Im injections, q15min checks, programming, and prn medication for anxiety, agitation, and insomnia. 8/28/2022 - changed prns to ativan, benadryl, and olanzapine. Scheduled ativan 2mg BID in the short term. 8/22/2022 - reduced scheduled ativan to 1mg BID.   8/24/2022 - Has declined schedule ativan the last 24hours. No prns given. discontinue order for scheduled Ativan. 3. Internal medicine consult for admission. Hypokalemia - resolved on repeat. - mild at 3.4  - encourage PO     HTN  - nursing to verify home medication  - pt stated he is taking a water pill at home, discussed with nursing ok to resume tomorrow once verified. - monitor     HLD  - Continue statin       Elevated Liver Enzymes  - known history  - stable     4. Collateral collected. 5. ELOS 10-15 days. Voluntary by guardian. Total time with patient was 35 minutes and more than 50 % of that time was spent counseling the patient on their symptoms, treatment, and expected goals.      Jameel Muhammad MD

## 2022-08-25 NOTE — PROGRESS NOTES
Kamala Melendez was coughing and c/o pneumonia. Robitussin DM was offered and Kamala Melendez accepted. It was administered according to the order. See MAR.

## 2022-08-25 NOTE — PLAN OF CARE
Problem: Rebeka  Goal: Will exhibit normal sleep and speech and no impulsivity  Outcome: Not Progressing     Problem: Psychosis  Goal: Will report no hallucinations or delusions  Outcome: Not Progressing   Pt refused all meds, He continues to be inappropriate at times and talks about sticking finger up his bum hole and smelling it. \"MMMMMM good,\" he says. He has been + for RTIS all day every minute. He called him mom and tried to manipulate her by saying [place] is gonna take care of you  I have to come home. \" Then he called later and pretended he was sick and had no voice ,  he told her he had PNA. Then siblings called and they were irritated with his behavior.  Pt did eat with not issues    Problem: Behavior  Goal: Pt/Family maintain appropriate behavior and adhere to behavioral management agreement, if implemented  Outcome: Progressing     Problem: Depression/Self Harm  Goal: Effect of psychiatric condition will be minimized and patient will be protected from self harm  Recent Flowsheet Documentation  Taken 8/25/2022 1006 by Akanksha Patterson RN  Effect of psychiatric condition will be minimized and patient will be protected from self harm:   Assess impact of patients symptoms on level of functioning, self care needs and offer support as indicated   Provide emotional support, presence and reassurance

## 2022-08-25 NOTE — PROGRESS NOTES
Department of Psychiatry  Progress Note    Patient's chart was reviewed. Discussed with treatment team. Met with patient. SUBJECTIVE:      Better today. Was able to walk with me throughout the entire unit without getting agitated. Still very disorganized and delusional.    \"I love you dr. Juan Luis Millan, you have schizophrenia, Fina Mail does too. \"    No scheduled ativan or prns in the last 48hrs. ROS:   Patient has new complaints: no  Sleeping adequately:  No:   Appetite adequate: Yes  Engaged in programming: No    OBJECTIVE:  VITALS:  BP (!) 133/94   Pulse 90   Temp 97.3 °F (36.3 °C) (Temporal)   Resp 20   Ht 6' 1\" (1.854 m)   Wt 240 lb (108.9 kg)   SpO2 93%   BMI 31.66 kg/m²     Mental Status Examination:    Appearance: in milieu. Poor hygiene    Behavior/Attitude toward examiner:  less irritable. Speech:  Non-pressured. Lower volume. Mood:  \"ok\"  Affect:  less irritable. Thought processes: disorganized  Thought Content: no SI, no HI, +paranoid, +delusional   Perceptions: less RTIS  Attention: improving  Abstraction: impaired  Cognition: impaired  Insight: impaired  Judgment: improving    Medication:  Scheduled:   polyethylene glycol  17 g Oral Daily    docusate sodium  100 mg Oral Daily    LORazepam  1 mg Oral BID    chlorthalidone  25 mg Oral Daily    fluticasone  1 spray Each Nostril Daily    pitavastatin  1 mg Oral Nightly        PRN:  OLANZapine (ZyPREXA) in sterile water IntraMUSCular, guaiFENesin-dextromethorphan, OLANZapine zydis, diphenhydrAMINE, LORazepam, acetaminophen, magnesium hydroxide, nicotine polacrilex, aluminum & magnesium hydroxide-simethicone       Formulation: This is a well known domiciled, never , psychiatrically  disabled 57-year-old with  schizophrenia, who was brought by  police to our emergency department with worsening psychotic symptoms and agitation in the setting of treatment nonadherence.     Principal Problem:    Schizophrenia ([address]  Active Problems: Essential hypertension    Pure hypercholesterolemia    Tardive dyskinesia  Resolved Problems:    Hypokalemia     Plan:  1. Admitted to inpatient adult psychiatry for stabilization and treatment. 2. On admission, ordered Dinorah Im injections, q15min checks, programming, and prn medication for anxiety, agitation, and insomnia. 8/28/2022 - changed prns to ativan, benadryl, and olanzapine. Scheduled ativan 2mg BID in the short term. 8/22/2022 - reduced scheduled ativan to 1mg BID.   8/24/2022 - Has declined schedule ativan the last 24hours. No prns given. 8/25/2022 - No scheduled ativan or prns in the last 48hrs. 3. Internal medicine consult for admission. Hypokalemia - resolved on repeat. - mild at 3.4  - encourage PO     HTN  - nursing to verify home medication  - pt stated he is taking a water pill at home, discussed with nursing ok to resume tomorrow once verified. - monitor     HLD  - Continue statin       Elevated Liver Enzymes  - known history  - stable     4. Collateral collected. 5. ELOS 10-15 days. Voluntary by guardian. Total time with patient was 35 minutes and more than 50 % of that time was spent counseling the patient on their symptoms, treatment, and expected goals.      Pro Weinstein MD

## 2022-08-26 PROCEDURE — 99233 SBSQ HOSP IP/OBS HIGH 50: CPT | Performed by: PSYCHIATRY & NEUROLOGY

## 2022-08-26 PROCEDURE — 6360000002 HC RX W HCPCS: Performed by: PSYCHIATRY & NEUROLOGY

## 2022-08-26 PROCEDURE — 6370000000 HC RX 637 (ALT 250 FOR IP)

## 2022-08-26 PROCEDURE — 1240000000 HC EMOTIONAL WELLNESS R&B

## 2022-08-26 PROCEDURE — 2580000003 HC RX 258: Performed by: PSYCHIATRY & NEUROLOGY

## 2022-08-26 PROCEDURE — 2500000003 HC RX 250 WO HCPCS: Performed by: PSYCHIATRY & NEUROLOGY

## 2022-08-26 RX ORDER — DIAZEPAM 5 MG/ML
10 INJECTION, SOLUTION INTRAMUSCULAR; INTRAVENOUS ONCE
Status: DISCONTINUED | OUTPATIENT
Start: 2022-08-26 | End: 2022-08-30 | Stop reason: HOSPADM

## 2022-08-26 RX ORDER — LORAZEPAM 2 MG/ML
2 INJECTION INTRAMUSCULAR ONCE
Status: COMPLETED | OUTPATIENT
Start: 2022-08-26 | End: 2022-08-26

## 2022-08-26 RX ORDER — LORAZEPAM 2 MG/ML
1 CONCENTRATE ORAL EVERY 8 HOURS PRN
Status: DISCONTINUED | OUTPATIENT
Start: 2022-08-26 | End: 2022-08-27

## 2022-08-26 RX ADMIN — LORAZEPAM 2 MG: 2 INJECTION INTRAMUSCULAR; INTRAVENOUS at 19:46

## 2022-08-26 RX ADMIN — GUAIFENESIN AND DEXTROMETHORPHAN 5 ML: 100; 10 SYRUP ORAL at 20:20

## 2022-08-26 RX ADMIN — OLANZAPINE 10 MG: 10 INJECTION, POWDER, LYOPHILIZED, FOR SOLUTION INTRAMUSCULAR at 14:50

## 2022-08-26 NOTE — PROGRESS NOTES
Patient ate lunch, conversing with peers. An African American staff member arrived on the unit and patient began screaming racial slurs and disrupting the milieu. Pt was redirected to room to de-escalate. Pt had to be redirected multiple times. He is calmly laying in bed at this time with minimal verbal outbursts. Will continue to monitor.

## 2022-08-26 NOTE — PROGRESS NOTES
Pt was calm and then impulsively ripped the fire alarm off the walls and began throwing its pieces around the unit and busting them on the ground.

## 2022-08-26 NOTE — PROGRESS NOTES
During shift change, pt was laying in his bed, masturbating, letting this writer know that he was masturbating with his door open. He kept demanding this writer go into his room to put a bandaid on his finger. Pt again, impulsively jumped up and ripped the TV out of the wall. Dr. Joan Garza, maintenance, and security aware and present on unit. All items removed from milieu. Pt then stated he was going to tear his room and and began trying flip his bedside table over. Pt then redirected to seclusion room with the mattress, pillow, and a blanket only. Seclusion room door is open. Pt laughing, continuing to state he's doing this for attention. Will continue to monitor for safety and comfort.

## 2022-08-26 NOTE — PROGRESS NOTES
Pt took a shower this morning, was proud to tell staff he was clean. He spent morning sitting in dining room RTIS and laughing, appears to be happy. He ate breakfast and took a short nap. Pt is resting in bed at this time. Will continue to monitor.

## 2022-08-26 NOTE — PROGRESS NOTES
Carrie Barrera was observed frequently coughing in the day room. When asked if he felt okay, he said his throat hurt and he needed medication. This writer offered Robitussin and he agreed. Robitussin was administered per order. See MAR.

## 2022-08-26 NOTE — PROGRESS NOTES
Declined morning meds, yelled stating \"I'm pissed\". Then began laughing uncontrollably. Despite encouragement, pt continued to decline medications.

## 2022-08-26 NOTE — PROGRESS NOTES
Alirezabozena Galarza slept from [address] [place] [address]. RTIS was constant throughout the night. senior living through the night, he began to stutter and stammer and wasn't able to effectively communicate. This writer could not understand him and urged him to rest/sleep. He cleaned himself up a bit in the sink, but advised he wants to take a shower in the morning. Took Livalo as prescribed; requested PRN Robitussin for cough & it was effective. PRNs for sleep were offered numerous times and Sheila Galarza declined.

## 2022-08-26 NOTE — PLAN OF CARE
Problem: Rebeka  Goal: Will exhibit normal sleep and speech and no impulsivity  Description: INTERVENTIONS:  1. Administer medication as ordered  2. Set limits on impulsive behavior  3. Make attempts to decrease external stimuli as possible  8/26/2022 0110 by Mich Baxter RN  Outcome: Not Progressing     Problem: Psychosis  Goal: Will report no hallucinations or delusions  Description: INTERVENTIONS:  1. Administer medication as  ordered  2. Assist with reality testing to support increasing orientation  3. Assess if patient's hallucinations or delusions are encouraging self harm or harm to others and intervene as appropriate  8/26/2022 0110 by Mich Baxter RN  Outcome: Not Progressing    Problem: Anxiety  Goal: Will report anxiety at manageable levels  Description: INTERVENTIONS:  1. Administer medication as ordered  2. Teach and rehearse alternative coping skills  3. Provide emotional support with 1:1 interaction with staff  Outcome: Progressing     Problem: Depression/Self Harm  Goal: Effect of psychiatric condition will be minimized and patient will be protected from self harm  Description: INTERVENTIONS:  1. Assess impact of patient's symptoms on level of functioning, self care needs and offer support as indicated  2. Assess patient/family knowledge of depression, impact on illness and need for teaching  3. Provide emotional support, presence and reassurance  4. Assess for possible suicidal thoughts or ideation. If patient expresses suicidal thoughts or statements do not leave alone, initiate Suicide Precautions, move to a room close to the nursing station and obtain sitter  5.  Initiate consults as appropriate with Mental Health Professional, Spiritual Care, Psychosocial CNS, and consider a recommendation to the LIP for a Psychiatric Consultation  Outcome: Progressing     Problem: Pain  Goal: Verbalizes/displays adequate comfort level or baseline comfort level  Outcome: Progressing     Problem: Behavior  Goal: Pt/Family maintain appropriate behavior and adhere to behavioral management agreement, if implemented  Description: INTERVENTIONS:  1. Assess patient/family's coping skills and  non-compliant behavior (including use of illegal substances)  2. Notify security of behavior or suspected illegal substances which indicate the need for search of the family and/or belongings  3. Encourage verbalization of thoughts and concerns in a socially appropriate manner  4. Utilize positive, consistent limit setting strategies supporting safety of patient, staff and others  5. Encourage participation in the decision making process about the behavioral management agreement  6. If a visitor's behavior poses a threat to safety call refer to organization policy.   7. Initiate consult with , Psychosocial CNS, Spiritual Care as appropriate  8/26/2022 0110 by Daniel Bolivar RN  Outcome: Progressing

## 2022-08-27 PROCEDURE — 6360000002 HC RX W HCPCS: Performed by: PSYCHIATRY & NEUROLOGY

## 2022-08-27 PROCEDURE — 1240000000 HC EMOTIONAL WELLNESS R&B

## 2022-08-27 PROCEDURE — 6370000000 HC RX 637 (ALT 250 FOR IP): Performed by: NURSE PRACTITIONER

## 2022-08-27 PROCEDURE — 6370000000 HC RX 637 (ALT 250 FOR IP): Performed by: PSYCHIATRY & NEUROLOGY

## 2022-08-27 RX ORDER — DIPHENHYDRAMINE HYDROCHLORIDE 50 MG/ML
50 INJECTION INTRAMUSCULAR; INTRAVENOUS ONCE
Status: COMPLETED | OUTPATIENT
Start: 2022-08-27 | End: 2022-08-27

## 2022-08-27 RX ORDER — OLANZAPINE 10 MG/1
10 TABLET, ORALLY DISINTEGRATING ORAL 3 TIMES DAILY PRN
Status: DISCONTINUED | OUTPATIENT
Start: 2022-08-27 | End: 2022-08-30 | Stop reason: HOSPADM

## 2022-08-27 RX ADMIN — DIPHENHYDRAMINE HCL 50 MG: 25 TABLET ORAL at 19:51

## 2022-08-27 RX ADMIN — LORAZEPAM 2 MG: 2 TABLET ORAL at 19:51

## 2022-08-27 RX ADMIN — DOCUSATE SODIUM 100 MG: 100 CAPSULE, LIQUID FILLED ORAL at 08:16

## 2022-08-27 RX ADMIN — POLYETHYLENE GLYCOL (3350) 17 G: 17 POWDER, FOR SOLUTION ORAL at 08:16

## 2022-08-27 RX ADMIN — FLUTICASONE PROPIONATE 1 SPRAY: 50 SPRAY, METERED NASAL at 08:16

## 2022-08-27 RX ADMIN — DIPHENHYDRAMINE HCL 50 MG: 25 TABLET ORAL at 09:00

## 2022-08-27 RX ADMIN — DIPHENHYDRAMINE HYDROCHLORIDE 50 MG: 50 INJECTION, SOLUTION INTRAMUSCULAR; INTRAVENOUS at 05:31

## 2022-08-27 RX ADMIN — CHLORTHALIDONE 25 MG: 25 TABLET ORAL at 08:16

## 2022-08-27 RX ADMIN — LORAZEPAM 2 MG: 2 TABLET ORAL at 08:16

## 2022-08-27 NOTE — BH NOTE
Mattie Newton called to update on status and to advise of PRN med's give message left for return call.

## 2022-08-27 NOTE — BH NOTE
Pt in bed screaming, when asked why he states he needs to see Dr Jamey Sena, pt notified  on another floor and will be here but he continues to scream.

## 2022-08-27 NOTE — BH NOTE
Pt brother, Saurabh Paul notified via voicemail of current situation and pt status. Return call number left.

## 2022-08-27 NOTE — BH NOTE
Pt back in bed at this time talking to himself and yelling at times, noted some anxiety in his voice but calms quickly on his own.

## 2022-08-27 NOTE — PLAN OF CARE
Pt anxious yelling verbally aggressive to nursing and dietary staff. TLC and encouragement given pt agrees to take po scheduled and prn medication. Pt resting quietly at this time continues to talk quietly to himself, yelling and anxiety subsided at this time. Will continue to monitor.

## 2022-08-27 NOTE — BH NOTE
Pt eats !00% of lunch returns to bed, but continues to scream out, cussing , threatening Dr Rowe Fear life.

## 2022-08-27 NOTE — SIGNIFICANT EVENT
Pt escalated in aggressive behavior with no provocationdirected at computers, lunch tray and ultimately staff. He postured toward staff and attempted to strike , Tammy Rainey. Pt was escorted to seclusion room and door was locked. Pt screamed and cursed throughout the event.

## 2022-08-27 NOTE — PROGRESS NOTES
Dr. Prince [address] ordered 50mg Benadryl injection to be administered once due to Hakeem's severe agitation. Benadryl administered per order in Hakeem's right upper arm at . See MAR.

## 2022-08-27 NOTE — BH NOTE
Pt requests benadryl, states he feels 'jerky' then winks at this nurse twice. PRN med given per request. Pt swallows med and states 'see its getting better already'.

## 2022-08-27 NOTE — PLAN OF CARE
Problem: Rebeka  Goal: Will exhibit normal sleep and speech and no impulsivity  Description: INTERVENTIONS:  1. Administer medication as ordered  2. Set limits on impulsive behavior  3. Make attempts to decrease external stimuli as possible  8/27/2022 0500 by Malaika Lindsey RN  Outcome: Not Progressing     Problem: Psychosis  Goal: Will report no hallucinations or delusions  Description: INTERVENTIONS:  1. Administer medication as  ordered  2. Assist with reality testing to support increasing orientation  3. Assess if patient's hallucinations or delusions are encouraging self harm or harm to others and intervene as appropriate  8/27/2022 0500 by Malaika Lindsey RN  Outcome: Not Progressing     Problem: Anxiety  Goal: Will report anxiety at manageable levels  Description: INTERVENTIONS:  1. Administer medication as ordered  2. Teach and rehearse alternative coping skills  3. Provide emotional support with 1:1 interaction with staff  8/27/2022 0500 by Malaika Lindsey RN  Outcome: Progressing     Problem: Behavior  Goal: Pt/Family maintain appropriate behavior and adhere to behavioral management agreement, if implemented  Description: INTERVENTIONS:  1. Assess patient/family's coping skills and  non-compliant behavior (including use of illegal substances)  2. Notify security of behavior or suspected illegal substances which indicate the need for search of the family and/or belongings  3. Encourage verbalization of thoughts and concerns in a socially appropriate manner  4. Utilize positive, consistent limit setting strategies supporting safety of patient, staff and others  5. Encourage participation in the decision making process about the behavioral management agreement  6. If a visitor's behavior poses a threat to safety call refer to organization policy.   7. Initiate consult with , Psychosocial CNS, Spiritual Care as appropriate  8/27/2022 0500 by Malaika Lindsey RN  Outcome: Progressing     Problem: Rebeka  Goal: Will exhibit normal sleep and speech and no impulsivity  Description: INTERVENTIONS:  1. Administer medication as ordered  2. Set limits on impulsive behavior  3. Make attempts to decrease external stimuli as possible  8/27/2022 0500 by Stephanie Youssef RN  Outcome: Not Progressing     Problem: Psychosis  Goal: Will report no hallucinations or delusions  Description: INTERVENTIONS:  1. Administer medication as  ordered  2. Assist with reality testing to support increasing orientation  3.  Assess if patient's hallucinations or delusions are encouraging self harm or harm to others and intervene as appropriate  8/27/2022 0500 by Stephanie Youssef RN  Outcome: Not Progressing

## 2022-08-27 NOTE — BH NOTE
Samantha Gricelda awoke after a brief nap and asked to use the restroom. He was appropriate and asked to return to his assigned room. He verbally contracted with RN to remain calm and went into room and laid down in bed.

## 2022-08-27 NOTE — BH NOTE
Restraint 1 Hour RN assessment      Yaritza Newton was agitated, posturing toward staff and destructive w/ unit equipment AEB knocking over computer monitors, throwing food tray and attempts at staff intimidation by physically intrusive and aggressive and threatening behaviors. Jeovannye Prime was placed in seclusion at 1300 due to Behavioral management problems. Currently patient is periodically loud and has reacted poorly to being placed in  seclusion. Patient medical history includes       Diagnosis Date    Elevated liver enzymes 9/1/2017    Hypertension     Pure hypercholesterolemia 8/31/2017    Current mental status A & O x3, unable to be redirected from his aggressive behaviors. At this time the patient   does meet criteria for continued restraint AEB continued loud screaming behaviors, inappropriate urination. The room has been assessed for safety and potentially harmful objects. Patient has been assessed for comfort and current needs. Respirations even and easy. Skin normal.    Current vitals include .     Most recent lab values include:   Admission on 08/14/2022   Component Date Value Ref Range Status    Acetaminophen Level 08/14/2022 <5 (A) 10 - 30 ug/mL Final    Comment: Therapeutic Range: 10.0-30.0 ug/mL  Toxic: >=150 ug/mL      WBC 08/14/2022 8.6  4.0 - 11.0 K/uL Final    RBC 08/14/2022 5.96 (A) 4.20 - 5.90 M/uL Final    Hemoglobin 08/14/2022 18.0 (A) 13.5 - 17.5 g/dL Final    Hematocrit 08/14/2022 53.8 (A) 40.5 - 52.5 % Final    MCV 08/14/2022 90.3  80.0 - 100.0 fL Final    MCH 08/14/2022 30.3  26.0 - 34.0 pg Final    MCHC 08/14/2022 33.6  31.0 - 36.0 g/dL Final    RDW 08/14/2022 13.6  12.4 - 15.4 % Final    Platelets 47/08/9511 221  135 - 450 K/uL Final    MPV 08/14/2022 7.9  5.0 - 10.5 fL Final    Neutrophils % 08/14/2022 86.0  % Final    Lymphocytes % 08/14/2022 6.9  % Final    Monocytes % 08/14/2022 6.6  % Final    Eosinophils % 08/14/2022 0.3  % Final    Basophils % 08/14/2022 0.2  % Final Neutrophils Absolute 08/14/2022 7.4  1.7 - 7.7 K/uL Final    Lymphocytes Absolute 08/14/2022 0.6 (A) 1.0 - 5.1 K/uL Final    Monocytes Absolute 08/14/2022 0.6  0.0 - 1.3 K/uL Final    Eosinophils Absolute 08/14/2022 0.0  0.0 - 0.6 K/uL Final    Basophils Absolute 08/14/2022 0.0  0.0 - 0.2 K/uL Final    Sodium 08/14/2022 141  136 - 145 mmol/L Final    Potassium 08/14/2022 3.4 (A) 3.5 - 5.1 mmol/L Final    Chloride 08/14/2022 101  99 - 110 mmol/L Final    CO2 08/14/2022 27  21 - 32 mmol/L Final    Anion Gap 08/14/2022 13  3 - 16 Final    Glucose 08/14/2022 103 (A) 70 - 99 mg/dL Final    BUN 08/14/2022 19  7 - 20 mg/dL Final    Creatinine 08/14/2022 1.2  0.8 - 1.3 mg/dL Final    GFR Non- 08/14/2022 >60  >60 Final    Comment: >60 mL/min/1.73m2 EGFR, calc. for ages 25 and older using the  MDRD formula (not corrected for weight), is valid for stable  renal function. GFR  08/14/2022 >60  >60 Final    Comment: Chronic Kidney Disease: less than 60 ml/min/1.73 sq.m. Kidney Failure: less than 15 ml/min/1.73 sq.m. Results valid for patients 18 years and older. Calcium 08/14/2022 9.8  8.3 - 10.6 mg/dL Final    Total Protein 08/14/2022 7.2  6.4 - 8.2 g/dL Final    Albumin 08/14/2022 4.9  3.4 - 5.0 g/dL Final    Albumin/Globulin Ratio 08/14/2022 2.1  1.1 - 2.2 Final    Total Bilirubin 08/14/2022 0.5  0.0 - 1.0 mg/dL Final    Alkaline Phosphatase 08/14/2022 68  40 - 129 U/L Final    ALT 08/14/2022 30  10 - 40 U/L Final    AST 08/14/2022 45 (A) 15 - 37 U/L Final    Comment: Specimen hemolysis has exceeded the interference as defined by Roche. Value may be falsely increased. Suggest reorder and recollection if  clinically indicated.       Ethanol Lvl 08/14/2022 None Detected  mg/dL Final    Comment:    None Detected  Conversion factor:  100 mg/dl = .100 g/dl  For Medical Purposes Only      Salicylate, Serum 44/71/8075 <0.3 (A) 15.0 - 30.0 mg/dL Final    Comment: Therapeutic Range: 15.0-30.0 mg/dL  Toxic: >30.0 mg/dL      Amphetamine Screen, Urine 08/15/2022 Neg  Negative <1000ng/mL Final    Barbiturate Screen, Ur 08/15/2022 Neg  Negative <200 ng/mL Final    Benzodiazepine Screen, Urine 08/15/2022 POSITIVE (A) Negative <200 ng/mL Final    Cannabinoid Scrn, Ur 08/15/2022 Neg  Negative <50 ng/mL Final    Cocaine Metabolite Screen, Urine 08/15/2022 Neg  Negative <300 ng/mL Final    Opiate Scrn, Ur 08/15/2022 Neg  Negative <300 ng/mL Final    Comment: \"Therapeutic levels of pain medication, especially oxycontin and synthetic  opioids, may not be detected by this Methodology. Pain management screen  panel  Drug panel-PM-Hi Res Ur, Interp (PAIN) should be considered for drug  monitoring \". PCP Screen, Urine 08/15/2022 Neg  Negative <25 ng/mL Final    Methadone Screen, Urine 08/15/2022 Neg  Negative <300 ng/mL Final    Propoxyphene Scrn, Ur 08/15/2022 Neg  Negative <300 ng/mL Final    Oxycodone Urine 08/15/2022 Neg  Negative <100 ng/ml Final    pH, UA 08/15/2022 6.0   Final    Comment: Urine pH less than 5.0 or greater than 8.0 may indicate sample adulteration. Another sample should be collected if clinically  indicated. Drug Screen Comment: 08/15/2022 see below   Final    Comment: This method is a screening test to detect only these drug  classes as part of a medical workup. Confirmatory testing  by another method should be ordered if clinically indicated. SARS-CoV-2 RNA, RT PCR 08/14/2022 NOT DETECTED  NOT DETECTED Final    Comment: Not Detected results do not preclude SARS-CoV-2 infection and  should not be used as the sole basis for patient management  decisions. Results must be combined with clinical observations,  patient history, and epidemiological information. Testing was performed using CHE TRAV SARS-CoV-2 and Influenza A/B  nucleic acid assay.  This test is a multiplex Real-Time Reverse  Transcriptase Polymerase Chain Reaction (RT-PCR)-based in vitro  diagnostic test intended for the qualitative detection of nucleic  acids from SARS-CoV-2, influenza A, and influenza B in nasopharyngeal  and nasal swab specimens for use under the FDAs Emergency Use  Authorization (EUA) only.     Patient Fact Sheet:  FindDrives.pl  Provider Fact Sheet: FindDrives.pl  EUA: FindDrives.pl  IFU: FindDrives.pl    Methodology:  RT-PCR      INFLUENZA A 08/14/2022 NOT DETECTED  NOT DETECTED Final    INFLUENZA B 08/14/2022 NOT DETECTED  NOT DETECTED Final    TSH 08/14/2022 1.08  0.27 - 4.20 uIU/mL Final    Cholesterol, Total 08/14/2022 213 (A) 0 - 199 mg/dL Final    Triglycerides 08/14/2022 118  0 - 150 mg/dL Final    HDL 08/14/2022 49  40 - 60 mg/dL Final    LDL Calculated 08/14/2022 140 (A) <100 mg/dL Final    VLDL Cholesterol Calculated 08/14/2022 24  Not Established mg/dL Final    Hemoglobin A1C 08/14/2022 5.3  See comment % Final    Comment: Comment:  Diagnosis of Diabetes: > or = 6.5%  Increased risk of diabetes (Prediabetes): 5.7-6.4%  Glycemic Control: Nonpregnant Adults: <7.0%                    Pregnant: <6.0%        eAG 08/14/2022 105.4  mg/dL Final    WBC 08/17/2022 8.0  4.0 - 11.0 K/uL Final    RBC 08/17/2022 5.42  4.20 - 5.90 M/uL Final    Hemoglobin 08/17/2022 16.4  13.5 - 17.5 g/dL Final    Hematocrit 08/17/2022 48.9  40.5 - 52.5 % Final    MCV 08/17/2022 90.3  80.0 - 100.0 fL Final    MCH 08/17/2022 30.4  26.0 - 34.0 pg Final    MCHC 08/17/2022 33.6  31.0 - 36.0 g/dL Final    RDW 08/17/2022 13.6  12.4 - 15.4 % Final    Platelets 93/67/8665 249  135 - 450 K/uL Final    MPV 08/17/2022 8.1  5.0 - 10.5 fL Final    Neutrophils % 08/17/2022 67.1  % Final    Lymphocytes % 08/17/2022 20.0  % Final    Monocytes % 08/17/2022 8.4  % Final    Eosinophils % 08/17/2022 4.0  % Final    Basophils % 08/17/2022 0.5  % Final    Neutrophils Absolute 08/17/2022 5.4  1.7 - 7.7 K/uL Final Lymphocytes Absolute 08/17/2022 1.6  1.0 - 5.1 K/uL Final    Monocytes Absolute 08/17/2022 0.7  0.0 - 1.3 K/uL Final    Eosinophils Absolute 08/17/2022 0.3  0.0 - 0.6 K/uL Final    Basophils Absolute 08/17/2022 0.0  0.0 - 0.2 K/uL Final    Sodium 08/17/2022 144  136 - 145 mmol/L Final    Potassium reflex Magnesium 08/17/2022 3.8  3.5 - 5.1 mmol/L Final    Chloride 08/17/2022 107  99 - 110 mmol/L Final    CO2 08/17/2022 27  21 - 32 mmol/L Final    Anion Gap 08/17/2022 10  3 - 16 Final    Glucose 08/17/2022 115 (A) 70 - 99 mg/dL Final    BUN 08/17/2022 15  7 - 20 mg/dL Final    Creatinine 08/17/2022 1.0  0.8 - 1.3 mg/dL Final    GFR Non- 08/17/2022 >60  >60 Final    Comment: >60 mL/min/1.73m2 EGFR, calc. for ages 25 and older using the  MDRD formula (not corrected for weight), is valid for stable  renal function. GFR  08/17/2022 >60  >60 Final    Comment: Chronic Kidney Disease: less than 60 ml/min/1.73 sq.m. Kidney Failure: less than 15 ml/min/1.73 sq.m. Results valid for patients 18 years and older. Calcium 08/17/2022 9.6  8.3 - 10.6 mg/dL Final    Ventricular Rate 08/18/2022 80  BPM Final    Atrial Rate 08/18/2022 80  BPM Final    P-R Interval 08/18/2022 156  ms Final    QRS Duration 08/18/2022 84  ms Final    Q-T Interval 08/18/2022 364  ms Final    QTc Calculation (Bazett) 08/18/2022 419  ms Final    P Axis 08/18/2022 54  degrees Final    R Axis 08/18/2022 -56  degrees Final    T Axis 08/18/2022 -21  degrees Final    Diagnosis 08/18/2022 Normal sinus rhythmLeft anterior fascicular blockAbnormal ECGWhen compared with ECG of 11-SEP-2020 14:24,Premature atrial complexes are no longer PresentConfirmed by Sherwin León MD, [address] ([facility]) on 8/18/2022 4:33:21 PM   Final         Patient presentation and results of RN assessment has been discussed with the on call physician.

## 2022-08-27 NOTE — BH NOTE
Pt at team station becomes agitated screaming again attempt to calm him down. Pt states \"if Dr Desirae Rodriguez comes near me I'm going to kill him\".

## 2022-08-27 NOTE — PROGRESS NOTES
Desean Balderrama slept from 2030 until 0345. Once awake, he continuously yelled about Dr. Dina Whitmore, his mother, and why he hasn't been discharged yet. An order was obtained from Dr. Dina Whitmore to administer Benadryl 50mg IM once. It was administered at 0531 in Hakeem's right upper arm. Desean Balderrama has continued to yell racial and homophobic slurs, as well as call this writer a bitch [facility] bitch, get me a drink! \"; \"Hey [facility], what time is it?\"; \"You're such a bitch - fuck you! \") and threaten to throw the computers located at the nurse's desk. This writer advised Desean Balderrama that security would be called and he backed down/stopped making threats & is now RTIS & maniacally laughing.

## 2022-08-28 PROCEDURE — 1240000000 HC EMOTIONAL WELLNESS R&B

## 2022-08-28 PROCEDURE — 6370000000 HC RX 637 (ALT 250 FOR IP): Performed by: PSYCHIATRY & NEUROLOGY

## 2022-08-28 PROCEDURE — 6370000000 HC RX 637 (ALT 250 FOR IP): Performed by: NURSE PRACTITIONER

## 2022-08-28 PROCEDURE — 99233 SBSQ HOSP IP/OBS HIGH 50: CPT | Performed by: PSYCHIATRY & NEUROLOGY

## 2022-08-28 RX ADMIN — CHLORTHALIDONE 25 MG: 25 TABLET ORAL at 07:45

## 2022-08-28 RX ADMIN — POLYETHYLENE GLYCOL (3350) 17 G: 17 POWDER, FOR SOLUTION ORAL at 07:45

## 2022-08-28 RX ADMIN — DIPHENHYDRAMINE HCL 50 MG: 25 TABLET ORAL at 20:55

## 2022-08-28 RX ADMIN — FLUTICASONE PROPIONATE 1 SPRAY: 50 SPRAY, METERED NASAL at 07:45

## 2022-08-28 RX ADMIN — LORAZEPAM 2 MG: 2 TABLET ORAL at 20:56

## 2022-08-28 RX ADMIN — DOCUSATE SODIUM 100 MG: 100 CAPSULE, LIQUID FILLED ORAL at 07:45

## 2022-08-28 NOTE — PROGRESS NOTES
Nilo Puentes is sleeping peacefully in his room. Respirations are even and easy. No apparent signs of distress are evident. Medication noted to be effective.

## 2022-08-28 NOTE — PROGRESS NOTES
Wadie December requested Ativan and Benadryl at 2000. Successfully attended evening group on the big side. Slept from 2100 - 0400. Requested a snack at 0400 and has been sitting calmly, peacefully, and quietly in the day room since then. He approached Juliet Siddiqi RN with the following requests: He would like for his nails to be clipped, his face shaved, and he wants to be able to spend time on the big side today. Hakeem's behavior has greatly improved since his conversation with Dr. Larissa Ingram on Saturday, August 27th. Dr. Larissa Ingram advised that if Wadie December can remain free of outbursts and yelling, discharge on Tuesday is likely. While he has not had an outburst or yelled during this shift, he still continues to express dislike of Dr. Larissa Ingram and his brother, Raghavendra Dotson.

## 2022-08-28 NOTE — GROUP NOTE
Group Therapy Note    Date: 8/28/2022    Group Start Time: 1100  Group End Time: 1200  Group Topic:  [address] [facility]        Group Therapy Note    Attendees: 10       Patient's Goal:  Pt will participate in assertive communication role play. Notes: Pt stayed for the whole duration of group. Pt was mumbling to self and interrupting peers throughout group. He would repeatedly state, \"I don't know what's going on\". Pt declined to participate in role play.     Status After Intervention:  Unchanged    Participation Level: Minimal    Participation Quality: Inappropriate and Intrusive      Speech:  loud      Thought Process/Content: Flight of ideas      Affective Functioning: Flat      Mood: euthymic      Level of consciousness:  Alert and Inattentive      Response to Learning: Able to verbalize current knowledge/experience, Able to verbalize/acknowledge new learning, and Progressing to goal      Endings: None Reported    Modes of Intervention: Education, Socialization, and Activity      Discipline Responsible: /Counselor      Signature:  RACHELE Amaral

## 2022-08-28 NOTE — PLAN OF CARE
Pt calm and cooperative with care, reports from last shift had no angry outbursts throughout night. Pt agrees to have breakfast with peers if anyone triggers him he will come back over to small side. Pt eating breakfast with peers social with select peers at this time. Will continue to monitor behavior for increased anger or restlessness.

## 2022-08-28 NOTE — PROGRESS NOTES
Department of Psychiatry  Progress Note    Patient's chart was reviewed. Discussed with treatment team. Met with patient. SUBJECTIVE:      Sat with me for 30 minutes without getting agitated. We discussed cars. Less disorganized and delusional.    \"Did you take your Trilafon this morning, Dr. Pia Allan? \"    No prns given in the last 72 hours. ROS:   Patient has new complaints: no  Sleeping adequately:  No:   Appetite adequate: Yes  Engaged in programming: No    OBJECTIVE:  VITALS:  /73   Pulse 72   Temp 97.5 °F (36.4 °C) (Temporal)   Resp 18   Ht 6' 1\" (1.854 m)   Wt 240 lb (108.9 kg)   SpO2 93%   BMI 31.66 kg/m²     Mental Status Examination:    Appearance: in milieu. Poor hygiene    Behavior/Attitude toward examiner:  less irritable. Speech:  Non-pressured. Lower volume. Mood:  \"ok\"  Affect:  less irritable. Thought processes: more organized   Thought Content: no SI, no HI, less paranoid, less delusional   Perceptions: less RTIS  Attention: improving  Abstraction: impaired  Cognition: impaired  Insight: impaired  Judgment: improving    Medication:  Scheduled:   diazePAM  10 mg IntraMUSCular Once    polyethylene glycol  17 g Oral Daily    docusate sodium  100 mg Oral Daily    chlorthalidone  25 mg Oral Daily    fluticasone  1 spray Each Nostril Daily    pitavastatin  1 mg Oral Nightly        PRN:  OLANZapine zydis, OLANZapine (ZyPREXA) in sterile water IntraMUSCular, guaiFENesin-dextromethorphan, diphenhydrAMINE, LORazepam, acetaminophen, magnesium hydroxide, nicotine polacrilex, aluminum & magnesium hydroxide-simethicone       Formulation: This is a well known domiciled, never , psychiatrically  disabled 57-year-old with  schizophrenia, who was brought by  police to our emergency department with worsening psychotic symptoms and agitation in the setting of treatment nonadherence.     Principal Problem:    Schizophrenia ([address]  Active Problems:    Essential hypertension    Pure hypercholesterolemia    Tardive dyskinesia  Resolved Problems:    Hypokalemia     Plan:  1. Admitted to inpatient adult psychiatry for stabilization and treatment. 2. On admission, ordered Dinorah Im injections, q15min checks, programming, and prn medication for anxiety, agitation, and insomnia. 8/28/2022 - changed prns to ativan, benadryl, and olanzapine. Scheduled ativan 2mg BID in the short term. 8/22/2022 - reduced scheduled ativan to 1mg BID.   8/24/2022 - Has declined schedule ativan the last 24hours. No prns given. 8/25/2022 - No scheduled ativan or prns in the last 48hrs.   8/26/2022 - doing better. No prns given in the last 72 hours. 3. Internal medicine consult for admission. Hypokalemia - resolved on repeat. - mild at 3.4  - encourage PO     HTN  - nursing to verify home medication  - pt stated he is taking a water pill at home, discussed with nursing ok to resume tomorrow once verified. - monitor     HLD  - Continue statin       Elevated Liver Enzymes  - known history  - stable     4. Collateral collected. 5. ELOS 10-15 days. Voluntary by guardian. Total time with patient was 35 minutes and more than 50 % of that time was spent counseling the patient on their symptoms, treatment, and expected goals.      Amanda Dunne MD

## 2022-08-28 NOTE — GROUP NOTE
Group Therapy Note    Date: 8/27/2022    Group Start Time: 2000  Group End Time: 2030  Group Topic: [address]        Group Therapy Note    Attendees: 14       Patient's Goal:  \"Get home for Carol\"    Notes:  Pt participated in group discussion, gave appropriate answers, and stayed for full duration. Status After Intervention:  Improved    Participation Level:  Active Listener and Interactive    Participation Quality: Appropriate, Attentive, and Sharing      Speech:  normal      Thought Process/Content: Logical  Linear      Affective Functioning: Blunted      Mood: euthymic      Level of consciousness:  Alert, Oriented x4, and Attentive      Response to Learning: Able to verbalize current knowledge/experience      Endings: None Reported    Modes of Intervention: Socialization      Discipline Responsible: /Counselor      Signature:  RACHELE Yee

## 2022-08-28 NOTE — PROGRESS NOTES
Department of Psychiatry  Progress Note    Patient's chart was reviewed. Discussed with treatment team. Met with patient. SUBJECTIVE:      Had a very difficult Friday evening and night, and Saturday morning. Became severely agitated - broke the TV off the wall, shoved chairs, and cut his hand on the fire alarm. Required multiple emergency medicines. Saturday morning required restraint and seclusion. I met with him yesterday afternoon and had a long discussion about the explanations for discharge. He was able to engage in the conversation and has done well since. Last night he went to group and this morning is on the bid side doing ok. ROS:   Patient has new complaints: no  Sleeping adequately:  improving   Appetite adequate: Yes  Engaged in programming: No    OBJECTIVE:  VITALS:  /73   Pulse 72   Temp 97.5 °F (36.4 °C) (Temporal)   Resp 18   Ht 6' 1\" (1.854 m)   Wt 240 lb (108.9 kg)   SpO2 93%   BMI 31.66 kg/m²     Mental Status Examination:    Appearance: in milieu. Poor hygiene    Behavior/Attitude toward examiner:  less irritable. Speech:  Non-pressured. Lower volume. Mood:  \"ok\"  Affect:  less irritable. Thought processes: more organized   Thought Content: no SI, no HI, less paranoid, less delusional   Perceptions: less RTIS  Attention: improving  Abstraction: impaired  Cognition: impaired  Insight: impaired  Judgment: improving    Medication:  Scheduled:   diazePAM  10 mg IntraMUSCular Once    polyethylene glycol  17 g Oral Daily    docusate sodium  100 mg Oral Daily    chlorthalidone  25 mg Oral Daily    fluticasone  1 spray Each Nostril Daily    pitavastatin  1 mg Oral Nightly        PRN:  OLANZapine zydis, OLANZapine (ZyPREXA) in sterile water IntraMUSCular, guaiFENesin-dextromethorphan, diphenhydrAMINE, LORazepam, acetaminophen, magnesium hydroxide, nicotine polacrilex, aluminum & magnesium hydroxide-simethicone       Formulation:  This is a well known domiciled, never , psychiatrically  disabled 57-year-old with  schizophrenia, who was brought by  police to our emergency department with worsening psychotic symptoms and agitation in the setting of treatment nonadherence. Principal Problem:    Schizophrenia ([address]  Active Problems:    Essential hypertension    Pure hypercholesterolemia    Tardive dyskinesia  Resolved Problems:    Hypokalemia     Plan:  1. Admitted to inpatient adult psychiatry for stabilization and treatment. 2. On admission, ordered Dinorah Im injections, q15min checks, programming, and prn medication for anxiety, agitation, and insomnia. 8/28/2022 - changed prns to ativan, benadryl, and olanzapine. Scheduled ativan 2mg BID in the short term. 8/22/2022 - reduced scheduled ativan to 1mg BID.   8/24/2022 - Has declined schedule ativan the last 24hours. No prns given. 8/25/2022 - No scheduled ativan or prns in the last 48hrs.   8/26/2022 - doing better. No prns given in the last 72 hours. 8/29/2022 - Had a very difficult Friday evening and night, and Saturday morning. Has done much better the last 16hours. 3. Internal medicine consult for admission. Hypokalemia - resolved on repeat. - mild at 3.4  - encourage PO     HTN  - nursing to verify home medication  - pt stated he is taking a water pill at home, discussed with nursing ok to resume tomorrow once verified. - monitor     HLD  - Continue statin       Elevated Liver Enzymes  - known history  - stable     4. Collateral collected. 5. ELOS 10-15 days. Voluntary by guardian. Total time with patient was 35 minutes and more than 50 % of that time was spent counseling the patient on their symptoms, treatment, and expected goals.      Cathleen Cummins MD

## 2022-08-28 NOTE — CARE COORDINATION
[facility]  Treatment Team Note  Review Date & Time:      8/28/22 0930    Patient was not in treatment team      Status EXAM:   Mental Status and Behavioral Exam  Normal: No  Level of Assistance: Independent/Self  Facial Expression: Brightened  Affect: Appropriate  Level of Consciousness: Alert  Frequency of Checks: 4 times per hour, close  Mood:Normal: Yes  Mood: Irritable  Motor Activity:Normal: Yes  Motor Activity: Increased  Eye Contact: Good  Observed Behavior: Friendly, Cooperative  Sexual Misconduct History: Current - no  Preception: [place] to person, [place] to time, [place] to place, [place] to situation  Attention:Normal: Yes  Attention: Distractible, Unable to concentrate  Thought Processes: Flight of ideas, Tangential  Thought Content:Normal: No  Thought Content: Delusions, Paranoia, Preoccupations  Depression Symptoms: Impaired concentration  Anxiety Symptoms: Generalized  Rebeka Symptoms: Flight of ideas  Hallucinations: None (pt denies not talking to self this morning)  Delusions: Yes  Delusions: Persecutory, Paranoid (towards his brother Iker Carson)  Memory:Normal: No  Memory: Confabulation  Insight and Judgment: No  Insight and Judgment: Poor judgment, Poor insight      Suicide Risk CSSR-S:  1) Within the past month, have you wished you were dead or wished you could go to sleep and not wake up? : No  2) Have you actually had any thoughts of killing yourself? : No  6) Have you ever done anything, started to do anything, or prepared to do anything to end your life?: No      Patient will take medication as prescribed, eat 75% of meals, attend groups, participate in milieu activities, participate in treatment team and care planning for discharge and follow up.           Nayely Bosch RN

## 2022-08-28 NOTE — PLAN OF CARE
Problem: Anxiety  Goal: Will report anxiety at manageable levels  Description: INTERVENTIONS:  1. Administer medication as ordered  2. Teach and rehearse alternative coping skills  3. Provide emotional support with 1:1 interaction with staff  Outcome: Progressing     Problem: Depression/Self Harm  Goal: Effect of psychiatric condition will be minimized and patient will be protected from self harm  Description: INTERVENTIONS:  1. Assess impact of patient's symptoms on level of functioning, self care needs and offer support as indicated  2. Assess patient/family knowledge of depression, impact on illness and need for teaching  3. Provide emotional support, presence and reassurance  4. Assess for possible suicidal thoughts or ideation. If patient expresses suicidal thoughts or statements do not leave alone, initiate Suicide Precautions, move to a room close to the nursing station and obtain sitter  5. Initiate consults as appropriate with Mental Health Professional, Spiritual Care, Psychosocial CNS, and consider a recommendation to the LIP for a Psychiatric Consultation  Outcome: Progressing     Problem: Pain  Goal: Verbalizes/displays adequate comfort level or baseline comfort level  Outcome: Progressing     Problem: Rebeka  Goal: Will exhibit normal sleep and speech and no impulsivity  Description: INTERVENTIONS:  1. Administer medication as ordered  2. Set limits on impulsive behavior  3. Make attempts to decrease external stimuli as possible  Outcome: Progressing     Problem: Psychosis  Goal: Will report no hallucinations or delusions  Description: INTERVENTIONS:  1. Administer medication as  ordered  2. Assist with reality testing to support increasing orientation  3.  Assess if patient's hallucinations or delusions are encouraging self harm or harm to others and intervene as appropriate  Outcome: Progressing     Problem: Behavior  Goal: Pt/Family maintain appropriate behavior and adhere to behavioral management agreement, if implemented  Description: INTERVENTIONS:  1. Assess patient/family's coping skills and  non-compliant behavior (including use of illegal substances)  2. Notify security of behavior or suspected illegal substances which indicate the need for search of the family and/or belongings  3. Encourage verbalization of thoughts and concerns in a socially appropriate manner  4. Utilize positive, consistent limit setting strategies supporting safety of patient, staff and others  5. Encourage participation in the decision making process about the behavioral management agreement  6. If a visitor's behavior poses a threat to safety call refer to organization policy. 7. Initiate consult with , Psychosocial CNS, Spiritual Care as appropriate  Outcome: Progressing     Problem: Safety - Violent/Self-destructive Restraint  Goal: Remains free of injury from restraints (Restraint for Violent/Self-Destructive Behavior)  Description: INTERVENTIONS:  1. Determine that de-escalation and other, less restrictive measures have been tried or would not be effective before applying the restraint  2. Identify and document the criteria for restraint  3. Evaluate the patient's condition at the time of restraint application  4. Inform patient/family regarding the reason for restraint/seclusion  5. Q2H: Monitor comfort, nutrition and hydration needs  6. Q15M: Perform safety checks including skin, circulation, sensory, respiratory and psychological status  7. Ensure continuous observation  8.  Identify and implement measures to help patient regain control, assess readiness for release and initiate progressive release per policy  Outcome: Progressing

## 2022-08-29 PROCEDURE — 6370000000 HC RX 637 (ALT 250 FOR IP)

## 2022-08-29 PROCEDURE — 1240000000 HC EMOTIONAL WELLNESS R&B

## 2022-08-29 PROCEDURE — 99233 SBSQ HOSP IP/OBS HIGH 50: CPT | Performed by: PSYCHIATRY & NEUROLOGY

## 2022-08-29 PROCEDURE — 6370000000 HC RX 637 (ALT 250 FOR IP): Performed by: PSYCHIATRY & NEUROLOGY

## 2022-08-29 PROCEDURE — 6370000000 HC RX 637 (ALT 250 FOR IP): Performed by: NURSE PRACTITIONER

## 2022-08-29 RX ADMIN — LORAZEPAM 2 MG: 2 TABLET ORAL at 20:52

## 2022-08-29 RX ADMIN — GUAIFENESIN AND DEXTROMETHORPHAN 5 ML: 100; 10 SYRUP ORAL at 06:20

## 2022-08-29 RX ADMIN — DIPHENHYDRAMINE HCL 50 MG: 25 TABLET ORAL at 20:52

## 2022-08-29 RX ADMIN — DOCUSATE SODIUM 100 MG: 100 CAPSULE, LIQUID FILLED ORAL at 07:51

## 2022-08-29 RX ADMIN — FLUTICASONE PROPIONATE 1 SPRAY: 50 SPRAY, METERED NASAL at 07:51

## 2022-08-29 RX ADMIN — GUAIFENESIN AND DEXTROMETHORPHAN 5 ML: 100; 10 SYRUP ORAL at 15:27

## 2022-08-29 RX ADMIN — POLYETHYLENE GLYCOL (3350) 17 G: 17 POWDER, FOR SOLUTION ORAL at 07:51

## 2022-08-29 RX ADMIN — CHLORTHALIDONE 25 MG: 25 TABLET ORAL at 07:51

## 2022-08-29 NOTE — PROGRESS NOTES
Group Therapy Note    Date: 8/29/2022  Start Time: 1300  End Time:  1400  Number of Participants: 11    Type of Group: Music Group    Notes:  Pt present for Music Group. Pt actively participated by making song selections and singing along to music. Pt became distracted at times and had to be redirected to focus on group and music. Pt was redirectable. Participation Level:  Active Listener and Interactive    Participation Quality: Appropriate, Attentive, and Inappropriate      Speech:  normal      Affective Functioning: Congruent and Exaggerated      Endings: None Reported    Modes of Intervention: Support, Socialization, Activity, and Media      Discipline Responsible: Chaplain Rajiv Samano       08/29/22 7994   Encounter Summary   Encounter Overview/Reason  Behavioral Health   Service Provided For: Patient   Last Encounter    (8/29 Music Group)   Complexity of Encounter Moderate   Begin Time 1300   End Time  1400   Total Time Calculated 60 min   Behavioral Health    Type  Spirituality Group

## 2022-08-29 NOTE — PLAN OF CARE
Patient pleasant and cooperative at beginning of the shift. Patient requested and given Ativan 2mg for anxiety and Benadryl 50mg for EPS at 2055. Patient resting in bed eyes closed, even, non labored respirations noted, medication effective. No angry or aggressive outbursts at this time.

## 2022-08-29 NOTE — GROUP NOTE
Group Therapy Note    Date: 8/29/2022    Group Start Time: 1000  Group End Time: 8083  Group Topic: Psychoeducation    [facility]        Group Therapy Note    Topic: Stress & The Body    Group facilitator led discussion of physical and mental symptoms of stress and anxiety. Group members explored the fight/flight/freeze response, a 6-step process to challenge anxious intrusive thoughts and response to symptoms in healthy ways. Attendees: 11     Notes:  Usha Bull was present during group, attentive while collaborating in process with peers. He did require redirection 2x to group topics, but was easily redirected and receptive to feedback from peers. Status After Intervention:  Improved    Participation Level:  Active Listener and Interactive    Participation Quality: Appropriate and Attentive      Speech:  normal      Thought Process/Content: Logical      Affective Functioning: Exaggerated      Mood: elevated      Level of consciousness:  Preoccupied      Response to Learning: Capable of insight and Able to change behavior      Endings: None Reported    Modes of Intervention: Education, Support, Socialization, and Exploration      Discipline Responsible: Psychoeducational Specialist      Signature:  Li Lundy MM, MT-BC

## 2022-08-29 NOTE — BH NOTE
[facility]  Treatment Team Note  Review Date & Time: 8/29/2022  0900    Patient was not in treatment team      Status EXAM:   Mental Status and Behavioral Exam  Normal: No  Level of Assistance: Independent/Self  Facial Expression: Brightened  Affect: Appropriate  Level of Consciousness: Alert  Frequency of Checks: 4 times per hour, close  Mood:Normal: Yes  Mood: Irritable  Motor Activity:Normal: Yes  Motor Activity: Increased  Eye Contact: Good  Observed Behavior: Friendly, Cooperative  Sexual Misconduct History: Current - no  Preception: [place] to person, [place] to time, [place] to place, [place] to situation  Attention:Normal: Yes  Attention: Distractible, Unable to concentrate  Thought Processes: Flight of ideas, Tangential  Thought Content:Normal: No  Thought Content: Paranoia, Preoccupations  Depression Symptoms: No problems reported or observed. Anxiety Symptoms: Generalized  [place] Symptoms: Flight of ideas  Hallucinations: None  Delusions: Yes  Delusions: Persecutory, Paranoid  Memory:Normal: No  Memory: Confabulation  Insight and Judgment: No  Insight and Judgment: Poor judgment, Poor insight      Suicide Risk CSSR-S:  1) Within the past month, have you wished you were dead or wished you could go to sleep and not wake up? : No  2) Have you actually had any thoughts of killing yourself? : No  6) Have you ever done anything, started to do anything, or prepared to do anything to end your life?: No      PLAN/TREATMENT RECOMMENDATIONS UPDATE:   Patient will take medication as prescribed, eat 75% of meals, attend groups, participate in milieu activities, participate in treatment team and care planning for discharge and follow up.             Jakub Bone RN

## 2022-08-29 NOTE — GROUP NOTE
Group Therapy Note    Date: 8/28/2022    Group Start Time: 2000  Group End Time: 2015  Group Topic: [address]        Group Therapy Note    Attendees: 17     Patient's Goal:  Group goals made from community meeting will be discussed. Notes:  Pt stated that he met his goal today by \"keeping my mouth shut\". Pt stayed for full duration of group and was appropriate. Status After Intervention:  Improved    Participation Level:  Active Listener and Interactive    Participation Quality: Appropriate, Attentive, and Sharing      Speech:  loud      Thought Process/Content: Logical  Linear      Affective Functioning: Congruent      Mood: euthymic      Level of consciousness:  Alert, Oriented x4, and Attentive      Response to Learning: Able to verbalize current knowledge/experience and Progressing to goal      Endings: None Reported    Modes of Intervention: Socialization      Discipline Responsible: /Counselor      Signature:  RACHELE Isaac

## 2022-08-29 NOTE — PLAN OF CARE
PT calm and cooperative with care no angry outbursts noted, social with staff and peers, pt denies SI,HI,AVH, without distress or anger and attends groups and community meeting.

## 2022-08-29 NOTE — GROUP NOTE
Group Therapy Note    Date: 8/29/2022    Group Start Time: 1100  Group End Time: 1150  Group Topic: Psychoeducation    [address] [facility],[address], LIS        Group Therapy Note    Attendees: 13    Participants engaged in a creative social activity to learn that when we share something, we cannot control what happens to it and we need to learn to appreciate everyone's differences. Notes:  Patient attended group and was engaged and actively participated in the group activity. Patient was appropriate during group and with peers throughout.      Status After Intervention:  Improved    Participation Level: Interactive    Participation Quality: Appropriate, Attentive, and Sharing      Speech:  pressured      Thought Process/Content: Logical      Affective Functioning: Congruent      Mood: euthymic      Level of consciousness:  Alert and Attentive      Response to Learning: Able to verbalize/acknowledge new learning and Progressing to goal      Endings: None Reported    Modes of Intervention: Education, Socialization, Exploration, and Activity      Discipline Responsible: /Counselor      Signature:  Dayne Osler, LISW

## 2022-08-30 VITALS
WEIGHT: 240 LBS | HEART RATE: 78 BPM | RESPIRATION RATE: 18 BRPM | SYSTOLIC BLOOD PRESSURE: 139 MMHG | TEMPERATURE: 97.5 F | HEIGHT: 73 IN | OXYGEN SATURATION: 95 % | DIASTOLIC BLOOD PRESSURE: 72 MMHG | BODY MASS INDEX: 31.81 KG/M2

## 2022-08-30 PROCEDURE — 6370000000 HC RX 637 (ALT 250 FOR IP)

## 2022-08-30 PROCEDURE — 99239 HOSP IP/OBS DSCHRG MGMT >30: CPT | Performed by: PSYCHIATRY & NEUROLOGY

## 2022-08-30 PROCEDURE — 6370000000 HC RX 637 (ALT 250 FOR IP): Performed by: NURSE PRACTITIONER

## 2022-08-30 PROCEDURE — 6370000000 HC RX 637 (ALT 250 FOR IP): Performed by: PSYCHIATRY & NEUROLOGY

## 2022-08-30 PROCEDURE — 5130000000 HC BRIDGE APPOINTMENT

## 2022-08-30 RX ORDER — PSEUDOEPHEDRINE HCL 30 MG
100 TABLET ORAL DAILY
Qty: 30 CAPSULE | Refills: 0 | Status: SHIPPED | OUTPATIENT
Start: 2022-08-31 | End: 2022-09-30

## 2022-08-30 RX ORDER — CHLORTHALIDONE 25 MG/1
25 TABLET ORAL DAILY
Qty: 30 TABLET | Refills: 0 | Status: SHIPPED | OUTPATIENT
Start: 2022-08-31

## 2022-08-30 RX ORDER — ARIPIPRAZOLE LAUROXIL 1064 MG/3.9ML
1064 INJECTION, SUSPENSION, EXTENDED RELEASE INTRAMUSCULAR
Qty: 1.8 ML | Refills: 1 | Status: SHIPPED | OUTPATIENT
Start: 2022-10-11 | End: 2022-10-11

## 2022-08-30 RX ORDER — LORAZEPAM 2 MG/1
2 TABLET ORAL ONCE
Status: DISCONTINUED | OUTPATIENT
Start: 2022-08-30 | End: 2022-08-30 | Stop reason: HOSPADM

## 2022-08-30 RX ORDER — PITAVASTATIN CALCIUM 2.09 MG/1
1 TABLET, FILM COATED ORAL NIGHTLY
Qty: 30 TABLET | Refills: 0 | Status: SHIPPED | OUTPATIENT
Start: 2022-08-30

## 2022-08-30 RX ORDER — LORAZEPAM 2 MG/1
2 TABLET ORAL DAILY PRN
Qty: 30 TABLET | Refills: 0 | Status: SHIPPED | OUTPATIENT
Start: 2022-08-30 | End: 2022-09-27

## 2022-08-30 RX ADMIN — GUAIFENESIN AND DEXTROMETHORPHAN 5 ML: 100; 10 SYRUP ORAL at 10:45

## 2022-08-30 RX ADMIN — DIPHENHYDRAMINE HCL 50 MG: 25 TABLET ORAL at 10:45

## 2022-08-30 RX ADMIN — DOCUSATE SODIUM 100 MG: 100 CAPSULE, LIQUID FILLED ORAL at 08:43

## 2022-08-30 RX ADMIN — FLUTICASONE PROPIONATE 1 SPRAY: 50 SPRAY, METERED NASAL at 08:43

## 2022-08-30 RX ADMIN — LORAZEPAM 2 MG: 2 TABLET ORAL at 10:46

## 2022-08-30 RX ADMIN — CHLORTHALIDONE 25 MG: 25 TABLET ORAL at 08:43

## 2022-08-30 ASSESSMENT — PAIN SCALES - GENERAL: PAINLEVEL_OUTOF10: 0

## 2022-08-30 NOTE — GROUP NOTE
Group Therapy Note    Date: 8/30/2022    Group Start Time: 1100  Group End Time: 9720  Group Topic: Psychoeducation    [facility]    SHY Ram        Group Therapy Note  Clinician introduced the stages of grief to the group members. They discussed the symptoms of depression, sadness and loss. They were able to define denial, and some of the members recognized they are stuck in the denial stage, locking away their loss. The clinician provided information sheet on the 5 stages of grief and a worksheet to name their grief and implement coping skills for their grief/loss. Attendees: 5       Patient's Goal:      Notes:  Patient was unable to keep up with all of the information. He was able to give a good definition of the denial. He shared that he lost his dad and did not go through all five of the stages. The clinician agreed with the stages that not everyone will go through all five stages and you can be in more than one stage at a time. The process is individual and there is no time limit on loss. Status After Intervention:  Improved    Participation Level:  Active Listener, Interactive, and Monopolizing    Participation Quality: Attentive      Speech:  pressured      Thought Process/Content: Flight of ideas      Affective Functioning: Congruent      Mood: euphoric      Level of consciousness:  Attentive      Response to Learning: Capable of insight      Endings: None Reported    Modes of Intervention: Education and Activity      Discipline Responsible: /Counselor      Signature:  SHY Ram

## 2022-08-30 NOTE — BH NOTE
Leatha Newton notified of patient being discharged today. Will look at discharge instructions when he gets home.

## 2022-08-30 NOTE — DISCHARGE INSTRUCTIONS
Advanced Directives:  Patient does have a surrogate decision maker appointed   Name Almas Corral  Patient does not have a psychiatric and/ or medical advanced directive or power of . Patient was not offered psychiatric and/ or medical advanced directive or power of  information/completion but declined to complete   Why not? Has guardian    Discharge Planning is Complete. Discharge Date: 8/30/2022  Reason for Hospitalization: Failure to care for self  Discharge Diagnosis: Schizophrenia  Discharging to: Private Domicile    Your instructions: Your physician here was Khoa Aleman MD. If you have any questions please call the unit at [phone] for the adult unit or [phone] for [facility]. Please note that we have a patient family advisory [place] that meets the second Wednesday of January and the second Wednesday of July at [address] [facility] in [place] at [facility]. Department leadership would love for you to attend to give feedback on what we are doing well and areas in which we can improve our patient care. Please come if you are able and feel free to reach out to [facility] at [phone] with any questions. The crisis number for [facility] is [phone] (SAVE). This crisis line is available 24 hours a day, seven days a week. Please follow up with your PCP regarding any pending labs. You are taking Abilify Aristada long-acting medication.   Your next appointment for medicine management is scheduled as follows:  Name of Provider: [facility]  Provider specialty/license: infusion clinic  Date and time of appointment: Tuesday, 10/11/2022 at 11:30 AM  The type/s of services requested are: medicine management for [facility] name: [facility]  Address: [address] [address], [place], [place], [address], in same-day surgery  Phone Number: [phone]  Special instructions (what to bring to appointment, etc.):       You are being scheduled a bridge appointment for psychiatric medicine management with your psychiatrist Dr. Sena Cornell MD.  Your appointment is scheduled as follows:  Name of Provider: Dr. Sena Cornell MD  Provider specialty/license: psychiatry  Date and time of appointment: Tuesday, 10/11/2022 at 12:00 PM  The type/s of services requested are: medicine management  Agency name: [address]  Address: [facility] [address], [place], [address], in same-day surgery  Phone Number: [phone]  Special instructions (what to bring to appointment, etc.):       You are being referred to case management services at [facility]. You have to go in person to register for services at this agency. It is strongly recommended that you follow up with case management. Name of Provider: [facility]  Provider specialty/license: mental health services  Date and time of appointment: Open enrollment any Monday-Thursday from 8:00am-4:30pm or Friday from 8:00am-3:00pm.  RECOMMEND Thursday, September 1st, 2022, at 10:00 AM  The type/s of services requested are: counseling/medication management  Agency name: [facility]  Address: [address], [address] [place] [address]  Phone Number: [phone]  Special instructions (what to bring to appointment, etc.): Valid ID, insurance card if you have one, proof of residence (mail), proof of income (tax return, check stubs, award letter). If you are unable to attend the open enrollment date and time above please plan to attend open enrollment any Monday-Thursday from 8:00am-4:30pm or Friday from 8:00am-3:00pm      Discharge Completed By: Dorothy JEFFREY  Fax to:  Follow up provider name and number  [facility] medical records [phone]    The following personal items were collected during your admission and were returned to you:    Belongings  Dental Appliances: None  Vision - Corrective Lenses: Eyeglasses (in locker)  Hearing Aid: None  Clothing: Pants, Shirt, Undergarments  Jewelry: None  Body Piercings Removed: N/A  Electronic Devices: None  Weapons (Notify Protective Services/Security): None  Other Valuables: Other (Comment) (duffle bag put in locker)  Home Medications: None  Valuables Given To: Patient, Jennifer Kathy, Other (Comment)  Provide Name(s) of Who Valuable(s) Were Given To: Nilo Puentes    By signing below, I understand and acknowledge receipt of the instructions indicated above.

## 2022-08-30 NOTE — PROGRESS NOTES
Department of Psychiatry  Progress Note    Patient's chart was reviewed. Discussed with treatment team. Met with patient. SUBJECTIVE:      Has done much better since Saturday afternoon. Is active in the milieu on the main unit, attending groups, and calm. Able to engage in organized conversation. ROS:   Patient has new complaints: no  Sleeping adequately:  improving   Appetite adequate: Yes  Engaged in programming: yes    OBJECTIVE:  VITALS:  /65   Pulse 80   Temp 97.8 °F (36.6 °C) (Temporal)   Resp 16   Ht 6' 1\" (1.854 m)   Wt 240 lb (108.9 kg)   SpO2 95%   BMI 31.66 kg/m²     Mental Status Examination:    Appearance: in milieu. Improved hygiene   Behavior/Attitude toward examiner:  less irritable. Speech:  Non-pressured. Lower volume. Mood:  \"ok\"  Affect:  less irritable. Thought processes: more organized   Thought Content: no SI, no HI, less paranoid, less delusional   Perceptions: less RTIS  Attention: improving  Abstraction: improving   Cognition: improving   Insight: improving  Judgment: improving    Medication:  Scheduled:   diazePAM  10 mg IntraMUSCular Once    polyethylene glycol  17 g Oral Daily    docusate sodium  100 mg Oral Daily    chlorthalidone  25 mg Oral Daily    fluticasone  1 spray Each Nostril Daily    pitavastatin  1 mg Oral Nightly        PRN:  OLANZapine zydis, OLANZapine (ZyPREXA) in sterile water IntraMUSCular, guaiFENesin-dextromethorphan, diphenhydrAMINE, LORazepam, acetaminophen, magnesium hydroxide, nicotine polacrilex, aluminum & magnesium hydroxide-simethicone       Formulation: This is a well known domiciled, never , psychiatrically  disabled 57-year-old with  schizophrenia, who was brought by  police to our emergency department with worsening psychotic symptoms and agitation in the setting of treatment nonadherence.     Principal Problem:    Schizophrenia ([address]  Active Problems:    Essential hypertension    Pure hypercholesterolemia    Tardive dyskinesia  Resolved Problems:    Hypokalemia     Plan:  1. Admitted to inpatient adult psychiatry for stabilization and treatment. 2. On admission, ordered Dinorah Im injections, q15min checks, programming, and prn medication for anxiety, agitation, and insomnia. 8/28/2022 - changed prns to ativan, benadryl, and olanzapine. Scheduled ativan 2mg BID in the short term. 8/22/2022 - reduced scheduled ativan to 1mg BID.   8/24/2022 - Has declined schedule ativan the last 24hours. No prns given. 8/25/2022 - No scheduled ativan or prns in the last 48hrs.   8/26/2022 - doing better. No prns given in the last 72 hours. 8/28/2022 - Had a very difficult Friday evening and night, and Saturday morning. Has done much better the last 16hours. 8/29/2022 - doing much better. 3. Internal medicine consult for admission. Hypokalemia - resolved on repeat. - mild at 3.4  - encourage PO     HTN  - monitor     HLD  - Continue statin       Elevated Liver Enzymes  - known history  - stable     4. Collateral collected. 5. ELOS 10-15 days. Voluntary by guardian. Target DC tomorrow if continues to make gains. Total time with patient was 35 minutes and more than 50 % of that time was spent counseling the patient on their symptoms, treatment, and expected goals.      Emeka Justice MD

## 2022-08-30 NOTE — BH NOTE
[facility]  Discharge Note    Pt discharged with followings belongings:   Dental Appliances: None  Vision - Corrective Lenses: Eyeglasses (in locker)  Hearing Aid: None  Jewelry: None  Body Piercings Removed: N/A  Clothing: Pants, Shirt, Undergarments  Other Valuables: Other (Comment) (duffle bag put in locker)   Nancy Garcia returned to patient. Patient education on aftercare instructions: Yes  Information faxed to [facility] by Jaden Pisano RN  at 2:43 PM .Patient verbalize understanding of AVS:  Yes. Status EXAM upon discharge:  Mental Status and Behavioral Exam  Normal: No  Level of Assistance: Independent/Self  Facial Expression: Elevated  Affect: Appropriate  Level of Consciousness: Alert  Frequency of Checks: 4 times per hour, close  Mood:Normal: No  Mood: Labile  Motor Activity:Normal: Yes  Motor Activity: Increased  Eye Contact: Good  Observed Behavior: Cooperative  Sexual Misconduct History: Current - no  Preception: [place] to person, [place] to time, [place] to place  Attention:Normal: No  Attention: Distractible  Thought Processes: Flight of ideas  Thought Content:Normal: No  Thought Content: Delusions, Paranoia  Depression Symptoms: No problems reported or observed.   Anxiety Symptoms: Generalized  Rebeka Symptoms: Flight of ideas  Hallucinations: None  Delusions: Yes  Delusions: Persecutory, Paranoid  Memory:Normal: No  Memory: Confabulation  Insight and Judgment: No  Insight and Judgment: Poor judgment, Poor insight    Tobacco Screening:  Practical Counseling, on admission, soledad X, if applicable and completed (first 3 are required if patient doesn't refuse):         Refused   ( ) Recognizing danger situations (included triggers and roadblocks)                    ( ) Coping skills (new ways to manage stress,relaxation techniques, changing routine, distraction)                                                           ( ) Basic information about quitting (benefits of quitting, techniques in how to quit, available resources  ( ) Referral for counseling faxed to [facility]                                                                                                                   ( ) Patient refused counseling  ( ) Patient refused referral  ( ) Patient refused prescription upon discharge  ( X) Patient has not smoked in the last 30 days    Metabolic Screening:    Lab Results   Component Value Date    LABA1C 5.3 08/14/2022       Lab Results   Component Value Date    CHOL 213 (H) 08/14/2022    CHOL 204 (H) 02/27/2022    CHOL 155 10/15/2021    CHOL 162 07/29/2020    CHOL 209 (H) 06/05/2019    CHOL 193 02/27/2019    CHOL 255 (H) 06/01/2018    CHOL 215 (H) 03/19/2018    CHOL 148 08/31/2017     Lab Results   Component Value Date    TRIG 118 08/14/2022    TRIG 81 02/27/2022    TRIG 82 10/15/2021    TRIG 135 07/29/2020    TRIG 239 (H) 06/05/2019    TRIG 181 (H) 02/27/2019    TRIG 177 (H) 06/01/2018    TRIG 212 (H) 03/19/2018    TRIG 112 08/31/2017     Lab Results   Component Value Date    HDL 49 08/14/2022    HDL 48 02/27/2022    HDL 42 10/15/2021    HDL 48 07/29/2020    HDL 45 06/05/2019    HDL 46 02/27/2019    HDL 50 06/01/2018    HDL 46 03/19/2018    HDL 50 08/31/2017     No components found for: LDLCAL  Lab Results   Component Value Date    LABVLDL 24 08/14/2022    LABVLDL 16 02/27/2022    LABVLDL 16 10/15/2021    LABVLDL 27 07/29/2020    LABVLDL 48 06/05/2019    LABVLDL 36 02/27/2019    LABVLDL 35 06/01/2018    LABVLDL 42 03/19/2018    LABVLDL 22 08/31/2017       Francella Sandifer, RN

## 2022-08-30 NOTE — BH NOTE
Purposeful Rounding    Patient Location: Day room    Patient willing to engage in conversation: Yes    Presentation/behavior: Cooperative    Affect: Labile    Concerns reported: None    PRN medications given: N/A    Environmental assessment: No safety hazards noted    Fall prevention interventions in place: Lighting appropriate, Room free of clutter, and Clear path to bathroom      Electronically signed by Tomasa Steele RN on 8/30/22 at 11:31 AM EDT

## 2022-08-30 NOTE — BH NOTE
Pt requesting Benadryl, Ativan and Robitussin for c/o anxiety and difficulty breathing. His respirations are 16. Educated regarding taking Ativan too soon, he requested writer ask Dr. Dragan Kraus and he approved this dose.

## 2022-08-30 NOTE — GROUP NOTE
Group Therapy Note    Date: 8/30/2022    Group Start Time: 1300  Group End Time: 1350  Group Topic: Recreational    [id number] [employer] [place] [employer]        Group Therapy Note    Attendees: 10    Group members started off group by playing the ice breaker \"Two Truths and a Lie. \" When finished, with the time left, group members engaged in multiple games of [employer]. Notes:  Yehuda Manzano attended group for the full duration. Yehuda Manzano remained engaged during his time in group and interacted appropriately with other members of the group. Status After Intervention:  Improved    Participation Level:  Active Listener and Interactive    Participation Quality: Appropriate, Attentive, and Sharing      Speech:  normal      Thought Process/Content: Logical      Affective Functioning: Congruent      Mood: euthymic      Level of consciousness:  Alert      Response to Learning: Able to verbalize current knowledge/experience      Endings: None Reported    Modes of Intervention: Socialization, Exploration, and Activity      Discipline Responsible: Behavorial Health Tech      Signature:  SHY Maldonado

## 2022-08-30 NOTE — PLAN OF CARE
No behavioral agitation or outburst verbalized at this time-Patient pleasant,able to make needs known politely,apologize about his past behaviors,able to go to groups and compliant with scheduled medication. Patient verbalizes that he will be d/c tomorrow but unsure about this. Pt also fixated on Ativan and Benadryl-Administered per request.Will c/o to observe behavior and provide support as needed. Problem: Behavior  Goal: Pt/Family maintain appropriate behavior and adhere to behavioral management agreement, if implemented  Description: INTERVENTIONS:  1. Assess patient/family's coping skills and  non-compliant behavior (including use of illegal substances)  2. Notify security of behavior or suspected illegal substances which indicate the need for search of the family and/or belongings  3. Encourage verbalization of thoughts and concerns in a socially appropriate manner  4. Utilize positive, consistent limit setting strategies supporting safety of patient, staff and others  5. Encourage participation in the decision making process about the behavioral management agreement  6. If a visitor's behavior poses a threat to safety call refer to organization policy.   7. Initiate consult with , Psychosocial CNS, Spiritual Care as appropriate  8/29/2022 2223 by Diane Russ RN  Outcome: Progressing

## 2022-08-30 NOTE — PLAN OF CARE
Problem: Anxiety  Goal: Will report anxiety at manageable levels  Description: INTERVENTIONS:  1. Administer medication as ordered  2. Teach and rehearse alternative coping skills  3. Provide emotional support with 1:1 interaction with staff  Outcome: Progressing     Problem: Depression/Self Harm  Goal: Effect of psychiatric condition will be minimized and patient will be protected from self harm  Description: INTERVENTIONS:  1. Assess impact of patient's symptoms on level of functioning, self care needs and offer support as indicated  2. Assess patient/family knowledge of depression, impact on illness and need for teaching  3. Provide emotional support, presence and reassurance  4. Assess for possible suicidal thoughts or ideation. If patient expresses suicidal thoughts or statements do not leave alone, initiate Suicide Precautions, move to a room close to the nursing station and obtain sitter  5. Initiate consults as appropriate with Mental Health Professional, Spiritual Care, Psychosocial CNS, and consider a recommendation to the LIP for a Psychiatric Consultation  Outcome: Acacia Martinez (Taken 8/29/2022 2120 by Eric Prince RN)  Effect of psychiatric condition will be minimized and patient will be protected from self harm: Provide emotional support, presence and reassurance     Problem: Pain  Goal: Verbalizes/displays adequate comfort level or baseline comfort level  Outcome: Progressing     Problem: Rebeka  Goal: Will exhibit normal sleep and speech and no impulsivity  Description: INTERVENTIONS:  1. Administer medication as ordered  2. Set limits on impulsive behavior  3. Make attempts to decrease external stimuli as possible  Outcome: Progressing     Problem: Psychosis  Goal: Will report no hallucinations or delusions  Description: INTERVENTIONS:  1. Administer medication as  ordered  2. Assist with reality testing to support increasing orientation  3.  Assess if patient's hallucinations or policy  Outcome: Progressing    Patient is interactive with staff. Patient denies SI/HI/AVH. Patient states they slept \"good - 12 hours\" last night. He reports feeling \"happy. \" Patient compliant with medications. Patient is cooperative.

## 2022-08-30 NOTE — BH NOTE
Purposeful Rounding     Patient Location: Day room     Patient willing to engage in conversation: Yes     Presentation/behavior: Cooperative     Affect: Labile     Concerns reported: None     PRN medications given: N/A     Environmental assessment: No safety hazards noted     Fall prevention interventions in place: Lighting appropriate, Room free of clutter, and Clear path to bathroom

## 2022-09-09 ENCOUNTER — TELEPHONE (OUTPATIENT)
Dept: NURSING | Age: 62
End: 2022-09-09

## 2022-09-09 NOTE — TELEPHONE ENCOUNTER
Received call from Leonel Christy who wanted to cancel his Aristada injection appt on Oct 11,2022  Pt reports that it is too far for him to drive and he is changing doctors  Pt would like his appt with Dr. Pendleton Client cancelled as well  pt declined to tell me his new MD's name  I called and spoke with Jhonny Dobbs RN in [facility] to inform her that he want's the appt with Dr. Pendleton Client to be cancelled as well   Isa Cardenas appt will be cancelled per his request

## 2022-10-04 NOTE — DISCHARGE SUMMARY
Department of Psychiatry    Discharge Summary      Laurann Phoenix  [id number]    Admission date:   8/14/2022    Discharge:   Date: 8/30/2022   Location: home    Inpatient Provider: Sotero Marks MD  Unit: [facility]    Diagnosis on Admission:  schizophrenia     Diagnosis on Discharge:  schizophrenia     Active Hospital Problems    Diagnosis Date Noted    Tardive dyskinesia [G24.01] 10/15/2021    Schizophrenia ([address] [F20.9] 07/29/2020    Essential hypertension [I10] 08/31/2017    Pure hypercholesterolemia [E78.00] 08/31/2017       Reason for Admission:    From my admission note:  Identification: This is a well known domiciled, never , psychiatrically  disabled 57-year-old with  schizophrenia, who was brought by  police to our emergency department with worsening psychotic symptoms and agitation in the setting of treatment nonadherence. SOI:  Patient; guardian; focused record review. CC:  \"I was kidnapped. \"     HPI:   Mr. Irving Lyles presents severely disorganized, delusional, agitated, and impaired. He stabilized here earlier this year on Dinorah but did not get follow-up treatment after discharge and his symptoms returned. Per the [facility]FF[facility] note:  patient presents to the ED  on a SOB after threatening his brother. Pt brought in on SOB for erratic behavior and threatening brother. In February, pt refused his injection of Abilify, and has been decompensating since this time. He lives with his elderly mother, who is his POA. SOB states he has not been sleeping and has been having increased paranoia. He believes his brother killed his mother. Pt is very disorganized, speech is nonsensical.  After being ask why brought he came in today he responded, \" I'm pissed at untapt, I got brain damage, I'm a buckeye, deactivated my medical card. \"  Then a few seconds later he stated, \"[employer] stocks, in my throat and chest.  Benadryl is the cure but I draw the line at 20.   Wolf Theodore is a liar, Thank you. \"  This writer was unable to obtain much information from this patient. Pt states he is not suicidal and wants to go home. Past Psychiatric History:    He has been admitted here four times (last here in  Six months ago). , [place], and also in [place] for schizophrenia. He does have a history of outpatient treatment through [facility] and our OP. When psychotic, he has a history of aggressive behavior. He's been on multiple medications including Clozaril, Invega, olanzapine, and most other antipsychotics. Past Medical History:  Past Medical History        Past Medical History:   Diagnosis Date    Elevated liver enzymes 9/1/2017    Hypertension      Pure hypercholesterolemia 8/31/2017      No known traumatic brain injuries, seizures or major surgeries. TD     Home Medications:  None. non-adherent      Chemical Dependency History:   none     Family Mental Health Hx:    None. No suicides. Social Hx:   Never . disabled. He lives with his mother. She is his guardian. He has no kids. ROS:  does not describe or endorse recent headaches, changes in vision, shortness of breath, chest pain, neurological problems, skin problems, muscle aches, GI problems, or bleeding problems, and was moving his extremities without difficulty. PE on admission:    /84   Pulse 82   Temp 98.2 °F (36.8 °C) (Oral)   Resp 18   Ht 6' 1\" (1.854 m)   Wt 240 lb (108.9 kg)   SpO2 96%   BMI 31.66 kg/m²        Motor / Gait: normal gait and station. Had TD     Mental Status Examination on admission:    Appearance: in milieu. Fair hygiene    Behavior/Attitude toward examiner:  Suspicious, paranoid, irritable. Speech:  Non-pressured. Elevated volume. Mood:  \"get away\"  Affect:  Irritable.    Thought processes:  Disorganized  Thought Content: no SI, no HI, +paranoid, +delusional   Perceptions: + RTIS  Attention: impaired  Abstraction: impaired  Cognition: impaired  Insight: impaired  Judgment: impaired     LAB: Reviewed all labs obtained during admission to date. Formulation on admission: This is a well known domiciled, never , psychiatrically  disabled 57-year-old with  schizophrenia, who was brought by  police to our emergency department with worsening psychotic symptoms and agitation in the setting of treatment nonadherence. Dx on admission:  Primary Psychiatric (DSM V) Diagnosis: schizophrenia   Secondary Psychiatric (DSM V) Diagnoses: none  Chemical Dependency Diagnoses: none  Tardive dyskinesia. Essential hypertension. Hyperlipidemia. Hospital Course:      Plan:  1. Admitted to inpatient adult psychiatry for stabilization and treatment. 2. On admission, gave Dinorah Im injections, q15min checks, programming, and prn medication for anxiety, agitation, and insomnia. 8/28/2022 - changed prns to ativan, benadryl, and olanzapine. Scheduled ativan 2mg BID in the short term. 8/22/2022 - reduced scheduled ativan to 1mg BID.   8/24/2022 - Has declined schedule ativan the last 24hours. No prns given. 8/25/2022 - No scheduled ativan or prns in the last 48hrs.   8/26/2022 - doing better. No prns given in the last 72 hours. 8/28/2022 - Had a very difficult Friday evening and night, and Saturday morning. Has done much better the last 16hours. 8/29/2022 - doing much better. Mr. Loida Yi stabilized and improved with treatment. His psychotic symptoms improved, and he became more reality based and future oriented. With treatment, he became active in the milieu, with peers, and in programming. He tolerated Edger Mon and Tokoza Ext well though continued to decline medication for TD. He committed to continuing treatment (including medication) after discharge. 3. Internal medicine consult for admission. Hypokalemia - resolved on repeat.    - mild at 3.4  - encourage PO     HTN  - monitor     HLD  - Continue statin       Elevated Liver Enzymes  - known history  - stable      4. Collateral collected. 5.Voluntary by guardian. Complications: none     Vital signs in last 24 hours:  Vitals:    08/30/22 0827   BP: 139/72   Pulse: 78   Resp: 18   Temp: 97.5 °F (36.4 °C)   SpO2: 95%       Mental Status Examination on Discharge:    Appearance: in milieu. Improved hygiene   Behavior/Attitude toward examiner:  less irritable. Speech:  Non-pressured. Lower volume. Mood:  \"ok\"  Affect:  mood congruent   Thought processes: more organized   Thought Content: no SI, no HI, less paranoid, less delusional   Perceptions: less RTIS  Attention: improving  Abstraction: improving   Cognition: improving   Insight: improving  Judgment: improving    Discharge on regular diet, continue activity as tolerated. Condition on Discharge:  Aneesh Trujillo was in stable condition. Aneesh Trujillo did not have suicidal or homicidal thoughts, and was future oriented. Aneesh Trujillo did not represent an imminent risk to self or others. However, given static risk factors, Aneesh Trujillo remains at perpetually elevated risk going forward. Medication List        START taking these medications      Aristada 1064 MG/3.9ML Prsy injection  Generic drug: ARIPiprazole Lauroxil  Inject 3.9 mLs into the muscle Every 2 months for 1 dose  Start taking on: October 11, 2022            CHANGE how you take these medications      chlorthalidone 25 MG tablet  Commonly known as: HYGROTON  Take 1 tablet by mouth daily  What changed:   how much to take  how to take this  when to take this     Livalo 2 MG Tabs tablet  Generic drug: pitavastatin  Take 0.5 tablets by mouth nightly  What changed: See the new instructions.             CONTINUE taking these medications      fluticasone 50 MCG/ACT nasal spray  Commonly known as: FLONASE            STOP taking these medications      Allergy Relief D 5-120 MG per extended release tablet  Generic drug: cetirizine-psuedoephedrine            ASK your doctor about these medications      docusate 100 MG Caps  Commonly known as: COLACE, DULCOLAX  Take 100 mg by mouth daily  Ask about: Should I take this medication? LORazepam 2 MG tablet  Commonly known as: ATIVAN  Take 1 tablet by mouth daily as needed (agitation) for up to 30 doses. Ask about: Should I take this medication? Where to Get Your Medications        You can get these medications from any pharmacy    Bring a paper prescription for each of these medications  Aristada 1064 MG/3.9ML Prsy injection  chlorthalidone 25 MG tablet  docusate 100 MG Caps  Livalo 2 MG Tabs tablet  LORazepam 2 MG tablet         Follow-up Plan: The following was given to the patient at discharge: The crisis number for [facility] is [phone] (SAVE). This crisis line is available 24 hours a day, seven days a week. Please follow up with your PCP regarding any pending labs. You are taking Abilify Aristada long-acting medication.   Your next appointment for medicine management is scheduled as follows:  Name of Provider: [facility]  Provider specialty/license: infusion clinic  Date and time of appointment: Tuesday, 10/11/2022 at 11:30 AM  The type/s of services requested are: medicine management for [facility] name: [facility]  Address: [address] [place] [address], [place], [place], [address], in same-day surgery  Phone Number: [phone]  Special instructions (what to bring to appointment, etc.):     You are being scheduled a bridge appointment for psychiatric medicine management with your psychiatrist Dr. Eleanor Hanna MD.  Your appointment is scheduled as follows:  Name of Provider: Dr. Eleanor Hanna MD  Provider specialty/license: psychiatry  Date and time of appointment: Tuesday, 10/11/2022 at 12:00 PM  The type/s of services requested are: medicine management  Agency name: [facility]  Address: [address] [facility] [facility], [place], [place], [address], in same-day surgery  Phone Number: [phone]  Special instructions (what to bring to appointment, etc.):     You are being referred to case management services at [facility]. You have to go in person to register for services at this agency. It is strongly recommended that you follow up with case management. Name of Provider: [facility]  Provider specialty/license: mental health services  Date and time of appointment: Open enrollment any Monday-Thursday from 8:00am-4:30pm or Friday from 8:00am-3:00pm.  RECOMMEND Thursday, September 1st, 2022, at 10:00 AM  The type/s of services requested are: counseling/medication management  Agency name: [facility]  Address: [place] [address] [place] [address], [address] [place] [address]  Phone Number: [phone]  Special instructions (what to bring to appointment, etc.): Valid ID, insurance card if you have one, proof of residence (mail), proof of income (tax return, check stubs, award letter). If you are unable to attend the open enrollment date and time above please plan to attend open enrollment any Monday-Thursday from 8:00am-4:30pm or Friday from 8:00am-3:00pm    More than 30 minutes were spent with the patient in completing this  evaluation and more than 50% of the time was spent completing this  evaluation, providing counseling, and planning treatment with the  patient.

## 2022-10-10 ENCOUNTER — FOLLOWUP TELEPHONE ENCOUNTER (OUTPATIENT)
Dept: PSYCHIATRY | Age: 62
End: 2022-10-10

## 2022-10-11 ENCOUNTER — HOSPITAL ENCOUNTER (OUTPATIENT)
Dept: NURSING | Age: 62
Discharge: HOME | End: 2022-10-11

## 2023-01-05 NOTE — PLAN OF CARE
Physical Therapy Visit    Visit Type: Daily Treatment Note  Visit: 4  Referring Provider: Roxi White MD  Medical Diagnosis (from order): Diagnosis Information    Diagnosis  724.2 (ICD-9-CM) - M54.50 (ICD-10-CM) - Low back pain, unspecified back pain laterality, unspecified chronicity, unspecified whether sciatica present       Patient alert and oriented X3.    SUBJECTIVE                                                                                                               Pt reports 8/10 pain.  Pain today was in the B LB, B gluts and down LLE to the lateral calf. Took 2 Ibuprofen and a Tylenol with relief.  Currently reports pain in L LB and L lateral calf. States she was given lumbar support to use at work.    Pain / Symptoms  - Pain rating (out of 10): ; Worst: 8      OBJECTIVE                                                                                                                                       Treatment     Therapeutic Exercise  RFIS: D/B in calf  repeated extension in standing: D/B in calf  Prone: L LBP  NIXON: L LBP  repeated extension in lying: L LBP during; pain abolished at rest  Educated on use of lumbar roll everytime she sits, avoid FB and lifting  Educated in proper sit to stand transfers    Skilled input: verbal instruction/cues, tactile instruction/cues, posture correction and demonstration    Writer verbally educated and received verbal consent for hand placement, positioning of patient, and techniques to be performed today from patient     Home Exercise Program  Use of lumbar roll in sitting  repeated extension in lying or repeated extension in standing at home every 1-2 hrs  At work: repeated extension in standing every 1-2 hrs  Avoid FB and lifting      ASSESSMENT                                                                                                            No c/o pain at end of session including sitting with lumbar roll.  Pt demonstrating signs and symptoms of  Problem: Anger Management/Homicidal Ideation:  Goal: Absence of angry outbursts  Description: Absence of angry outbursts  10/19/2021 0919 by Ger Pace LPN  Outcome: Not Met This Shift  Gets increasingly loud with any interaction with staff. spinal origin.  Pain/symptoms after session (out of 10): 0  Education:   - Results of above outlined education: Verbalizes understanding, Demonstrates understanding and Needs reinforcement    PLAN                                                                                                                           Suggestions for next session as indicated: Progress per plan of care       Therapy procedure time and total treatment time can be found documented on the Time Entry flowsheet

## 2023-02-23 ENCOUNTER — HOSPITAL ENCOUNTER (INPATIENT)
Age: 63
LOS: 21 days | Discharge: HOME | DRG: 750 | End: 2023-03-17
Attending: STUDENT IN AN ORGANIZED HEALTH CARE EDUCATION/TRAINING PROGRAM | Admitting: PSYCHIATRY & NEUROLOGY
Payer: COMMERCIAL

## 2023-02-23 DIAGNOSIS — F20.9 SCHIZOPHRENIA, UNSPECIFIED TYPE (HCC): Primary | ICD-10-CM

## 2023-02-23 DIAGNOSIS — R45.851 SUICIDAL IDEATION: ICD-10-CM

## 2023-02-23 DIAGNOSIS — R45.850 HOMICIDAL IDEATION: ICD-10-CM

## 2023-02-23 LAB
A/G RATIO: 2.2 (ref 1.1–2.2)
ACETAMINOPHEN LEVEL: <5 UG/ML (ref 10–30)
ALBUMIN SERPL-MCNC: 4.2 G/DL (ref 3.4–5)
ALP BLD-CCNC: 54 U/L (ref 40–129)
ALT SERPL-CCNC: 29 U/L (ref 10–40)
ANION GAP SERPL CALCULATED.3IONS-SCNC: 13 MMOL/L (ref 3–16)
AST SERPL-CCNC: 44 U/L (ref 15–37)
BASOPHILS ABSOLUTE: 0 K/UL (ref 0–0.2)
BASOPHILS RELATIVE PERCENT: 0.4 %
BILIRUB SERPL-MCNC: 0.5 MG/DL (ref 0–1)
BUN BLDV-MCNC: 20 MG/DL (ref 7–20)
CALCIUM SERPL-MCNC: 9.2 MG/DL (ref 8.3–10.6)
CHLORIDE BLD-SCNC: 105 MMOL/L (ref 99–110)
CO2: 25 MMOL/L (ref 21–32)
CREAT SERPL-MCNC: 0.8 MG/DL (ref 0.8–1.3)
EOSINOPHILS ABSOLUTE: 0.1 K/UL (ref 0–0.6)
EOSINOPHILS RELATIVE PERCENT: 0.7 %
ETHANOL: NORMAL MG/DL (ref 0–0.08)
GFR SERPL CREATININE-BSD FRML MDRD: >60 ML/MIN/{1.73_M2}
GLUCOSE BLD-MCNC: 106 MG/DL (ref 70–99)
HCT VFR BLD CALC: 48.4 % (ref 40.5–52.5)
HEMOGLOBIN: 16.1 G/DL (ref 13.5–17.5)
LYMPHOCYTES ABSOLUTE: 0.8 K/UL (ref 1–5.1)
LYMPHOCYTES RELATIVE PERCENT: 9 %
MAGNESIUM: 2.1 MG/DL (ref 1.8–2.4)
MCH RBC QN AUTO: 30.6 PG (ref 26–34)
MCHC RBC AUTO-ENTMCNC: 33.3 G/DL (ref 31–36)
MCV RBC AUTO: 91.9 FL (ref 80–100)
MONOCYTES ABSOLUTE: 0.7 K/UL (ref 0–1.3)
MONOCYTES RELATIVE PERCENT: 7.6 %
NEUTROPHILS ABSOLUTE: 7.5 K/UL (ref 1.7–7.7)
NEUTROPHILS RELATIVE PERCENT: 82.3 %
PDW BLD-RTO: 13.6 % (ref 12.4–15.4)
PLATELET # BLD: 213 K/UL (ref 135–450)
PMV BLD AUTO: 8.3 FL (ref 5–10.5)
POTASSIUM REFLEX MAGNESIUM: 3 MMOL/L (ref 3.5–5.1)
RBC # BLD: 5.26 M/UL (ref 4.2–5.9)
SALICYLATE, SERUM: <0.3 MG/DL (ref 15–30)
SODIUM BLD-SCNC: 143 MMOL/L (ref 136–145)
TOTAL PROTEIN: 6.1 G/DL (ref 6.4–8.2)
WBC # BLD: 9.2 K/UL (ref 4–11)

## 2023-02-23 PROCEDURE — 96372 THER/PROPH/DIAG INJ SC/IM: CPT

## 2023-02-23 PROCEDURE — 85025 COMPLETE CBC W/AUTO DIFF WBC: CPT

## 2023-02-23 PROCEDURE — 83735 ASSAY OF MAGNESIUM: CPT

## 2023-02-23 PROCEDURE — 82077 ASSAY SPEC XCP UR&BREATH IA: CPT

## 2023-02-23 PROCEDURE — 99285 EMERGENCY DEPT VISIT HI MDM: CPT

## 2023-02-23 PROCEDURE — 80179 DRUG ASSAY SALICYLATE: CPT

## 2023-02-23 PROCEDURE — 6360000002 HC RX W HCPCS: Performed by: STUDENT IN AN ORGANIZED HEALTH CARE EDUCATION/TRAINING PROGRAM

## 2023-02-23 PROCEDURE — 80143 DRUG ASSAY ACETAMINOPHEN: CPT

## 2023-02-23 PROCEDURE — 36415 COLL VENOUS BLD VENIPUNCTURE: CPT

## 2023-02-23 PROCEDURE — 80053 COMPREHEN METABOLIC PANEL: CPT

## 2023-02-23 PROCEDURE — 2500000003 HC RX 250 WO HCPCS: Performed by: STUDENT IN AN ORGANIZED HEALTH CARE EDUCATION/TRAINING PROGRAM

## 2023-02-23 RX ORDER — LORAZEPAM 2 MG/ML
2 INJECTION INTRAMUSCULAR ONCE
Status: COMPLETED | OUTPATIENT
Start: 2023-02-23 | End: 2023-02-23

## 2023-02-23 RX ORDER — OLANZAPINE 10 MG/1
10 INJECTION, POWDER, LYOPHILIZED, FOR SOLUTION INTRAMUSCULAR
Status: COMPLETED | OUTPATIENT
Start: 2023-02-23 | End: 2023-02-23

## 2023-02-23 RX ORDER — DIPHENHYDRAMINE HYDROCHLORIDE 50 MG/ML
50 INJECTION INTRAMUSCULAR; INTRAVENOUS ONCE
Status: COMPLETED | OUTPATIENT
Start: 2023-02-23 | End: 2023-02-23

## 2023-02-23 RX ADMIN — LORAZEPAM 2 MG: 2 INJECTION INTRAMUSCULAR; INTRAVENOUS at 19:54

## 2023-02-23 RX ADMIN — OLANZAPINE 10 MG: 10 INJECTION, POWDER, FOR SOLUTION INTRAMUSCULAR at 20:30

## 2023-02-23 RX ADMIN — DIPHENHYDRAMINE HYDROCHLORIDE 50 MG: 50 INJECTION, SOLUTION INTRAMUSCULAR; INTRAVENOUS at 19:54

## 2023-02-24 LAB
INFLUENZA A: NOT DETECTED
INFLUENZA B: NOT DETECTED
SARS-COV-2 RNA, RT PCR: NOT DETECTED

## 2023-02-24 PROCEDURE — 99222 1ST HOSP IP/OBS MODERATE 55: CPT

## 2023-02-24 PROCEDURE — 1240000000 HC EMOTIONAL WELLNESS R&B

## 2023-02-24 PROCEDURE — 2500000003 HC RX 250 WO HCPCS

## 2023-02-24 PROCEDURE — 6370000000 HC RX 637 (ALT 250 FOR IP)

## 2023-02-24 PROCEDURE — 6370000000 HC RX 637 (ALT 250 FOR IP): Performed by: PSYCHIATRY & NEUROLOGY

## 2023-02-24 PROCEDURE — 2580000003 HC RX 258

## 2023-02-24 PROCEDURE — 87636 SARSCOV2 & INF A&B AMP PRB: CPT

## 2023-02-24 PROCEDURE — 6360000002 HC RX W HCPCS

## 2023-02-24 RX ORDER — CHLORPROMAZINE HYDROCHLORIDE 25 MG/ML
50 INJECTION INTRAMUSCULAR ONCE
Status: COMPLETED | OUTPATIENT
Start: 2023-02-24 | End: 2023-02-24

## 2023-02-24 RX ORDER — ACETAMINOPHEN 325 MG/1
650 TABLET ORAL EVERY 4 HOURS PRN
Status: DISCONTINUED | OUTPATIENT
Start: 2023-02-24 | End: 2023-03-17 | Stop reason: HOSPADM

## 2023-02-24 RX ORDER — LORAZEPAM 1 MG/1
1 TABLET ORAL EVERY 4 HOURS PRN
Status: DISCONTINUED | OUTPATIENT
Start: 2023-02-24 | End: 2023-02-24

## 2023-02-24 RX ORDER — LORAZEPAM 2 MG/ML
2 INJECTION INTRAMUSCULAR EVERY 4 HOURS PRN
Status: DISCONTINUED | OUTPATIENT
Start: 2023-02-24 | End: 2023-02-24

## 2023-02-24 RX ORDER — CHLORPROMAZINE HYDROCHLORIDE 25 MG/1
50 TABLET, FILM COATED ORAL ONCE
Status: COMPLETED | OUTPATIENT
Start: 2023-02-24 | End: 2023-02-24

## 2023-02-24 RX ORDER — DOCUSATE SODIUM 100 MG/1
200 CAPSULE, LIQUID FILLED ORAL DAILY PRN
Status: DISCONTINUED | OUTPATIENT
Start: 2023-02-24 | End: 2023-03-17 | Stop reason: HOSPADM

## 2023-02-24 RX ORDER — TRAZODONE HYDROCHLORIDE 50 MG/1
50 TABLET ORAL NIGHTLY PRN
Status: DISCONTINUED | OUTPATIENT
Start: 2023-02-24 | End: 2023-03-08

## 2023-02-24 RX ORDER — DOCUSATE SODIUM 100 MG/1
100 CAPSULE, LIQUID FILLED ORAL 2 TIMES DAILY PRN
Status: DISCONTINUED | OUTPATIENT
Start: 2023-02-24 | End: 2023-02-24

## 2023-02-24 RX ORDER — POTASSIUM CHLORIDE 20 MEQ/1
40 TABLET, EXTENDED RELEASE ORAL ONCE
Status: DISCONTINUED | OUTPATIENT
Start: 2023-02-24 | End: 2023-03-17 | Stop reason: HOSPADM

## 2023-02-24 RX ORDER — LORAZEPAM 2 MG/ML
1 INJECTION INTRAMUSCULAR EVERY 4 HOURS PRN
Status: DISCONTINUED | OUTPATIENT
Start: 2023-02-24 | End: 2023-02-24

## 2023-02-24 RX ORDER — LORAZEPAM 2 MG/ML
2 INJECTION INTRAMUSCULAR EVERY 4 HOURS PRN
Status: DISCONTINUED | OUTPATIENT
Start: 2023-02-24 | End: 2023-02-28

## 2023-02-24 RX ORDER — POLYETHYLENE GLYCOL 3350 17 G
2 POWDER IN PACKET (EA) ORAL
Status: DISCONTINUED | OUTPATIENT
Start: 2023-02-24 | End: 2023-03-17 | Stop reason: HOSPADM

## 2023-02-24 RX ORDER — LORAZEPAM 2 MG/1
2 TABLET ORAL EVERY 4 HOURS PRN
Status: DISCONTINUED | OUTPATIENT
Start: 2023-02-24 | End: 2023-02-28

## 2023-02-24 RX ORDER — DOCUSATE SODIUM 100 MG/1
100 CAPSULE, LIQUID FILLED ORAL ONCE
Status: COMPLETED | OUTPATIENT
Start: 2023-02-24 | End: 2023-02-24

## 2023-02-24 RX ORDER — OLANZAPINE 5 MG/1
5 TABLET ORAL EVERY 4 HOURS PRN
Status: DISCONTINUED | OUTPATIENT
Start: 2023-02-24 | End: 2023-02-24

## 2023-02-24 RX ORDER — CHLORPROMAZINE HYDROCHLORIDE 25 MG/1
50 TABLET, FILM COATED ORAL EVERY 6 HOURS PRN
Status: DISCONTINUED | OUTPATIENT
Start: 2023-02-24 | End: 2023-02-28

## 2023-02-24 RX ORDER — DIPHENHYDRAMINE HYDROCHLORIDE 50 MG/ML
50 INJECTION INTRAMUSCULAR; INTRAVENOUS EVERY 4 HOURS PRN
Status: DISCONTINUED | OUTPATIENT
Start: 2023-02-24 | End: 2023-03-14

## 2023-02-24 RX ORDER — CHLORPROMAZINE HYDROCHLORIDE 25 MG/ML
50 INJECTION INTRAMUSCULAR EVERY 6 HOURS PRN
Status: DISCONTINUED | OUTPATIENT
Start: 2023-02-24 | End: 2023-02-28

## 2023-02-24 RX ORDER — MAGNESIUM HYDROXIDE/ALUMINUM HYDROXICE/SIMETHICONE 120; 1200; 1200 MG/30ML; MG/30ML; MG/30ML
30 SUSPENSION ORAL EVERY 6 HOURS PRN
Status: DISCONTINUED | OUTPATIENT
Start: 2023-02-24 | End: 2023-03-17 | Stop reason: HOSPADM

## 2023-02-24 RX ADMIN — LORAZEPAM 2 MG: 2 TABLET ORAL at 20:40

## 2023-02-24 RX ADMIN — DOCUSATE SODIUM 100 MG: 100 CAPSULE, LIQUID FILLED ORAL at 20:40

## 2023-02-24 RX ADMIN — DIPHENHYDRAMINE HYDROCHLORIDE 50 MG: 50 INJECTION, SOLUTION INTRAMUSCULAR; INTRAVENOUS at 17:11

## 2023-02-24 RX ADMIN — CHLORPROMAZINE HYDROCHLORIDE 50 MG: 25 TABLET, FILM COATED ORAL at 20:39

## 2023-02-24 RX ADMIN — OLANZAPINE 10 MG: 10 INJECTION, POWDER, FOR SOLUTION INTRAMUSCULAR at 17:11

## 2023-02-24 RX ADMIN — DOCUSATE SODIUM 100 MG: 100 CAPSULE, LIQUID FILLED ORAL at 19:43

## 2023-02-24 SDOH — ECONOMIC STABILITY: TRANSPORTATION INSECURITY
IN THE PAST 12 MONTHS, HAS LACK OF TRANSPORTATION KEPT YOU FROM MEETINGS, WORK, OR FROM GETTING THINGS NEEDED FOR DAILY LIVING?: YES

## 2023-02-24 SDOH — ECONOMIC STABILITY: HOUSING INSECURITY
IN THE LAST 12 MONTHS, WAS THERE A TIME WHEN YOU DID NOT HAVE A STEADY PLACE TO SLEEP OR SLEPT IN A SHELTER (INCLUDING NOW)?: NO

## 2023-02-24 SDOH — ECONOMIC STABILITY: FOOD INSECURITY: WITHIN THE PAST 12 MONTHS, THE FOOD YOU BOUGHT JUST DIDN'T LAST AND YOU DIDN'T HAVE MONEY TO GET MORE.: NEVER TRUE

## 2023-02-24 SDOH — ECONOMIC STABILITY: TRANSPORTATION INSECURITY
IN THE PAST 12 MONTHS, HAS THE LACK OF TRANSPORTATION KEPT YOU FROM MEDICAL APPOINTMENTS OR FROM GETTING MEDICATIONS?: YES

## 2023-02-24 SDOH — ECONOMIC STABILITY: FOOD INSECURITY: WITHIN THE PAST 12 MONTHS, YOU WORRIED THAT YOUR FOOD WOULD RUN OUT BEFORE YOU GOT MONEY TO BUY MORE.: NEVER TRUE

## 2023-02-24 SDOH — ECONOMIC STABILITY: INCOME INSECURITY: IN THE LAST 12 MONTHS, WAS THERE A TIME WHEN YOU WERE NOT ABLE TO PAY THE MORTGAGE OR RENT ON TIME?: NO

## 2023-02-24 SDOH — ECONOMIC STABILITY: HOUSING INSECURITY: IN THE LAST 12 MONTHS, HOW MANY PLACES HAVE YOU LIVED?: 1

## 2023-02-24 ASSESSMENT — SOCIAL DETERMINANTS OF HEALTH (SDOH)
HOW HARD IS IT FOR YOU TO PAY FOR THE VERY BASICS LIKE FOOD, HOUSING, MEDICAL CARE, AND HEATING?: NOT VERY HARD
WITHIN THE LAST YEAR, HAVE YOU BEEN HUMILIATED OR EMOTIONALLY ABUSED IN OTHER WAYS BY YOUR PARTNER OR EX-PARTNER?: NO
WITHIN THE LAST YEAR, HAVE YOU BEEN KICKED, HIT, SLAPPED, OR OTHERWISE PHYSICALLY HURT BY YOUR PARTNER OR EX-PARTNER?: NO
WITHIN THE LAST YEAR, HAVE TO BEEN RAPED OR FORCED TO HAVE ANY KIND OF SEXUAL ACTIVITY BY YOUR PARTNER OR EX-PARTNER?: NO
WITHIN THE LAST YEAR, HAVE YOU BEEN AFRAID OF YOUR PARTNER OR EX-PARTNER?: NO

## 2023-02-24 ASSESSMENT — SLEEP AND FATIGUE QUESTIONNAIRES
DO YOU USE A SLEEP AID: NO
DO YOU HAVE DIFFICULTY SLEEPING: NO
AVERAGE NUMBER OF SLEEP HOURS: 8

## 2023-02-24 ASSESSMENT — LIFESTYLE VARIABLES
HOW MANY STANDARD DRINKS CONTAINING ALCOHOL DO YOU HAVE ON A TYPICAL DAY: PATIENT DOES NOT DRINK
HOW OFTEN DO YOU HAVE A DRINK CONTAINING ALCOHOL: NEVER

## 2023-02-24 NOTE — H&P
INITIAL PSYCHIATRIC HISTORY AND PHYSICAL      Patient name: Christopher Ozuna date: 2/23/2023  Today's date: 2/24/2023           CC:  Schizophrenia    HPI:   Patient seen in room on Adult Behavioral Unit. Patient is a 57 y.o. male who presents  to [facility] on a SOB after police were called by brother regarding patient erratic behavior at home. Per TUNDE notes:  \"Brother reports patient has been of medication X4 months and has become increasingly decompensated X 10  days. He was threatening his 80year old mother and destroying property in house. Upon  arrival patient was telling the police to shot him and stated he was going to kill his mother. \"  Collateral from brother:  \"Pt reportedly had his \"two month injection\" of Invega around four months ago. Dalia Victoria reports that around 10 days ago pt started to \"get sick again,\" and began to exhibit his \"usual\" symptoms where he begins to have olfactory hallucinations, or \"starts to smell things that aren't there. \" followed by agitated, and irritable behaviors like making threats of violence, and threatening physical harm. He reportedly becomes \"loud,\" and starts making these threats toward his mother, as well as the nurses who come to the house to care for her. Eventually EMS has to be called and he's brought our emergency department. According to Dalia Victoria pt \"cannot\" be discharged in his current state. It is reported to be unsafe for other's to be around pt. Nicajuan Victoria has had stop his mother's nursing care due to pt being at the house. \"    Pt now on BHI, irritable, screaming out at times. I asked what had happened at home and he tells me his brother is a demon/warlock and has been trying to kill him. I asked about not allowing staff to care for his mother and he tells me he was saving her and those people were trying to kill  them both. He is RTIS, disorganized. He could be later be seen talking to himself, and then going into episodes of anger.   Pt became upset when medications were mentioned to him, stating he does not take antipsychotics.  Pt then began calling me names for talking about medications, screaming for me to leave.  He has not had an injection for four months (two doses), decompensating within the last 10 days.      Plan:  Abilify Initio and Aristada injections ordered for tomorrow.    Voluntary admission, guardian       PAST PSYCHIATRIC HISTORY:  pt was first admitted to [facility] in October 2017 and has had 5 admits since. He was most recently admitted to [facility] in 2022 from february 26 to march 3rd and then again in august 2022 for sixteen days. Per chart review patient has also been treated out of [facility] and in [facility]. Also has been involved with [facility] and out OP program.    FAMILY PSYCHIATRIC HISTORY:     Family History   Problem Relation Age of Onset    Hypertension Mother     Heart Failure Mother 89    Prostate Cancer Father 47    Cancer Father        ALLERGIES:    Allergies   Allergen Reactions    Clozaril [Clozapine]     Crestor [Rosuvastatin] Other (See Comments)     myalgia    Haldol [Haloperidol]      Gives him tardive dysknesia    Invega [Paliperidone Er] Other (See Comments)     Dr. Tam does not think patient is allergic to Paliperidone.  Okay to administer to patient.    Klonopin [Clonazepam]     Lipitor [Atorvastatin]      Increased urination    Mevacor [Lovastatin] Other (See Comments)     myalgia    Rexulti [Brexpiprazole]     Simvastatin Other (See Comments)     myalgia    Thiothixene     Vraylar [Cariprazine Hcl]        CURRENT MEDICATIONS:     potassium chloride  40 mEq Oral Once       PAST MEDICAL HISTORY:    Past Medical History:   Diagnosis Date    Elevated liver enzymes 9/1/2017    Hypertension     Pure hypercholesterolemia 8/31/2017        PAST SURGICAL HISTORY:    Past Surgical History:   Procedure Laterality Date    NOSE SURGERY      deviated septum       PROBLEM LIST:  Principal Problem:    Schizophrenia (HCC)  Resolved Problems:     * No resolved hospital problems. *       SOCIAL HISTORY:  Social History     Socioeconomic History    Marital status: Single     Spouse name: Not on file    Number of children: 1    Years of education: 14    Highest education level: Not on file   Occupational History     Employer: UNEMPLOYED   Tobacco Use    Smoking status: Former     Packs/day: 0.10     Years: 4.00     Pack years: 0.40     Types: Cigarettes     Quit date: 1980     Years since quittin.1    Smokeless tobacco: Never    Tobacco comments:     1 pk per year - social smoker, didn't smoke much at all    Vaping Use    Vaping Use: Never used   Substance and Sexual Activity    Alcohol use: No    Drug use: No     Comment: Pt states he does \"natural stuff\"    Sexual activity: Never   Other Topics Concern    Not on file   Social History Narrative    Not on file     Social Determinants of Health     Financial Resource Strain: Low Risk     Difficulty of Paying Living Expenses: Not very hard   Food Insecurity: No Food Insecurity    Worried About Running Out of Food in the Last Year: Never true    Ran Out of Food in the Last Year: Never true   Transportation Needs: Unmet Transportation Needs    Lack of Transportation (Medical): Yes    Lack of Transportation (Non-Medical): Yes   Physical Activity: Not on file   Stress: No Stress Concern Present    Feeling of Stress :  Only a little   Social Connections: Not on file   Intimate Partner Violence: Not At Risk    Fear of Current or Ex-Partner: No    Emotionally Abused: No    Physically Abused: No    Sexually Abused: No   Housing Stability: Low Risk     Unable to Pay for Housing in the Last Year: No    Number of [place] in the Last Year: 1    Unstable Housing in the Last Year: No       OBJECTIVE:   Vitals:    23 1138   BP:    Pulse:    Resp: 18   Temp:    SpO2:        REVIEW OF SYSTEMS:   Reports no current cardiovascular, respiratory, gastrointestinal, genitourinary,   integumentary, neurological, musculoskeletal, or immunological symptoms today. PSYCHIATRIC:  See HPI above     PSYCHIATRIC EXAMINATION / MENTAL STATUS EXAM:     CONSTITUTIONAL:    Vitals:    Blood pressure 130/75, pulse 71, temperature 98.2 °F (36.8 °C), temperature source Oral, resp. rate 18, height 6' 1\" (1.854 m), weight 240 lb (108.9 kg), SpO2 95 %.   General appearance:  [x]  appears age, []  appears older than stated age,     []  adequately dressed and groomed, [x] disheveled,                []  in no acute distress, [x] appears mildly distressed,      []  Other:      MUSCULOSKELETAL:   Gait:   [x] normal, [] antalgic, [] unsteady, [] in bed, gait not evaluated   Station:  [x] erect, [] sitting, [] recumbent, [] other        Strength/tone:  [x] muscle strength and tone appear consistent with age and condition     [] atrophy      [] abnormal movements  PSYCHIATRIC:    Relatedness:  [] cooperative, [] guarded, [] indifferent, [x] hostile,      [] sedated  Speech:  [] normal prosody, [x] pressured, [] decreased volume,    [] slurred, [] halting, [] slowed, [] other     [] echolalia, [] incoherent, [] stuttering   Eye contact:  [] direct, [] avoidant, [x] intense  Kinetics:  [x] normal, [] increased, [] decreased  Mood:   [] stable, [] depressed, [] anxious, [x] irritable,     [] labile  Affect:   [] normal range, [] constricted, [] depressed, [] anxious,     [x] angry, [] blunted  Hallucinations  [] denies, [x] auditory,  [x] visual,  [x] olfactory, [] tactile  Delusions  [] none, [] grandiose,  [] jealous,  [x] persecutory,  [] somatic,     [x] bizarre  Preoccupations  [] none, [x] violence, [] obsessions, [] other     Suicidal ideation  [x] denies, [] endorses  Homicidal ideation [x] denies, [] endorses  Thought process: [] logical, [] circumstantial, [] tangential, [] SYLVIE,     [] simplistic, [x] disorganized  Associations:  [] logical and coherent, [] loosening, [x] disorganized  Insight:   [] good, [] fair, [x] poor  Judgment:  [] good, [] fair, [x] poor  Attention and concentration:     [] intact, [] limited, [] able to focus on interview,     [x] grossly impaired  Orientation:  [x] person, place, time, situation     [] disoriented to:     Memory:  Remote memory [x] intact, [] impaired     Recent memory  [x] intact, [] impaired          IMPRESSION    Principal Problem:    Schizophrenia ([address]  Resolved Problems:    * No resolved hospital problems. *       ______      Tx plan:  Prevent self injury, stabilize affect, restore sleep, treat depression, treat anxiety, establish/maintain aftercare, increase coping mechanisms, improve medication compliance. All conditions present on admission are being treated while pt is hospitalized. Discussed PHP after discharge as part of transition back to the community.      Medications  Current Facility-Administered Medications   Medication Dose Route Frequency Provider Last Rate Last Admin    acetaminophen (TYLENOL) tablet 650 mg  650 mg Oral Q4H PRN Lori Hamming, APRN - CNP        magnesium hydroxide (MILK OF MAGNESIA) 400 MG/5ML suspension 30 mL  30 mL Oral Daily PRN Lori Hamming, APRN - CNP        nicotine polacrilex (COMMIT) lozenge 2 mg  2 mg Oral Q1H PRN Lori Hamming, APRN - CNP        aluminum & magnesium hydroxide-simethicone (MAALOX) 200-200-20 MG/5ML suspension 30 mL  30 mL Oral Q6H PRN Lori Hamming, APRN - CNP        OLANZapine (ZYPREXA) tablet 5 mg  5 mg Oral Q4H PRN Lori Hamming, APRN - CNP        Or    OLANZapine (ZYPREXA) 10 mg in sterile water 2 mL injection  10 mg IntraMUSCular Q4H PRN Lori Hamming, APRN - CNP        diphenhydrAMINE (BENADRYL) injection 50 mg  50 mg IntraMUSCular Q4H PRN Lori Hamming, APRN - CNP        traZODone (DESYREL) tablet 50 mg  50 mg Oral Nightly PRN Lori Hamming, APRN - CNP        LORazepam (ATIVAN) tablet 1 mg  1 mg Oral Q4H PRN Lori Hamming, APRN - CNP        Or    LORazepam (ATIVAN) injection 1 mg  1 mg IntraMUSCular Q4H PRN Lori Hamming, APRN - CNP        potassium chloride (KLOR-CON M) extended release tablet 40 mEq  40 mEq Oral Once [facility] [place]          potassium chloride  40 mEq Oral Once      PRN Meds: acetaminophen, magnesium hydroxide, nicotine polacrilex, aluminum & magnesium hydroxide-simethicone, OLANZapine **OR** OLANZapine (ZyPREXA) in sterile water IntraMUSCular, diphenhydrAMINE, traZODone, LORazepam **OR** LORazepam   Estimated length of stay: 2-5 days  Prognosis:  Fair   Criteria for Discharge:  Not suicidal, sleeping well, affect stable, depression improving, eating well, aftercare arranged. Spent > 70 minutes evaluating and treating patient, more than 50 % of that time was spent counseling the patient on their symptoms, treatment, and expected goals. ______  PLAN   1. Admit to Adult Behavioral Unit / Senior Behavioral Unit  2. Consult Internal Medicine to evaluate and treat medical conditions  3. Adjust psychotropic medications to target symptoms  4. Occupational Therapy, Physical Therapy, Group Psychotherapy as tolerated   5. Reviewed treatment plan with patient including medication risks, benefits, side effects. Obtained informed consent for treatment.      GRACE Graham-BC

## 2023-02-24 NOTE — ED NOTES
RN responded to code violent; orders from  to give 10mg Zyprexa; MD aware medication list in pts allergy list; pt had medication at last ER visit and had no known reaction; pt on monitor.  VSS BP (!) 149/90   Pulse 100   Temp 98.3 °F (36.8 °C) (Axillary)   Resp 26   SpO2 100%       Leslee Hamman, RN  02/23/23 2037

## 2023-02-24 NOTE — ED NOTES
Collateral Contact:  Name: Lizette Ellis  Phone: [phone]  Relation to Patient: Brother  Concerns: Pt reportedly had his \"two month injection\" of Invega around four months ago. Gita Butt reports that around 10 days ago pt started to \"get sick again,\" and began to exhibit his \"usual\" symptoms where he begins to have olfactory hallucinations, or \"starts to smell things that aren't there. \" followed by agitated, and irritable behaviors like making threats of violence, and threatening physical harm. He reportedly becomes \"loud,\" and starts making these threats toward his mother, as well as the nurses who come to the house to care for her. Eventually EMS has to be called and he's brought our emergency department. According to Gita Butt pt \"cannot\" be discharged in his current state. It is reported to be unsafe for other's to be around pt. Gita Butt has had stop his mother's nursing care due to pt being at the house.            Adarsh Toth  02/23/23 8548

## 2023-02-24 NOTE — PROGRESS NOTES
Behavioral Services  Medicare Certification Upon Admission    I certify that this patient's inpatient psychiatric hospital admission is medically necessary for:    [x] (1) Treatment which could reasonably be expected to improve this patient's condition,       [x] (2) Or for diagnostic study;     AND     [x](2) The inpatient psychiatric services are provided while the individual is under the care of a physician and are included in the individualized plan of care.     Estimated length of stay/service 2-5 days    Plan for post-hospital care outpatient services    Electronically signed by SAULO Lew CNP on 2/24/2023 at 1:37 PM

## 2023-02-24 NOTE — ED NOTES
[id number] unable to participate in triage and answer questions about safety at home or pain level.       Solomon Streeter RN  02/24/23 3800

## 2023-02-24 NOTE — PROGRESS NOTES
Presenting Problem: Patient presents to [facility] on a SOB after police were called by brother regarding patient erratic behavior at home. Brother reports patient has been of medication X4 months and has become increasingly decompensated X 10  days. He was threatening his 80year old mother and destroying property in house. Upon  arrival patient was telling the police to shot him and stated he was going to kill his mother. Appearance/Hygiene:  ill-appearing   Motor Behavior: pt is fidgety and restless in bed, he is currently restrained d/t aggression toward staff   Attitude: uncooperative  Affect: labile and agitated affect   Speech: loud, high pitched  Mood: angry, anxious, irritable, and labile   Thought Processes: Illogical  Perceptions: CLEMENTE  Thought content: CLEMENTE pt speech is incomprehensible pt refusing to cooperative    Orientation: A&O to person and place only  Memory: poor  Concentration: Poor    Insight/ judgement: impaired judgment, impaired insight      Psychosocial and contextual factors: pt does deny current SI/HI. Pt only able to answer basic yes/no questions. He is sedated and restrained and has incomprehensible speech. pt aware he is at hospital. Disoriented to time and situation. Pt becomes verbally aggressive and increased agitation shortly into conversation. Unable to conduct complete interview at this time. C-SSRS flowsheet is not Complete. CLEMENTE    Psychiatric History (including current outpatient provider and past inpatient admissions): pt was first admitted to [facility] in October 2017 and has had 5 admits since. He was most recently admitted to [facility] in 2022 from february 26 to march 3rd and then again in august 2022 for sixteen days. Per chart review patient has also been treated [place] of [place] and in [facility].  Also has been involved with [facility] and out OP program.    Access to Firearms: no    ASSESSMENT FOR IMMINENT FUTURE DANGER:    RISK FACTORS:    [x]  Age <25 or >49   [x]  Male gender   []  Depressed mood   []  Active suicidal ideation   []  Suicide plan   []  Suicide attempt   []  Access to lethal means   []  Prior suicide attempt   []  Active substance abuse (if yes pleases add details )   [x]  Highly impulsive behaviors   [x]  Not attending to self-care/ADLs    []  Recent significant loss   []  Chronic pain or medical illness   []  Social isolation   []  History of violence per chart review patient has aggressive behavior when psychotic   [x]  Active psychosis   [x]  Cognitive impairment    [x]  No outpatient services in place   [x]  Medication noncompliance   []  No collateral information to support safety  [] Self- injurious/ Self-harm behavior    PROTECTIVE FACTORS:  [] Age >25 and <55  [] Female gender   [] Denies depression  [x] Denies suicidal ideation  [x] Does not have lethal plan   [x] Does not have access to guns   [] Patient is verbally ramya for safety  [] No prior suicide attempts  [] No active substance abuse  [x] Patient has social or family support (lives with 80year old mother and brother out of town, pt unable to confirm support)  [] No active psychosis or cognitive dysfunction  [] Physically healthy  [] Has outpatient services in place  [] Compliant with recommended medications  [] Collateral information from  supports patient safety   [] Patient is future oriented with plans to

## 2023-02-24 NOTE — PLAN OF CARE
Attempted to see patient for medical H&P. Patient is extremely agitated. IM to re-attempt tomorrow. Vitals and labs are stable. Will replace potassium. Call with concerns.     Misti Narvaez PA-C  2/24/2023 1:49 PM

## 2023-02-24 NOTE — ED NOTES
Patient grew increasingly combative and was kicking people passing by his bed in the lazo. Security called to aide in containing patient. Patient began spitting and kicking harder. Code violet called. 2 members of staff reported for code. Patient placed in 4 points violent restraints and given zyprexa IM.       Mary Ann Kraft RN  02/23/23 2039

## 2023-02-24 NOTE — PLAN OF CARE
Edouard No slept until 21  this am. He was calm and cooperative and breakfast was reheated for him. He came out in the common area later and began to yell and curse at staff. He was posturing and stating \"Fuck you\" to any staff who tried to talk to him. He was offered snacks and given chips and a cookie. He ate part of each, then threw them in the floor. He began to flip over chairs and tables in the common area. All furniture was removed from the area except the chair he was sitting in. It was removed when the pt went to bed. He has been noted to be talking to/yelling at self. He refused vital signs, assessment and any medications. He is currently in bed awake yelling and pointing at the wall. Problem: Coping  Goal: Pt/Family able to verbalize concerns and demonstrate effective coping strategies  Description: INTERVENTIONS:  1. Assist patient/family to identify coping skills, available support systems and cultural and spiritual values  2. Provide emotional support, including active listening and acknowledgement of concerns of patient and caregivers  3. Reduce environmental stimuli, as able  4. Instruct patient/family in relaxation techniques, as appropriate  5. Assess for spiritual pain/suffering and initiate Spiritual Care, Psychosocial Clinical Specialist consults as needed  2/24/2023 1238 by Ximena Pretty RN  Outcome: Not Progressing  Flowsheets (Taken 2/24/2023 1141)  Patient/family able to verbalize anxieties, fears, and concerns, and demonstrate effective coping:   Provide emotional support, including active listening and acknowledgement of concerns of patient and caregivers   Reduce environmental stimuli, as able   Instruct patient/family in relaxation techniques, as appropriate  2/24/2023 0441 by Smith Bull RN  Outcome: Progressing     Problem: Behavior  Goal: Pt/Family maintain appropriate behavior and adhere to behavioral management agreement, if implemented  Description: INTERVENTIONS:  1.  Assess patient/family's coping skills and  non-compliant behavior (including use of illegal substances)  2. Notify security of behavior or suspected illegal substances which indicate the need for search of the family and/or belongings  3. Encourage verbalization of thoughts and concerns in a socially appropriate manner  4. Utilize positive, consistent limit setting strategies supporting safety of patient, staff and others  5. Encourage participation in the decision making process about the behavioral management agreement  6. If a visitor's behavior poses a threat to safety call refer to organization policy. 7. Initiate consult with , Psychosocial CNS, Spiritual Care as appropriate  2/24/2023 1238 by Gordo Romeo RN  Outcome: Not Progressing  Flowsheets (Taken 2/24/2023 1141)  Patient/family maintains appropriate behavior and adheres to behavioral management agreement, if implemented:   Assess patient/familys coping skills and  non-compliant behavior (including use of illegal substances)   Notify security of behavior or suspected illegal substances which indicate the need for search of the patient and/or belongings   Encourage verbalization of thoughts and concerns in a socially appropriate manner   Utilize positive, consistent limit setting strategies supporting safety of patient, staff and others   Encourage participation in the decision making process about the behavioral management agreement   If a patients behavior jeopardizes the safety of the patient, staff, or others refer to organization policy.  If a visitors behavior poses a threat to safety refer to organization policy   Initiate consult with , Psychosocial Clinical Nurse Specialist, Spiritual Care as appropriate  2/24/2023 0441 by Lori Salas RN  Outcome: Progressing     Problem: Involuntary Admit  Goal: Will cooperate with staff recommendations and doctor's orders and will demonstrate appropriate behavior  Description: INTERVENTIONS:  1. Treat underlying conditions and offer medication as ordered  2. Educate regarding involuntary admission procedures and rules  3.  Contain excessive/inappropriate behavior per unit and hospital policies  4/07/7013 4033 by Ximena Pretty RN  Outcome: Not Progressing  Flowsheets (Taken 2/24/2023 1141)  Will cooperate with staff recommendations and doctor's orders and will demonstrate appropriate behavior:   Treat underlying conditions and offer medication as ordered   Contain excessive/inappropriate behavior per unit and hospital policies   Educate regarding involuntary admission procedures and rules  2/24/2023 0441 by Smith Bull RN  Outcome: Progressing

## 2023-02-24 NOTE — BH NOTE
Micha Keys came out of his room yelling \"Fuck You\" to the staff. He then requested a glass of water. Writer provided water and the pt threw it very hard against the wall.  He then went back to his room

## 2023-02-24 NOTE — PLAN OF CARE
Problem: Risk for Elopement  Goal: Patient will not exit the unit/facility without proper excort  Outcome: Progressing     Problem: Coping  Goal: Pt/Family able to verbalize concerns and demonstrate effective coping strategies  Description: INTERVENTIONS:  1. Assist patient/family to identify coping skills, available support systems and cultural and spiritual values  2. Provide emotional support, including active listening and acknowledgement of concerns of patient and caregivers  3. Reduce environmental stimuli, as able  4. Instruct patient/family in relaxation techniques, as appropriate  5. Assess for spiritual pain/suffering and initiate Spiritual Care, Psychosocial Clinical Specialist consults as needed  Outcome: Progressing     Problem: Behavior  Goal: Pt/Family maintain appropriate behavior and adhere to behavioral management agreement, if implemented  Description: INTERVENTIONS:  1. Assess patient/family's coping skills and  non-compliant behavior (including use of illegal substances)  2. Notify security of behavior or suspected illegal substances which indicate the need for search of the family and/or belongings  3. Encourage verbalization of thoughts and concerns in a socially appropriate manner  4. Utilize positive, consistent limit setting strategies supporting safety of patient, staff and others  5. Encourage participation in the decision making process about the behavioral management agreement  6. If a visitor's behavior poses a threat to safety call refer to organization policy. 7. Initiate consult with , Psychosocial CNS, Spiritual Care as appropriate  Outcome: Progressing     Problem: Involuntary Admit  Goal: Will cooperate with staff recommendations and doctor's orders and will demonstrate appropriate behavior  Description: INTERVENTIONS:  1. Treat underlying conditions and offer medication as ordered  2. Educate regarding involuntary admission procedures and rules  3.  Contain excessive/inappropriate behavior per unit and hospital policies  Outcome: Progressing

## 2023-02-24 NOTE — CARE COORDINATION
02/24/23 1010   Activities of Daily Living   Patient Requires assistance with daily self-care activities? No   Leisure Activity 1   3 Favorite Leisure Activities tinkering with computers   Frequency   [place])   Last time   [place] Sh[place])   Barriers to participating    [place] Shook)   Leisure Activity 2   Favorite Leisure Activities  browsing internet   Frequency    (fabricio)   Last time    [place])   Barriers to participating    [place])   Leisure Activity 3   Favorite Leisure Activities  fabricio   Social   Patient reports spending the majority of their free time   [place])   Patient verbalizes a preference for spending free time   [place])   Patients perception of support system recently disrupted  (pt is off medication. homicidal towards mom. pt has decompensated)   Patients perception of barriers to socializing with others include(s)   (fabricio)   Beliefs & Coping   Has difficulty dealing with feelings     (fabricio)   Internalizes feelings/Keeps feelings in   [place])   Externalizes feelings through aggressiveness or poor temper control    (fabricio)   Feels uncomfortable around others    [place])   Has difficulty talking to others    [place])   Depends on others for direction or decisions   (fabricio)   Difficulty dealing with anger of others    (fabricio)   Difficulty dealing with own anger    (fabricio)   Difficulty managing stress   (fabricio)   Frequently has difficulty with relationships    (fabricio)   Has recently perceived/experienced loss, disappointment, humiliation or failure    (fabricio)   General perception about self   [place])   Attitude about abilities   (fabricio)   Locus of Control    (fabricio)   Belief about recovery   [place])   Patient Identified Strengths  fabricio   Patient Identified Limitations  fabricio   Perception of most stressful event prior to hospitalization per chart review pt off medication for 4 months and decompensted for last 10 days.  pt exhibiting erratic behaviors, destroying property at home, homicidal towards mom   Perception of changes needed fabricio   Strengths and Limitations Strengths Independent in basic self-care activities; Positive support network   Limitations Difficult relationships / poor social skills  (pt off medication, decompensated, erratic behaviors)     Pt is currently sleeping and not to wake at this time per nursing due to pt's mental status and aggressive behaviors in the ED/TUNDE. Assessments completed via chart review and fabricio.
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
## 2023-02-24 NOTE — ED NOTES
Patient resting but starts to try to kick staff when obtaining vitals     Lucinda Schwartz, KEVON  02/23/23 [id number]
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
## 2023-02-24 NOTE — ED NOTES
Restraint 1 Hour RN assessment      Joanne Pastor was dangerous AEB kicking and verbally abusive to staff. Joanne Pastor was placed in Leather/Alternative Bilateral Wrist and Leather/Alternative Bilateral Ankle restraints at 2036 due to Behavioral management problems. Currently patient is intermittently resting and then impulsively attempting to get up and curse staff; and has reacted negatively to being placed in restraints. Current mental status awake and alert. At this time the patient  does meet criteria for continued restraint AEB Agitated, Combative, Imulsive, and Resistive behavior. The room has been assessed for safety and potentially harmful objects. Patient has been assessed for comfort and current needs. Respirations easy and even. Skin Skin color, tempature, turgor normal. No rashes or lesions. Current vitals include BP (!) 149/90   Pulse 100   Temp 98.3 °F (36.8 °C) (Axillary)   Resp 26   SpO2 100%     Patient presentation and results of RN assessment has been discussed with the ER physician.      Sukhwinder Gamez Clinical

## 2023-02-24 NOTE — ED NOTES
Patient arrive combative and unable to follow directions or answer questions. Pt attempting to hit staff. Arrives in soft restraints.  Order placed for patient to be in restraints in ED>      Macie Pepper RN  02/23/23 2006

## 2023-02-24 NOTE — BH NOTE
[id number] [facility]  Admission Note     Admission Type:   Admission Type: Involuntary    Reason for admission:  Reason for Admission: Erratic behavior, homicidal threats towards mother, suicidal statements towards police      Addictive Behavior:   Addictive Behavior  In the Past 3 Months, Have You Felt or Has Someone Told You That You Have a Problem With  : None    Medical Problems:   Past Medical History:   Diagnosis Date    Elevated liver enzymes 9/1/2017    Hypertension     Pure hypercholesterolemia 8/31/2017       Status EXAM:  Mental Status and Behavioral Exam  Normal: No  Level of Assistance: Independent/Self  Facial Expression: Flat  Affect: Congruent  Level of Consciousness: Lethargic  Frequency of Checks: 4 times per hour, close  Mood:Normal: No  Mood: Other (comment) (Sedated & confused about location)  Motor Activity:Normal: Yes  Eye Contact: Good  Observed Behavior: Cooperative  Sexual Misconduct History: Current - no  Preception: [place] to person  Attention:Normal: Yes  Thought Processes: Circumstantial  Thought Content:Normal: Yes  Depression Symptoms: No problems reported or observed. Anxiety Symptoms: No problems reported or observed. Rebeka Symptoms: No problems reported or observed.   Hallucinations: None (Patient denies)  Delusions: No  Memory:Normal: No  Memory: Poor recent  Insight and Judgment: No  Insight and Judgment: Poor insight, Poor judgment    Tobacco Screening:  Practical Counseling, on admission, soledad X, if applicable and completed (first 3 are required if patient doesn't refuse):            ( ) Recognizing danger situations (included triggers and roadblocks)                    ( ) Coping skills (new ways to manage stress,relaxation techniques, changing routine, distraction)                                                           ( ) Basic information about quitting (benefits of quitting, techniques in how to quit, available resources  ( ) [facility]                                                                                                                   ( ) Patient refused counseling  (X) Patient has not smoked in the last 30 days    Metabolic Screening:    Lab Results   Component Value Date    LABA1C 5.3 08/14/2022       Lab Results   Component Value Date    CHOL 213 (H) 08/14/2022    CHOL 204 (H) 02/27/2022    CHOL 155 10/15/2021    CHOL 162 07/29/2020    CHOL 209 (H) 06/05/2019    CHOL 193 02/27/2019    CHOL 255 (H) 06/01/2018    CHOL 215 (H) 03/19/2018    CHOL 148 08/31/2017     Lab Results   Component Value Date    TRIG 118 08/14/2022    TRIG 81 02/27/2022    TRIG 82 10/15/2021    TRIG 135 07/29/2020    TRIG 239 (H) 06/05/2019    TRIG 181 (H) 02/27/2019    TRIG 177 (H) 06/01/2018    TRIG 212 (H) 03/19/2018    TRIG 112 08/31/2017     Lab Results   Component Value Date    HDL 49 08/14/2022    HDL 48 02/27/2022    HDL 42 10/15/2021    HDL 48 07/29/2020    HDL 45 06/05/2019    HDL 46 02/27/2019    HDL 50 06/01/2018    HDL 46 03/19/2018    HDL 50 08/31/2017     No components found for: LDLCAL  Lab Results   Component Value Date    LABVLDL 24 08/14/2022    LABVLDL 16 02/27/2022    LABVLDL 16 10/15/2021    LABVLDL 27 07/29/2020    LABVLDL 48 06/05/2019    LABVLDL 36 02/27/2019    LABVLDL 35 06/01/2018    LABVLDL 42 03/19/2018    LABVLDL 22 08/31/2017         Body mass index is 31.66 kg/m². BP Readings from Last 2 Encounters:   02/24/23 130/75   08/30/22 139/72           Pt admitted with followings belongings:  Dental Appliances: None  Vision - Corrective Lenses: Eyeglasses  Hearing Aid: None  Jewelry: None  Body Piercings Removed: N/A  Clothing: Pants, Undergarments, Shirt (1 pair of boxers, 1 pair of pants, 1 shirt - on his person)  Other Valuables:  Other (Comment) (Patient does not have valuables)    Miguel Li RN

## 2023-02-24 NOTE — ED NOTES
Patient curings and flinging his body around in the bed. Staff unable to redirect patient.      Veronica Young RN  02/23/23 2022

## 2023-02-24 NOTE — BH NOTE
Clifton Paulino arrived on the unit via stretcher with 2 [facility] staff and 1 security staff. He is lethargic. When speaking 1:1 with this writer, he asked where he is, why he is here, where his mother is, how is mother is, and if someone is with his mother. This writer provided all information requested and reassured Clifton Paulino that we can contact his mother in the morning. Clifton Paulino is pleasant & agreeable. Staff helped Clifton Paulino to the bathroom so he could urinate and he was safely returned to bed. Snacks & beverage were provided. Clifton Paulino is currently resting quietly in bed.

## 2023-02-24 NOTE — PROGRESS NOTES
Hakeem's brother, Judit Layton, called police d/t Hakeem's erratic behavior. He destroyed multiple items within the home & was threatening his mother's hospice nurses. When police arrived, David Servin threatened to kill his mother & asked the police to shoot him. Judit Layton reported that David Servin has been off his medications for 3 months, but his actions & olfactory hallucinations have escalated within the last 10 days. Upon arrival to ED, David Servin was aggressive & combative (kicking staff, using racial slurs), so he was placed in restraints & received Ativan, Haldol, & Zyprexa. David Servin was most recently here in August 2022 for nearly 3 weeks. David Servin has not been aggressive or combative since arriving on the unit. Restraints were removed before this writer assumed care of patient. He is very concerned about his mother & this writer informed he could speak with her in the morning. Hakeem's speech is garbled & he is sometimes difficult to understand.      He denies current AVH, SI, & HI.

## 2023-02-25 PROCEDURE — 6370000000 HC RX 637 (ALT 250 FOR IP)

## 2023-02-25 PROCEDURE — 1240000000 HC EMOTIONAL WELLNESS R&B

## 2023-02-25 PROCEDURE — 6370000000 HC RX 637 (ALT 250 FOR IP): Performed by: PSYCHIATRY & NEUROLOGY

## 2023-02-25 PROCEDURE — 99232 SBSQ HOSP IP/OBS MODERATE 35: CPT | Performed by: PSYCHIATRY & NEUROLOGY

## 2023-02-25 RX ADMIN — ACETAMINOPHEN 650 MG: 325 TABLET ORAL at 22:45

## 2023-02-25 RX ADMIN — LORAZEPAM 2 MG: 2 TABLET ORAL at 08:52

## 2023-02-25 RX ADMIN — DOCUSATE SODIUM 200 MG: 100 CAPSULE, LIQUID FILLED ORAL at 17:45

## 2023-02-25 RX ADMIN — CHLORPROMAZINE HYDROCHLORIDE 50 MG: 25 TABLET, SUGAR COATED ORAL at 08:51

## 2023-02-25 RX ADMIN — CHLORPROMAZINE HYDROCHLORIDE 50 MG: 25 TABLET, SUGAR COATED ORAL at 17:44

## 2023-02-25 RX ADMIN — ACETAMINOPHEN 650 MG: 325 TABLET ORAL at 08:52

## 2023-02-25 RX ADMIN — LORAZEPAM 2 MG: 2 TABLET ORAL at 22:45

## 2023-02-25 RX ADMIN — LORAZEPAM 2 MG: 2 TABLET ORAL at 17:45

## 2023-02-25 RX ADMIN — CHLORPROMAZINE HYDROCHLORIDE 50 MG: 25 TABLET, SUGAR COATED ORAL at 22:45

## 2023-02-25 RX ADMIN — ACETAMINOPHEN 650 MG: 325 TABLET ORAL at 17:45

## 2023-02-25 ASSESSMENT — PAIN - FUNCTIONAL ASSESSMENT
PAIN_FUNCTIONAL_ASSESSMENT: ACTIVITIES ARE NOT PREVENTED
PAIN_FUNCTIONAL_ASSESSMENT: ACTIVITIES ARE NOT PREVENTED

## 2023-02-25 ASSESSMENT — PAIN DESCRIPTION - DESCRIPTORS
DESCRIPTORS: ACHING
DESCRIPTORS: ACHING;SORE

## 2023-02-25 ASSESSMENT — PAIN SCALES - GENERAL
PAINLEVEL_OUTOF10: 2
PAINLEVEL_OUTOF10: 0
PAINLEVEL_OUTOF10: 5
PAINLEVEL_OUTOF10: 6
PAINLEVEL_OUTOF10: 0

## 2023-02-25 ASSESSMENT — PAIN DESCRIPTION - ORIENTATION
ORIENTATION: RIGHT;LEFT
ORIENTATION: RIGHT;LEFT

## 2023-02-25 ASSESSMENT — PAIN SCALES - WONG BAKER: WONGBAKER_NUMERICALRESPONSE: 0

## 2023-02-25 ASSESSMENT — PAIN DESCRIPTION - LOCATION
LOCATION: HIP;FOOT
LOCATION: FOOT;HIP;LEG

## 2023-02-25 NOTE — PROGRESS NOTES
Patient was given colace 100 mg po, per request. Patient stated that he always takes 2 at a time.  Patient was instructed that only 1 was ordered twice a day & that writer would call the doctor, about his request. Jessica Ridley R.N.

## 2023-02-25 NOTE — PROGRESS NOTES
Patient was awaken at 05:55, by the rapid response team, as they were leaving the unit. Patient was out to the team station. Patient was given a cup of orange juice per request. As patient was drinking it,  he was attempting to talk to writer & became easily agitated, because writer couldn't understand him due to his garbled speech. Patient then yelled out to talk to a male staff that he saw talking to another staff. Patient became impatient & threw his cup, that still had juice in it, across the floor. Patient went to his room & the male staff went to talk to him. Patient calmer & appeared asleep at this time.  Mansi Ridley R.N.

## 2023-02-25 NOTE — PLAN OF CARE
Patient has been regressed to his room & bed, most of the shift. When awake, patient had rapid pressured speech & said he was kidnapped by the police & brought to the hospital. Patient denied having hallucinations but reported seeing a red flashing light, earlier in the day. Patient smiled briefly when talking with Caterina Rucker complimented him on his hair. Patient has appeared asleep since 21:15, after taking thorazine & ativan at 20:40. Patient continues with 1:1, line of sight & has been observed changing positions at intervals. No further talking to himself or yelling. Bri Ridley R.N.  Problem: Risk for Elopement  Goal: Patient will not exit the unit/facility without proper excort  2/25/2023 0301 by Elizabeth Kay RN  Outcome: Progressing  2/25/2023 0300 by Elizabeth Kay RN  Outcome: Progressing     Problem: Coping  Goal: Pt/Family able to verbalize concerns and demonstrate effective coping strategies  Description: INTERVENTIONS:  1. Assist patient/family to identify coping skills, available support systems and cultural and spiritual values  2. Provide emotional support, including active listening and acknowledgement of concerns of patient and caregivers  3. Reduce environmental stimuli, as able  4. Instruct patient/family in relaxation techniques, as appropriate  5. Assess for spiritual pain/suffering and initiate Spiritual Care, Psychosocial Clinical Specialist consults as needed  2/25/2023 0301 by Elizabeth Kay RN  Outcome: Progressing  2/25/2023 0300 by Elizabeth Kay RN  Outcome: Progressing     Problem: Behavior  Goal: Pt/Family maintain appropriate behavior and adhere to behavioral management agreement, if implemented  Description: INTERVENTIONS:  1. Assess patient/family's coping skills and  non-compliant behavior (including use of illegal substances)  2. Notify security of behavior or suspected illegal substances which indicate the need for search of the family and/or belongings  3.  Encourage verbalization of thoughts and concerns in a socially appropriate manner  4. Utilize positive, consistent limit setting strategies supporting safety of patient, staff and others  5. Encourage participation in the decision making process about the behavioral management agreement  6. If a visitor's behavior poses a threat to safety call refer to organization policy. 7. Initiate consult with , Psychosocial CNS, Spiritual Care as appropriate  2/25/2023 0301 by Eyad Benitez RN  Outcome: Progressing  2/25/2023 0300 by Eyad Benitez RN  Outcome: Progressing     Problem: Psychosis  Goal: Will report no hallucinations or delusions  Description: INTERVENTIONS:  1. Administer medication as  ordered  2. Assist with reality testing to support increasing orientation  3. Assess if patient's hallucinations or delusions are encouraging self harm or harm to others and intervene as appropriate  Outcome: Progressing     Problem: Anxiety  Goal: Will report anxiety at manageable levels  Description: INTERVENTIONS:  1. Administer medication as ordered  2. Teach and rehearse alternative coping skills  3.  Provide emotional support with 1:1 interaction with staff  Outcome: Progressing

## 2023-02-25 NOTE — PLAN OF CARE
Problem: Risk for Elopement  Goal: Patient will not exit the unit/facility without proper excort  2/25/2023 1035 by Delvis Cleary RN  Outcome: Not Progressing  Flowsheets (Taken 2/25/2023 1003)  Nursing Interventions for Elopement Risk:   Collaborate with family members/caregivers to mitigate the elopement risk   Communicate/escalate to charge nurse the risk of elopement   Communicate/escalate to /other team member the risk of elopement   Communicate to physician the risk for elopement   Place patient in room far away from exits and stairways   Reduce environmental triggers   Make sure patient has all necessary personal care items  2/25/2023 0301 by Gissell Hoffman RN  Outcome: Progressing  2/25/2023 0300 by Gissell Hoffman RN  Outcome: Progressing     Problem: Coping  Goal: Pt/Family able to verbalize concerns and demonstrate effective coping strategies  Description: INTERVENTIONS:  1. Assist patient/family to identify coping skills, available support systems and cultural and spiritual values  2. Provide emotional support, including active listening and acknowledgement of concerns of patient and caregivers  3. Reduce environmental stimuli, as able  4. Instruct patient/family in relaxation techniques, as appropriate  5. Assess for spiritual pain/suffering and initiate Spiritual Care, Psychosocial Clinical Specialist consults as needed  2/25/2023 1035 by Delvis Cleary RN  Outcome: Not Progressing  2/25/2023 0301 by Gissell Hoffman RN  Outcome: Progressing  2/25/2023 0300 by Gissell Hoffman RN  Outcome: Progressing     Problem: Behavior  Goal: Pt/Family maintain appropriate behavior and adhere to behavioral management agreement, if implemented  Description: INTERVENTIONS:  1. Assess patient/family's coping skills and  non-compliant behavior (including use of illegal substances)  2. Notify security of behavior or suspected illegal substances which indicate the need for search of the family and/or belongings  3.  Encourage verbalization of thoughts and concerns in a socially appropriate manner  4. Utilize positive, consistent limit setting strategies supporting safety of patient, staff and others  5. Encourage participation in the decision making process about the behavioral management agreement  6. If a visitor's behavior poses a threat to safety call refer to organization policy. 7. Initiate consult with , Psychosocial CNS, Spiritual Care as appropriate  2/25/2023 1035 by Nisreen Cowart RN  Outcome: Not Progressing  2/25/2023 0301 by Greg Tobias RN  Outcome: Progressing  2/25/2023 0300 by Greg Tobias RN  Outcome: Progressing     Problem: Involuntary Admit  Goal: Will cooperate with staff recommendations and doctor's orders and will demonstrate appropriate behavior  Description: INTERVENTIONS:  1. Treat underlying conditions and offer medication as ordered  2. Educate regarding involuntary admission procedures and rules  3. Contain excessive/inappropriate behavior per unit and hospital policies  Outcome: Not Progressing     Problem: Psychosis  Goal: Will report no hallucinations or delusions  Description: INTERVENTIONS:  1. Administer medication as  ordered  2. Assist with reality testing to support increasing orientation  3. Assess if patient's hallucinations or delusions are encouraging self harm or harm to others and intervene as appropriate  2/25/2023 1035 by Nisreen Cowart RN  Outcome: Not Progressing  2/25/2023 0301 by Greg Tobias RN  Outcome: Progressing     Problem: Anxiety  Goal: Will report anxiety at manageable levels  Description: INTERVENTIONS:  1. Administer medication as ordered  2. Teach and rehearse alternative coping skills  3.  Provide emotional support with 1:1 interaction with staff  2/25/2023 1035 by Nisreen Cowart RN  Outcome: Not Progressing  Flowsheets (Taken 2/25/2023 1003)  Will report anxiety at manageable levels: Administer medication as ordered  2/25/2023 0301 by Greg Tobias RN  Outcome: Progressing     Problem: Pain  Goal: Verbalizes/displays adequate comfort level or baseline comfort level  Outcome: Not Progressing

## 2023-02-25 NOTE — PLAN OF CARE
Attempted to see patient, nursing staff requested to not see patient today he is finally resting.      Will re-attempt tomorrow     Paulo Saunders  02/25/23  4:23 PM

## 2023-02-25 NOTE — PROGRESS NOTES
Patient has been awake in his bed . Patient was cooperative with vitals. Patient with rapid pressured speech & said that he was kidnapped by the police & brought him to the hospital. Patient was cooperative with getting his vitals checked. Patient denied having hallucinations, but has been talking to himself. Patient started screaming out loudly at intervals at 20:15. Patient also kept asking for another colace after being given his 1 prn dose at 19:43. Dr. Garcia was notified at of patient's behavior & of his request for 2 colace, tabs with orders received. Dr. Garcia was also notified of the medication, that he received on the previous shift.Patient received an additional dose of colace 100 mg po, plus ativan 2 mg po & thorazine 50 mg po. Patient is currently resting in bed. Patient continues on 1:1, line of sight.  Tg Ridley R.N.

## 2023-02-26 PROBLEM — R45.850 HOMICIDAL IDEATION: Status: ACTIVE | Noted: 2023-02-26

## 2023-02-26 LAB
ANION GAP SERPL CALCULATED.3IONS-SCNC: 10 MMOL/L (ref 3–16)
BUN BLDV-MCNC: 16 MG/DL (ref 7–20)
CALCIUM SERPL-MCNC: 9.1 MG/DL (ref 8.3–10.6)
CHLORIDE BLD-SCNC: 100 MMOL/L (ref 99–110)
CO2: 25 MMOL/L (ref 21–32)
CREAT SERPL-MCNC: 0.8 MG/DL (ref 0.8–1.3)
GFR SERPL CREATININE-BSD FRML MDRD: >60 ML/MIN/{1.73_M2}
GLUCOSE BLD-MCNC: 137 MG/DL (ref 70–99)
POTASSIUM REFLEX MAGNESIUM: 4 MMOL/L (ref 3.5–5.1)
SODIUM BLD-SCNC: 135 MMOL/L (ref 136–145)

## 2023-02-26 PROCEDURE — 1240000000 HC EMOTIONAL WELLNESS R&B

## 2023-02-26 PROCEDURE — 80048 BASIC METABOLIC PNL TOTAL CA: CPT

## 2023-02-26 PROCEDURE — 6370000000 HC RX 637 (ALT 250 FOR IP)

## 2023-02-26 PROCEDURE — 6370000000 HC RX 637 (ALT 250 FOR IP): Performed by: PSYCHIATRY & NEUROLOGY

## 2023-02-26 PROCEDURE — 36415 COLL VENOUS BLD VENIPUNCTURE: CPT

## 2023-02-26 PROCEDURE — 99221 1ST HOSP IP/OBS SF/LOW 40: CPT

## 2023-02-26 RX ORDER — CHLORTHALIDONE 25 MG/1
12.5 TABLET ORAL DAILY
Status: DISCONTINUED | OUTPATIENT
Start: 2023-02-27 | End: 2023-03-17 | Stop reason: HOSPADM

## 2023-02-26 RX ORDER — ATORVASTATIN CALCIUM 10 MG/1
10 TABLET, FILM COATED ORAL DAILY
Status: DISCONTINUED | OUTPATIENT
Start: 2023-02-26 | End: 2023-03-17 | Stop reason: HOSPADM

## 2023-02-26 RX ORDER — CHLORTHALIDONE 25 MG/1
25 TABLET ORAL DAILY
Status: DISCONTINUED | OUTPATIENT
Start: 2023-02-26 | End: 2023-02-26

## 2023-02-26 RX ORDER — IBUPROFEN 400 MG/1
400 TABLET ORAL EVERY 6 HOURS PRN
Status: DISCONTINUED | OUTPATIENT
Start: 2023-02-26 | End: 2023-03-09

## 2023-02-26 RX ORDER — FLUTICASONE PROPIONATE 50 MCG
1 SPRAY, SUSPENSION (ML) NASAL DAILY
Status: DISCONTINUED | OUTPATIENT
Start: 2023-02-26 | End: 2023-03-17 | Stop reason: HOSPADM

## 2023-02-26 RX ADMIN — CHLORPROMAZINE HYDROCHLORIDE 50 MG: 25 TABLET, SUGAR COATED ORAL at 18:30

## 2023-02-26 RX ADMIN — TRAZODONE HYDROCHLORIDE 50 MG: 50 TABLET ORAL at 22:38

## 2023-02-26 RX ADMIN — LORAZEPAM 2 MG: 2 TABLET ORAL at 12:38

## 2023-02-26 RX ADMIN — ACETAMINOPHEN 650 MG: 325 TABLET ORAL at 18:30

## 2023-02-26 RX ADMIN — IBUPROFEN 400 MG: 400 TABLET, FILM COATED ORAL at 18:30

## 2023-02-26 RX ADMIN — DOCUSATE SODIUM 200 MG: 100 CAPSULE, LIQUID FILLED ORAL at 12:50

## 2023-02-26 RX ADMIN — ACETAMINOPHEN 650 MG: 325 TABLET ORAL at 06:11

## 2023-02-26 RX ADMIN — CHLORPROMAZINE HYDROCHLORIDE 50 MG: 25 TABLET, SUGAR COATED ORAL at 12:38

## 2023-02-26 RX ADMIN — LORAZEPAM 2 MG: 2 TABLET ORAL at 18:30

## 2023-02-26 RX ADMIN — ACETAMINOPHEN 650 MG: 325 TABLET ORAL at 12:38

## 2023-02-26 RX ADMIN — IBUPROFEN 400 MG: 400 TABLET, FILM COATED ORAL at 11:01

## 2023-02-26 RX ADMIN — CHLORPROMAZINE HYDROCHLORIDE 50 MG: 25 TABLET, SUGAR COATED ORAL at 06:11

## 2023-02-26 RX ADMIN — LORAZEPAM 2 MG: 2 TABLET ORAL at 06:11

## 2023-02-26 RX ADMIN — DOCUSATE SODIUM 200 MG: 100 CAPSULE, LIQUID FILLED ORAL at 18:31

## 2023-02-26 RX ADMIN — LORAZEPAM 2 MG: 2 TABLET ORAL at 22:38

## 2023-02-26 RX ADMIN — FLUTICASONE PROPIONATE 1 SPRAY: 50 SPRAY, METERED NASAL at 16:43

## 2023-02-26 ASSESSMENT — PAIN SCALES - GENERAL
PAINLEVEL_OUTOF10: 4
PAINLEVEL_OUTOF10: 5
PAINLEVEL_OUTOF10: 3

## 2023-02-26 ASSESSMENT — PAIN DESCRIPTION - ORIENTATION
ORIENTATION: LEFT
ORIENTATION: LEFT;RIGHT
ORIENTATION: RIGHT;LEFT

## 2023-02-26 ASSESSMENT — PAIN DESCRIPTION - LOCATION
LOCATION: FOOT;LEG

## 2023-02-26 ASSESSMENT — PAIN SCALES - WONG BAKER: WONGBAKER_NUMERICALRESPONSE: 2

## 2023-02-26 ASSESSMENT — PAIN DESCRIPTION - DESCRIPTORS: DESCRIPTORS: ACHING

## 2023-02-26 NOTE — H&P
Hospital Medicine History & Physical      PCP: JOSE Young    Date of Admission: 2/23/2023    Date of Service: Pt seen/examined on 2/26/2023     Chief Complaint:    Chief Complaint   Patient presents with    Psychiatric Evaluation     Pt off psych meds for 3 months. On SOB for erratic behavior. Was destroying things at home. Lives with mom.          History Of Present Illness:      The patient is a 62 y.o. male with pmhx of tardive dyskinesia, schizophrenia, HTN, HLD who presented to [facility] for erratic behavior.  Patient was seen and evaluated in the ED by the ED medical provider, patient was medically cleared for admission to [facility] at [facility].  This note serves as an admission medical H&P.    Tobacco use: 0.1 ppd   ETOH use: None   Illicit drug use: None     Patient denies any medical complaints     Past Medical History:        Diagnosis Date    Elevated liver enzymes 9/1/2017    Hypertension     Pure hypercholesterolemia 8/31/2017       Past Surgical History:        Procedure Laterality Date    NOSE SURGERY      deviated septum       Medications Prior to Admission:    Prior to Admission medications    Medication Sig Start Date End Date Taking? Authorizing Provider   chlorthalidone (HYGROTON) 25 MG tablet Take 1 tablet by mouth daily  Patient not taking: Reported on 2/23/2023 8/31/22   Brock Tam MD   pitavastatin (LIVALO) 2 MG TABS tablet Take 0.5 tablets by mouth nightly  Patient not taking: Reported on 2/23/2023 8/30/22   Brock Tam MD   fluticasone (FLONASE) 50 MCG/ACT nasal spray  2/22/22   Historical Provider, MD       Allergies:  Clozaril [clozapine], Crestor [rosuvastatin], Haldol [haloperidol], Invega [paliperidone er], Klonopin [clonazepam], Lipitor [atorvastatin], Mevacor [lovastatin], Rexulti [brexpiprazole], Simvastatin, Thiothixene, and Vraylar [cariprazine hcl]    Social History:      TOBACCO:   reports that he quit smoking about 43 years ago. His smoking use  included cigarettes. He has a 0.40 pack-year smoking history. He has never used smokeless tobacco.  ETOH:   reports no history of alcohol use. Family History:   Positive as follows:        Problem Relation Age of Onset    Hypertension Mother     Heart Failure Mother 80    Prostate Cancer Father 52    Cancer Father        REVIEW OF SYSTEMS:       Constitutional: Negative for fever   HENT: Negative for sore throat   Eyes: Negative for redness   Respiratory: Negative  for dyspnea, cough   Cardiovascular: Negative for chest pain   Gastrointestinal: Negative for vomiting, diarrhea   Genitourinary: Negative for hematuria   Musculoskeletal: Negative for arthralgias   Skin: Negative for rash   Neurological: Negative for syncope    Hematological: Negative for easy bruising/bleeding   Psychiatric/Behavorial: Per psychiatry team evaluation     PHYSICAL EXAM:    BP (!) 140/89   Pulse 92   Temp 97.3 °F (36.3 °C)   Resp 18   Ht 6' 1\" (1.854 m)   Wt 240 lb (108.9 kg)   SpO2 95%   BMI 31.66 kg/m²     Gen: No distress. Alert. Eyes: PERRL. No sclera icterus. No conjunctival injection. Neck: No JVD. No Carotid Bruit. Trachea midline. Resp: No accessory muscle use. No crackles. No wheezes. No rhonchi. CV: Regular rate. Regular rhythm. No murmur. No rub. No edema. GI: Non-tender. Normal bowel sounds. +Distended but soft   Skin: Warm and dry. No nodule on exposed extremities. No rash on exposed extremities. M/S: No cyanosis. No joint deformity. No clubbing. Neuro: Awake. No focal neurologic deficit on exam.  Cranial nerves II through XII intact. Patient is able to ambulate without difficulty.   Psych: Per psychiatry team evaluation     Lab Results   Component Value Date    WBC 9.2 02/23/2023    HGB 16.1 02/23/2023    HCT 48.4 02/23/2023    MCV 91.9 02/23/2023     02/23/2023     Lab Results   Component Value Date     02/23/2023    K 3.0 (L) 02/23/2023     02/23/2023    CO2 25 02/23/2023    BUN 20 02/23/2023    CREATININE 0.8 02/23/2023    GLUCOSE 106 (H) 02/23/2023    CALCIUM 9.2 02/23/2023    PROT 6.1 (L) 02/23/2023    LABALBU 4.2 02/23/2023    BILITOT 0.5 02/23/2023    ALKPHOS 54 02/23/2023    AST 44 (H) 02/23/2023    ALT 29 02/23/2023    LABGLOM >60 02/23/2023    GFRAA >60 08/17/2022    AGRATIO 2.2 02/23/2023    GLOB 2.6 10/15/2021           U/A:    Lab Results   Component Value Date/Time    COLORU Yellow 02/27/2022 06:01 AM    WBCUA 0-2 10/01/2017 02:40 PM    RBCUA 0-2 10/01/2017 02:40 PM    MUCUS 3+ 10/01/2017 02:40 PM    BACTERIA Rare 10/01/2017 02:40 PM    CLARITYU Clear 02/27/2022 06:01 AM    SPECGRAV 1.020 02/27/2022 06:01 AM    LEUKOCYTESUR Negative 02/27/2022 06:01 AM    BLOODU Negative 02/27/2022 06:01 AM    GLUCOSEU Negative 02/27/2022 06:01 AM       CULTURES  COVID and Flu not detected     EKG:  Not obtained     RADIOLOGY  No orders to display       ASSESSMENT/PLAN:  #Schizophrenia   - per psychiatry team    #Hypokalemia   -refused replacement   -repeat BMP today     #HTN  -resume HCTZ at request of patient     #HLD   -on statin         Pt has no medical complaints at this time. They were informed that should a medical concern arise during their admission they may have [facility] contact us.         Paulo Aguilar   2/26/2023 1:55 PM

## 2023-02-26 NOTE — PLAN OF CARE
Problem: Risk for Elopement  Goal: Patient will not exit the unit/facility without proper excort  Outcome: Not Progressing  Flowsheets (Taken 2/26/2023 9029)  Nursing Interventions for Elopement Risk:   Communicate/escalate to charge nurse the risk of elopement   Communicate/escalate to /other team member the risk of elopement   Make sure patient has all necessary personal care items   Place patient in room far away from exits and stairways   Reduce environmental triggers     Problem: Coping  Goal: Pt/Family able to verbalize concerns and demonstrate effective coping strategies  Description: INTERVENTIONS:  1. Assist patient/family to identify coping skills, available support systems and cultural and spiritual values  2. Provide emotional support, including active listening and acknowledgement of concerns of patient and caregivers  3. Reduce environmental stimuli, as able  4. Instruct patient/family in relaxation techniques, as appropriate  5. Assess for spiritual pain/suffering and initiate Spiritual Care, Psychosocial Clinical Specialist consults as needed  Outcome: Not Progressing     Problem: Behavior  Goal: Pt/Family maintain appropriate behavior and adhere to behavioral management agreement, if implemented  Description: INTERVENTIONS:  1. Assess patient/family's coping skills and  non-compliant behavior (including use of illegal substances)  2. Notify security of behavior or suspected illegal substances which indicate the need for search of the family and/or belongings  3. Encourage verbalization of thoughts and concerns in a socially appropriate manner  4. Utilize positive, consistent limit setting strategies supporting safety of patient, staff and others  5. Encourage participation in the decision making process about the behavioral management agreement  6. If a visitor's behavior poses a threat to safety call refer to organization policy.   7. Initiate consult with , Psychosocial CNS, Spiritual Care as appropriate  2/26/2023 1050 by Gina Delacruz RN  Outcome: Not Progressing  2/26/2023 0548 by Shahla Lopez RN  Outcome: Progressing     Problem: Involuntary Admit  Goal: Will cooperate with staff recommendations and doctor's orders and will demonstrate appropriate behavior  Description: INTERVENTIONS:  1. Treat underlying conditions and offer medication as ordered  2. Educate regarding involuntary admission procedures and rules  3. Contain excessive/inappropriate behavior per unit and hospital policies  [id number] by Gina Delacruz RN  Outcome: Not Progressing  2/26/2023 0548 by Shahla Lopez RN  Outcome: Progressing     Problem: Psychosis  Goal: Will report no hallucinations or delusions  Description: INTERVENTIONS:  1. Administer medication as  ordered  2. Assist with reality testing to support increasing orientation  3. Assess if patient's hallucinations or delusions are encouraging self harm or harm to others and intervene as appropriate  2/26/2023 1050 by Gina Delacruz RN  Outcome: Not Progressing  2/26/2023 0548 by Shahla Lopez RN  Outcome: Progressing     Problem: Anxiety  Goal: Will report anxiety at manageable levels  Description: INTERVENTIONS:  1. Administer medication as ordered  2. Teach and rehearse alternative coping skills  3.  Provide emotional support with 1:1 interaction with staff  2/26/2023 1050 by Gina Delacruz RN  Outcome: Not Progressing  Flowsheets (Taken 2/26/2023 1023)  Will report anxiety at manageable levels: Administer medication as ordered  2/26/2023 0548 by Shahla Lopez RN  Outcome: Progressing     Problem: Pain  Goal: Verbalizes/displays adequate comfort level or baseline comfort level  Outcome: Not Progressing

## 2023-02-26 NOTE — ED PROVIDER NOTES
[address] ENCOUNTER        Patient Name: Michelle David  MRN: [id number]  [place] 1960  Date of evaluation: 2/23/2023  Provider: Barrera Reich MD  PCP: Paulo Mac  Note Started: 12:50 PM EST 2/26/23      CHIEF COMPLAINT  Psychiatric evaluation        HISTORY & PHYSICAL     HISTORY OF PRESENT ILLNESS  History from : Law Enforcement    Limitations to history : None    Michelle David is a 57 y.o. male  has a past medical history of Elevated liver enzymes (9/1/2017), Hypertension, and Pure hypercholesterolemia (8/31/2017). , who presents to the ED for psychiatric valuation. Patient has a known history of schizophrenia. Patient arrives on a statement of belief by police. Police arrived at the home due to patient destroying things in the home. Per police, patient was requesting that the officer shoot him. He also made comments stating he was going to kill his mother. Mobile crisis at the scene had reported that home health has been unable to take care of the patient's mother for some time due to the patient's erratic behavior. He has reportedly been off of his psychiatric medications for 3 months. Patient arrives acutely agitated, requiring medication and physical restraints on arrival due to significant agitation and combativeness. Old records reviewed:  Patient has significant history of admission for psychosis and aggressive behavior presentation today seems consistent with previous presentations. No other complaints, modifying factors or associated symptoms. Nursing Notes were all reviewed and agreed with or any disagreements were addressed in the HPI. I have reviewed the following from the nursing documentation.     Past Medical History:   Diagnosis Date    Elevated liver enzymes 9/1/2017    Hypertension     Pure hypercholesterolemia 8/31/2017     Past Surgical History:   Procedure Laterality Date    NOSE SURGERY      deviated septum     Family History   Problem Relation Age of Onset    Hypertension Mother     Heart Failure Mother 80    Prostate Cancer Father 52    Cancer Father      Social History     Socioeconomic History    Marital status: Single     Spouse name: Not on file    Number of children: 1    Years of education: 15    Highest education level: Not on file   Occupational History     Employer: UNEMPLOYED   Tobacco Use    Smoking status: Former     Packs/day: 0.10     Years: 4.00     Pack years: 0.40     Types: Cigarettes     Quit date: 1980     Years since quittin.1    Smokeless tobacco: Never    Tobacco comments:     1 pk per year - social smoker, didn't smoke much at all    Vaping Use    Vaping Use: Never used   Substance and Sexual Activity    Alcohol use: No    Drug use: No     Comment: Pt states he does \"natural stuff\"    Sexual activity: Never   Other Topics Concern    Not on file   Social History Narrative    Not on file     Social Determinants of Health     Financial Resource Strain: Low Risk     Difficulty of Paying Living Expenses: Not very hard   Food Insecurity: No Food Insecurity    Worried About Running Out of Food in the Last Year: Never true    Ran Out of Food in the Last Year: Never true   Transportation Needs: Unmet Transportation Needs    Lack of Transportation (Medical): Yes    Lack of Transportation (Non-Medical): Yes   Physical Activity: Not on file   Stress: No Stress Concern Present    Feeling of Stress :  Only a little   Social Connections: Not on file   Intimate Partner Violence: Not At Risk    Fear of Current or Ex-Partner: No    Emotionally Abused: No    Physically Abused: No    Sexually Abused: No   Housing Stability: Low Risk     Unable to Pay for Housing in the Last Year: No    Number of [place] in the Last Year: 1    Unstable Housing in the Last Year: No     Current Facility-Administered Medications   Medication Dose Route Frequency Provider Last Rate Last Admin    ibuprofen (ADVIL;MOTRIN) tablet 400 mg  400 mg Oral Q6H PRN Lázaro Kahn MD   400 mg at 02/26/23 1101    acetaminophen (TYLENOL) tablet 650 mg  650 mg Oral Q4H PRN Jersey , APRN - CNP   650 mg at 02/26/23 1238    magnesium hydroxide (MILK OF MAGNESIA) 400 MG/5ML suspension 30 mL  30 mL Oral Daily PRN Jersey , APRN - CNP        nicotine polacrilex (COMMIT) lozenge 2 mg  2 mg Oral Q1H PRN Jersey , APRN - CNP        aluminum & magnesium hydroxide-simethicone (MAALOX) 200-200-20 MG/5ML suspension 30 mL  30 mL Oral Q6H PRN Jersey , APRN - CNP        diphenhydrAMINE (BENADRYL) injection 50 mg  50 mg IntraMUSCular Q4H PRN Jersey , APRN - CNP   50 mg at 02/24/23 1711    traZODone (DESYREL) tablet 50 mg  50 mg Oral Nightly PRN Jersey , APRN - CNP        potassium chloride (KLOR-CON M) extended release tablet 40 mEq  40 mEq Oral Once JOSE Riley        docusate sodium (COLACE) capsule 200 mg  200 mg Oral Daily PRN Lázaro Kahn MD   200 mg at 02/26/23 1250    LORazepam (ATIVAN) tablet 2 mg  2 mg Oral Q4H PRN Lázaro Kahn MD   2 mg at 02/26/23 1238    Or    LORazepam (ATIVAN) injection 2 mg  2 mg IntraMUSCular Q4H PRN Lázaro Kahn MD        chlorproMAZINE (THORAZINE) tablet 50 mg  50 mg Oral Q6H PRN Lázaro Kahn MD   50 mg at 02/26/23 1238    Or    chlorproMAZINE (THORAZINE) injection 50 mg  50 mg IntraMUSCular Q6H PRN Lázaro Kahn MD         Allergies   Allergen Reactions    Clozaril [Clozapine]     Crestor [Rosuvastatin] Other (See Comments)     myalgia    Haldol [Haloperidol]      Gives him tardive dysknesia    Invega [Paliperidone Er] Other (See Comments)     Dr. Nita Rutledge does not think patient is allergic to Paliperidone. Okay to administer to patient.     Klonopin [Clonazepam]     Lipitor [Atorvastatin]      Increased urination    Mevacor [Lovastatin] Other (See Comments)     myalgia    Rexulti [Brexpiprazole]     Simvastatin Other (See Comments)     myalgia    Thiothixene     Vraylar [Cariprazine Hcl]        REVIEW OF SYSTEMS  All systems reviewed, pertinent positives per HPI otherwise noted to be negative. PHYSICAL EXAM  ED Triage Vitals   BP Temp Temp Source Heart Rate Resp SpO2 Height Weight   02/23/23 2030 02/23/23 2003 02/23/23 2003 02/23/23 1956 02/23/23 1956 02/23/23 1956 02/24/23 0345 02/24/23 0045   (!) 149/90 98.3 °F (36.8 °C) Axillary (!) 111 21 95 % 6' 1\" (1.854 m) 240 lb (108.9 kg)     GENERAL APPEARANCE: Awake, disheveled, agitated  HENT: Normocephalic. Atraumatic. Mucous membranes are moist  NECK: Supple. Full range of motion of the neck without stiffness or pain. EYES: PERRL. EOM's grossly intact. HEART/CHEST: RR, tachycardia. No murmurs. LUNGS: Respirations unlabored. CTAB. Good air exchange. Speaking comfortably in full sentences. ABDOMEN: No tenderness. Soft. Non-distended. No masses. No organomegaly. No guarding or rebound. MUSCULOSKELETAL: No extremity edema. Compartments soft. No deformity. No tenderness in the extremities. All extremities neurovascularly intact. SKIN: Warm and dry. No acute rashes. NEUROLOGICAL: Alert No gross facial drooping. Strength 5/5, sensation intact. PSYCHIATRIC: Appears to be in decompensated psychosis. Patient is extremely aggressive and agitated. WORKUP     LABS  I have reviewed all labs for this visit.    Results for orders placed or performed during the hospital encounter of 02/23/23   COVID-19 & Influenza Combo    Specimen: Nasopharyngeal Swab   Result Value Ref Range    SARS-CoV-2 RNA, RT PCR NOT DETECTED NOT DETECTED    INFLUENZA A NOT DETECTED NOT DETECTED    INFLUENZA B NOT DETECTED NOT DETECTED   CBC with Auto Differential   Result Value Ref Range    WBC 9.2 4.0 - 11.0 K/uL    RBC 5.26 4.20 - 5.90 M/uL    Hemoglobin 16.1 13.5 - 17.5 g/dL    Hematocrit 48.4 40.5 - 52.5 %    MCV 91.9 80.0 - 100.0 fL    MCH 30.6 26.0 - 34.0 pg    MCHC 33.3 31.0 - 36.0 g/dL    RDW 13.6 12.4 - 15.4 %    Platelets 407 770 - 139 K/uL MPV 8.3 5.0 - 10.5 fL    Neutrophils % 82.3 %    Lymphocytes % 9.0 %    Monocytes % 7.6 %    Eosinophils % 0.7 %    Basophils % 0.4 %    Neutrophils Absolute 7.5 1.7 - 7.7 K/uL    Lymphocytes Absolute 0.8 (L) 1.0 - 5.1 K/uL    Monocytes Absolute 0.7 0.0 - 1.3 K/uL    Eosinophils Absolute 0.1 0.0 - 0.6 K/uL    Basophils Absolute 0.0 0.0 - 0.2 K/uL   Comprehensive Metabolic Panel w/ Reflex to MG   Result Value Ref Range    Sodium 143 136 - 145 mmol/L    Potassium reflex Magnesium 3.0 (L) 3.5 - 5.1 mmol/L    Chloride 105 99 - 110 mmol/L    CO2 25 21 - 32 mmol/L    Anion Gap 13 3 - 16    Glucose 106 (H) 70 - 99 mg/dL    BUN 20 7 - 20 mg/dL    Creatinine 0.8 0.8 - 1.3 mg/dL    Est, Glom Filt Rate >60 >60    Calcium 9.2 8.3 - 10.6 mg/dL    Total Protein 6.1 (L) 6.4 - 8.2 g/dL    Albumin 4.2 3.4 - 5.0 g/dL    Albumin/Globulin Ratio 2.2 1.1 - 2.2    Total Bilirubin 0.5 0.0 - 1.0 mg/dL    Alkaline Phosphatase 54 40 - 129 U/L    ALT 29 10 - 40 U/L    AST 44 (H) 15 - 37 U/L   Ethanol   Result Value Ref Range    Ethanol Lvl None Detected mg/dL   Salicylate   Result Value Ref Range    Salicylate, Serum <3.6 (L) 15.0 - 30.0 mg/dL   Acetaminophen (TYLENOL) level   Result Value Ref Range    Acetaminophen Level <5 (L) 10 - 30 ug/mL   Magnesium   Result Value Ref Range    Magnesium 2.10 1.80 - 2.40 mg/dL       EMERGENCY DEPARTMENT COURSE and DIFFERENTIAL DIAGNOSIS/MDM:   Patient seen and evaluated. Old records reviewed and pertinent information included in HPI. Labs and imaging reviewed and results discussed with patient. Overall extremely agitated patient, presenting for decompensated psychosis and agitation. At the home, patient requesting that police shoot him and also stating that he is going to kill his mother. He has been off medications for multiple months.   Patient is extremely agitated and combative on arrival.  Patient requiring physical restraints on arrival.  Patient has multiple allergies, patient given Benadryl and Ativan on arrival.  He did briefly calm down but then became extremely agitated again and was spitting at staff. Patient was given olanzapine injection. It is listed as an allergy, but review of previous admission shows that he did receive it multiple times. At this time, feel the benefits of acquiring adequate sedation in a patient who represents significant risk of harm to self and others outweighs the risk of allergic reaction given patient has proven history of tolerating the medication. History obtained from police, patient unable to cooperate with interview. After he received medications and I spoke to him he simply swore at me. Physical exam remarkable for tachycardia. Patient's pertinent external medical records: Records Reviewed : Inpatient Notes patient has multiple previous presentations similar to this. Reviewed previous psychiatric admission regarding what medications he received in presentation    Chronic Medical Conditions that may contribute to presentation today:  has a past medical history of Elevated liver enzymes (2017), Hypertension, and Pure hypercholesterolemia (2017).      Social Determinants affecting Dx or Tx:   Social History     Socioeconomic History    Marital status: Single     Spouse name: Not on file    Number of children: 1    Years of education: 14    Highest education level: Not on file   Occupational History     Employer: UNEMPLOYED   Tobacco Use    Smoking status: Former     Packs/day: 0.10     Years: 4.00     Pack years: 0.40     Types: Cigarettes     Quit date: 1980     Years since quittin.1    Smokeless tobacco: Never    Tobacco comments:     1 pk per year - social smoker, didn't smoke much at all    Vaping Use    Vaping Use: Never used   Substance and Sexual Activity    Alcohol use: No    Drug use: No     Comment: Pt states he does \"natural stuff\"    Sexual activity: Never   Other Topics Concern    Not on file   Social History Narrative    Not on file     Social Determinants of Health     Financial Resource Strain: Low Risk     Difficulty of Paying Living Expenses: Not very hard   Food Insecurity: No Food Insecurity    Worried About Running Out of Food in the Last Year: Never true    Ran Out of Food in the Last Year: Never true   Transportation Needs: Unmet Transportation Needs    Lack of Transportation (Medical): Yes    Lack of Transportation (Non-Medical): Yes   Physical Activity: Not on file   Stress: No Stress Concern Present    Feeling of Stress : Only a little   Social Connections: Not on file   Intimate Partner Violence: Not At Risk    Fear of Current or Ex-Partner: No    Emotionally Abused: No    Physically Abused: No    Sexually Abused: No   Housing Stability: Low Risk     Unable to Pay for Housing in the Last Year: No    Number of [place] in the Last Year: 1    Unstable Housing in the Last Year: No       WORKUP INTERPRETATION:  ED Course as of 02/26/23 1258   Thu Feb 23, 2023 1952 Patient arrives and required sedation on arrival, patient unable to answer questions due to extreme decompensation of schizophrenia. Police placed patient on psychiatric hold after they were called out. Patient had made statements about wanting the police to shoot him as well as that he wanted to harm his mother [ER]   1063 Ethanol, salicylate, acetaminophen levels negative [ER]   2342 No leukocytosis, anemia, thrombocytopenia [ER]   2342 Hypokalemia 3, no other electrolyte abnormalities or evidence of kidney dysfunction [ER]   2343 Liver function testing unremarkable [ER]   Sun Feb 26, 2023   1258 COVID and flu swab negative [ER]      ED Course User Index  [ER] Janee Willis MD            CONSULTS:   Patient was signed out to oncoming provider while awaiting final recommendations from psychiatry.   Suspect plan for admission based off presentation      SCREENINGS:       Isiah Coma Scale  Eye Opening: Spontaneous  Best Verbal Response: Oriented  Best Motor Response: Obeys commands  Isiah Coma Scale Score: 15                CIWA Assessment  BP: (!) 140/89  Heart Rate: 92  Nausea and Vomiting: no nausea and no vomiting  Tactile Disturbances: none  Tremor: no tremor  Auditory Disturbances: not present  Paroxysmal Sweats: barely perceptible sweating, palms moist  Visual Disturbances: not present  Anxiety: no anxiety, at ease  Headache, Fullness in Head: none present  Agitation: normal activity  Orientation and Clouding of Sensorium: cannot do serial additions or is uncertain about date  CIWA-Ar Total: 2  Clinical Opiate Withdrawal Scale Score: 0 (02/24/23 0357)        Is this patient to be included in the SEP-1 Core Measure due to severe sepsis or septic shock? No   Exclusion criteria - the patient is NOT to be included for SEP-1 Core Measure due to: Infection is not suspected      TREATMENT:  During the patient's ED course, the patient was given:   Medications     diphenhydrAMINE (BENADRYL) injection 50 mg (50 mg IntraMUSCular Given 2/23/23 1954)   LORazepam (ATIVAN) injection 2 mg (2 mg IntraMUSCular Given 2/23/23 1954)   OLANZapine (ZYPREXA) injection 10 mg (10 mg IntraMUSCular Given 2/23/23 2030)     REASSESSMENT:  I have performed a medical clearance examination on this patient. It is my opinion that no medical conditions were discovered that would preclude admission to a behavioral health unit or discharge home. I feel that the patient is medically stable for disposition by the behavioral health team at this time. Psychiatry social work evaluated the patient and staffed the case with on-call psychiatry team. Decision made to admit the patient.     Vitals:    Vitals:    02/24/23 2015 02/25/23 0821 02/25/23 1930 02/26/23 1051   BP: 112/79 122/83 118/72 (!) 140/89   Pulse: (!) 101 99 100 92   Resp: 16 17 18 18   Temp: 97.3 °F (36.3 °C) 96.9 °F (36.1 °C) 96.8 °F (36 °C) 97.3 °F (36.3 °C)   TempSrc: Oral Oral Oral    SpO2:  91% 96% 95% Weight:       Height:           CRITICAL CARE TIME     I personally spent a total of 20 minutes of critical care time in obtaining history, performing a physical exam, bedside monitoring of interventions, collecting and interpreting tests and discussion with consultants but excluding time spent performing procedures, treating other patients and teaching time. FINAL IMPRESSION      1. Schizophrenia, unspecified type ([facility] [address]    2. Suicidal ideation    3. Homicidal ideation          DISPOSITION/PLAN   Disposition: DISPOSITION Admitted 02/24/2023 01:41:47 AM    Condition: stable     DISCLAIMER: This chart was created using Dragon dictation software. Efforts were made by me to ensure accuracy, however some errors may be present due to limitations of this technology and occasionally words are not transcribed correctly.     MD Chloé Stahl MD  02/26/23 9441

## 2023-02-26 NOTE — CARE COORDINATION
[facility]  Treatment Team Note  Review Date & Time:   02/26/23 0900    Patient was not in treatment team      Status EXAM:   Mental Status and Behavioral Exam  Normal: No  Level of Assistance: Independent/Self  Facial Expression: Flat, Worried, Exaggerated  Affect: Blunt, Unstable  Level of Consciousness: Alert  Frequency of Checks: 1 staff: 1 patient  - continuous  Mood:Normal: No  Mood: Irritable, Labile, Anxious, Angry  Motor Activity:Normal: Yes  Eye Contact: Good  Observed Behavior: Preoccupied, Guarded, Agitated  Sexual Misconduct History: Current - no  Preception: [place] to person, [place] to time, [place] to place  Attention:Normal: No  Attention: Unable to concentrate, Distractible  Thought Processes: Perseveration  Thought Content:Normal: No  Thought Content: Preoccupations, Delusions, Paranoia  Depression Symptoms: Impaired concentration, Increased irritability  Anxiety Symptoms: Generalized  [place]be[place] Symptoms: Pressured speech, Flight of ideas, Labile, Poor judgment  Hallucinations: None  Delusions: Yes  Delusions: Persecutory, Paranoid, Obsessions  Memory:Normal: No  Memory: Poor recent, Poor remote, Confabulation  Insight and Judgment: No  Insight and Judgment: Poor judgment, Poor insight      Suicide Risk CSSR-S:  1) Within the past month, have you wished you were dead or wished you could go to sleep and not wake up? : No  2) Have you actually had any thoughts of killing yourself? : No  6) Have you ever done anything, started to do anything, or prepared to do anything to end your life?: No      PLAN/TREATMENT RECOMMENDATIONS UPDATE: Patient will take medication as prescribed, eat 75% of meals, attend groups, participate in milieu activities, participate in treatment team and care planning for discharge and follow up.             John Lazaro RN

## 2023-02-26 NOTE — PLAN OF CARE
Problem: Anxiety  Goal: Will report anxiety at manageable levels  Description: INTERVENTIONS:  1. Administer medication as ordered  2. Teach and rehearse alternative coping skills  3. Provide emotional support with 1:1 interaction with staff  Outcome: Progressing     Problem: Psychosis  Goal: Will report no hallucinations or delusions  Description: INTERVENTIONS:  1. Administer medication as  ordered  2. Assist with reality testing to support increasing orientation  3. Assess if patient's hallucinations or delusions are encouraging self harm or harm to others and intervene as appropriate  Outcome: Progressing     Problem: Involuntary Admit  Goal: Will cooperate with staff recommendations and doctor's orders and will demonstrate appropriate behavior  Description: INTERVENTIONS:  1. Treat underlying conditions and offer medication as ordered  2. Educate regarding involuntary admission procedures and rules  3. Contain excessive/inappropriate behavior per unit and hospital policies  Outcome: Progressing     Problem: Behavior  Goal: Pt/Family maintain appropriate behavior and adhere to behavioral management agreement, if implemented  Description: INTERVENTIONS:  1. Assess patient/family's coping skills and  non-compliant behavior (including use of illegal substances)  2. Notify security of behavior or suspected illegal substances which indicate the need for search of the family and/or belongings  3. Encourage verbalization of thoughts and concerns in a socially appropriate manner  4. Utilize positive, consistent limit setting strategies supporting safety of patient, staff and others  5. Encourage participation in the decision making process about the behavioral management agreement  6. If a visitor's behavior poses a threat to safety call refer to organization policy.   7. Initiate consult with , Psychosocial CNS, Spiritual Care as appropriate  Outcome: Progressing       Patient cooperative/friendly with staff. No angry outbursts or bizarre behaviors exhibited. Patient spoke with mother appropriately on phone x2 during shift. Multiple HS snacks & drinks. No scheduled meds due during shift however received prn ativan/thorazine at 2345 which was effective. Patient slept majority of shift. 1:1 line of sight continued for patient safety and behaviors.

## 2023-02-27 PROCEDURE — 99233 SBSQ HOSP IP/OBS HIGH 50: CPT | Performed by: PSYCHIATRY & NEUROLOGY

## 2023-02-27 PROCEDURE — 6370000000 HC RX 637 (ALT 250 FOR IP)

## 2023-02-27 PROCEDURE — 1240000000 HC EMOTIONAL WELLNESS R&B

## 2023-02-27 PROCEDURE — 6370000000 HC RX 637 (ALT 250 FOR IP): Performed by: PSYCHIATRY & NEUROLOGY

## 2023-02-27 RX ADMIN — FLUTICASONE PROPIONATE 1 SPRAY: 50 SPRAY, METERED NASAL at 07:35

## 2023-02-27 RX ADMIN — CHLORTHALIDONE 12.5 MG: 25 TABLET ORAL at 07:36

## 2023-02-27 RX ADMIN — TRAZODONE HYDROCHLORIDE 50 MG: 50 TABLET ORAL at 21:02

## 2023-02-27 RX ADMIN — CHLORPROMAZINE HYDROCHLORIDE 50 MG: 25 TABLET, SUGAR COATED ORAL at 21:03

## 2023-02-27 RX ADMIN — IBUPROFEN 400 MG: 400 TABLET, FILM COATED ORAL at 03:41

## 2023-02-27 RX ADMIN — ATORVASTATIN CALCIUM 10 MG: 10 TABLET, FILM COATED ORAL at 15:44

## 2023-02-27 RX ADMIN — LORAZEPAM 2 MG: 2 TABLET ORAL at 21:02

## 2023-02-27 RX ADMIN — DOCUSATE SODIUM 200 MG: 100 CAPSULE, LIQUID FILLED ORAL at 07:35

## 2023-02-27 ASSESSMENT — PAIN DESCRIPTION - LOCATION: LOCATION: LEG

## 2023-02-27 ASSESSMENT — PAIN SCALES - GENERAL
PAINLEVEL_OUTOF10: 8
PAINLEVEL_OUTOF10: 0

## 2023-02-27 ASSESSMENT — PAIN DESCRIPTION - DESCRIPTORS: DESCRIPTORS: ACHING

## 2023-02-27 ASSESSMENT — PAIN DESCRIPTION - ORIENTATION: ORIENTATION: RIGHT;LEFT

## 2023-02-27 NOTE — GROUP NOTE
Group Therapy Note    Date: 2/27/2023    Group Start Time: 1000  Group End Time: 8741  Group Topic: Psychoeducation    [facility]    SHY Short        Group Therapy Note  Clinician introduced the group members to the zones of regulations, feelings wheel and emoji lists. Patients were able to identify many feelings for the zones of regulation and once they were able to personalize their feelings wheel to themselves, the group discussed and practiced grounding techniques. Attendees: 10       Patient's Goal:      Notes:  Patient was cooperative and fully engaged in the group discussion and activity. Patient took notes on their handouts sheets and practiced the grounding techniques. Status After Intervention:  Improved    Participation Level:  Active Listener and Interactive    Participation Quality: Appropriate and Attentive      Speech:  normal      Thought Process/Content: Linear      Affective Functioning: Exaggerated      Mood: anxious      Level of consciousness:  Preoccupied      Response to Learning: Able to verbalize current knowledge/experience      Endings: None Reported    Modes of Intervention: Education, Support, and Activity      Discipline Responsible: /Counselor      Signature:  SHY Short

## 2023-02-27 NOTE — PLAN OF CARE
Problem: Behavior  Goal: Pt/Family maintain appropriate behavior and adhere to behavioral management agreement, if implemented  Description: INTERVENTIONS:  1. Assess patient/family's coping skills and  non-compliant behavior (including use of illegal substances)  2. Notify security of behavior or suspected illegal substances which indicate the need for search of the family and/or belongings  3. Encourage verbalization of thoughts and concerns in a socially appropriate manner  4. Utilize positive, consistent limit setting strategies supporting safety of patient, staff and others  5. Encourage participation in the decision making process about the behavioral management agreement  6. If a visitor's behavior poses a threat to safety call refer to organization policy. 7. Initiate consult with , Psychosocial CNS, Spiritual Care as appropriate  2/26/2023 2242 by Mitchell Barnes RN  Outcome: Progressing  2/26/2023 1050 by Gabriela Batista RN  Outcome: Not Progressing     Problem: Involuntary Admit  Goal: Will cooperate with staff recommendations and doctor's orders and will demonstrate appropriate behavior  Description: INTERVENTIONS:  1. Treat underlying conditions and offer medication as ordered  2. Educate regarding involuntary admission procedures and rules  3. Contain excessive/inappropriate behavior per unit and hospital policies  8/76/6310 5274 by Mitchlel Barnes RN  Outcome: Progressing  2/26/2023 1050 by Gabriela Batista RN  Outcome: Not Progressing     Problem: Psychosis  Goal: Will report no hallucinations or delusions  Description: INTERVENTIONS:  1. Administer medication as  ordered  2. Assist with reality testing to support increasing orientation  3.  Assess if patient's hallucinations or delusions are encouraging self harm or harm to others and intervene as appropriate  2/26/2023 2242 by Mitchell Barnes RN  Outcome: Progressing  2/26/2023 1050 by Gabriela Batista RN  Outcome: Not Progressing       Patient very pleasant during shift. Cooperative and friendly and free from angry outbursts or bizarre behaviors. Patient took a shower during shift and asked for face to be shaved. No scheduled meds this evening however took prn ativan/trazodone at 2245 which were effective. Pt reports he's looking forward to going home and seeing his mother. Patient engaged more with staff during shift and spent some time at the nurses desk coloring. Appropriate demeanor and tone of voice.

## 2023-02-27 NOTE — BH NOTE
Patient requesting Ibuprofen for right and left leg pain rates 8/10. Patient medicated with Ibuprofen 400 mg po.

## 2023-02-27 NOTE — PLAN OF CARE
Hakeem is alert and oriented X3. He is delusional and believes he was kidnapped and brought here. He states \"these big men were taking me and they were really rough and hurting me.\" Writer told pt he is a strong man and he laughs and holds his arms out. He states \"I'm a girl with a allyson. I can't hurt anyone. Look at me.\"  He tells writer about a woman named brayden who wants to have sex with him. He stated he didn't like her, \"she's gross.\" He states writer would be compatible with him, but he is \"celibate.\" He is religiously preoccupied and told the  to leave when he didn't agree that \"Valorie is the bride.\"  He continues to talk about Yarsani and wanted to lay down in his room, but stated \"theres something evil in there.\" He was offered a new room, but declined. He has had no behavioral outbursts thus far this am. He denies SI/HI. He denies any hallucinations, but is noted to be talking to himself constantly.  Problem: Psychosis  Goal: Will report no hallucinations or delusions  Description: INTERVENTIONS:  1. Administer medication as  ordered  2. Assist with reality testing to support increasing orientation  3. Assess if patient's hallucinations or delusions are encouraging self harm or harm to others and intervene as appropriate  2/27/2023 0842 by Leida Hurtado RN  Outcome: Not Progressing  2/26/2023 2242 by Vidhi De La Cruz RN  Outcome: Progressing

## 2023-02-27 NOTE — BH NOTE
Patient resting quietly in bed with eyes closed. PRN trazodone and ativan given earlier was effective.

## 2023-02-28 PROBLEM — E87.6 HYPOKALEMIA: Status: RESOLVED | Noted: 2022-02-27 | Resolved: 2023-02-28

## 2023-02-28 LAB
ANION GAP SERPL CALCULATED.3IONS-SCNC: 11 MMOL/L (ref 3–16)
BUN BLDV-MCNC: 13 MG/DL (ref 7–20)
CALCIUM SERPL-MCNC: 9.1 MG/DL (ref 8.3–10.6)
CHLORIDE BLD-SCNC: 103 MMOL/L (ref 99–110)
CO2: 26 MMOL/L (ref 21–32)
CREAT SERPL-MCNC: 0.8 MG/DL (ref 0.8–1.3)
GFR SERPL CREATININE-BSD FRML MDRD: >60 ML/MIN/{1.73_M2}
GLUCOSE BLD-MCNC: 158 MG/DL (ref 70–99)
POTASSIUM REFLEX MAGNESIUM: 3.9 MMOL/L (ref 3.5–5.1)
SODIUM BLD-SCNC: 140 MMOL/L (ref 136–145)

## 2023-02-28 PROCEDURE — 6370000000 HC RX 637 (ALT 250 FOR IP)

## 2023-02-28 PROCEDURE — 36415 COLL VENOUS BLD VENIPUNCTURE: CPT

## 2023-02-28 PROCEDURE — 99233 SBSQ HOSP IP/OBS HIGH 50: CPT | Performed by: PSYCHIATRY & NEUROLOGY

## 2023-02-28 PROCEDURE — 6370000000 HC RX 637 (ALT 250 FOR IP): Performed by: PSYCHIATRY & NEUROLOGY

## 2023-02-28 PROCEDURE — 80048 BASIC METABOLIC PNL TOTAL CA: CPT

## 2023-02-28 PROCEDURE — 1240000000 HC EMOTIONAL WELLNESS R&B

## 2023-02-28 RX ORDER — LORAZEPAM 2 MG/ML
2 INJECTION INTRAMUSCULAR EVERY 4 HOURS PRN
Status: DISCONTINUED | OUTPATIENT
Start: 2023-02-28 | End: 2023-03-09

## 2023-02-28 RX ORDER — POLYETHYLENE GLYCOL 3350 17 G/17G
17 POWDER, FOR SOLUTION ORAL ONCE
Status: COMPLETED | OUTPATIENT
Start: 2023-02-28 | End: 2023-02-28

## 2023-02-28 RX ORDER — CHLORPROMAZINE HYDROCHLORIDE 25 MG/ML
50 INJECTION INTRAMUSCULAR EVERY 6 HOURS PRN
Status: DISCONTINUED | OUTPATIENT
Start: 2023-02-28 | End: 2023-03-04

## 2023-02-28 RX ORDER — CHLORPROMAZINE HYDROCHLORIDE 25 MG/1
50 TABLET, FILM COATED ORAL EVERY 6 HOURS PRN
Status: DISCONTINUED | OUTPATIENT
Start: 2023-02-28 | End: 2023-03-04

## 2023-02-28 RX ORDER — LORAZEPAM 2 MG/1
2 TABLET ORAL EVERY 4 HOURS PRN
Status: DISCONTINUED | OUTPATIENT
Start: 2023-02-28 | End: 2023-03-09

## 2023-02-28 RX ADMIN — CHLORPROMAZINE HYDROCHLORIDE 50 MG: 25 TABLET, FILM COATED ORAL at 20:56

## 2023-02-28 RX ADMIN — FLUTICASONE PROPIONATE 1 SPRAY: 50 SPRAY, METERED NASAL at 08:30

## 2023-02-28 RX ADMIN — ATORVASTATIN CALCIUM 10 MG: 10 TABLET, FILM COATED ORAL at 08:30

## 2023-02-28 RX ADMIN — POLYETHYLENE GLYCOL 3350 17 G: 17 POWDER, FOR SOLUTION ORAL at 16:50

## 2023-02-28 RX ADMIN — IBUPROFEN 400 MG: 400 TABLET, FILM COATED ORAL at 19:42

## 2023-02-28 RX ADMIN — LORAZEPAM 2 MG: 2 TABLET ORAL at 20:56

## 2023-02-28 RX ADMIN — DOCUSATE SODIUM 200 MG: 100 CAPSULE, LIQUID FILLED ORAL at 08:30

## 2023-02-28 RX ADMIN — ACETAMINOPHEN 650 MG: 325 TABLET ORAL at 08:30

## 2023-02-28 RX ADMIN — ACETAMINOPHEN 650 MG: 325 TABLET ORAL at 21:26

## 2023-02-28 RX ADMIN — LORAZEPAM 2 MG: 2 TABLET ORAL at 08:30

## 2023-02-28 RX ADMIN — TRAZODONE HYDROCHLORIDE 50 MG: 50 TABLET ORAL at 23:10

## 2023-02-28 RX ADMIN — CHLORPROMAZINE HYDROCHLORIDE 50 MG: 25 TABLET, SUGAR COATED ORAL at 08:30

## 2023-02-28 RX ADMIN — CHLORTHALIDONE 12.5 MG: 25 TABLET ORAL at 08:30

## 2023-02-28 ASSESSMENT — PAIN SCALES - GENERAL
PAINLEVEL_OUTOF10: 0
PAINLEVEL_OUTOF10: 1
PAINLEVEL_OUTOF10: 5
PAINLEVEL_OUTOF10: 3
PAINLEVEL_OUTOF10: 3

## 2023-02-28 ASSESSMENT — PAIN DESCRIPTION - LOCATION
LOCATION: GENERALIZED
LOCATION: GENERALIZED;LEG
LOCATION: KNEE

## 2023-02-28 ASSESSMENT — PAIN DESCRIPTION - DESCRIPTORS: DESCRIPTORS: ACHING

## 2023-02-28 ASSESSMENT — PAIN DESCRIPTION - ORIENTATION
ORIENTATION: RIGHT;LEFT
ORIENTATION: RIGHT;LEFT

## 2023-02-28 NOTE — PROGRESS NOTES
Constant observation remains at this time. Pt is calm and cooperative. Pt is awake, sitting in chair at nurses station, and conversing with staff. No needs expressed at this time. Will continue to monitor.

## 2023-02-28 NOTE — PROGRESS NOTES
Constant observation remains at this time. Pt is calm and asleep. No needs expressed at this time. Will continue to monitor.

## 2023-02-28 NOTE — PROGRESS NOTES
Department of Psychiatry  Progress Note    Patient's chart was reviewed. Discussed with treatment team. Met with patient. SUBJECTIVE:      Continues to make gains but remains delusional and impaired. \"I'm a lesbian. My brother's a warlock. \"    Tolerating aripiprazole. Agreed to attend groups today. ROS:   Patient has new complaints: no  Sleeping adequately:  no  Appetite adequate: yes  Engaged in programming: No:     OBJECTIVE:  VITALS:  /89   Pulse 80   Temp 97.5 °F (36.4 °C) (Oral)   Resp 18   Ht 6' 1\" (1.854 m)   Wt 240 lb (108.9 kg)   SpO2 96%   BMI 31.66 kg/m²     Mental Status Examination:    Appearance: fair grooming and hygiene  Behavior/Attitude toward examiner:  suspicious  Speech: elevated   Mood:  \"fine\"  Affect:  elevated    Thought processes:  disorganized  Thought Content: no SI, no HI, + delusional   Perceptions: +RTIS  Attention: limited   Abstraction: impaired  Cognition:  limited  Insight: impaired   Judgment: impaired    Medication:  Scheduled:   fluticasone  1 spray Each Nostril Daily    atorvastatin  10 mg Oral Daily    chlorthalidone  12.5 mg Oral Daily    potassium chloride  40 mEq Oral Once        PRN:  ibuprofen, acetaminophen, magnesium hydroxide, nicotine polacrilex, aluminum & magnesium hydroxide-simethicone, diphenhydrAMINE, traZODone, docusate sodium, LORazepam **OR** LORazepam, chlorproMAZINE **OR** chlorproMAZINE     ASSESSMENT AND PLAN:  57 y.o. who presented  to [facility] a SOB after police were called by brother regarding patient erratic behavior at home. Principal Problem:    Schizophrenia ([facility] [address]  Active Problems:    Essential hypertension    Hyperlipidemia    Tardive dyskinesia  Resolved Problems:    Hypokalemia     Plan: 1. Admitted to psychiatry for stabilization and treatment. 2.On admission, ordered q15min checks for safety, programming, and prn medication for anxiety, agitation, and insomnia.      2/25/2023 - Aristada Initio 675mg and Aristada 1064mb given without incident. 2/27/2023 - making gains. 2/28/2023 - continues to make gains. Discontinue constant observation. 3. Hospitalist consult for admission. #Hypokalemia - resolved on repeat.   -refused replacement      #HTN  -resume HCTZ at request of patient      #HLD   -on statin     4. Voluntary by guardian. ELOS 8-10 days. Total time with patient was 50 minutes and more than 50 % of that time was spent counseling the patient on their symptoms, treatment, and expected goals.      Ashely Patiño MD

## 2023-02-28 NOTE — PROGRESS NOTES
Constant observation remains at this time. Pt is calm and cooperative. Pt is awake and resting in bed in his room. No needs expressed at this time. Will continue to monitor.

## 2023-02-28 NOTE — DISCHARGE INSTRUCTIONS
Advanced Directives:  Patient does have a surrogate decision maker appointed   Name (if yes): Leatha Newton   Patient does not have a psychiatric and/ or medical advanced directive or power of . Patient was not offered psychiatric and/ or medical advanced directive or power of  information/completion but declined to complete   Why not? Has Guardian     Discharge Planning is Complete. Discharge Date: 3/17/23  Reason for Hospitalization: Failure to care for self  Discharge Diagnosis: Schizophrenia [facility])  Discharging to: Private Domicile     Your instructions: Your physician here was Cecil Baugh MD. If you have any questions please call the unit at [phone] for the adult unit or [phone] for [facility]. Please note that we have a patient family advisory [place] that meets the second Wednesday of January and the second Wednesday of July at [address] in [place] at [facility]. Department leadership would love for you to attend to give feedback on what we are doing well and areas in which we can improve our patient care. Please come if you are able and feel free to reach out to [address] [place] [address] at [phone] with any questions. The crisis number for [facility] is [phone] (SAVE). This crisis line is available 24 hours a day, seven days a week. Please follow up with your PCP regarding any pending labs. You are being scheduled for your next injection here at the hospital. See below. Name of Provider: Leslee Kelley- Marely RN  Provider specialty/license: Psychiatry  Date and time of appointment:  Wednesday 4/5/23 at 10:30 AM  The type/s of services requested are: Medicine Management  Agency name: [facility]  Address: [address] [address], [place], [place], [address]  Phone Number: [phone]  Special instructions (what to bring to appointment, etc.): Please register at the  of the hospital and they will escort you to your appointment. You completed an assessment at [facility] on 3/17/23. [facility] sent a representative to the hospital to complete this assessment. From there, [facility] will [facility] regarding services that you are eligible for. Name of Provider: Miguelmoy  Provider specialty/license: NextInput  Date and time of appointment: [facility] WILL BE FOLLOWING UP. The type/s of services requested are: Counseling/Medication Management  Agency name: [facility]  Address: [place] [address] [place] [address], [address] [place] [address]  Phone Number: [phone]  Special instructions (what to bring to appointment, etc.):       Discharge Completed By: SHY Cunningham  Fax to: Follow up provider name and number  [facility] records [phone]      The following personal items were collected during your admission and were returned to you:    Belongings  Dental Appliances: None  Vision - Corrective Lenses: Eyeglasses  Hearing Aid: None  Clothing: Pants, Undergarments, Shirt (1 pair of boxers, 1 pair of pants, 1 shirt - on his person)  Jewelry: None  Body Piercings Removed: N/A  Electronic Devices: None  Weapons (Notify Protective Services/Security): None  Other Valuables: Other (Comment) (Patient does not have valuables)  Home Medications: None  Valuables Given To: Other (Comment) (Patient does not have valuables)  Provide Name(s) of Who Valuable(s) Were Given To: N/A  Responsible person(s) in the waiting room: N/A  Patient approves for provider to speak to responsible person post operatively: No    By signing below, I understand and acknowledge receipt of the instructions indicated above.

## 2023-02-28 NOTE — PLAN OF CARE
Problem: Involuntary Admit  Goal: Will cooperate with staff recommendations and doctor's orders and will demonstrate appropriate behavior  Description: INTERVENTIONS:  1. Treat underlying conditions and offer medication as ordered  2. Educate regarding involuntary admission procedures and rules  3. Contain excessive/inappropriate behavior per unit and hospital policies  5/33/3620 9868 by Lucia Pinto RN  Outcome: Progressing  2/27/2023 0842 by Darrell Cadena RN  Outcome: Progressing  Flowsheets (Taken 2/27/2023 0825)  Will cooperate with staff recommendations and doctor's orders and will demonstrate appropriate behavior:   Treat underlying conditions and offer medication as ordered   Contain excessive/inappropriate behavior per unit and hospital policies   Educate regarding involuntary admission procedures and rules     Problem: Risk for Elopement  Goal: Patient will not exit the unit/facility without proper excort  2/27/2023 0842 by Darrell Cadena RN  Outcome: Progressing  Flowsheets (Taken 2/27/2023 0825)  Nursing Interventions for Elopement Risk:   Communicate/escalate to charge nurse the risk of elopement   Reduce environmental triggers     Problem: Psychosis  Goal: Will report no hallucinations or delusions  Description: INTERVENTIONS:  1. Administer medication as  ordered  2. Assist with reality testing to support increasing orientation  3. Assess if patient's hallucinations or delusions are encouraging self harm or harm to others and intervene as appropriate  2/27/2023 2113 by Lucia Pinto RN  Outcome: Not Progressing  2/27/2023 0842 by Darrell Cadena RN  Outcome: Not Progressing    1:1 Line of sight continuous observation continues. Patient continues to have Baptist and persecutory delusions. Patient spent beginning of shift sitting at nurses desk talking about Congregational, the rapture, and the bible. Patient continues to be cooperative and friendly. No angry outbursts exhibited during shift. Patient appears to be RTIS and talking to himself in his room. Patient had no scheduled night medications however received prn thorazine, ativan, and trazodone which were effective. Patient slept well this evening.

## 2023-02-28 NOTE — PLAN OF CARE
Problem: Risk for Elopement  Goal: Patient will not exit the unit/facility without proper excort  Outcome: Not Progressing  Flowsheets (Taken 2/28/2023 1009)  Nursing Interventions for Elopement Risk: Communicate/escalate to charge nurse the risk of elopement     Problem: Coping  Goal: Pt/Family able to verbalize concerns and demonstrate effective coping strategies  Description: INTERVENTIONS:  1. Assist patient/family to identify coping skills, available support systems and cultural and spiritual values  2. Provide emotional support, including active listening and acknowledgement of concerns of patient and caregivers  3. Reduce environmental stimuli, as able  4. Instruct patient/family in relaxation techniques, as appropriate  5. Assess for spiritual pain/suffering and initiate Spiritual Care, Psychosocial Clinical Specialist consults as needed  2/28/2023 1017 by Krunal Elias RN  Outcome: Not Progressing  2/27/2023 2113 by Jourdan Pedroza RN  Outcome: Progressing     Problem: Behavior  Goal: Pt/Family maintain appropriate behavior and adhere to behavioral management agreement, if implemented  Description: INTERVENTIONS:  1. Assess patient/family's coping skills and  non-compliant behavior (including use of illegal substances)  2. Notify security of behavior or suspected illegal substances which indicate the need for search of the family and/or belongings  3. Encourage verbalization of thoughts and concerns in a socially appropriate manner  4. Utilize positive, consistent limit setting strategies supporting safety of patient, staff and others  5. Encourage participation in the decision making process about the behavioral management agreement  6. If a visitor's behavior poses a threat to safety call refer to organization policy.   7. Initiate consult with , Psychosocial CNS, Spiritual Care as appropriate  2/28/2023 1017 by Krunal Elias RN  Outcome: Not Progressing  2/27/2023 2113 by Reese Brewster Francesco Ocampo RN  Outcome: Progressing     Problem: Involuntary Admit  Goal: Will cooperate with staff recommendations and doctor's orders and will demonstrate appropriate behavior  Description: INTERVENTIONS:  1. Treat underlying conditions and offer medication as ordered  2. Educate regarding involuntary admission procedures and rules  3. Contain excessive/inappropriate behavior per unit and hospital policies  0/09/0476 0150 by Rolly Felty, RN  Outcome: Not Progressing  2/27/2023 2113 by Lucia Pinto RN  Outcome: Progressing     Problem: Psychosis  Goal: Will report no hallucinations or delusions  Description: INTERVENTIONS:  1. Administer medication as  ordered  2. Assist with reality testing to support increasing orientation  3. Assess if patient's hallucinations or delusions are encouraging self harm or harm to others and intervene as appropriate  2/28/2023 1017 by Rolly Felty, RN  Outcome: Not Progressing  2/27/2023 2113 by Lucia Pinto RN  Outcome: Not Progressing     Problem: Anxiety  Goal: Will report anxiety at manageable levels  Description: INTERVENTIONS:  1. Administer medication as ordered  2. Teach and rehearse alternative coping skills  3.  Provide emotional support with 1:1 interaction with staff  Outcome: Not Progressing  Flowsheets (Taken 2/28/2023 1009)  Will report anxiety at manageable levels: Administer medication as ordered     Problem: Pain  Goal: Verbalizes/displays adequate comfort level or baseline comfort level  Outcome: Not Progressing

## 2023-02-28 NOTE — PROGRESS NOTES
Constant observation remains at this time. Pt is calm and asleep No needs expressed at this time. Will continue to monitor.

## 2023-02-28 NOTE — PROGRESS NOTES
Department of Psychiatry  Progress Note    Patient's chart was reviewed. Discussed with treatment team. Met with patient.     SUBJECTIVE:      Remains delusional and impaired, but less agitated.    Tolerating aripiprazole well so far.     \"You're going to burn in fire.\"    ROS:   Patient has new complaints: no  Sleeping adequately:  no  Appetite adequate: yes  Engaged in programming: No:     OBJECTIVE:  VITALS:  /89   Pulse 80   Temp 97.5 °F (36.4 °C) (Oral)   Resp 18   Ht 6' 1\" (1.854 m)   Wt 240 lb (108.9 kg)   SpO2 96%   BMI 31.66 kg/m²     Mental Status Examination:    Appearance: fair grooming and hygiene  Behavior/Attitude toward examiner:  suspicious  Speech: elevated   Mood:  \"fine\"  Affect:  elevated    Thought processes:  disorganized  Thought Content: no SI, no HI, + delusional   Perceptions: +RTIS  Attention: limited   Abstraction: impaired  Cognition:  limited  Insight: impaired   Judgment: impaired    Medication:  Scheduled:   fluticasone  1 spray Each Nostril Daily    atorvastatin  10 mg Oral Daily    chlorthalidone  12.5 mg Oral Daily    potassium chloride  40 mEq Oral Once        PRN:  ibuprofen, acetaminophen, magnesium hydroxide, nicotine polacrilex, aluminum & magnesium hydroxide-simethicone, diphenhydrAMINE, traZODone, docusate sodium, LORazepam **OR** LORazepam, chlorproMAZINE **OR** chlorproMAZINE     ASSESSMENT AND PLAN:  62 y.o. who presented  to [facility] on a SOB after police were called by brother regarding patient erratic behavior at home.    Principal Problem:    Schizophrenia ([facility])  Active Problems:    Essential hypertension    Hyperlipidemia    Tardive dyskinesia  Resolved Problems:    Hypokalemia     Plan:    1.Admitted to psychiatry for stabilization and treatment.    2.On admission, ordered q15min checks for safety, programming, and prn medication for anxiety, agitation, and insomnia.     2/25/2023 - Aristada Initio 675mg and Aristada 1064mb given without  incident. 2/27/2023 - making gains. 3. Hospitalist consult for admission. #Hypokalemia - resolved on repeat.   -refused replacement      #HTN  -resume HCTZ at request of patient      #HLD   -on statin     4. Voluntary by guardian. DONOVANOS 8-10 days. Total time with patient was 50 minutes and more than 50 % of that time was spent counseling the patient on their symptoms, treatment, and expected goals.      Harika Segundo MD

## 2023-02-28 NOTE — PROGRESS NOTES
Constant observation remains at this time. Pt is calm and cooperative. No needs expressed at this time. Will continue to monitor.

## 2023-03-01 PROCEDURE — 6370000000 HC RX 637 (ALT 250 FOR IP): Performed by: PSYCHIATRY & NEUROLOGY

## 2023-03-01 PROCEDURE — 6370000000 HC RX 637 (ALT 250 FOR IP)

## 2023-03-01 PROCEDURE — 99233 SBSQ HOSP IP/OBS HIGH 50: CPT | Performed by: PSYCHIATRY & NEUROLOGY

## 2023-03-01 PROCEDURE — 1240000000 HC EMOTIONAL WELLNESS R&B

## 2023-03-01 RX ORDER — BENZTROPINE MESYLATE 1 MG/1
1 TABLET ORAL ONCE
Status: COMPLETED | OUTPATIENT
Start: 2023-03-01 | End: 2023-03-01

## 2023-03-01 RX ADMIN — FLUTICASONE PROPIONATE 1 SPRAY: 50 SPRAY, METERED NASAL at 07:52

## 2023-03-01 RX ADMIN — IBUPROFEN 400 MG: 400 TABLET, FILM COATED ORAL at 21:02

## 2023-03-01 RX ADMIN — TRAZODONE HYDROCHLORIDE 50 MG: 50 TABLET ORAL at 21:02

## 2023-03-01 RX ADMIN — ATORVASTATIN CALCIUM 10 MG: 10 TABLET, FILM COATED ORAL at 07:52

## 2023-03-01 RX ADMIN — DOCUSATE SODIUM 200 MG: 100 CAPSULE, LIQUID FILLED ORAL at 08:47

## 2023-03-01 RX ADMIN — BENZTROPINE MESYLATE 1 MG: 1 TABLET ORAL at 21:02

## 2023-03-01 RX ADMIN — CHLORPROMAZINE HYDROCHLORIDE 50 MG: 25 TABLET, FILM COATED ORAL at 18:44

## 2023-03-01 RX ADMIN — CHLORTHALIDONE 12.5 MG: 25 TABLET ORAL at 07:51

## 2023-03-01 RX ADMIN — ACETAMINOPHEN 650 MG: 325 TABLET ORAL at 18:44

## 2023-03-01 ASSESSMENT — PAIN SCALES - WONG BAKER: WONGBAKER_NUMERICALRESPONSE: 0

## 2023-03-01 ASSESSMENT — PAIN DESCRIPTION - DESCRIPTORS: DESCRIPTORS: ACHING

## 2023-03-01 ASSESSMENT — PAIN SCALES - GENERAL
PAINLEVEL_OUTOF10: 0
PAINLEVEL_OUTOF10: 3

## 2023-03-01 ASSESSMENT — PAIN DESCRIPTION - LOCATION
LOCATION: HEAD
LOCATION: GENERALIZED

## 2023-03-01 ASSESSMENT — PAIN - FUNCTIONAL ASSESSMENT: PAIN_FUNCTIONAL_ASSESSMENT: ACTIVITIES ARE NOT PREVENTED

## 2023-03-01 NOTE — PLAN OF CARE
Problem: Psychosis  Goal: Will report no hallucinations or delusions  Description: INTERVENTIONS:  1. Administer medication as  ordered  2. Assist with reality testing to support increasing orientation  3. Assess if patient's hallucinations or delusions are encouraging self harm or harm to others and intervene as appropriate  3/1/2023 0831 by Dean Mendoza LPN  Outcome: Progressing  2/28/2023 2114 by Catracho Maxwell RN  Outcome: Not Progressing   Pt out visible on unit, in front of team station delusional with flight of ideas, hyper verbal, looking off  talking to person not there, or just talking to self. Obsessed about Dr Pan Hernández retiring to play golf and becoming a surgeon, repeating this statement every few minutes. Pt calm cooperative complies with medication, makes a phone call and has a calm conversation. Pt makes no attempt to check doors for elopement, or destroying property.

## 2023-03-01 NOTE — PROGRESS NOTES
Pt wanted to attend group. Was advised of ground rules for group - share if he wants to, but do not throw food or create a disturbance as he did earlier today. Pt denied any such prior behavior. Pt attended group and displayed good behavior, though he did begin to perseverate and monopolize the conversation towards the end. Able to redirect and take back to small side of unit. Given snack. Pt remains calm and cooperative at this time.

## 2023-03-01 NOTE — PROGRESS NOTES
Pt has been sitting at the team station speaking with this writer since shift began. No distress or needs at this time. Pt is calm and cooperative.

## 2023-03-01 NOTE — GROUP NOTE
Group Therapy Note    Date: 3/1/2023    Group Start Time: 1300  Group End Time: 8468  Group Topic: Psychoeducation    [address] [place] [address]        Group Therapy Note    Attendees: 9    Facilitator led a \"post-card from Rico chaudhry. Patients were encouraged to think about the coping and grounding skills that they explored earlier in the day, along with other therapeutic tools, and consider where they could be five-years from now if they were able to utilize the skills being taught in group therapy. Patients were asked to consider a positive message that their future-selves may want their present-selves to know. Patients were than asked to create a positive, encouraging, or uplifting postcard for themselves, from the perspective of their future selves. Notes:  Patient was experiencing flight of ideas and was unable to concentrate on group content. However, patient created drawing of where they would like to be 5 years from now.     Status After Intervention:  Improved    Participation Level: Monopolizing    Participation Quality: Inappropriate      Speech:  pressured      Thought Process/Content: Flight of ideas      Affective Functioning: Exaggerated      Mood: elevated      Level of consciousness:  Alert, Preoccupied, and Inattentive      Response to Learning: Capable of insight      Endings: None Reported    Modes of Intervention: Exploration and Activity      Discipline Responsible: /Counselor      Signature:  RACHELE Yadav

## 2023-03-01 NOTE — PLAN OF CARE
Problem: Psychosis  Goal: Will report no hallucinations or delusions  Description: INTERVENTIONS:  1. Administer medication as  ordered  2. Assist with reality testing to support increasing orientation  3. Assess if patient's hallucinations or delusions are encouraging self harm or harm to others and intervene as appropriate  2/28/2023 2114 by Bc Reynolds RN  Outcome: Not Progressing  2/28/2023 1017 by Mely Zhang RN  Outcome: Not Progressing     Problem: Risk for Elopement  Goal: Patient will not exit the unit/facility without proper excort  2/28/2023 2114 by Bc Reynolds RN  Outcome: Progressing  2/28/2023 1017 by Mely Zhang RN  Outcome: Not Progressing  Flowsheets (Taken 2/28/2023 1009)  Nursing Interventions for Elopement Risk: Communicate/escalate to charge nurse the risk of elopement     Problem: Coping  Goal: Pt/Family able to verbalize concerns and demonstrate effective coping strategies  Description: INTERVENTIONS:  1. Assist patient/family to identify coping skills, available support systems and cultural and spiritual values  2. Provide emotional support, including active listening and acknowledgement of concerns of patient and caregivers  3. Reduce environmental stimuli, as able  4. Instruct patient/family in relaxation techniques, as appropriate  5. Assess for spiritual pain/suffering and initiate Spiritual Care, Psychosocial Clinical Specialist consults as needed  2/28/2023 2114 by Bc Reynolds RN  Outcome: Progressing  2/28/2023 1017 by Mely Zhang RN  Outcome: Not Progressing     Problem: Behavior  Goal: Pt/Family maintain appropriate behavior and adhere to behavioral management agreement, if implemented  Description: INTERVENTIONS:  1. Assess patient/family's coping skills and  non-compliant behavior (including use of illegal substances)  2. Notify security of behavior or suspected illegal substances which indicate the need for search of the family and/or belongings  3.  Encourage verbalization of thoughts and concerns in a socially appropriate manner  4. Utilize positive, consistent limit setting strategies supporting safety of patient, staff and others  5. Encourage participation in the decision making process about the behavioral management agreement  6. If a visitor's behavior poses a threat to safety call refer to organization policy. 7. Initiate consult with , Psychosocial CNS, Spiritual Care as appropriate  2/28/2023 2114 by Saturnino Godwin RN  Outcome: Progressing  2/28/2023 1017 by Ward Dumont RN  Outcome: Not Progressing     Problem: Involuntary Admit  Goal: Will cooperate with staff recommendations and doctor's orders and will demonstrate appropriate behavior  Description: INTERVENTIONS:  1. Treat underlying conditions and offer medication as ordered  2. Educate regarding involuntary admission procedures and rules  3. Contain excessive/inappropriate behavior per unit and hospital policies  [id number] by Saturnino Godwin RN  Outcome: Progressing  2/28/2023 1017 by Ward Dumont RN  Outcome: Not Progressing     Problem: Psychosis  Goal: Will report no hallucinations or delusions  Description: INTERVENTIONS:  1. Administer medication as  ordered  2. Assist with reality testing to support increasing orientation  3. Assess if patient's hallucinations or delusions are encouraging self harm or harm to others and intervene as appropriate  2/28/2023 2114 by Saturnino Godwin RN  Outcome: Not Progressing  2/28/2023 1017 by Ward Dumont RN  Outcome: Not Progressing     Problem: Anxiety  Goal: Will report anxiety at manageable levels  Description: INTERVENTIONS:  1. Administer medication as ordered  2. Teach and rehearse alternative coping skills  3.  Provide emotional support with 1:1 interaction with staff  2/28/2023 2114 by Saturnino Godwin RN  Outcome: Progressing  2/28/2023 1017 by Ward Dumont RN  Outcome: Not Progressing  Flowsheets (Taken 2/28/2023 1009)  Will report anxiety at manageable levels: Administer medication as ordered     Problem: Pain  Goal: Verbalizes/displays adequate comfort level or baseline comfort level  2/28/2023 2114 by Kat Tinoco RN  Outcome: Progressing  2/28/2023 1017 by Jonn Fuentes RN  Outcome: Not Progressing  Pt has been calm and mostly cooperative this shift. Sitting in chair at team station most of the,shift, talking. He is tangential, with FOL, and perseverates on Yarsani, how long he will be inpatient, medication, and food. He denied displaying poor behavior and judgement earlier today in group and he was mostly appropriate in group this evening. He did try and monopolize the conversation but was redirectable. RTIS noted during interaction with this writer.

## 2023-03-01 NOTE — PROGRESS NOTES
Pt has been telling this writer, and stated again in group, he does not want to stay here past Thursday (\"Thursday is where I draw the line\"). Advised will pass on message.

## 2023-03-01 NOTE — PROGRESS NOTES
Pt asked for tylenol. Unable to rate pain, just indicates it is generalized. Did appear to grimace when walking earlier. Given PRN tylenol.

## 2023-03-01 NOTE — GROUP NOTE
Group Therapy Note    Date: 3/1/2023    Group Start Time: 1100  Group End Time: 1328  Group Topic: Psychoeducation    [facility]    SHY Carson        Group Therapy Note  Clinician introduce the Fight, Flight, Freeze fear response to the group members. Pateints took notes and took handouts with them to work on after the group processing group. Attendees: 11       Patient's Goal:      Notes:  Patient came to the group and was RTIS. He was easily redirected and able to stay for the entire group time. Status After Intervention:  Unchanged    Participation Level:  Active Listener    Participation Quality: Attentive      Speech:  normal      Thought Process/Content: Logical      Affective Functioning: Congruent      Mood: anxious and fearful      Level of consciousness:  Attentive      Response to Learning: Able to retain information      Endings: None Reported    Modes of Intervention: Education, Support, and Activity      Discipline Responsible: /Counselor      Signature:  SHY Carson

## 2023-03-01 NOTE — PROGRESS NOTES
Group Therapy Note    Date: 2/28/2023  Start Time: 20:00  End Time:  21:00  Number of Participants: 10    Type of Group: Recreational  wrap up    Wellness Binder Information  Module Name:  /  Session Number:  /    Patient's Goal:  to close hospital     Notes:  loud and talking during group    Status After Intervention:  Unchanged    Participation Level: Monopolizing    Participation Quality: Intrusive      Speech:  pressured and loud      Thought Process/Content: Delusional  Flight of ideas      Affective Functioning: Exaggerated      Mood: anxious and irritable      Level of consciousness:  Alert and Preoccupied      Response to Learning: Able to change behavior      Endings: None Reported    Modes of Intervention: Socialization and Problem-solving      Discipline Responsible: Behavorial Health Tech      Signature:  Felicia Gordillo

## 2023-03-01 NOTE — PROGRESS NOTES
Patient continues awake & out to the team station & was very talkative & wanted to finish eating his sandwich. Trazodone 50 mg given po, for sleep. Tg Ridley R.N.

## 2023-03-02 PROBLEM — R00.0 TACHYCARDIA: Status: ACTIVE | Noted: 2023-03-02

## 2023-03-02 LAB
EKG ATRIAL RATE: 90 BPM
EKG DIAGNOSIS: NORMAL
EKG P AXIS: 45 DEGREES
EKG P-R INTERVAL: 160 MS
EKG Q-T INTERVAL: 364 MS
EKG QRS DURATION: 84 MS
EKG QTC CALCULATION (BAZETT): 445 MS
EKG R AXIS: -66 DEGREES
EKG T AXIS: 3 DEGREES
EKG VENTRICULAR RATE: 90 BPM

## 2023-03-02 PROCEDURE — 6370000000 HC RX 637 (ALT 250 FOR IP): Performed by: PSYCHIATRY & NEUROLOGY

## 2023-03-02 PROCEDURE — 93010 ELECTROCARDIOGRAM REPORT: CPT | Performed by: INTERNAL MEDICINE

## 2023-03-02 PROCEDURE — 6370000000 HC RX 637 (ALT 250 FOR IP)

## 2023-03-02 PROCEDURE — 1240000000 HC EMOTIONAL WELLNESS R&B

## 2023-03-02 PROCEDURE — 99233 SBSQ HOSP IP/OBS HIGH 50: CPT | Performed by: PSYCHIATRY & NEUROLOGY

## 2023-03-02 PROCEDURE — 93005 ELECTROCARDIOGRAM TRACING: CPT | Performed by: PSYCHIATRY & NEUROLOGY

## 2023-03-02 PROCEDURE — 99231 SBSQ HOSP IP/OBS SF/LOW 25: CPT | Performed by: NURSE PRACTITIONER

## 2023-03-02 RX ADMIN — ATORVASTATIN CALCIUM 10 MG: 10 TABLET, FILM COATED ORAL at 08:12

## 2023-03-02 RX ADMIN — LORAZEPAM 2 MG: 2 TABLET ORAL at 21:56

## 2023-03-02 RX ADMIN — DOCUSATE SODIUM 200 MG: 100 CAPSULE, LIQUID FILLED ORAL at 21:56

## 2023-03-02 RX ADMIN — CHLORPROMAZINE HYDROCHLORIDE 50 MG: 25 TABLET, FILM COATED ORAL at 21:56

## 2023-03-02 RX ADMIN — TRAZODONE HYDROCHLORIDE 50 MG: 50 TABLET ORAL at 21:56

## 2023-03-02 RX ADMIN — IBUPROFEN 400 MG: 400 TABLET, FILM COATED ORAL at 21:56

## 2023-03-02 RX ADMIN — CHLORTHALIDONE 12.5 MG: 25 TABLET ORAL at 08:12

## 2023-03-02 RX ADMIN — FLUTICASONE PROPIONATE 1 SPRAY: 50 SPRAY, METERED NASAL at 08:13

## 2023-03-02 ASSESSMENT — PAIN - FUNCTIONAL ASSESSMENT: PAIN_FUNCTIONAL_ASSESSMENT: ACTIVITIES ARE NOT PREVENTED

## 2023-03-02 ASSESSMENT — PAIN SCALES - WONG BAKER
WONGBAKER_NUMERICALRESPONSE: 2
WONGBAKER_NUMERICALRESPONSE: 0
WONGBAKER_NUMERICALRESPONSE: 0

## 2023-03-02 ASSESSMENT — PAIN SCALES - GENERAL: PAINLEVEL_OUTOF10: 0

## 2023-03-02 ASSESSMENT — PAIN DESCRIPTION - LOCATION: LOCATION: GENERALIZED

## 2023-03-02 NOTE — PROGRESS NOTES
Pt up to TS and asked for water. Asked pt again about him not sleeping and he insists that he is sleeping, because he is \"day dreaming\". Sounded congested and was having trouble getting his words out again. Rechecked VS and they are WNL. Agitated and trying to remember names of staff. Keeps calling this writer the names of other staff. Still refusing PRNs. Returned to room, having a conversation with someone who is not present about conversation with this writer.

## 2023-03-02 NOTE — PROGRESS NOTES
Pt out in day room again, sitting in chair at TS, talking. RTIS continues. Pt is less agitated, convinced he is going home today.  Speech is clearer again,  but remains FOL and tangential.

## 2023-03-02 NOTE — PROGRESS NOTES
Pt still awake, RTIS continues. Offered ativan again for insomnia and pt refused again, appearing upset and insisting that ativan was a \"death threat\". Announced he needed to urinate and went to the bathroom.

## 2023-03-02 NOTE — BH NOTE
Rosette Nieves is very talkative. Unable to identify specific pain. No c/o chest pain or having difficulty breathing. Noted HR to be irregular. O2 sat is 95% room air. Will notify provider. Line of sight continued.

## 2023-03-02 NOTE — BH NOTE
PA notified that EKG was completed and to evalCristopher Mirza was cooperative with having test obtained.

## 2023-03-02 NOTE — PROGRESS NOTES
Department of Psychiatry  Progress Note    Patient's chart was reviewed. Discussed with treatment team. Met with patient. SUBJECTIVE:      \"You need to be golf pro and then a surgeon. 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 \"    Tried to attend groups this morning but began talking about how he is a girl with a penis and is on peyote. Continues to RTIS.    ROS:   Patient has new complaints: no  Sleeping adequately:  no  Appetite adequate: yes  Engaged in programming: no    OBJECTIVE:  VITALS:  BP (!) 140/87   Pulse (!) 120   Temp 98.3 °F (36.8 °C) (Oral)   Resp 20   Ht 6' 1\" (1.854 m)   Wt 240 lb (108.9 kg)   SpO2 95%   BMI 31.66 kg/m²     Mental Status Examination:    Appearance: fair grooming and hygiene  Behavior/Attitude toward examiner:  suspicious  Speech: elevated   Mood:  \"upset\"  Affect:  elevated    Thought processes:  disorganized  Thought Content: no SI, no HI, + delusional   Perceptions: +RTIS  Attention: limited   Abstraction: impaired  Cognition:  limited  Insight: impaired   Judgment: impaired    Medication:  Scheduled:   fluticasone  1 spray Each Nostril Daily    atorvastatin  10 mg Oral Daily    chlorthalidone  12.5 mg Oral Daily    potassium chloride  40 mEq Oral Once        PRN:  LORazepam **OR** LORazepam, chlorproMAZINE **OR** chlorproMAZINE, ibuprofen, acetaminophen, magnesium hydroxide, nicotine polacrilex, aluminum & magnesium hydroxide-simethicone, diphenhydrAMINE, traZODone, docusate sodium     ASSESSMENT AND PLAN:  57 y.o. who presented  to [facility] on a SOB after police were called by brother regarding patient erratic behavior at home. Principal Problem:    Schizophrenia ([facility], [address]  Active Problems:    Essential hypertension    Hyperlipidemia    Tardive dyskinesia  Resolved Problems:    Hypokalemia     Plan: 1. Admitted to psychiatry for stabilization and treatment.     2.On admission, ordered q15min checks for safety, programming, and prn medication for anxiety, agitation, and insomnia. 2/25/2023 - Aristada Initio 675mg and Aristada 1064mb given without incident. 2/27/2023 - making gains. 2/28/2023 - continues to make gains. Discontinue constant observation. 3/1/2023 - remains severely symptomatic     3. Hospitalist consult for admission. #Hypokalemia - resolved on repeat.   -refused replacement      #HTN  -resume HCTZ at request of patient      #HLD   -on statin     4. Voluntary by guardian. ELOS 10-15 days. Total time with patient was 50 minutes and more than 50 % of that time was spent counseling the patient on their symptoms, treatment, and expected goals.      Luis Eduardo An MD

## 2023-03-02 NOTE — BH NOTE
Declined to talk with his Mom. \"She's not my Mom\" and then muttered a statement that could not be made out. His mother was concerned if pt was receiving his injections and informed he had.

## 2023-03-02 NOTE — PROGRESS NOTES
Department of Psychiatry  Progress Note    Patient's chart was reviewed. Discussed with treatment team. Met with patient. SUBJECTIVE:      Near constant RTIS. Severely delusional.    \"Next time I won't use a condom. I'm going to bring this whole place down. \"    Slowly making gains. Last admission required about 15 days before achieving enough stability to go home. ROS:   Patient has new complaints: no  Sleeping adequately:  no  Appetite adequate: yes  Engaged in programming: no    OBJECTIVE:  VITALS:  BP (!) 140/87   Pulse (!) 120   Temp 98.3 °F (36.8 °C) (Oral)   Resp 20   Ht 6' 1\" (1.854 m)   Wt 240 lb (108.9 kg)   SpO2 95%   BMI 31.66 kg/m²     Mental Status Examination:    Appearance: fair grooming and hygiene  Behavior/Attitude toward examiner:  suspicious  Speech: elevated   Mood:  \"fuck you\"  Affect:  elevated    Thought processes:  disorganized  Thought Content: no SI, no HI, + delusional   Perceptions: +RTIS  Attention: limited   Abstraction: impaired  Cognition:  limited  Insight: impaired   Judgment: impaired    Medication:  Scheduled:   fluticasone  1 spray Each Nostril Daily    atorvastatin  10 mg Oral Daily    chlorthalidone  12.5 mg Oral Daily    potassium chloride  40 mEq Oral Once        PRN:  LORazepam **OR** LORazepam, chlorproMAZINE **OR** chlorproMAZINE, ibuprofen, acetaminophen, magnesium hydroxide, nicotine polacrilex, aluminum & magnesium hydroxide-simethicone, diphenhydrAMINE, traZODone, docusate sodium     ASSESSMENT AND PLAN:  57 y.o. who presented  to [facility] on a SOB after police were called by brother regarding patient erratic behavior at home. Principal Problem:    Schizophrenia ([address]  Active Problems:    Essential hypertension    Hyperlipidemia    Tardive dyskinesia  Resolved Problems:    Hypokalemia     Plan: 1. Admitted to psychiatry for stabilization and treatment.     2.On admission, ordered q15min checks for safety, programming, and prn medication for anxiety, agitation, and insomnia.     2/25/2023 - Aristada Initio 675mg and Aristada 1064mb given without incident.     2/27/2023 - making gains.    2/28/2023 - continues to make gains. Discontinue constant observation.     3/1/2023 - remains severely symptomatic     3. Hospitalist consult for admission.   #Hypokalemia - resolved on repeat.   -refused replacement      #HTN  -resume HCTZ at request of patient      #HLD   -on statin     4. Voluntary by guardian. ELOS 10-15 days.    Total time with patient was 50 minutes and more than 50 % of that time was spent counseling the patient on their symptoms, treatment, and expected goals.     Brock Tam MD

## 2023-03-02 NOTE — BH NOTE
Episode of irritability, becoming verbally aggressive stating \"I'm blowing this place. Dr. Hesham Webb will pay for this\". Did sit down when meal was delivered and became less verbally aggressive. Uses foul language during interaction.

## 2023-03-02 NOTE — PLAN OF CARE
Problem: Risk for Elopement  Goal: Patient will not exit the unit/facility without proper excort  3/2/2023 0927 by Trini Mueller LPN  Outcome: Not Progressing  Flowsheets (Taken 3/2/2023 0800)  Nursing Interventions for Elopement Risk:   Assist with personal care needs such as toileting, eating, dressing, as needed to reduce the risk of wandering   Collaborate with family members/caregivers to mitigate the elopement risk   Communicate/escalate to nursing supervisor the risk of elopement   Communicate to physician the risk for elopement   Make sure patient has all necessary personal care items   Reduce environmental triggers     Problem: Coping  Goal: Pt/Family able to verbalize concerns and demonstrate effective coping strategies  Description: INTERVENTIONS:  1. Assist patient/family to identify coping skills, available support systems and cultural and spiritual values  2. Provide emotional support, including active listening and acknowledgement of concerns of patient and caregivers  3. Reduce environmental stimuli, as able  4. Instruct patient/family in relaxation techniques, as appropriate  5. Assess for spiritual pain/suffering and initiate Spiritual Care, Psychosocial Clinical Specialist consults as needed  3/2/2023 7251 by Trini Mueller LPN  Outcome: Not Progressing  Flowsheets (Taken 3/2/2023 0800)  Patient/family able to verbalize anxieties, fears, and concerns, and demonstrate effective coping:   Provide emotional support, including active listening and acknowledgement of concerns of patient and caregivers   Reduce environmental stimuli, as able     Problem: Behavior  Goal: Pt/Family maintain appropriate behavior and adhere to behavioral management agreement, if implemented  Description: INTERVENTIONS:  1. Assess patient/family's coping skills and  non-compliant behavior (including use of illegal substances)  2.  Notify security of behavior or suspected illegal substances which indicate the need for search of the family and/or belongings  3. Encourage verbalization of thoughts and concerns in a socially appropriate manner  4. Utilize positive, consistent limit setting strategies supporting safety of patient, staff and others  5. Encourage participation in the decision making process about the behavioral management agreement  6. If a visitor's behavior poses a threat to safety call refer to organization policy. 7. Initiate consult with , Psychosocial CNS, Spiritual Care as appropriate  3/2/2023 0927 by Cammie Flores LPN  Outcome: Not Progressing  Flowsheets (Taken 3/2/2023 0800)  Patient/family maintains appropriate behavior and adheres to behavioral management agreement, if implemented:   Utilize positive, consistent limit setting strategies supporting safety of patient, staff and others   If a patients behavior jeopardizes the safety of the patient, staff, or others refer to organization policy. If a visitors behavior poses a threat to safety refer to organization policy   Initiate consult with , Psychosocial Clinical Nurse Specialist, Spiritual Care as appropriate     Problem: Involuntary Admit  Goal: Will cooperate with staff recommendations and doctor's orders and will demonstrate appropriate behavior  Description: INTERVENTIONS:  1. Treat underlying conditions and offer medication as ordered  2. Educate regarding involuntary admission procedures and rules  3. Contain excessive/inappropriate behavior per unit and hospital policies  3/2/6068 [id number] by Cammie Flores LPN  Outcome: Progressing  Flowsheets (Taken 3/2/2023 0800)  Will cooperate with staff recommendations and doctor's orders and will demonstrate appropriate behavior: Treat underlying conditions and offer medication as ordered     Problem: Psychosis  Goal: Will report no hallucinations or delusions  Description: INTERVENTIONS:  1. Administer medication as  ordered  2.  Assist with reality testing to support increasing orientation  3. Assess if patient's hallucinations or delusions are encouraging self harm or harm to others and intervene as appropriate  3/2/2023 0927 by Evelyne Sears LPN  Outcome: Not Progressing     Problem: Anxiety  Goal: Will report anxiety at manageable levels  Description: INTERVENTIONS:  1. Administer medication as ordered  2. Teach and rehearse alternative coping skills  3. Provide emotional support with 1:1 interaction with staff  3/2/2023 0927 by Evelyne Sears LPN  Outcome: Not Progressing  Flowsheets (Taken 3/2/2023 0800)  Will report anxiety at manageable levels:   Administer medication as ordered   Provide emotional support with 1:1 interaction with staff     Problem: Pain  Goal: Verbalizes/displays adequate comfort level or baseline comfort level  3/2/2023 0927 by Evelyne Sears LPN  Outcome: Progressing     Problem: Risk for Elopement  Goal: Patient will not exit the unit/facility without proper excort  3/2/2023 0927 by Evelyne Sears LPN  Outcome: Not Progressing  Flowsheets (Taken 3/2/2023 0800)  Nursing Interventions for Elopement Risk:   Assist with personal care needs such as toileting, eating, dressing, as needed to reduce the risk of wandering   Collaborate with family members/caregivers to mitigate the elopement risk   Communicate/escalate to nursing supervisor the risk of elopement   Communicate to physician the risk for elopement   Make sure patient has all necessary personal care items   Reduce environmental triggers  3/1/2023 2131 by Charlie More RN  Outcome: Progressing     Problem: Coping  Goal: Pt/Family able to verbalize concerns and demonstrate effective coping strategies  Description: INTERVENTIONS:  1. Assist patient/family to identify coping skills, available support systems and cultural and spiritual values  2.  Provide emotional support, including active listening and acknowledgement of concerns of patient and caregivers  3. Reduce environmental stimuli, as able  4. Instruct patient/family in relaxation techniques, as appropriate  5. Assess for spiritual pain/suffering and initiate Spiritual Care, Psychosocial Clinical Specialist consults as needed  3/2/2023 2701 by Amarilys Brumfield LPN  Outcome: Not Progressing  Flowsheets (Taken 3/2/2023 0800)  Patient/family able to verbalize anxieties, fears, and concerns, and demonstrate effective coping:   Provide emotional support, including active listening and acknowledgement of concerns of patient and caregivers   Reduce environmental stimuli, as able  3/1/2023 2131 by Jesús Melgar RN  Outcome: Progressing     Problem: Behavior  Goal: Pt/Family maintain appropriate behavior and adhere to behavioral management agreement, if implemented  Description: INTERVENTIONS:  1. Assess patient/family's coping skills and  non-compliant behavior (including use of illegal substances)  2. Notify security of behavior or suspected illegal substances which indicate the need for search of the family and/or belongings  3. Encourage verbalization of thoughts and concerns in a socially appropriate manner  4. Utilize positive, consistent limit setting strategies supporting safety of patient, staff and others  5. Encourage participation in the decision making process about the behavioral management agreement  6. If a visitor's behavior poses a threat to safety call refer to organization policy. 7. Initiate consult with , Psychosocial CNS, Spiritual Care as appropriate  3/2/2023 0927 by Amarilys Brumfield LPN  Outcome: Not Progressing  Flowsheets (Taken 3/2/2023 0800)  Patient/family maintains appropriate behavior and adheres to behavioral management agreement, if implemented:   Utilize positive, consistent limit setting strategies supporting safety of patient, staff and others   If a patients behavior jeopardizes the safety of the patient, staff, or others refer to organization policy.  If a visitors behavior poses a threat to safety refer to organization policy   Initiate consult with , Psychosocial Clinical Nurse Specialist, [facility] as appropriate  3/1/2023 2131 by Carmine Walker RN  Outcome: Progressing     Problem: Psychosis  Goal: Will report no hallucinations or delusions  Description: INTERVENTIONS:  1. Administer medication as  ordered  2. Assist with reality testing to support increasing orientation  3. Assess if patient's hallucinations or delusions are encouraging self harm or harm to others and intervene as appropriate  3/2/2023 0927 by Ivan James LPN  Outcome: Not Progressing  3/1/2023 2131 by Carmine Walker RN  Outcome: Not Progressing     Problem: Anxiety  Goal: Will report anxiety at manageable levels  Description: INTERVENTIONS:  1. Administer medication as ordered  2. Teach and rehearse alternative coping skills  3.  Provide emotional support with 1:1 interaction with staff  3/2/2023 0927 by Ivan James LPN  Outcome: Not Progressing  Flowsheets (Taken 3/2/2023 0800)  Will report anxiety at manageable levels:   Administer medication as ordered   Provide emotional support with 1:1 interaction with staff  3/1/2023 2131 by Carmine Walker RN  Outcome: Progressing

## 2023-03-02 NOTE — PROGRESS NOTES
Pt appeared to be having EPS: difficulty with speech, repetitive movements, increased facial expressions. Appeared more frustrated with speech than normal. Offered PRN benedryl but pt states \"benedryl will kill me\". One time order of Cogentin ordered by Frankie Orozco NP and this was given to pt.  Also given PRN motrin and trazodone by request.

## 2023-03-02 NOTE — PROGRESS NOTES
Notification of sepsis score of 7 popped up for pt. Pt is awake, MEWS is currently a 1, asymptomatic. Advised Sinai Abrams NP on call and asked about ordering sepsis blood tests and she advised will check with Dr. Juan Gomez later this morning. No new orders at this time.

## 2023-03-02 NOTE — PROGRESS NOTES
Pt attended group briefly and shared, mostly FOL again. He was responding quite a bit and disrupting group despite redirection, so brought back to small side. No behavior issues.

## 2023-03-02 NOTE — PROGRESS NOTES
Pt resting in bed since 2200. Still awake, RTIS, despite trazodone. Offered ativan for insomnia and pt declined at this time. Pt calm, no behavior issues.

## 2023-03-02 NOTE — BH NOTE
Has continued to sit in dinning area listening to music constantly talking aloud. Episodes of thought blocking. Hesitant speech. Laughing inappropriately.  RTIS

## 2023-03-02 NOTE — PROGRESS NOTES
Progress Note    Admit Date:  2/23/2023    Subjective:  Mr. Yosef Corral seen per RN. Patient noted to have increased HR, it was irregular. EKG done. Shows PVC's. Repeat vitals improved. Objective:   Patient Vitals for the past 4 hrs:   BP Temp Temp src Pulse Resp   03/02/23 1300 (!) 147/93 98 °F (36.7 °C) Oral 94 18          Intake/Output Summary (Last 24 hours) at 3/2/2023 1455  Last data filed at 3/2/2023 1044  Gross per 24 hour   Intake 300 ml   Output --   Net 300 ml       Physical Exam:  Gen: No distress. Alert. Eyes: PERRL. No sclera icterus. No conjunctival injection. ENT: No discharge. Pharynx clear. Neck: Trachea midline. Resp: No accessory muscle use. No crackles. No wheezes. No rhonchi. CV: Regular rate. Irregular rhythm. No murmur. No rub. No edema. Capillary Refill: Brisk,< 3 seconds   Peripheral Pulses: +2 palpable, equal bilaterally   Skin: Warm and dry. No nodule on exposed extremities. No rash on exposed extremities. Neuro: Awake. Psych: per psychiatry      Data:  CBC: No results for input(s): WBC, HGB, HCT, MCV, PLT in the last 72 hours. BMP:   Recent Labs     02/28/23  0903      K 3.9      CO2 26   BUN 13   CREATININE 0.8     LIVER PROFILE: No results for input(s): AST, ALT, LIPASE, BILIDIR, BILITOT, ALKPHOS in the last 72 hours. Invalid input(s): AMYLASE,  ALB  PT/INR: No results for input(s): PROTIME, INR in the last 72 hours. Assessment/Plan:  Irregular HR  -EKG Sinus rhythm, with PVC's  -HR improved on repeat vitals  -monitor for now. Diet: ADULT DIET;  Regular; Safety Tray; Safety Tray (Disposables)  Code Status: Full Code      Briellekelly Duarte [facility]  3/2/2023

## 2023-03-02 NOTE — GROUP NOTE
Group Therapy Note    Date: 3/2/2023    Group Start Time:   Group End Time:   Group Topic: 100 Mame Toney RN        Group Therapy Note    Attendees: 10         Patient's Goal:  ***    Notes:  ***    Status After Intervention:  {Status After Intervention:250518467}    Participation Level: {Participation Level:857641191}    Participation Quality: {[facility] PARTICIPATION QUALITY:891592263}      Speech:  {ED  CD_SPEECH:87155}      Thought Process/Content: {Thought Process/Content:784776469}      Affective Functioning: {Affective Functionin}      Mood: {Mood:212947897}      Level of consciousness:  {Level of consciousness:960257052}      Response to Learnin [facility] Responses to Learnin}      Endings: {[facility] Endings:71133}    Modes of Intervention: {[facility] Modes of Intervention:778775406}      Discipline Responsible: {[facility] Multidisciplinary:919033137}      Signature:  Bennie Lind RN

## 2023-03-02 NOTE — PLAN OF CARE
Problem: Risk for Elopement  Goal: Patient will not exit the unit/facility without proper excort  3/1/2023 2131 by Jesús Melgar RN  Outcome: Progressing  3/1/2023 0831 by Marquise Mueller LPN  Outcome: Progressing     Problem: Coping  Goal: Pt/Family able to verbalize concerns and demonstrate effective coping strategies  Description: INTERVENTIONS:  1. Assist patient/family to identify coping skills, available support systems and cultural and spiritual values  2. Provide emotional support, including active listening and acknowledgement of concerns of patient and caregivers  3. Reduce environmental stimuli, as able  4. Instruct patient/family in relaxation techniques, as appropriate  5. Assess for spiritual pain/suffering and initiate Spiritual Care, Psychosocial Clinical Specialist consults as needed  3/1/2023 2131 by Jesús Melgar RN  Outcome: Progressing  3/1/2023 0831 by Marquise Mueller LPN  Outcome: Progressing     Problem: Behavior  Goal: Pt/Family maintain appropriate behavior and adhere to behavioral management agreement, if implemented  Description: INTERVENTIONS:  1. Assess patient/family's coping skills and  non-compliant behavior (including use of illegal substances)  2. Notify security of behavior or suspected illegal substances which indicate the need for search of the family and/or belongings  3. Encourage verbalization of thoughts and concerns in a socially appropriate manner  4. Utilize positive, consistent limit setting strategies supporting safety of patient, staff and others  5. Encourage participation in the decision making process about the behavioral management agreement  6. If a visitor's behavior poses a threat to safety call refer to organization policy.   7. Initiate consult with , Psychosocial CNS, Spiritual Care as appropriate  3/1/2023 2131 by Jesús Melgar RN  Outcome: Progressing  3/1/2023 0831 by Marquise Mueller LPN  Outcome: Progressing     Problem: Involuntary Admit  Goal: Will cooperate with staff recommendations and doctor's orders and will demonstrate appropriate behavior  Description: INTERVENTIONS:  1. Treat underlying conditions and offer medication as ordered  2. Educate regarding involuntary admission procedures and rules  3. Contain excessive/inappropriate behavior per unit and hospital policies  0/0/9225 4610 by Lamont Rivera RN  Outcome: Progressing  3/1/2023 0831 by Rakesh Tran LPN  Outcome: Progressing     Problem: Anxiety  Goal: Will report anxiety at manageable levels  Description: INTERVENTIONS:  1. Administer medication as ordered  2. Teach and rehearse alternative coping skills  3. Provide emotional support with 1:1 interaction with staff  3/1/2023 2131 by Lamont Rivera RN  Outcome: Progressing  3/1/2023 0831 by Rakesh Tran LPN  Outcome: Progressing     Problem: Pain  Goal: Verbalizes/displays adequate comfort level or baseline comfort level  3/1/2023 2131 by Lamont Rivera RN  Outcome: Progressing  3/1/2023 0831 by Rakesh Tran LPN  Outcome: Progressing     Problem: Psychosis  Goal: Will report no hallucinations or delusions  Description: INTERVENTIONS:  1. Administer medication as  ordered  2. Assist with reality testing to support increasing orientation  3. Assess if patient's hallucinations or delusions are encouraging self harm or harm to others and intervene as appropriate  3/1/2023 2131 by Lamont Rivera RN  Outcome: Not Progressing  3/1/2023 0831 by Rakesh Tran LPN  Outcome: Progressing   Pt continues to have FOL and tangential speech. RTIS continues. Attended evening group and RTIS required cutting his attendance short, but he did share and was mostly appropriate. No behavorial issues. Did appear to be having EPS so one time order of cogentin was given as pt refused benedryl. Calm and cooperative throughout most of shift, but did appear upset when offered ativan for insomnia and declared ativan to be a \"death threat\".

## 2023-03-03 PROCEDURE — 99233 SBSQ HOSP IP/OBS HIGH 50: CPT | Performed by: PSYCHIATRY & NEUROLOGY

## 2023-03-03 PROCEDURE — 6370000000 HC RX 637 (ALT 250 FOR IP): Performed by: PSYCHIATRY & NEUROLOGY

## 2023-03-03 PROCEDURE — 6370000000 HC RX 637 (ALT 250 FOR IP)

## 2023-03-03 PROCEDURE — 1240000000 HC EMOTIONAL WELLNESS R&B

## 2023-03-03 RX ORDER — ASPIRIN 81 MG/1
324 TABLET, CHEWABLE ORAL ONCE
Status: COMPLETED | OUTPATIENT
Start: 2023-03-03 | End: 2023-03-03

## 2023-03-03 RX ORDER — ASPIRIN 81 MG/1
325 TABLET, CHEWABLE ORAL ONCE
Status: DISCONTINUED | OUTPATIENT
Start: 2023-03-03 | End: 2023-03-03

## 2023-03-03 RX ADMIN — ATORVASTATIN CALCIUM 10 MG: 10 TABLET, FILM COATED ORAL at 07:39

## 2023-03-03 RX ADMIN — IBUPROFEN 400 MG: 400 TABLET, FILM COATED ORAL at 22:07

## 2023-03-03 RX ADMIN — CHLORTHALIDONE 12.5 MG: 25 TABLET ORAL at 07:39

## 2023-03-03 RX ADMIN — TRAZODONE HYDROCHLORIDE 50 MG: 50 TABLET ORAL at 22:00

## 2023-03-03 RX ADMIN — CHLORPROMAZINE HYDROCHLORIDE 50 MG: 25 TABLET, FILM COATED ORAL at 22:00

## 2023-03-03 RX ADMIN — ASPIRIN 324 MG: 81 TABLET, CHEWABLE ORAL at 13:13

## 2023-03-03 RX ADMIN — FLUTICASONE PROPIONATE 1 SPRAY: 50 SPRAY, METERED NASAL at 07:39

## 2023-03-03 RX ADMIN — DOCUSATE SODIUM 200 MG: 100 CAPSULE, LIQUID FILLED ORAL at 22:19

## 2023-03-03 ASSESSMENT — PAIN DESCRIPTION - DESCRIPTORS: DESCRIPTORS: ACHING

## 2023-03-03 ASSESSMENT — PAIN DESCRIPTION - LOCATION
LOCATION: GENERALIZED
LOCATION: HEAD

## 2023-03-03 ASSESSMENT — PAIN - FUNCTIONAL ASSESSMENT: PAIN_FUNCTIONAL_ASSESSMENT: ACTIVITIES ARE NOT PREVENTED

## 2023-03-03 ASSESSMENT — PAIN SCALES - WONG BAKER: WONGBAKER_NUMERICALRESPONSE: 0

## 2023-03-03 ASSESSMENT — PAIN SCALES - GENERAL
PAINLEVEL_OUTOF10: 3
PAINLEVEL_OUTOF10: 4

## 2023-03-03 NOTE — PLAN OF CARE
Problem: Risk for Elopement  Goal: Patient will not exit the unit/facility without proper excort  3/2/2023 2235 by Roosevelt Ruiz RN  Outcome: Progressing  3/2/2023 0927 by Mariel Tang LPN  Outcome: Not Progressing  Flowsheets (Taken 3/2/2023 0800)  Nursing Interventions for Elopement Risk:   Assist with personal care needs such as toileting, eating, dressing, as needed to reduce the risk of wandering   Collaborate with family members/caregivers to mitigate the elopement risk   Communicate/escalate to nursing supervisor the risk of elopement   Communicate to physician the risk for elopement   Make sure patient has all necessary personal care items   Reduce environmental triggers     Problem: Coping  Goal: Pt/Family able to verbalize concerns and demonstrate effective coping strategies  Description: INTERVENTIONS:  1. Assist patient/family to identify coping skills, available support systems and cultural and spiritual values  2. Provide emotional support, including active listening and acknowledgement of concerns of patient and caregivers  3. Reduce environmental stimuli, as able  4. Instruct patient/family in relaxation techniques, as appropriate  5. Assess for spiritual pain/suffering and initiate Spiritual Care, Psychosocial Clinical Specialist consults as needed  3/2/2023 2235 by Roosevelt Ruiz RN  Outcome: Not Progressing  3/2/2023 0927 by Mariel Tang LPN  Outcome: Not Progressing  Flowsheets (Taken 3/2/2023 0800)  Patient/family able to verbalize anxieties, fears, and concerns, and demonstrate effective coping:   Provide emotional support, including active listening and acknowledgement of concerns of patient and caregivers   Reduce environmental stimuli, as able     Problem: Behavior  Goal: Pt/Family maintain appropriate behavior and adhere to behavioral management agreement, if implemented  Description: INTERVENTIONS:  1.  Assess patient/family's coping skills and  non-compliant behavior (including use of illegal substances)  2. Notify security of behavior or suspected illegal substances which indicate the need for search of the family and/or belongings  3. Encourage verbalization of thoughts and concerns in a socially appropriate manner  4. Utilize positive, consistent limit setting strategies supporting safety of patient, staff and others  5. Encourage participation in the decision making process about the behavioral management agreement  6. If a visitor's behavior poses a threat to safety call refer to organization policy. 7. Initiate consult with , Psychosocial CNS, Spiritual Care as appropriate  3/2/2023 2235 by Delaney Osgood, RN  Outcome: Not Progressing  3/2/2023 0927 by Brice Lew LPN  Outcome: Not Progressing  Flowsheets (Taken 3/2/2023 0800)  Patient/family maintains appropriate behavior and adheres to behavioral management agreement, if implemented:   Utilize positive, consistent limit setting strategies supporting safety of patient, staff and others   If a patients behavior jeopardizes the safety of the patient, staff, or others refer to organization policy. If a visitors behavior poses a threat to safety refer to organization policy   Initiate consult with , Psychosocial Clinical Nurse Specialist, Spiritual Care as appropriate     Problem: Psychosis  Goal: Will report no hallucinations or delusions  Description: INTERVENTIONS:  1. Administer medication as  ordered  2. Assist with reality testing to support increasing orientation  3. Assess if patient's hallucinations or delusions are encouraging self harm or harm to others and intervene as appropriate  3/2/2023 2235 by Delaney Osgood, RN  Outcome: Not Progressing  3/2/2023 0927 by Brice Lew LPN  Outcome: Not Progressing     Pt continues to be extremely delusional and is almost constant responding to internal stimulation.  Patient sat in chair at nurses station first half of shift talking to self out loud. Frequent periods of laughing inappropriately Pt speech is pressured, thoughts are tangential, flight of ideas, paranoid, and loose association. 1:1 line of sight continues for patient safety. Pt face was shaved by writer per pt request. No evening medications scheduled for patient however pt received prn colace/thorazine/ativan/trazodone/IBU at 2200 which was effective. Multiple evening snacks consumed during shift. No complaints voiced. Denies suicidal ideation, homicidal ideation, visual hallucinations.

## 2023-03-04 LAB
AMPHETAMINE SCREEN, URINE: NORMAL
BARBITURATE SCREEN URINE: NORMAL
BENZODIAZEPINE SCREEN, URINE: NORMAL
BILIRUBIN URINE: NEGATIVE
BLOOD, URINE: NEGATIVE
CANNABINOID SCREEN URINE: NORMAL
CLARITY: CLEAR
COCAINE METABOLITE SCREEN URINE: NORMAL
COLOR: YELLOW
FENTANYL SCREEN, URINE: NORMAL
GLUCOSE URINE: NEGATIVE MG/DL
KETONES, URINE: NEGATIVE MG/DL
LEUKOCYTE ESTERASE, URINE: NEGATIVE
Lab: NORMAL
METHADONE SCREEN, URINE: NORMAL
MICROSCOPIC EXAMINATION: NORMAL
NITRITE, URINE: NEGATIVE
OPIATE SCREEN URINE: NORMAL
OXYCODONE URINE: NORMAL
PH UA: 6
PH UA: 6 (ref 5–8)
PHENCYCLIDINE SCREEN URINE: NORMAL
PROTEIN UA: NEGATIVE MG/DL
SPECIFIC GRAVITY UA: 1.02 (ref 1–1.03)
URINE TYPE: NORMAL
UROBILINOGEN, URINE: 0.2 E.U./DL

## 2023-03-04 PROCEDURE — 6370000000 HC RX 637 (ALT 250 FOR IP): Performed by: PSYCHIATRY & NEUROLOGY

## 2023-03-04 PROCEDURE — 6370000000 HC RX 637 (ALT 250 FOR IP)

## 2023-03-04 PROCEDURE — 81003 URINALYSIS AUTO W/O SCOPE: CPT

## 2023-03-04 PROCEDURE — 1240000000 HC EMOTIONAL WELLNESS R&B

## 2023-03-04 PROCEDURE — 80307 DRUG TEST PRSMV CHEM ANLYZR: CPT

## 2023-03-04 PROCEDURE — 99233 SBSQ HOSP IP/OBS HIGH 50: CPT | Performed by: PSYCHIATRY & NEUROLOGY

## 2023-03-04 RX ORDER — CHLORPROMAZINE HYDROCHLORIDE 100 MG/1
200 TABLET, FILM COATED ORAL NIGHTLY
Status: DISCONTINUED | OUTPATIENT
Start: 2023-03-04 | End: 2023-03-06

## 2023-03-04 RX ORDER — CHLORPROMAZINE HYDROCHLORIDE 100 MG/1
100 TABLET, FILM COATED ORAL EVERY 6 HOURS PRN
Status: DISCONTINUED | OUTPATIENT
Start: 2023-03-04 | End: 2023-03-08

## 2023-03-04 RX ORDER — CHLORPROMAZINE HYDROCHLORIDE 25 MG/ML
100 INJECTION INTRAMUSCULAR EVERY 6 HOURS PRN
Status: DISCONTINUED | OUTPATIENT
Start: 2023-03-04 | End: 2023-03-08

## 2023-03-04 RX ORDER — BENZTROPINE MESYLATE 1 MG/1
1 TABLET ORAL 2 TIMES DAILY
Status: DISCONTINUED | OUTPATIENT
Start: 2023-03-04 | End: 2023-03-14

## 2023-03-04 RX ADMIN — IBUPROFEN 400 MG: 400 TABLET, FILM COATED ORAL at 20:16

## 2023-03-04 RX ADMIN — CHLORPROMAZINE HYDROCHLORIDE 200 MG: 100 TABLET, SUGAR COATED ORAL at 19:46

## 2023-03-04 RX ADMIN — CHLORTHALIDONE 12.5 MG: 25 TABLET ORAL at 07:41

## 2023-03-04 RX ADMIN — ACETAMINOPHEN 650 MG: 325 TABLET ORAL at 20:16

## 2023-03-04 RX ADMIN — ACETAMINOPHEN 650 MG: 325 TABLET ORAL at 01:06

## 2023-03-04 RX ADMIN — TRAZODONE HYDROCHLORIDE 50 MG: 50 TABLET ORAL at 19:46

## 2023-03-04 RX ADMIN — ATORVASTATIN CALCIUM 10 MG: 10 TABLET, FILM COATED ORAL at 07:41

## 2023-03-04 RX ADMIN — BENZTROPINE MESYLATE 1 MG: 1 TABLET ORAL at 19:46

## 2023-03-04 ASSESSMENT — PAIN SCALES - GENERAL
PAINLEVEL_OUTOF10: 3
PAINLEVEL_OUTOF10: 3

## 2023-03-04 ASSESSMENT — PAIN DESCRIPTION - ORIENTATION: ORIENTATION: LOWER

## 2023-03-04 ASSESSMENT — PAIN - FUNCTIONAL ASSESSMENT
PAIN_FUNCTIONAL_ASSESSMENT: ACTIVITIES ARE NOT PREVENTED
PAIN_FUNCTIONAL_ASSESSMENT: PREVENTS OR INTERFERES SOME ACTIVE ACTIVITIES AND ADLS

## 2023-03-04 ASSESSMENT — PAIN DESCRIPTION - LOCATION
LOCATION: GENERALIZED
LOCATION: BACK

## 2023-03-04 ASSESSMENT — PAIN DESCRIPTION - DESCRIPTORS: DESCRIPTORS: ACHING

## 2023-03-04 NOTE — PROGRESS NOTES
Department of Psychiatry  Progress Note    Patient's chart was reviewed. Discussed with treatment team. Met with patient. SUBJECTIVE:      Seems worse the last 24 hrs: more disorganized, delusional, and impaired. UA clear. Has received prn thorazine the last few nights and agreed to take an increased scheduled dose starting tonight. He is adamant about not taking any other medicines including cogentin. ROS:   Patient has new complaints: no  Sleeping adequately:  no  Appetite adequate: yes  Engaged in programming: no    OBJECTIVE:  VITALS:  /62   Pulse 92   Temp 97.9 °F (36.6 °C) (Oral)   Resp 18   Ht 6' 1\" (1.854 m)   Wt 240 lb (108.9 kg)   SpO2 97%   BMI 31.66 kg/m²     Mental Status Examination:    Appearance: fair grooming and hygiene  Behavior/Attitude toward examiner:  suspicious  Speech: elevated   Mood:  \"I'm out of here\"  Affect:  elevated    Thought processes:  disorganized  Thought Content: no SI, no HI, + delusional   Perceptions: +RTIS  Attention: limited   Abstraction: impaired  Cognition:  limited  Insight: impaired   Judgment: impaired    Medication:  Scheduled:   fluticasone  1 spray Each Nostril Daily    atorvastatin  10 mg Oral Daily    chlorthalidone  12.5 mg Oral Daily    potassium chloride  40 mEq Oral Once        PRN:  LORazepam **OR** LORazepam, chlorproMAZINE **OR** chlorproMAZINE, ibuprofen, acetaminophen, magnesium hydroxide, nicotine polacrilex, aluminum & magnesium hydroxide-simethicone, diphenhydrAMINE, traZODone, docusate sodium     ASSESSMENT AND PLAN:  57 y.o. who presented  to [facility] on a SOB after police were called by brother regarding patient erratic behavior at home. Principal Problem:    Schizophrenia ([address]  Active Problems:    Tachycardia    Essential hypertension    Hyperlipidemia    Tardive dyskinesia  Resolved Problems:    Hypokalemia     Plan: 1. Admitted to psychiatry for stabilization and treatment.     2.On admission, ordered q15min checks for safety, programming, and prn medication for anxiety, agitation, and insomnia. 2/25/2023 - Aristada Initio 675mg and Aristada 1064mb given without incident. 2/27/2023 - making gains. 2/28/2023 - continues to make gains. Discontinue constant observation. 3/1/2023 - remains severely symptomatic. 3/2/2023 - Last admission required about 15 days before achieving enough stability to go home. 3/3/2023 - consider first generation antipsychotic as adjunctive treatment. 3/4/2023 - schedule thorazine 200mg PO QHS. Increase prn doses. 3. Hospitalist consult for admission. #Hypokalemia - resolved on repeat.   -refused replacement      #HTN  -resume HCTZ at request of patient      #HLD   -on statin     Irregular HR  -EKG Sinus rhythm, with PVC's  -HR improved on repeat vitals  -monitor for now. 4. Voluntary by guardian. ELOS 10-15 days. Total time with patient was 50 minutes and more than 50 % of that time was spent counseling the patient on their symptoms, treatment, and expected goals.      Adolfo Buckley MD

## 2023-03-04 NOTE — PROGRESS NOTES
Pt just got out of bed, stood in his doorway, and proceeded to urinate into a styrofoam drinking cup. This writer asked him to stop and pt stated \"You need a sample! \" Writer advised we already had a urine sample and reminded him that his UA came back clear. Pt then set the cup down by his door, stating that the writer should take it for a sample and he went to finish urinating in his bathroom. Writer disposed of urine and cup. Pt now back in bed. Still RTIS, but now agitated and angrily talking about how he has not been discharged. Began screaming, redirected.

## 2023-03-04 NOTE — PLAN OF CARE
Problem: Risk for Elopement  Goal: Patient will not exit the unit/facility without proper excort  Outcome: Progressing     Problem: Coping  Goal: Pt/Family able to verbalize concerns and demonstrate effective coping strategies  Description: INTERVENTIONS:  1. Assist patient/family to identify coping skills, available support systems and cultural and spiritual values  2. Provide emotional support, including active listening and acknowledgement of concerns of patient and caregivers  3. Reduce environmental stimuli, as able  4. Instruct patient/family in relaxation techniques, as appropriate  5. Assess for spiritual pain/suffering and initiate Spiritual Care, Psychosocial Clinical Specialist consults as needed  Outcome: Progressing     Problem: Behavior  Goal: Pt/Family maintain appropriate behavior and adhere to behavioral management agreement, if implemented  Description: INTERVENTIONS:  1. Assess patient/family's coping skills and  non-compliant behavior (including use of illegal substances)  2. Notify security of behavior or suspected illegal substances which indicate the need for search of the family and/or belongings  3. Encourage verbalization of thoughts and concerns in a socially appropriate manner  4. Utilize positive, consistent limit setting strategies supporting safety of patient, staff and others  5. Encourage participation in the decision making process about the behavioral management agreement  6. If a visitor's behavior poses a threat to safety call refer to organization policy.   7. Initiate consult with , Psychosocial CNS, Spiritual Care as appropriate  Outcome: Progressing

## 2023-03-04 NOTE — PROGRESS NOTES
Writer spoke with pt about what PRN medications he would like this evening. Pt became angry, screaming that he wasn't staying another night. Reminded again that provider must sign off on him leaving. Pt continued to scream off on on, offered something for anxiety and pt yelled again saying that he didn't need it. Advised that his behavior was out of line. Pt quieted down, then continued RTIS, demanded writer \"come right now and look at my pee\". Pt demanding to leave at \"midnight\", \"8pm\", and \"6pm\". States that offers of medications are \"threats\". Yelling \"I'm a good man! I'm half deaf! I'm not yelling! \" Pt returned to room and then returned to desk, screaming racial slurs. Advised his behavior is inappropriate. Attempts to redirect are varying in degree of effectiveness. Pt now sitting back in chair at TS, speaking more quietly.

## 2023-03-04 NOTE — PROGRESS NOTES
Pt stated he was going to shower and took two towels from the desk. Went into room, turned on the water, started cursing, and returned to throw the towels over the desk on top of the laundry bin. Said he was no longer going to take a shower. Then removed his shirt. Writer told him he had to remain dressed if he was out of his room. Pt returned to room, put on shirt, then walked back to desk, and removed his paper pants and threw them over the desk. Advised he had to keep pants on. Pt kept insisting that he was a girl and writer was a girl and it didn't matter. Advised that it did indeed and that he had to remain fully dressed if he wanted to be outside of his room. Pt returned to put on pants. Continues to be inappropriate off and on, pulling down pants to show writer his underwear.

## 2023-03-04 NOTE — PROGRESS NOTES
Order obtained from Dr. Tiffany Marie for urinalysis to rule out possible UTI. Pt is very focused on staff looking at his urine but he is unable to describe symptoms.

## 2023-03-04 NOTE — PROGRESS NOTES
Pt went to rest room and tore soap dispenser off the wall. He came out to nurses station laughing and said he was wasted on peyote. He said \"go tell Bipin Buenrostro. \"

## 2023-03-04 NOTE — PLAN OF CARE
Problem: Risk for Elopement  Goal: Patient will not exit the unit/facility without proper excort  3/3/2023 2003 by Keary Seip, RN  Outcome: Progressing  3/3/2023 0903 by Fede Jeffers LPN  Outcome: Progressing     Problem: Behavior  Goal: Pt/Family maintain appropriate behavior and adhere to behavioral management agreement, if implemented  Description: INTERVENTIONS:  1. Assess patient/family's coping skills and  non-compliant behavior (including use of illegal substances)  2. Notify security of behavior or suspected illegal substances which indicate the need for search of the family and/or belongings  3. Encourage verbalization of thoughts and concerns in a socially appropriate manner  4. Utilize positive, consistent limit setting strategies supporting safety of patient, staff and others  5. Encourage participation in the decision making process about the behavioral management agreement  6. If a visitor's behavior poses a threat to safety call refer to organization policy. 7. Initiate consult with , Psychosocial CNS, Spiritual Care as appropriate  3/3/2023 2003 by Keary Seip, RN  Outcome: Progressing  3/3/2023 0903 by Fede Jeffers LPN  Outcome: Progressing     Problem: Involuntary Admit  Goal: Will cooperate with staff recommendations and doctor's orders and will demonstrate appropriate behavior  Description: INTERVENTIONS:  1. Treat underlying conditions and offer medication as ordered  2. Educate regarding involuntary admission procedures and rules  3. Contain excessive/inappropriate behavior per unit and hospital policies  7/7/8095 4412 by Keary Seip, RN  Outcome: Progressing  3/3/2023 0903 by Fede Jeffers LPN  Outcome: Progressing     Problem: Anxiety  Goal: Will report anxiety at manageable levels  Description: INTERVENTIONS:  1. Administer medication as ordered  2. Teach and rehearse alternative coping skills  3.  Provide emotional support with 1:1 interaction with staff  3/3/2023 2003 by Alaina Jansky, RN  Outcome: Progressing  3/3/2023 0903 by Jenifer Chowdhury LPN  Outcome: Progressing     Problem: Pain  Goal: Verbalizes/displays adequate comfort level or baseline comfort level  3/3/2023 2003 by Alaina Johnson RN  Outcome: Progressing  3/3/2023 0903 by Jenifer Chowdhury LPN  Outcome: Progressing     Problem: Coping  Goal: Pt/Family able to verbalize concerns and demonstrate effective coping strategies  Description: INTERVENTIONS:  1. Assist patient/family to identify coping skills, available support systems and cultural and spiritual values  2. Provide emotional support, including active listening and acknowledgement of concerns of patient and caregivers  3. Reduce environmental stimuli, as able  4. Instruct patient/family in relaxation techniques, as appropriate  5. Assess for spiritual pain/suffering and initiate Spiritual Care, Psychosocial Clinical Specialist consults as needed  3/3/2023 2003 by Alaina Johnson RN  Outcome: Not Progressing  3/3/2023 0903 by Jenifer Chowdhury LPN  Outcome: Progressing     Problem: Psychosis  Goal: Will report no hallucinations or delusions  Description: INTERVENTIONS:  1. Administer medication as  ordered  2. Assist with reality testing to support increasing orientation  3. Assess if patient's hallucinations or delusions are encouraging self harm or harm to others and intervene as appropriate  3/3/2023 2003 by Alaina Johnson RN  Outcome: Not Progressing  3/3/2023 0903 by Jenifer Chowdhury LPN  Outcome: Progressing   Pt maintains constant RTIS, with FOL and tangential speech. Not responding to writer's questions as directly or clearly as earlier in the week. VS WNL, no signs of physical distress or discomfort. Perseverating on going home tonight at midnight. Reorientation does not help pt. Screaming off and on demanding to leave. Demanded twice that writer \"smell his pee\". Reminded again that provider must sign off before he can leave.  Pt harder to redirect and more agitated this evening.

## 2023-03-04 NOTE — PROGRESS NOTES
Pt was able to spend some time on the big side with peers, coloring and socializing appropriately. After about an hour, pt began calling staff \"sluts\". He willingly came back to the small side. He ate lunch and is currently resting in bed, RTIS, but calm. Will continue to monitor for safety and comfort.

## 2023-03-04 NOTE — PROGRESS NOTES
Pt up ad terri this morning. Presents as hyper-verbal.  Pt reports he is a [de-identified] year old girl and demands staff to stop being perverts. He has asked numerous staff numerous times if \"they would like to see his butt\". He has continued to use vulgar language. Much redirection provided with little success. Will continue to monitor for safety and comfort.

## 2023-03-04 NOTE — PROGRESS NOTES
Pt given PRN trazodone and thorazine by request. Given PRN motrin by request for generalized pain, is not able to score pain or describe it otherwise.

## 2023-03-04 NOTE — PROGRESS NOTES
Pt asked again about leaving \"What's the hold up? \" Reminded again that he is not being discharged tonight. Pt began screaming, threw his drinks, threw his food, flipped over his tray table and his chair. Able to redirect pt to stop screaming briefly and he announced that he had to urinate. Went with pt to collect urine sample for urinalysis that was ordered earlier. Pt complied with this and provided sample. Pt remained agitated, and chair and table were removed from the floor and he was directed to remain in his room and try to rest. Pt began arguing and yelling that he had already slept, \"I day dreamed\", and that he didn't need any more. Pt offered ativan again for anxiety/agitation as well as sleep and he screamed again \"That's a death threat! You should be dead already! \" Writer explained why ativan was being offered and pt stated \"I'm calm! I'm calm! Take my blood pressure! \" and flung his arm out. Advised pt he was causing a disruption while people were trying to sleep and that he needed to stop yelling and screaming. Reassured that he is not here \"forever\" as he is claiming but that his behavior tonight indicates that he is not ready for discharge. Pt remains in his bed at this time, line of sight still in place, constant RTIS.

## 2023-03-04 NOTE — PROGRESS NOTES
Department of Psychiatry  Progress Note    Patient's chart was reviewed. Discussed with treatment team. Met with patient. SUBJECTIVE:      Remains severely psychotic - constant RTIS and delusional content. \"I'm in the HANS! \"    Unable to participate in groups or function in the common milieu. ROS:   Patient has new complaints: no  Sleeping adequately:  no  Appetite adequate: no  Engaged in programming: no    OBJECTIVE:  VITALS:  /71   Pulse 86   Temp 98.3 °F (36.8 °C) (Oral)   Resp 18   Ht 6' 1\" (1.854 m)   Wt 240 lb (108.9 kg)   SpO2 94%   BMI 31.66 kg/m²     Mental Status Examination:    Appearance: fair grooming and hygiene  Behavior/Attitude toward examiner:  suspicious  Speech: elevated   Mood:  \"fine\"  Affect:  elevated    Thought processes:  disorganized  Thought Content: no SI, no HI, + delusional   Perceptions: +RTIS  Attention: limited   Abstraction: impaired  Cognition:  limited  Insight: impaired   Judgment: impaired    Medication:  Scheduled:   fluticasone  1 spray Each Nostril Daily    atorvastatin  10 mg Oral Daily    chlorthalidone  12.5 mg Oral Daily    potassium chloride  40 mEq Oral Once        PRN:  LORazepam **OR** LORazepam, chlorproMAZINE **OR** chlorproMAZINE, ibuprofen, acetaminophen, magnesium hydroxide, nicotine polacrilex, aluminum & magnesium hydroxide-simethicone, diphenhydrAMINE, traZODone, docusate sodium     ASSESSMENT AND PLAN:  57 y.o. who presented  to [facility] on a SOB after police were called by brother regarding patient erratic behavior at home. Principal Problem:    Schizophrenia ([address]  Active Problems:    Tachycardia    Essential hypertension    Hyperlipidemia    Tardive dyskinesia  Resolved Problems:    Hypokalemia     Plan: 1. Admitted to psychiatry for stabilization and treatment. 2.On admission, ordered q15min checks for safety, programming, and prn medication for anxiety, agitation, and insomnia.      2/25/2023 Elvi Grief Initio 675mg and Aristada 1064mb given without incident. 2/27/2023 - making gains. 2/28/2023 - continues to make gains. Discontinue constant observation. 3/1/2023 - remains severely symptomatic. 3/2/2023 - Last admission required about 15 days before achieving enough stability to go home. 3/3/2023 - consider first generation antipsychotic as adjunctive treatment. 3. Hospitalist consult for admission. #Hypokalemia - resolved on repeat.   -refused replacement      #HTN  -resume HCTZ at request of patient      #HLD   -on statin     4. Voluntary by guardian. ELOS 10-15 days. Total time with patient was 50 minutes and more than 50 % of that time was spent counseling the patient on their symptoms, treatment, and expected goals.      Adolfo Larry MD

## 2023-03-04 NOTE — PROGRESS NOTES
Pt asked for colace, insisting on 6 of them at once. Advised that the dose is 200mg. Pt took this after coaxing. Asked again for cogentin and advised we are not ordering that at this time per Dr. Henry Clark. Threw a screaming fit, throwing chips on the floor and cursing writer. Insisting that he is leaving by midnight tonight. Redirected and pt finally stopped screaming. Pt continues to remain in chair in front of desk, RTIS, talking about being in \"the war\".

## 2023-03-05 PROCEDURE — 6370000000 HC RX 637 (ALT 250 FOR IP)

## 2023-03-05 PROCEDURE — 6370000000 HC RX 637 (ALT 250 FOR IP): Performed by: PSYCHIATRY & NEUROLOGY

## 2023-03-05 PROCEDURE — 1240000000 HC EMOTIONAL WELLNESS R&B

## 2023-03-05 RX ADMIN — BENZTROPINE MESYLATE 1 MG: 1 TABLET ORAL at 20:11

## 2023-03-05 RX ADMIN — CHLORTHALIDONE 12.5 MG: 25 TABLET ORAL at 08:32

## 2023-03-05 RX ADMIN — CHLORPROMAZINE HYDROCHLORIDE 200 MG: 100 TABLET, SUGAR COATED ORAL at 20:11

## 2023-03-05 RX ADMIN — TRAZODONE HYDROCHLORIDE 50 MG: 50 TABLET ORAL at 20:15

## 2023-03-05 RX ADMIN — IBUPROFEN 400 MG: 400 TABLET, FILM COATED ORAL at 08:31

## 2023-03-05 RX ADMIN — ATORVASTATIN CALCIUM 10 MG: 10 TABLET, FILM COATED ORAL at 08:32

## 2023-03-05 RX ADMIN — DOCUSATE SODIUM 200 MG: 100 CAPSULE, LIQUID FILLED ORAL at 08:31

## 2023-03-05 RX ADMIN — BENZTROPINE MESYLATE 1 MG: 1 TABLET ORAL at 08:32

## 2023-03-05 RX ADMIN — LORAZEPAM 2 MG: 2 TABLET ORAL at 08:53

## 2023-03-05 NOTE — PLAN OF CARE
Patient was given tylenol 650 mg po & motrin 400 mg po, per request for complaint of lower back pain. Patient also requested a baby aspirin & was instructed that his doctor, had not ordered it.  Cat Ridley R.N.

## 2023-03-05 NOTE — GROUP NOTE
Group Therapy Note    Date: 3/5/2023    Group Start Time: 1630  Group End Time: 1325  Group Topic:  [address], [place]        Group Therapy Note    Attendees: 7       Patient's Goal:  Create DBT house to aid pts in understanding emotional regulation. Notes: Pt completed DBT house with assistance from facilitator. Pt had minimal disruptions and was redirectable. Pt stayed for the full duration of group. Status After Intervention:  Improved    Participation Level:  Active Listener and Interactive    Participation Quality: Appropriate, Attentive, Sharing, and Inappropriate      Speech:  loud, soft      Thought Process/Content: linear      Affective Functioning: Flat      Mood: euthymic      Level of consciousness:  Alert, Oriented x4, and Attentive      Response to Learning: Able to verbalize current knowledge/experience, Able to verbalize/acknowledge new learning, and Progressing to goal      Endings: None Reported    Modes of Intervention: Education and Activity      Discipline Responsible: /Counselor      Signature:  RACHELE Kelly

## 2023-03-05 NOTE — PLAN OF CARE
Patient awake earlier in the shift & was mostly resting in bed. Patient did ambulate to the bathroom once. Patient was pleasant & verbal doing 1:1. Patient's speech was difficult to understand. Patient continues to verbalize that he was kidnapped by the police & brought to the hospital.\"I don't know why I'm still here. I'll never go home. \"Patient was giving encouragement. That he would be discharged soon. Patient denied having hallucinations, but was observed talking to himself at intervals. Patient declined of a snack & something to drink. Patient reported that he was awake all last night due to not getting his cogentin. Patient was medication compliant & appeared asleep at 21:15. Jorgito Ridley R.N.  Problem: Coping  Goal: Pt/Family able to verbalize concerns and demonstrate effective coping strategies  Description: INTERVENTIONS:  1. Assist patient/family to identify coping skills, available support systems and cultural and spiritual values  2. Provide emotional support, including active listening and acknowledgement of concerns of patient and caregivers  3. Reduce environmental stimuli, as able  4. Instruct patient/family in relaxation techniques, as appropriate  5. Assess for spiritual pain/suffering and initiate Spiritual Care, Psychosocial Clinical Specialist consults as needed  3/4/2023 1500 by Collette Marseilles Marlin Comber), RN  Outcome: Progressing     Problem: Behavior  Goal: Pt/Family maintain appropriate behavior and adhere to behavioral management agreement, if implemented  Description: INTERVENTIONS:  1. Assess patient/family's coping skills and  non-compliant behavior (including use of illegal substances)  2. Notify security of behavior or suspected illegal substances which indicate the need for search of the family and/or belongings  3. Encourage verbalization of thoughts and concerns in a socially appropriate manner  4.  Utilize positive, consistent limit setting strategies supporting safety of patient, staff and others  5. Encourage participation in the decision making process about the behavioral management agreement  6. If a visitor's behavior poses a threat to safety call refer to organization policy. 7. Initiate consult with , Psychosocial CNS, Spiritual Care as appropriate  3/4/2023 1500 by Deshawn Garcia RN  Outcome: Progressing     Problem: Psychosis  Goal: Will report no hallucinations or delusions  Description: INTERVENTIONS:  1. Administer medication as  ordered  2. Assist with reality testing to support increasing orientation  3. Assess if patient's hallucinations or delusions are encouraging self harm or harm to others and intervene as appropriate  Outcome: Progressing     Problem: Anxiety  Goal: Will report anxiety at manageable levels  Description: INTERVENTIONS:  1. Administer medication as ordered  2. Teach and rehearse alternative coping skills  3.  Provide emotional support with 1:1 interaction with staff  Outcome: Progressing

## 2023-03-05 NOTE — PROGRESS NOTES
Pt spent afternoon in dining room on large side. Attended group and interacted appropriately with minimal redirection. He was watching a basketball game on TV and began screaming at the TV and getting agitated. Pt willingly came back to the small side, complained about wanting to be discharged. Much emotional support provided. Pt is currently resting with eyes closed in his room. Will continue to monitor.

## 2023-03-05 NOTE — PROGRESS NOTES
Pt spent much of the morning yelling delusional and vulgar statements.  When he became frustrated with his juice container, he began screaming.  When this writer tried to ask what was wrong, he got more agitated stating he was frustrated. Pt appeared to be overwhelmed with the delusion and hallucinations.      Pt requested Ibuprofen and Tylenol PRN for a headache.  He also received Ativan 2mg PO PRN.     Ativan was effective.  He has spent much of the day on the big side, interacting with peers.  He spent a couple of hours in the library sorting through books and listening to music.  He is currently sitting in dining room watching television and eating a snack.      Will continue to monitor for safety and comfort.

## 2023-03-05 NOTE — PROGRESS NOTES
Patient  awake since 06:15. Patient showered. Patient was talking to himself since being awake. Patient with rapid pressured speech & his speech has been difficult to understand at times. Patient said to writer,\"Go have sex with yourself. Go play with yourself. \" Patient declined offer at [employer]. \"I only take it at night. Patient was cooperative, with allowing writer to check his vitals.  Dolores Ridley R.N.

## 2023-03-06 PROCEDURE — 6360000002 HC RX W HCPCS: Performed by: PSYCHIATRY & NEUROLOGY

## 2023-03-06 PROCEDURE — 99233 SBSQ HOSP IP/OBS HIGH 50: CPT | Performed by: PSYCHIATRY & NEUROLOGY

## 2023-03-06 PROCEDURE — 6370000000 HC RX 637 (ALT 250 FOR IP): Performed by: PSYCHIATRY & NEUROLOGY

## 2023-03-06 PROCEDURE — 2500000003 HC RX 250 WO HCPCS: Performed by: PSYCHIATRY & NEUROLOGY

## 2023-03-06 PROCEDURE — 6370000000 HC RX 637 (ALT 250 FOR IP)

## 2023-03-06 PROCEDURE — 1240000000 HC EMOTIONAL WELLNESS R&B

## 2023-03-06 RX ORDER — CHLORPROMAZINE HYDROCHLORIDE 100 MG/1
300 TABLET, FILM COATED ORAL NIGHTLY
Status: DISCONTINUED | OUTPATIENT
Start: 2023-03-06 | End: 2023-03-08

## 2023-03-06 RX ADMIN — IBUPROFEN 400 MG: 400 TABLET, FILM COATED ORAL at 05:46

## 2023-03-06 RX ADMIN — CHLORPROMAZINE HYDROCHLORIDE 100 MG: 25 INJECTION INTRAMUSCULAR at 12:01

## 2023-03-06 RX ADMIN — FLUTICASONE PROPIONATE 1 SPRAY: 50 SPRAY, METERED NASAL at 10:49

## 2023-03-06 RX ADMIN — BENZTROPINE MESYLATE 1 MG: 1 TABLET ORAL at 20:10

## 2023-03-06 RX ADMIN — TRAZODONE HYDROCHLORIDE 50 MG: 50 TABLET ORAL at 20:10

## 2023-03-06 RX ADMIN — LORAZEPAM 2 MG: 2 INJECTION INTRAMUSCULAR; INTRAVENOUS at 12:01

## 2023-03-06 RX ADMIN — IBUPROFEN 400 MG: 400 TABLET, FILM COATED ORAL at 16:55

## 2023-03-06 RX ADMIN — CHLORTHALIDONE 12.5 MG: 25 TABLET ORAL at 10:49

## 2023-03-06 RX ADMIN — BENZTROPINE MESYLATE 1 MG: 1 TABLET ORAL at 10:49

## 2023-03-06 RX ADMIN — CHLORPROMAZINE HYDROCHLORIDE 300 MG: 100 TABLET, SUGAR COATED ORAL at 20:10

## 2023-03-06 RX ADMIN — ATORVASTATIN CALCIUM 10 MG: 10 TABLET, FILM COATED ORAL at 10:49

## 2023-03-06 ASSESSMENT — PAIN DESCRIPTION - DESCRIPTORS: DESCRIPTORS: ACHING

## 2023-03-06 ASSESSMENT — PAIN SCALES - GENERAL
PAINLEVEL_OUTOF10: 3
PAINLEVEL_OUTOF10: 0
PAINLEVEL_OUTOF10: 2

## 2023-03-06 ASSESSMENT — PAIN DESCRIPTION - LOCATION
LOCATION: HEAD
LOCATION: OTHER (COMMENT)

## 2023-03-06 ASSESSMENT — PAIN - FUNCTIONAL ASSESSMENT: PAIN_FUNCTIONAL_ASSESSMENT: ACTIVITIES ARE NOT PREVENTED

## 2023-03-06 NOTE — BH NOTE
This writer was notified by MARSHA Stack that patient was escalating. Patient had thrown food and drink at JENNIFER Lu. When this writer approached patient and asked him if he would be willing to take medication, he begins to hit his elbow into pilar on the unit. He is labile, unable to be redirected, and threatening staff verbally. He continues to escalate at this time. Offered PO medications to which patient hit this writers hand in an attempt to hit medications and water out of my hand. Naomi Alonso was called and staff administered IM medications ordered, see MAR.

## 2023-03-06 NOTE — PLAN OF CARE
Problem: Coping  Goal: Pt/Family able to verbalize concerns and demonstrate effective coping strategies  Description: INTERVENTIONS:  1. Assist patient/family to identify coping skills, available support systems and cultural and spiritual values  2. Provide emotional support, including active listening and acknowledgement of concerns of patient and caregivers  3. Reduce environmental stimuli, as able  4. Instruct patient/family in relaxation techniques, as appropriate  5. Assess for spiritual pain/suffering and initiate Spiritual Care, Psychosocial Clinical Specialist consults as needed  3/5/2023 2030 by Ervin Keys RN  Outcome: Progressing     Problem: Behavior  Goal: Pt/Family maintain appropriate behavior and adhere to behavioral management agreement, if implemented  Description: INTERVENTIONS:  1. Assess patient/family's coping skills and  non-compliant behavior (including use of illegal substances)  2. Notify security of behavior or suspected illegal substances which indicate the need for search of the family and/or belongings  3. Encourage verbalization of thoughts and concerns in a socially appropriate manner  4. Utilize positive, consistent limit setting strategies supporting safety of patient, staff and others  5. Encourage participation in the decision making process about the behavioral management agreement  6. If a visitor's behavior poses a threat to safety call refer to organization policy. 7. Initiate consult with , Psychosocial CNS, Spiritual Care as appropriate  3/5/2023 2030 by Ervin Keys, RN  Outcome: Progressing    Pt denies all, RTIS, +meds. Did get upset briefly and lashed out, throwing the unit phone from the counter and smacking the door to his room, but was easily redirected and quickly calmed down. Pt still fixated on leaving and wants to \"go home\" also still fixated on Dr. Jay Carter making several inappropriate comments about him.  \" Dr. Jay Carter needs to drop dead [place]. \" \"Dr. Agustin Thomas still hasn't fixed my medications, he is the reason for the rapture\"

## 2023-03-06 NOTE — PROGRESS NOTES
Patient has been extremely labile and argumentative since he found out he will not be discharged today. He has yelled, threw food, and dumped his cup of juice on the counter at the nurses station. He wasn't able to be redirected and cursing and threatening staff. He told this writer \"I will kill you with my bare hands. \"  Alana Harry RN advised of situation.

## 2023-03-06 NOTE — PROGRESS NOTES
Pt continues to be agitated, yelling and screaming. He went into bathroom and tore call light out of wall and stated \"I'm frustrated. \" Froylan Abdalla LPN notified.

## 2023-03-06 NOTE — PROGRESS NOTES
Pt is at NS screaming very loud cussing and stating he was kidnapped and he will leave today. Pt gets increasingly agitated with any emotional support. Refusing medication. Does complain of HA and takes prn ibuprofen at this time. After taking medication he trys to grab phone and water pitcher from over counter screaming \"it doesn't matter he's never getting out of here\" removed phone and water from desk. Pt goes back to pillar bench and sits and continuous to yell and be irritable.

## 2023-03-06 NOTE — PROGRESS NOTES
Merlinda Rooks continues to be agitated. He threw water at this writer and [facility]. Patient threw food into nurses station while cursing and yelling. He is unable to be redirected and also tore sign off wall by door entering the nurses station. Merlinda Rooks is continues to yell at this time but did go into his room.

## 2023-03-06 NOTE — BH NOTE
Patient urinated in cup in day room and threw urine in sink. When attempting to redirect patient to room he refused. Stimuli decreased and patient able to be verbally de-escalated.

## 2023-03-06 NOTE — PROGRESS NOTES
Pt came out of his room and apologized for his behavior earlier and cleaned up the trash from the floor that he threw off the counter. He asked to use the phone to call his brother and was given the phone at this time.

## 2023-03-06 NOTE — PROGRESS NOTES
Pt started talking about Dr. Marcos Pace stating \" he needs to drop dead tonight, he still hasn't gotten my medications right. Im never going home unless you let me out of here. Are you going to let me out of here? \" Writer told pt no and pt became upset and swiped everything off the counter, started screaming and beating on the door to his room, he then got into his bed and started reading the bible.

## 2023-03-07 PROCEDURE — 6370000000 HC RX 637 (ALT 250 FOR IP): Performed by: PSYCHIATRY & NEUROLOGY

## 2023-03-07 PROCEDURE — 6370000000 HC RX 637 (ALT 250 FOR IP)

## 2023-03-07 PROCEDURE — 6360000002 HC RX W HCPCS: Performed by: PSYCHIATRY & NEUROLOGY

## 2023-03-07 PROCEDURE — 99233 SBSQ HOSP IP/OBS HIGH 50: CPT | Performed by: PSYCHIATRY & NEUROLOGY

## 2023-03-07 PROCEDURE — 1240000000 HC EMOTIONAL WELLNESS R&B

## 2023-03-07 RX ADMIN — ATORVASTATIN CALCIUM 10 MG: 10 TABLET, FILM COATED ORAL at 09:03

## 2023-03-07 RX ADMIN — CHLORPROMAZINE HYDROCHLORIDE 100 MG: 100 TABLET, SUGAR COATED ORAL at 12:54

## 2023-03-07 RX ADMIN — IBUPROFEN 400 MG: 400 TABLET, FILM COATED ORAL at 16:42

## 2023-03-07 RX ADMIN — BENZTROPINE MESYLATE 1 MG: 1 TABLET ORAL at 21:31

## 2023-03-07 RX ADMIN — LORAZEPAM 2 MG: 2 TABLET ORAL at 09:03

## 2023-03-07 RX ADMIN — LORAZEPAM 2 MG: 2 INJECTION INTRAMUSCULAR; INTRAVENOUS at 17:31

## 2023-03-07 RX ADMIN — IBUPROFEN 400 MG: 400 TABLET, FILM COATED ORAL at 09:11

## 2023-03-07 RX ADMIN — FLUTICASONE PROPIONATE 1 SPRAY: 50 SPRAY, METERED NASAL at 09:10

## 2023-03-07 RX ADMIN — TRAZODONE HYDROCHLORIDE 50 MG: 50 TABLET ORAL at 21:31

## 2023-03-07 RX ADMIN — BENZTROPINE MESYLATE 1 MG: 1 TABLET ORAL at 09:03

## 2023-03-07 RX ADMIN — ACETAMINOPHEN 650 MG: 325 TABLET ORAL at 13:01

## 2023-03-07 RX ADMIN — CHLORTHALIDONE 12.5 MG: 25 TABLET ORAL at 09:03

## 2023-03-07 RX ADMIN — CHLORPROMAZINE HYDROCHLORIDE 300 MG: 100 TABLET, SUGAR COATED ORAL at 21:31

## 2023-03-07 ASSESSMENT — PAIN SCALES - GENERAL
PAINLEVEL_OUTOF10: 3
PAINLEVEL_OUTOF10: 6
PAINLEVEL_OUTOF10: 3
PAINLEVEL_OUTOF10: 0

## 2023-03-07 ASSESSMENT — PAIN DESCRIPTION - ORIENTATION
ORIENTATION: LEFT;RIGHT
ORIENTATION: RIGHT;LEFT
ORIENTATION: RIGHT;LEFT

## 2023-03-07 ASSESSMENT — PAIN DESCRIPTION - LOCATION
LOCATION: FOOT

## 2023-03-07 ASSESSMENT — PAIN DESCRIPTION - DESCRIPTORS
DESCRIPTORS: ACHING

## 2023-03-07 NOTE — BH NOTE
Clinical supervisor and maintenance notified of damage to audible strobe fire alarm boxes X2 and patients behavior. [address] notified as well.

## 2023-03-07 NOTE — PROGRESS NOTES
Department of Psychiatry  Progress Note    Patient's chart was reviewed. Discussed with treatment team. Met with patient. SUBJECTIVE:      Continues to do poorly - more agitated and labile this morning in part because he isn't able to be discharged. Is severely delusional, disorganized, and agitated. ROS:   Patient has new complaints: no  Sleeping adequately:  no  Appetite adequate: yes  Engaged in programming: no    OBJECTIVE:  VITALS:  /73   Pulse (!) 110   Temp 97.7 °F (36.5 °C) (Oral)   Resp 18   Ht 6' 1\" (1.854 m)   Wt 240 lb (108.9 kg)   SpO2 98%   BMI 31.66 kg/m²     Mental Status Examination:    Appearance: poor grooming and hygiene  Behavior/Attitude toward examiner:  suspicious  Speech: elevated   Mood:  \"you're gonna burn\"  Affect:  elevated    Thought processes:  disorganized  Thought Content: no SI, no HI, + delusional   Perceptions: +RTIS  Attention: limited   Abstraction: impaired  Cognition:  limited  Insight: impaired   Judgment: impaired    Medication:  Scheduled:   chlorproMAZINE  300 mg Oral Nightly    benztropine  1 mg Oral BID    fluticasone  1 spray Each Nostril Daily    atorvastatin  10 mg Oral Daily    chlorthalidone  12.5 mg Oral Daily    potassium chloride  40 mEq Oral Once        PRN:  chlorproMAZINE **OR** chlorproMAZINE, LORazepam **OR** LORazepam, ibuprofen, acetaminophen, magnesium hydroxide, nicotine polacrilex, aluminum & magnesium hydroxide-simethicone, diphenhydrAMINE, traZODone, docusate sodium     ASSESSMENT AND PLAN:  57 y.o. who presented  to [facility] on a SOB after police were called by brother regarding patient erratic behavior at home. Principal Problem:    Schizophrenia ([facility] [address]  Active Problems:    Tachycardia    Essential hypertension    Hyperlipidemia    Tardive dyskinesia  Resolved Problems:    Hypokalemia     Plan: 1. Admitted to psychiatry for stabilization and treatment.     2.On admission, ordered q15min checks for safety, programming, and prn medication for anxiety, agitation, and insomnia. 2/25/2023 - Aristada Initio 675mg and Aristada 1064mb given without incident. 2/27/2023 - making gains. 2/28/2023 - continues to make gains. Discontinue constant observation. 3/1/2023 - remains severely symptomatic. 3/2/2023 - Last admission required about 15 days before achieving enough stability to go home. 3/3/2023 - consider first generation antipsychotic as adjunctive treatment. 3/4/2023 - schedule thorazine 200mg PO QHS. Increase prn doses. 3/6/2023 - increase thorazine to 300mg QHS. 3. Hospitalist consult for admission. #Hypokalemia - resolved on repeat.   -refused replacement      #HTN  -resume HCTZ at request of patient      #HLD   -on statin     Irregular HR  -EKG Sinus rhythm, with PVC's  -HR improved on repeat vitals  -monitor for now. 4. Voluntary by guardian. ELOS 10-15 days. Total time with patient was 50 minutes and more than 50 % of that time was spent counseling the patient on their symptoms, treatment, and expected goals.      Luis Eduardo An MD

## 2023-03-07 NOTE — BH NOTE
Code violet was called on patient after multiple attempts to verbally deescalate aggressive and agitated behaviors that could have potentially harmed the patient or staff members. Patient was cussing at nursing staff, throwing food over the floor, slamming the bedroom door, with force, into the wall repeatedly, and eventually slapped water and medications out of the hands of the charge nurse, Wen Montez was called to assist with the code. Patient spat in the face of the , Sukh Acosta, whilst he was assisting with restraint during the administration of an emergency ativan injection.

## 2023-03-07 NOTE — PROGRESS NOTES
03/07/23 1019   Encounter Summary   Encounter Overview/Reason  Spiritual/Emotional Needs   Service Provided For: Patient   Referral/Consult From: Rounding   Last Encounter    (3/7 follow up, listened and sppt)   Complexity of Encounter Moderate   Begin Time 1003   End Time  1020   Total Time Calculated 17 min

## 2023-03-07 NOTE — PLAN OF CARE
Patient alert and oriented x 3. Patient visible in the dayroom talking to himself. Patient denies SI/HI/A/V/H but is clearly RTIS by talking to himself. Patient loud @ times but is redirectable by staff. Patient took HS medications with encouragement. Patient is paranoid about his medications. Patient sitting in the dayroom eating a HS snack. No c/o's voiced @ present. Problem: Involuntary Admit  Goal: Will cooperate with staff recommendations and doctor's orders and will demonstrate appropriate behavior  Description: INTERVENTIONS:  1. Treat underlying conditions and offer medication as ordered  2. Educate regarding involuntary admission procedures and rules  3. Contain excessive/inappropriate behavior per unit and hospital policies  0/7/9182 [id number] by Aarti Sarkar LPN  Outcome: Not Progressing     Problem: Psychosis  Goal: Will report no hallucinations or delusions  Description: INTERVENTIONS:  1. Administer medication as  ordered  2. Assist with reality testing to support increasing orientation  3.  Assess if patient's hallucinations or delusions are encouraging self harm or harm to others and intervene as appropriate  3/6/2023 1909 by Aarti Sarkar LPN  Outcome: Not Progressing

## 2023-03-07 NOTE — BH NOTE
Patient continued to yell, cuss and threaten staff then walking over to the audible strobe fire alarm on wall ripping it down from wall. As staff was redirecting patient with show of concern patient then ripped additional fire alarm off wall.

## 2023-03-07 NOTE — BH NOTE
Patient resting quietly in bed with eyes closed. PRN Trazodone was effective. Patient asked writer to plug in his bed. This writer plugged in his bed but patient immediately unplugged his bed. This writer offered patient a pillow case, fitted sheet and a blanket but patient refused. Patient resting quietly in bed with eyes closed. No distress noted @ present.

## 2023-03-07 NOTE — PROGRESS NOTES
Behavioral Services                                              Medicare Re-Certification    I certify that the inpatient psychiatric hospital services furnished since the previous certification/re-certification were, and continue to be, medically necessary for;    [x] (1) Treatment which could reasonably be expected to improve the patient's condition,    [x] (2) Or for diagnostic study. Estimated length of stay/service 10-15 days     Plan for post-hospital care incomplete     This patient continues to need, on a daily basis, active treatment furnished directly by or requiring the supervision of inpatient psychiatric personnel.     Electronically signed by Fariba Cheng MD on 3/7/2023 at 6:11 AM

## 2023-03-07 NOTE — GROUP NOTE
Group Therapy Note    Date: 3/7/2023    Group Start Time: 1300  Group End Time: 3173  Group Topic: Psychoeducation    [address] [place] [address]        Group Therapy Note    Attendees: 7    Facilitator led a group on identifying emotions and how emotions tie to behaviors and thoughts. Patients explored the basic principles of the cognitive behavioral triangle and how emotions can influence thoughts and behaviors. Patients assessed the baseline for identified emotions to help discern when emotions are manageable and able to be redirected with coping skills, and when emotions increase to an intensity that is more difficult to control. Notes:  Patient verbalized understanding of group expectations prior to entering group. Patient agreed to support a positive group environment by not arguing, yelling, or disrupting by talking off topic. Patient engaged in group discussion and was able to give appropriate answers with little to no redirection. Patient often spoke about \"Dr. Israel Tinsley preventing the Rapture\" and family members being \"witches and warlocks\". However, patient was redirectable and was non-confrontational.     Status After Intervention:  Improved    Participation Level: Active Listener and Interactive    Participation Quality: Sharing and Inappropriate at times.     Speech:  slurred      Thought Process/Content: Delusional  Flight of ideas      Affective Functioning: Congruent      Mood: euthymic      Level of consciousness:  Alert and preoccupied at times      Response to Learning: Able to verbalize current knowledge/experience, Capable of insight, Able to change behavior, and Progressing to goal      Endings: None Reported    Modes of Intervention: Education and Exploration      Discipline Responsible: /Counselor      Signature:  RACHELE Gallardo

## 2023-03-07 NOTE — PLAN OF CARE
Problem: Involuntary Admit  Goal: Will cooperate with staff recommendations and doctor's orders and will demonstrate appropriate behavior  Description: INTERVENTIONS:  1. Treat underlying conditions and offer medication as ordered  2. Educate regarding involuntary admission procedures and rules  3. Contain excessive/inappropriate behavior per unit and hospital policies  Outcome: Not Progressing     Problem: Psychosis  Goal: Will report no hallucinations or delusions  Description: INTERVENTIONS:  1. Administer medication as  ordered  2. Assist with reality testing to support increasing orientation  3. Assess if patient's hallucinations or delusions are encouraging self harm or harm to others and intervene as appropriate  Outcome: Not Progressing      03/06/23 6554   Mental Status and Behavioral Exam   Normal No   Level of Assistance Independent/Self   Facial Expression Hostile;Exaggerated;Elevated   Affect Unstable   Level of Consciousness Alert   Frequency of Checks 4 times per hour, close   Mood:Normal No   Mood Labile; Suspicious;Irritable   Motor Activity:Normal Yes   Observed Behavior Cooperative   Sexual Misconduct History Current - no   Preception [place] to person;[place] to time;[place] to place   Attention:Normal No   Attention Distractible   Thought Processes Loose association   Thought Content:Normal No   Thought Content Delusions;Paranoia   Depression Symptoms No problems reported or observed. Anxiety Symptoms Generalized   Rebeka Symptoms Grandiosity; Labile;Poor judgment;Pressured speech   Delusions Yes   Delusions Paranoid; Somatic;Obsessions   Memory:Normal No   Memory Confabulation   Insight and Judgment No   Insight and Judgment Poor judgment;Poor insight;Unmotivated;Unrealistic
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
## 2023-03-08 PROCEDURE — 6370000000 HC RX 637 (ALT 250 FOR IP): Performed by: PSYCHIATRY & NEUROLOGY

## 2023-03-08 PROCEDURE — 99233 SBSQ HOSP IP/OBS HIGH 50: CPT | Performed by: PSYCHIATRY & NEUROLOGY

## 2023-03-08 PROCEDURE — 6370000000 HC RX 637 (ALT 250 FOR IP)

## 2023-03-08 PROCEDURE — 1240000000 HC EMOTIONAL WELLNESS R&B

## 2023-03-08 RX ORDER — CHLORPROMAZINE HYDROCHLORIDE 25 MG/ML
300 INJECTION INTRAMUSCULAR 2 TIMES DAILY
Status: DISCONTINUED | OUTPATIENT
Start: 2023-03-08 | End: 2023-03-08

## 2023-03-08 RX ORDER — CHLORPROMAZINE HYDROCHLORIDE 25 MG/ML
100 INJECTION INTRAMUSCULAR 2 TIMES DAILY
Status: DISCONTINUED | OUTPATIENT
Start: 2023-03-08 | End: 2023-03-10

## 2023-03-08 RX ORDER — CHLORPROMAZINE HYDROCHLORIDE 25 MG/ML
100 INJECTION INTRAMUSCULAR 2 TIMES DAILY
Status: DISCONTINUED | OUTPATIENT
Start: 2023-03-08 | End: 2023-03-08

## 2023-03-08 RX ORDER — CHLORPROMAZINE HYDROCHLORIDE 100 MG/1
300 TABLET, FILM COATED ORAL 2 TIMES DAILY
Status: DISCONTINUED | OUTPATIENT
Start: 2023-03-08 | End: 2023-03-10

## 2023-03-08 RX ORDER — CHLORPROMAZINE HYDROCHLORIDE 100 MG/1
300 TABLET, FILM COATED ORAL 2 TIMES DAILY
Status: DISCONTINUED | OUTPATIENT
Start: 2023-03-08 | End: 2023-03-08

## 2023-03-08 RX ADMIN — ACETAMINOPHEN 650 MG: 325 TABLET ORAL at 08:40

## 2023-03-08 RX ADMIN — IBUPROFEN 400 MG: 400 TABLET, FILM COATED ORAL at 08:42

## 2023-03-08 RX ADMIN — IBUPROFEN 400 MG: 400 TABLET, FILM COATED ORAL at 02:59

## 2023-03-08 RX ADMIN — LORAZEPAM 2 MG: 2 TABLET ORAL at 12:57

## 2023-03-08 RX ADMIN — IBUPROFEN 400 MG: 400 TABLET, FILM COATED ORAL at 17:54

## 2023-03-08 RX ADMIN — BENZTROPINE MESYLATE 1 MG: 1 TABLET ORAL at 21:29

## 2023-03-08 RX ADMIN — CHLORPROMAZINE HYDROCHLORIDE 100 MG: 100 TABLET, SUGAR COATED ORAL at 08:41

## 2023-03-08 RX ADMIN — CHLORTHALIDONE 12.5 MG: 25 TABLET ORAL at 08:39

## 2023-03-08 RX ADMIN — ACETAMINOPHEN 650 MG: 325 TABLET ORAL at 22:04

## 2023-03-08 RX ADMIN — ATORVASTATIN CALCIUM 10 MG: 10 TABLET, FILM COATED ORAL at 08:42

## 2023-03-08 RX ADMIN — LORAZEPAM 2 MG: 2 TABLET ORAL at 08:04

## 2023-03-08 RX ADMIN — CHLORPROMAZINE HYDROCHLORIDE 300 MG: 100 TABLET, SUGAR COATED ORAL at 12:57

## 2023-03-08 RX ADMIN — DOCUSATE SODIUM 200 MG: 100 CAPSULE, LIQUID FILLED ORAL at 08:41

## 2023-03-08 RX ADMIN — LORAZEPAM 2 MG: 2 TABLET ORAL at 22:07

## 2023-03-08 RX ADMIN — CHLORPROMAZINE HYDROCHLORIDE 300 MG: 100 TABLET, SUGAR COATED ORAL at 21:29

## 2023-03-08 RX ADMIN — BENZTROPINE MESYLATE 1 MG: 1 TABLET ORAL at 08:40

## 2023-03-08 RX ADMIN — FLUTICASONE PROPIONATE 1 SPRAY: 50 SPRAY, METERED NASAL at 08:43

## 2023-03-08 ASSESSMENT — PAIN DESCRIPTION - ORIENTATION
ORIENTATION: RIGHT;LEFT
ORIENTATION: OTHER (COMMENT)
ORIENTATION: RIGHT;LEFT

## 2023-03-08 ASSESSMENT — PAIN SCALES - GENERAL
PAINLEVEL_OUTOF10: 8
PAINLEVEL_OUTOF10: 7
PAINLEVEL_OUTOF10: 8

## 2023-03-08 ASSESSMENT — PAIN DESCRIPTION - DESCRIPTORS
DESCRIPTORS: ACHING;THROBBING
DESCRIPTORS: ACHING
DESCRIPTORS: ACHING
DESCRIPTORS: ACHING;THROBBING

## 2023-03-08 ASSESSMENT — PAIN - FUNCTIONAL ASSESSMENT
PAIN_FUNCTIONAL_ASSESSMENT: PREVENTS OR INTERFERES SOME ACTIVE ACTIVITIES AND ADLS

## 2023-03-08 ASSESSMENT — PAIN DESCRIPTION - LOCATION
LOCATION: HEAD
LOCATION: OTHER (COMMENT)
LOCATION: KNEE;ANKLE
LOCATION: HIP;FOOT

## 2023-03-08 NOTE — BH NOTE
Spoke with pt's guardian, Ginny Andersen, and pt's sister in law. They reported they are interested in long term care, as they cannot get pt to take medications when he is at home. They are also concerned that about Sherin Orozco not calling his mother or accepting phone calls, they state this is not typical behavior. Ginny Andersen did give consent for increased dose of Thorazine and to give IM if the pt refuses PO.

## 2023-03-08 NOTE — BH NOTE
Patient in bed talking to himself. Patient yelling out  @ time. When asked by writer to please stop yelling out, as to not wake up other patients. Patient yells louder.

## 2023-03-08 NOTE — BH NOTE
Throwing food and containers at staff. Throwing chair being used as a food table. Chair removed. Multiple attempts at redirection from multiple staff members at different times. Thorazine and ativan given p.o.for agitation/aggression per orders. Screaming and cursing, threatening verbage and gestures, drawing back fist and stating \"I'll knock you out\".

## 2023-03-08 NOTE — PROGRESS NOTES
Department of Psychiatry  Progress Note    Patient's chart was reviewed. Discussed with treatment team. Met with patient. SUBJECTIVE:      More agitated today - throwing food and yelling. Remains severely delusional and impaired. Constant RTIS. Is oriented. ROS:   Patient has new complaints: no  Sleeping adequately:  no  Appetite adequate: yes  Engaged in programming: no    OBJECTIVE:  VITALS:  /73   Pulse 80   Temp 98 °F (36.7 °C) (Oral)   Resp 18   Ht 6' 1\" (1.854 m)   Wt 240 lb (108.9 kg)   SpO2 96%   BMI 31.66 kg/m²     Mental Status Examination:    Appearance: poor grooming and hygiene  Behavior/Attitude toward examiner:  suspicious and agitated  Speech: elevated   Mood:  \"double fuck you\"  Affect:  elevated    Thought processes:  disorganized  Thought Content: no SI, no HI, + delusional   Perceptions: +RTIS  Attention: limited   Abstraction: impaired  Cognition:  limited  Insight: impaired   Judgment: impaired    Medication:  Scheduled:   chlorproMAZINE  300 mg Oral Nightly    benztropine  1 mg Oral BID    fluticasone  1 spray Each Nostril Daily    atorvastatin  10 mg Oral Daily    chlorthalidone  12.5 mg Oral Daily    potassium chloride  40 mEq Oral Once        PRN:  chlorproMAZINE **OR** chlorproMAZINE, LORazepam **OR** LORazepam, ibuprofen, acetaminophen, magnesium hydroxide, nicotine polacrilex, aluminum & magnesium hydroxide-simethicone, diphenhydrAMINE, traZODone, docusate sodium     ASSESSMENT AND PLAN:  57 y.o. who presented  to [facility] on a SOB after police were called by brother regarding patient erratic behavior at home. Principal Problem:    Schizophrenia ([address]  Active Problems:    Tachycardia    Essential hypertension    Hyperlipidemia    Tardive dyskinesia  Resolved Problems:    Hypokalemia     Plan: 1. Admitted to psychiatry for stabilization and treatment.     2.On admission, ordered q15min checks for safety, programming, and prn medication for anxiety, agitation, and insomnia. 2/25/2023 - Aristada Initio 675mg and Aristada 1064mb given without incident. 2/27/2023 - making gains. 2/28/2023 - continues to make gains. Discontinue constant observation. 3/1/2023 - remains severely symptomatic. 3/2/2023 - Last admission required about 15 days before achieving enough stability to go home. 3/3/2023 - consider first generation antipsychotic as adjunctive treatment. 3/4/2023 - schedule thorazine 200mg PO QHS. Increase prn doses. 3/6/2023 - increased thorazine to 300mg QHS. 3. Hospitalist consult for admission. #Hypokalemia - resolved on repeat.   -refused replacement      #HTN  -resume HCTZ at request of patient      #HLD   -on statin     Irregular HR  -EKG Sinus rhythm, with PVC's  -HR improved on repeat vitals  -monitor for now. 4. Voluntary by guardian. ELOS 10-15 days. Total time with patient was 50 minutes and more than 50 % of that time was spent counseling the patient on their symptoms, treatment, and expected goals.      Robert Odell MD

## 2023-03-08 NOTE — BH NOTE
Ativan 2 mg given p.o. for agitation. Screaming, spitting, throwing food items and containers. Verbally threatening. Refused to take thorazine prn p.o. Verbal redirection and distraction attempted with little to no success.

## 2023-03-08 NOTE — BH NOTE
Patient awake yelling and scream for a snack. Patient eating snack in the dayroom. Then patient threw his cookies @ the nursing station. Patient eating  a bag of chips in the dayroom @ present.

## 2023-03-08 NOTE — PLAN OF CARE
Patient alert and oriented x 1 to self only. Patient woke up and voided yellow clear urine in toilet. Patient denies SI/HI/A/V/H but is clearly RTIS by talking and laughing to himself. Patient took HS medications. Patient medicated with Trazodone 50 mg po for sleep. Patient sitting in the dayroom eating a turkey sandwich and chips snack. Patient allow this writer to put his  socks on him. No distress noted.

## 2023-03-08 NOTE — BH NOTE
Patient was sitting on edge of bed, stood up, then sat back down and rolled to floor on knee, then to hip and back slowly. Did not hit head. Alert and oriented per baseline. Moving all extremities per baseline. Assisted to chair per 4 staff members. No obvious/apparent injury. Vital signs obtained. Physician notified.
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
## 2023-03-08 NOTE — PLAN OF CARE
Problem: Risk for Elopement  Goal: Patient will not exit the unit/facility without proper excort  Outcome: Not Progressing     Problem: Coping  Goal: Pt/Family able to verbalize concerns and demonstrate effective coping strategies  Description: INTERVENTIONS:  1. Assist patient/family to identify coping skills, available support systems and cultural and spiritual values  2. Provide emotional support, including active listening and acknowledgement of concerns of patient and caregivers  3. Reduce environmental stimuli, as able  4. Instruct patient/family in relaxation techniques, as appropriate  5. Assess for spiritual pain/suffering and initiate Spiritual Care, Psychosocial Clinical Specialist consults as needed  Outcome: Not Progressing     Problem: Behavior  Goal: Pt/Family maintain appropriate behavior and adhere to behavioral management agreement, if implemented  Description: INTERVENTIONS:  1. Assess patient/family's coping skills and  non-compliant behavior (including use of illegal substances)  2. Notify security of behavior or suspected illegal substances which indicate the need for search of the family and/or belongings  3. Encourage verbalization of thoughts and concerns in a socially appropriate manner  4. Utilize positive, consistent limit setting strategies supporting safety of patient, staff and others  5. Encourage participation in the decision making process about the behavioral management agreement  6. If a visitor's behavior poses a threat to safety call refer to organization policy. 7. Initiate consult with , Psychosocial CNS, Spiritual Care as appropriate  Outcome: Not Progressing     Problem: Involuntary Admit  Goal: Will cooperate with staff recommendations and doctor's orders and will demonstrate appropriate behavior  Description: INTERVENTIONS:  1. Treat underlying conditions and offer medication as ordered  2. Educate regarding involuntary admission procedures and rules  3. Contain excessive/inappropriate behavior per unit and hospital policies  Outcome: Not Progressing     Problem: Psychosis  Goal: Will report no hallucinations or delusions  Description: INTERVENTIONS:  1. Administer medication as  ordered  2. Assist with reality testing to support increasing orientation  3. Assess if patient's hallucinations or delusions are encouraging self harm or harm to others and intervene as appropriate  Outcome: Not Progressing     Problem: Anxiety  Goal: Will report anxiety at manageable levels  Description: INTERVENTIONS:  1. Administer medication as ordered  2. Teach and rehearse alternative coping skills  3.  Provide emotional support with 1:1 interaction with staff  Outcome: Not Progressing     Problem: Pain  Goal: Verbalizes/displays adequate comfort level or baseline comfort level  Outcome: Not Progressing

## 2023-03-09 PROCEDURE — 6360000002 HC RX W HCPCS: Performed by: PSYCHIATRY & NEUROLOGY

## 2023-03-09 PROCEDURE — 6370000000 HC RX 637 (ALT 250 FOR IP): Performed by: PSYCHIATRY & NEUROLOGY

## 2023-03-09 PROCEDURE — 2500000003 HC RX 250 WO HCPCS: Performed by: PSYCHIATRY & NEUROLOGY

## 2023-03-09 PROCEDURE — 6370000000 HC RX 637 (ALT 250 FOR IP)

## 2023-03-09 PROCEDURE — 1240000000 HC EMOTIONAL WELLNESS R&B

## 2023-03-09 PROCEDURE — 99233 SBSQ HOSP IP/OBS HIGH 50: CPT | Performed by: PSYCHIATRY & NEUROLOGY

## 2023-03-09 RX ORDER — OLANZAPINE 5 MG/1
5 TABLET, ORALLY DISINTEGRATING ORAL 4 TIMES DAILY PRN
Status: DISCONTINUED | OUTPATIENT
Start: 2023-03-09 | End: 2023-03-12

## 2023-03-09 RX ORDER — OLANZAPINE 10 MG/1
10 INJECTION, POWDER, LYOPHILIZED, FOR SOLUTION INTRAMUSCULAR 2 TIMES DAILY PRN
Status: DISCONTINUED | OUTPATIENT
Start: 2023-03-09 | End: 2023-03-17 | Stop reason: HOSPADM

## 2023-03-09 RX ORDER — IBUPROFEN 600 MG/1
600 TABLET ORAL EVERY 6 HOURS PRN
Status: DISCONTINUED | OUTPATIENT
Start: 2023-03-09 | End: 2023-03-17 | Stop reason: HOSPADM

## 2023-03-09 RX ADMIN — BENZTROPINE MESYLATE 1 MG: 1 TABLET ORAL at 20:15

## 2023-03-09 RX ADMIN — LORAZEPAM 2 MG: 2 INJECTION INTRAMUSCULAR; INTRAVENOUS at 04:12

## 2023-03-09 RX ADMIN — CHLORTHALIDONE 12.5 MG: 25 TABLET ORAL at 09:05

## 2023-03-09 RX ADMIN — IBUPROFEN 600 MG: 600 TABLET, FILM COATED ORAL at 09:59

## 2023-03-09 RX ADMIN — ACETAMINOPHEN 650 MG: 325 TABLET ORAL at 04:52

## 2023-03-09 RX ADMIN — BENZTROPINE MESYLATE 1 MG: 1 TABLET ORAL at 08:46

## 2023-03-09 RX ADMIN — FLUTICASONE PROPIONATE 1 SPRAY: 50 SPRAY, METERED NASAL at 09:59

## 2023-03-09 RX ADMIN — IBUPROFEN 600 MG: 600 TABLET, FILM COATED ORAL at 22:18

## 2023-03-09 RX ADMIN — IBUPROFEN 600 MG: 600 TABLET, FILM COATED ORAL at 16:32

## 2023-03-09 RX ADMIN — ATORVASTATIN CALCIUM 10 MG: 10 TABLET, FILM COATED ORAL at 08:46

## 2023-03-09 RX ADMIN — ACETAMINOPHEN 650 MG: 325 TABLET ORAL at 22:18

## 2023-03-09 RX ADMIN — ACETAMINOPHEN 650 MG: 325 TABLET ORAL at 08:46

## 2023-03-09 RX ADMIN — ACETAMINOPHEN 650 MG: 325 TABLET ORAL at 16:32

## 2023-03-09 RX ADMIN — CHLORPROMAZINE HYDROCHLORIDE 300 MG: 100 TABLET, SUGAR COATED ORAL at 08:46

## 2023-03-09 RX ADMIN — CHLORPROMAZINE HYDROCHLORIDE 100 MG: 25 INJECTION INTRAMUSCULAR at 20:22

## 2023-03-09 ASSESSMENT — PAIN DESCRIPTION - LOCATION
LOCATION: FOOT;LEG;KNEE
LOCATION: HIP;KNEE;FOOT;ANKLE
LOCATION: KNEE;HIP
LOCATION: KNEE;HIP;ANKLE;FOOT
LOCATION: FOOT

## 2023-03-09 ASSESSMENT — PAIN DESCRIPTION - ORIENTATION
ORIENTATION: RIGHT;LEFT
ORIENTATION: LEFT;RIGHT
ORIENTATION: RIGHT;LEFT

## 2023-03-09 ASSESSMENT — PAIN DESCRIPTION - DESCRIPTORS
DESCRIPTORS: ACHING;THROBBING
DESCRIPTORS: ACHING
DESCRIPTORS: ACHING
DESCRIPTORS: ACHING;THROBBING
DESCRIPTORS: ACHING;THROBBING

## 2023-03-09 ASSESSMENT — PAIN - FUNCTIONAL ASSESSMENT
PAIN_FUNCTIONAL_ASSESSMENT: PREVENTS OR INTERFERES SOME ACTIVE ACTIVITIES AND ADLS
PAIN_FUNCTIONAL_ASSESSMENT: ACTIVITIES ARE NOT PREVENTED
PAIN_FUNCTIONAL_ASSESSMENT: PREVENTS OR INTERFERES SOME ACTIVE ACTIVITIES AND ADLS

## 2023-03-09 ASSESSMENT — PAIN SCALES - GENERAL
PAINLEVEL_OUTOF10: 7
PAINLEVEL_OUTOF10: 8

## 2023-03-09 NOTE — PLAN OF CARE
Patient was asleep early in the shift, in the recliner, close to the team station. Patient was awake 21:10 & was assisted to the bathroom, due to unsteady gait. Patient with rapid pressured speech & patient's speech was gargled & difficult to understand. Patient was easily irritable & agitated at times. Patient has been asleep again, at 22:45, after receiving tylenol at 22:04 & ativan at 22:07. Patient continues with 1:1-line of sight. Dominic Ridley R.N.  Problem: Risk for Elopement  Goal: Patient will not exit the unit/facility without proper excort  3/8/2023 2353 by Kerri Enrique RN  Outcome: Progressing  3/8/2023 1312 by Efe Vila RN  Outcome: Not Progressing     Problem: Coping  Goal: Pt/Family able to verbalize concerns and demonstrate effective coping strategies  Description: INTERVENTIONS:  1. Assist patient/family to identify coping skills, available support systems and cultural and spiritual values  2. Provide emotional support, including active listening and acknowledgement of concerns of patient and caregivers  3. Reduce environmental stimuli, as able  4. Instruct patient/family in relaxation techniques, as appropriate  5. Assess for spiritual pain/suffering and initiate Spiritual Care, Psychosocial Clinical Specialist consults as needed  3/8/2023 2353 by Kerri Enrique RN  Outcome: Progressing  3/8/2023 1312 by Efe Vila RN  Outcome: Not Progressing     Problem: Behavior  Goal: Pt/Family maintain appropriate behavior and adhere to behavioral management agreement, if implemented  Description: INTERVENTIONS:  1. Assess patient/family's coping skills and  non-compliant behavior (including use of illegal substances)  2. Notify security of behavior or suspected illegal substances which indicate the need for search of the family and/or belongings  3. Encourage verbalization of thoughts and concerns in a socially appropriate manner  4.  Utilize positive, consistent limit setting strategies supporting safety of patient, staff and others  5. Encourage participation in the decision making process about the behavioral management agreement  6. If a visitor's behavior poses a threat to safety call refer to organization policy. 7. Initiate consult with , Psychosocial CNS, Spiritual Care as appropriate  3/8/2023 2353 by Lupe Sears RN  Outcome: Progressing  3/8/2023 1312 by Autumn Walton RN  Outcome: Not Progressing     Problem: Involuntary Admit  Goal: Will cooperate with staff recommendations and doctor's orders and will demonstrate appropriate behavior  Description: INTERVENTIONS:  1. Treat underlying conditions and offer medication as ordered  2. Educate regarding involuntary admission procedures and rules  3. Contain excessive/inappropriate behavior per unit and hospital policies  8/8/7751 [id number] by Autumn Walton RN  Outcome: Not Progressing     Problem: Psychosis  Goal: Will report no hallucinations or delusions  Description: INTERVENTIONS:  1. Administer medication as  ordered  2. Assist with reality testing to support increasing orientation  3. Assess if patient's hallucinations or delusions are encouraging self harm or harm to others and intervene as appropriate  3/8/2023 2353 by Lupe Sears RN  Outcome: Progressing  3/8/2023 1312 by Autumn Walton RN  Outcome: Not Progressing     Problem: Anxiety  Goal: Will report anxiety at manageable levels  Description: INTERVENTIONS:  1. Administer medication as ordered  2. Teach and rehearse alternative coping skills  3.  Provide emotional support with 1:1 interaction with staff  3/8/2023 2353 by Lupe Sears RN  Outcome: Progressing  3/8/2023 1312 by Autumn Walton RN  Outcome: Not Progressing     Problem: Pain  Goal: Verbalizes/displays adequate comfort level or baseline comfort level  3/8/2023 2214 by Lupe Sears RN  Outcome: Progressing  3/8/2023 1312 by Autumn Walton RN  Outcome: Not Progressing

## 2023-03-09 NOTE — BH NOTE
Writer and [employer] spoke to Eric De Oliveira, patients sister, and his mother Brittany Webster. Patients family expressed that they want patient to come home but they feel as if this time he has compensated and they want him to be stable. Family reported that he does not take medications and asked about forced medications. Writer mentioned AOT but it is ultimately up to Dr. Anusha Cornell. Family mentioned that they need to speak to Dr. Anusha Cornell about where to go from here. Family reported that patients mom will come visit him Monday if he is okay it. Writer will follow up with family.

## 2023-03-09 NOTE — PROGRESS NOTES
Patient has been asleep in chair in dining room, since 05:30, after getting ativan IM at 04:12.  Kalpesh Ridley R.N.

## 2023-03-09 NOTE — PLAN OF CARE
Patient was given tylenol 650 mg po, per request for generalized pain. Carol Ridley R.N.  Problem: Pain  Goal: Verbalizes/displays adequate comfort level or baseline comfort level  3/8/2023 2214 by Estelle Garcia RN  Outcome: Progressing  3/8/2023 1312 by Jonn Fuentes RN  Outcome: Not Progressing     Problem: Risk for Elopement  Goal: Patient will not exit the unit/facility without proper excort  3/8/2023 1312 by Jonn Fuentes RN  Outcome: Not Progressing     Problem: Coping  Goal: Pt/Family able to verbalize concerns and demonstrate effective coping strategies  Description: INTERVENTIONS:  1. Assist patient/family to identify coping skills, available support systems and cultural and spiritual values  2. Provide emotional support, including active listening and acknowledgement of concerns of patient and caregivers  3. Reduce environmental stimuli, as able  4. Instruct patient/family in relaxation techniques, as appropriate  5. Assess for spiritual pain/suffering and initiate Spiritual Care, Psychosocial Clinical Specialist consults as needed  3/8/2023 1312 by Jonn Fuentes RN  Outcome: Not Progressing     Problem: Behavior  Goal: Pt/Family maintain appropriate behavior and adhere to behavioral management agreement, if implemented  Description: INTERVENTIONS:  1. Assess patient/family's coping skills and  non-compliant behavior (including use of illegal substances)  2. Notify security of behavior or suspected illegal substances which indicate the need for search of the family and/or belongings  3. Encourage verbalization of thoughts and concerns in a socially appropriate manner  4. Utilize positive, consistent limit setting strategies supporting safety of patient, staff and others  5. Encourage participation in the decision making process about the behavioral management agreement  6. If a visitor's behavior poses a threat to safety call refer to organization policy.   7. Initiate consult with , Psychosocial CNS, Spiritual Care as appropriate  3/8/2023 1312 by Nikkie Ash RN  Outcome: Not Progressing     Problem: Involuntary Admit  Goal: Will cooperate with staff recommendations and doctor's orders and will demonstrate appropriate behavior  Description: INTERVENTIONS:  1. Treat underlying conditions and offer medication as ordered  2. Educate regarding involuntary admission procedures and rules  3. Contain excessive/inappropriate behavior per unit and hospital policies  6/6/5797 5302 by Nikkie Ash RN  Outcome: Not Progressing     Problem: Psychosis  Goal: Will report no hallucinations or delusions  Description: INTERVENTIONS:  1. Administer medication as  ordered  2. Assist with reality testing to support increasing orientation  3. Assess if patient's hallucinations or delusions are encouraging self harm or harm to others and intervene as appropriate  3/8/2023 1312 by Nikkie Ash RN  Outcome: Not Progressing     Problem: Anxiety  Goal: Will report anxiety at manageable levels  Description: INTERVENTIONS:  1. Administer medication as ordered  2. Teach and rehearse alternative coping skills  3.  Provide emotional support with 1:1 interaction with staff  3/8/2023 1312 by Nikkie Ash RN  Outcome: Not Progressing     Problem: Pain  Goal: Verbalizes/displays adequate comfort level or baseline comfort level  3/8/2023 2214 by Yosef Chowdhury RN  Outcome: Progressing  3/8/2023 1312 by Nikkie Ash RN  Outcome: Not Progressing

## 2023-03-09 NOTE — PROGRESS NOTES
Patient  was awake at 21:10 & & put said he had to go to the bathroom, patient was assisted to the bathroom. Patient's gait was unsteady & patient was holding onto the wall, his bed & the bathroom door. Writer was also assisting patient. Patient stood close to the doorway, toward the toilet. Patient was encouraged to move closer, but didn't. Patient urinated on the bathroom floor. Writer got patient back to his bed to sit. Patient took his scheduled medications, po without difficulty. A few minutes later, patient stated, that he wanted to come back to the dayroom. Writer brought the recliner to patients bed side & he transferred to the recliner. Patient then stated that he had to urinate. Writer asked another staff to bring a urinal & then writer directed patient to stand up(in his room), by the recliner & was able to void 500 cc per urinal. Patient was wheeled back to the dayroom, close to the team station, due to patient's unsteady gait. Patient ate a ham sandwich and drank a large styrofoam cup full of water.  Je Ridley R.N.

## 2023-03-09 NOTE — BH NOTE
Writer received a voicemail from patients sister, Chen Mendez. The family would like to talk to Dr. Ganesh Razo directly and discuss the ongoing treatment plan. Carolyn's number is [phone].

## 2023-03-09 NOTE — BH NOTE
Sleeping more today. No spitting or throwing behavior noted thus far. Resting in bed. Continuous observation maintained.

## 2023-03-09 NOTE — PROGRESS NOTES
Once back to the day room, patient became irritable, labile & easily agitated. Patient was given ativan 2 mg po. Patient was somewhat hesitant to take the ativan, but did so, with encouragement.  Jorgito Ridley R.N.
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
## 2023-03-09 NOTE — PROGRESS NOTES
Patient was awake at 01:10 & pushed recliner down, but then put it right back up & said that he had to urinate. With assist of another staff, patient was assisted to a standing position & patient used the urinal, but also was incontinent. Patient's pants were changed & patient was given a wipe to clean himself & the chair was cleaned off. Patient was also given water to drink. Patient had much difficulty standing up, but appeared very drowsy. Patient was back to sleep by 01:30.  Iza Ridley R.N.

## 2023-03-09 NOTE — PLAN OF CARE
Problem: Risk for Elopement  Goal: Patient will not exit the unit/facility without proper excort  Outcome: Not Progressing  Flowsheets (Taken 3/9/2023 1053)  Nursing Interventions for Elopement Risk: Assist with personal care needs such as toileting, eating, dressing, as needed to reduce the risk of wandering     Problem: Coping  Goal: Pt/Family able to verbalize concerns and demonstrate effective coping strategies  Description: INTERVENTIONS:  1. Assist patient/family to identify coping skills, available support systems and cultural and spiritual values  2. Provide emotional support, including active listening and acknowledgement of concerns of patient and caregivers  3. Reduce environmental stimuli, as able  4. Instruct patient/family in relaxation techniques, as appropriate  5. Assess for spiritual pain/suffering and initiate Spiritual Care, Psychosocial Clinical Specialist consults as needed  Outcome: Not Progressing     Problem: Behavior  Goal: Pt/Family maintain appropriate behavior and adhere to behavioral management agreement, if implemented  Description: INTERVENTIONS:  1. Assess patient/family's coping skills and  non-compliant behavior (including use of illegal substances)  2. Notify security of behavior or suspected illegal substances which indicate the need for search of the family and/or belongings  3. Encourage verbalization of thoughts and concerns in a socially appropriate manner  4. Utilize positive, consistent limit setting strategies supporting safety of patient, staff and others  5. Encourage participation in the decision making process about the behavioral management agreement  6. If a visitor's behavior poses a threat to safety call refer to organization policy.  7. Initiate consult with , Psychosocial CNS, Spiritual Care as appropriate  Outcome: Not Progressing     Problem: Involuntary Admit  Goal: Will cooperate with staff recommendations and doctor's orders and will  demonstrate appropriate behavior  Description: INTERVENTIONS:  1. Treat underlying conditions and offer medication as ordered  2. Educate regarding involuntary admission procedures and rules  3. Contain excessive/inappropriate behavior per unit and hospital policies  Outcome: Not Progressing     Problem: Psychosis  Goal: Will report no hallucinations or delusions  Description: INTERVENTIONS:  1. Administer medication as  ordered  2. Assist with reality testing to support increasing orientation  3. Assess if patient's hallucinations or delusions are encouraging self harm or harm to others and intervene as appropriate  Outcome: Not Progressing     Problem: Anxiety  Goal: Will report anxiety at manageable levels  Description: INTERVENTIONS:  1. Administer medication as ordered  2. Teach and rehearse alternative coping skills  3.  Provide emotional support with 1:1 interaction with staff  Outcome: Not Progressing  Flowsheets (Taken 3/9/2023 1053)  Will report anxiety at manageable levels:   Administer medication as ordered   Provide emotional support with 1:1 interaction with staff     Problem: Pain  Goal: Verbalizes/displays adequate comfort level or baseline comfort level  Outcome: Not Progressing     Problem: Safety - Adult  Goal: Free from fall injury  Outcome: Not Progressing

## 2023-03-10 PROCEDURE — 99233 SBSQ HOSP IP/OBS HIGH 50: CPT | Performed by: PSYCHIATRY & NEUROLOGY

## 2023-03-10 PROCEDURE — 6360000002 HC RX W HCPCS: Performed by: PSYCHIATRY & NEUROLOGY

## 2023-03-10 PROCEDURE — 1240000000 HC EMOTIONAL WELLNESS R&B

## 2023-03-10 PROCEDURE — 6370000000 HC RX 637 (ALT 250 FOR IP): Performed by: PSYCHIATRY & NEUROLOGY

## 2023-03-10 PROCEDURE — 2500000003 HC RX 250 WO HCPCS: Performed by: PSYCHIATRY & NEUROLOGY

## 2023-03-10 PROCEDURE — 6370000000 HC RX 637 (ALT 250 FOR IP)

## 2023-03-10 PROCEDURE — 6360000002 HC RX W HCPCS

## 2023-03-10 RX ORDER — FLUPHENAZINE HYDROCHLORIDE 10 MG/1
10 TABLET ORAL 2 TIMES DAILY
Status: DISCONTINUED | OUTPATIENT
Start: 2023-03-10 | End: 2023-03-13

## 2023-03-10 RX ORDER — ZIPRASIDONE MESYLATE 20 MG/ML
20 INJECTION, POWDER, LYOPHILIZED, FOR SOLUTION INTRAMUSCULAR
Status: DISCONTINUED | OUTPATIENT
Start: 2023-03-10 | End: 2023-03-11

## 2023-03-10 RX ORDER — LORAZEPAM 2 MG/ML
2 INJECTION INTRAMUSCULAR ONCE
Status: COMPLETED | OUTPATIENT
Start: 2023-03-10 | End: 2023-03-10

## 2023-03-10 RX ORDER — DIVALPROEX SODIUM 500 MG/1
1000 TABLET, EXTENDED RELEASE ORAL 2 TIMES DAILY
Status: DISCONTINUED | OUTPATIENT
Start: 2023-03-10 | End: 2023-03-13

## 2023-03-10 RX ORDER — LORAZEPAM 2 MG/ML
2 INJECTION INTRAMUSCULAR
Status: DISCONTINUED | OUTPATIENT
Start: 2023-03-10 | End: 2023-03-11

## 2023-03-10 RX ORDER — LORAZEPAM 2 MG/ML
2 INJECTION INTRAMUSCULAR
Status: DISCONTINUED | OUTPATIENT
Start: 2023-03-10 | End: 2023-03-10

## 2023-03-10 RX ORDER — FLUPHENAZINE HYDROCHLORIDE 2.5 MG/ML
10 INJECTION, SOLUTION INTRAMUSCULAR 2 TIMES DAILY
Status: DISCONTINUED | OUTPATIENT
Start: 2023-03-10 | End: 2023-03-13

## 2023-03-10 RX ADMIN — FLUPHENAZINE HYDROCHLORIDE 10 MG: 10 TABLET, FILM COATED ORAL at 21:02

## 2023-03-10 RX ADMIN — CHLORPROMAZINE HYDROCHLORIDE 100 MG: 25 INJECTION INTRAMUSCULAR at 07:38

## 2023-03-10 RX ADMIN — FLUTICASONE PROPIONATE 1 SPRAY: 50 SPRAY, METERED NASAL at 07:23

## 2023-03-10 RX ADMIN — BENZTROPINE MESYLATE 1 MG: 1 TABLET ORAL at 07:24

## 2023-03-10 RX ADMIN — LORAZEPAM 2 MG: 2 INJECTION INTRAMUSCULAR; INTRAVENOUS at 14:07

## 2023-03-10 RX ADMIN — DIPHENHYDRAMINE HYDROCHLORIDE 50 MG: 50 INJECTION, SOLUTION INTRAMUSCULAR; INTRAVENOUS at 14:06

## 2023-03-10 RX ADMIN — FLUPHENAZINE HYDROCHLORIDE 10 MG: 2.5 INJECTION, SOLUTION INTRAMUSCULAR at 13:18

## 2023-03-10 RX ADMIN — ATORVASTATIN CALCIUM 10 MG: 10 TABLET, FILM COATED ORAL at 07:24

## 2023-03-10 RX ADMIN — DIVALPROEX SODIUM 1000 MG: 500 TABLET, FILM COATED, EXTENDED RELEASE ORAL at 21:02

## 2023-03-10 RX ADMIN — ACETAMINOPHEN 650 MG: 325 TABLET ORAL at 21:36

## 2023-03-10 RX ADMIN — CHLORTHALIDONE 12.5 MG: 25 TABLET ORAL at 07:50

## 2023-03-10 RX ADMIN — IBUPROFEN 600 MG: 600 TABLET, FILM COATED ORAL at 21:37

## 2023-03-10 RX ADMIN — DOCUSATE SODIUM 200 MG: 100 CAPSULE, LIQUID FILLED ORAL at 21:59

## 2023-03-10 RX ADMIN — ACETAMINOPHEN 650 MG: 325 TABLET ORAL at 03:28

## 2023-03-10 RX ADMIN — BENZTROPINE MESYLATE 1 MG: 1 TABLET ORAL at 21:02

## 2023-03-10 ASSESSMENT — PAIN - FUNCTIONAL ASSESSMENT
PAIN_FUNCTIONAL_ASSESSMENT: PREVENTS OR INTERFERES SOME ACTIVE ACTIVITIES AND ADLS
PAIN_FUNCTIONAL_ASSESSMENT: ACTIVITIES ARE NOT PREVENTED

## 2023-03-10 ASSESSMENT — PAIN DESCRIPTION - LOCATION
LOCATION: LEG;KNEE
LOCATION: KNEE
LOCATION: LEG

## 2023-03-10 ASSESSMENT — PAIN DESCRIPTION - ORIENTATION
ORIENTATION: RIGHT;LEFT;LOWER
ORIENTATION: LEFT;RIGHT
ORIENTATION: RIGHT;LEFT

## 2023-03-10 ASSESSMENT — PAIN DESCRIPTION - DESCRIPTORS
DESCRIPTORS: ACHING
DESCRIPTORS: ACHING;THROBBING

## 2023-03-10 ASSESSMENT — PAIN SCALES - GENERAL
PAINLEVEL_OUTOF10: 3
PAINLEVEL_OUTOF10: 3
PAINLEVEL_OUTOF10: 8
PAINLEVEL_OUTOF10: 8

## 2023-03-10 NOTE — BH NOTE
Attempted to give scheduled med's pt refused throws applesauce at this nurse and starts screaming and cursing, threats towards Mummsie, and our staff. IM med's given per order. [place] pt's guardian called asked that his Mother not come to visit today d/t Volital behavior of this pt and her safety.

## 2023-03-10 NOTE — PLAN OF CARE
Problem: Coping  Goal: Pt/Family able to verbalize concerns and demonstrate effective coping strategies  Description: INTERVENTIONS:  1. Assist patient/family to identify coping skills, available support systems and cultural and spiritual values  2. Provide emotional support, including active listening and acknowledgement of concerns of patient and caregivers  3. Reduce environmental stimuli, as able  4. Instruct patient/family in relaxation techniques, as appropriate  5. Assess for spiritual pain/suffering and initiate Spiritual Care, Psychosocial Clinical Specialist consults as needed  3/10/2023 0815 by Ken Wilson LPN  Outcome: Progressing  3/10/2023 0310 by Odilia Dancer, RN  Outcome: Not Progressing     Problem: Behavior  Goal: Pt/Family maintain appropriate behavior and adhere to behavioral management agreement, if implemented  Description: INTERVENTIONS:  1. Assess patient/family's coping skills and  non-compliant behavior (including use of illegal substances)  2. Notify security of behavior or suspected illegal substances which indicate the need for search of the family and/or belongings  3. Encourage verbalization of thoughts and concerns in a socially appropriate manner  4. Utilize positive, consistent limit setting strategies supporting safety of patient, staff and others  5. Encourage participation in the decision making process about the behavioral management agreement  6. If a visitor's behavior poses a threat to safety call refer to organization policy. 7. Initiate consult with , Psychosocial CNS, Spiritual Care as appropriate  3/10/2023 0815 by Ken Wilson LPN  Outcome: Progressing  3/10/2023 0310 by Odilia Dancer, RN  Outcome: Not Progressing     Problem: Psychosis  Goal: Will report no hallucinations or delusions  Description: INTERVENTIONS:  1. Administer medication as  ordered  2. Assist with reality testing to support increasing orientation  3.  Assess if patient's hallucinations or delusions are encouraging self harm or harm to others and intervene as appropriate  3/10/2023 0815 by Romie Dowell LPN  Outcome: Progressing  3/10/2023 0310 by Loida Farley RN  Outcome: Not Progressing     Problem: Anxiety  Goal: Will report anxiety at manageable levels  Description: INTERVENTIONS:  1. Administer medication as ordered  2. Teach and rehearse alternative coping skills  3. Provide emotional support with 1:1 interaction with staff  3/10/2023 0815 by Romie Dowell LPN  Outcome: Progressing  3/10/2023 0310 by Loida Farley RN  Outcome: Not Progressing   Continued anxiety, restlessness, agitation, continued screaming and cursing at staff. IM thorazine give pt throws pill into nurses station, then throws his water at this nurse.

## 2023-03-10 NOTE — PROGRESS NOTES
Patient yelling and screaming. \"Fuck you, Fuck you and Fuck you! Send me to retirement right fucking now! I have shit I need to do. Patient continues to yell and scream louder when ask to calm down.

## 2023-03-10 NOTE — PROGRESS NOTES
Patient sitting in chair in dayroom talking to unseen others . Patient stated,\"Me and you are going to [facility] in the morning. I'm going to tear this place up and we will get out of here. \"
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
## 2023-03-10 NOTE — PROGRESS NOTES
Patient continues to be verbally aggressive. Patient is making threats to kill staff. Patient attempting to open door into nurses station stating he was going to destroy everything.

## 2023-03-10 NOTE — PROGRESS NOTES
Patient sitting in dayroom verbally aggressive and yelling. Patient becomes louder when attempting to redirect.

## 2023-03-10 NOTE — PROGRESS NOTES
Patient currently sitting in chair in dayroom. Patient appears calm at this time. Patient given snacks and fluids.

## 2023-03-10 NOTE — PROGRESS NOTES
Department of Psychiatry  Progress Note    Patient's chart was reviewed. Discussed with treatment team. Met with patient. SUBJECTIVE:      Agitated overnight. Slightly better this morning - less irritable with me. Continues to RTIS. Has received 8mg of ativan in the last 24 hrs. Some EPS with the increased dose of Thorazine - is accepting cogentin twice daily. ROS:   Patient has new complaints: no  Sleeping adequately:  no  Appetite adequate: yes  Engaged in programming: no    OBJECTIVE:  VITALS:  /70   Pulse 94   Temp 97.3 °F (36.3 °C) (Oral)   Resp 18   Ht 6' 1\" (1.854 m)   Wt 240 lb (108.9 kg)   SpO2 96%   BMI 31.66 kg/m²     Mental Status Examination:    Appearance: poor grooming and hygiene  Behavior/Attitude toward examiner:  less agitated  Speech: elevated   Mood:  \"are we friends\"  Affect:  elevated    Thought processes:  disorganized  Thought Content: no SI, no HI, + delusional   Perceptions: +RTIS  Attention: limited   Abstraction: impaired  Cognition:  limited  Insight: impaired   Judgment: impaired    Medication:  Scheduled:   chlorproMAZINE  300 mg Oral BID    Or    chlorproMAZINE  100 mg IntraMUSCular BID    benztropine  1 mg Oral BID    fluticasone  1 spray Each Nostril Daily    atorvastatin  10 mg Oral Daily    chlorthalidone  12.5 mg Oral Daily    potassium chloride  40 mEq Oral Once        PRN:  ibuprofen, OLANZapine zydis, OLANZapine, acetaminophen, magnesium hydroxide, nicotine polacrilex, aluminum & magnesium hydroxide-simethicone, diphenhydrAMINE, docusate sodium     ASSESSMENT AND PLAN:  57 y.o. who presented  to [facility] on a SOB after police were called by brother regarding patient erratic behavior at home. Principal Problem:    Schizophrenia ([address]  Active Problems:    Tachycardia    Essential hypertension    Hyperlipidemia    Tardive dyskinesia  Resolved Problems:    Hypokalemia     Plan: 1. Admitted to psychiatry for stabilization and treatment. 2.On admission, ordered q15min checks for safety, programming, and prn medication for anxiety, agitation, and insomnia. 2/25/2023 - Aristada Initio 675mg and Aristada 1064mb given without incident. 2/27/2023 - making gains. 2/28/2023 - continues to make gains. Discontinue constant observation. 3/1/2023 - remains severely symptomatic. 3/2/2023 - Last admission required about 15 days before achieving enough stability to go home. 3/3/2023 - consider first generation antipsychotic as adjunctive treatment. 3/4/2023 - schedule thorazine 200mg PO QHS. Increase prn doses. 3/6/2023 - increased thorazine to 300mg QHS.     3/8/2023 - Thorazine 300mg PO with IM backup (100mg) pending approval from guardian. Chose thorazine because it's something he typically agrees to take. 3/9/2023 - discontinue ativan prn - possibly disinhibiting? Replace with prn zyprexa PO/IM    3. Hospitalist consult for admission. #Hypokalemia - resolved on repeat. -declined replacement      #HTN  -resume HCTZ at request of patient      #HLD   -on statin     Irregular HR  -EKG Sinus rhythm, with PVC's  -HR improved on repeat vitals  -monitor for now. 4. Voluntary by guardian. KAMERON 15-20 days. Total time with patient was 50 minutes and more than 50 % of that time was spent counseling the patient on their symptoms, treatment, and expected goals.      Yumiko Barbosa MD

## 2023-03-10 NOTE — PROGRESS NOTES
Patient complained of bilateral leg, foot and knee pain 8/10. Patient given Ibuprofen and Tylenol per MD order. See MAR.

## 2023-03-10 NOTE — PROGRESS NOTES
Assisted patient to room. Patient then attempted to touch staff breast and groin area. Asked staff if she wanted to see his \"pee pee\". Patient started laughing when staff told him that he was not allowed to touch and it was inappropriate.

## 2023-03-10 NOTE — PROGRESS NOTES
Patient requested and given Tylenol 650mg for bilateral knee and lower leg pain.  See [facility]
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
## 2023-03-10 NOTE — PLAN OF CARE
Problem: Coping  Goal: Pt/Family able to verbalize concerns and demonstrate effective coping strategies  Description: INTERVENTIONS:  1. Assist patient/family to identify coping skills, available support systems and cultural and spiritual values  2. Provide emotional support, including active listening and acknowledgement of concerns of patient and caregivers  3. Reduce environmental stimuli, as able  4. Instruct patient/family in relaxation techniques, as appropriate  5. Assess for spiritual pain/suffering and initiate Spiritual Care, Psychosocial Clinical Specialist consults as needed  3/10/2023 0310 by Nivia Núñez RN  Outcome: Not Progressing     Problem: Behavior  Goal: Pt/Family maintain appropriate behavior and adhere to behavioral management agreement, if implemented  Description: INTERVENTIONS:  1. Assess patient/family's coping skills and  non-compliant behavior (including use of illegal substances)  2. Notify security of behavior or suspected illegal substances which indicate the need for search of the family and/or belongings  3. Encourage verbalization of thoughts and concerns in a socially appropriate manner  4. Utilize positive, consistent limit setting strategies supporting safety of patient, staff and others  5. Encourage participation in the decision making process about the behavioral management agreement  6. If a visitor's behavior poses a threat to safety call refer to organization policy. 7. Initiate consult with , Psychosocial CNS, Spiritual Care as appropriate  3/10/2023 0310 by Nivia Núñez RN  Outcome: Not Progressing    Problem: Psychosis  Goal: Will report no hallucinations or delusions  Description: INTERVENTIONS:  1. Administer medication as  ordered  2. Assist with reality testing to support increasing orientation  3.  Assess if patient's hallucinations or delusions are encouraging self harm or harm to others and intervene as appropriate  3/10/2023 0310 by Myriam Kirk RN  Outcome: Not Progressing    Problem: Anxiety  Goal: Will report anxiety at manageable levels  Description: INTERVENTIONS:  1. Administer medication as ordered  2. Teach and rehearse alternative coping skills  3.  Provide emotional support with 1:1 interaction with staff  3/10/2023 0310 by Myriam Kirk RN  Outcome: Not Progressing
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
## 2023-03-10 NOTE — PROGRESS NOTES
Patient in bed talking to self. Patient making sexually inappropriate comments to staff. When attempting to redirect, patient starts laughing.

## 2023-03-11 PROCEDURE — 99232 SBSQ HOSP IP/OBS MODERATE 35: CPT

## 2023-03-11 PROCEDURE — 2500000003 HC RX 250 WO HCPCS: Performed by: PSYCHIATRY & NEUROLOGY

## 2023-03-11 PROCEDURE — 6370000000 HC RX 637 (ALT 250 FOR IP)

## 2023-03-11 PROCEDURE — 1240000000 HC EMOTIONAL WELLNESS R&B

## 2023-03-11 PROCEDURE — 6370000000 HC RX 637 (ALT 250 FOR IP): Performed by: PSYCHIATRY & NEUROLOGY

## 2023-03-11 PROCEDURE — 2580000003 HC RX 258

## 2023-03-11 RX ORDER — WATER 1000 ML/1000ML
INJECTION, SOLUTION INTRAVENOUS
Status: COMPLETED
Start: 2023-03-11 | End: 2023-03-11

## 2023-03-11 RX ORDER — LORAZEPAM 2 MG/1
2 TABLET ORAL ONCE
Status: COMPLETED | OUTPATIENT
Start: 2023-03-11 | End: 2023-03-11

## 2023-03-11 RX ADMIN — BENZTROPINE MESYLATE 1 MG: 1 TABLET ORAL at 08:31

## 2023-03-11 RX ADMIN — OLANZAPINE 10 MG: 10 INJECTION, POWDER, FOR SOLUTION INTRAMUSCULAR at 13:19

## 2023-03-11 RX ADMIN — BENZTROPINE MESYLATE 1 MG: 1 TABLET ORAL at 21:03

## 2023-03-11 RX ADMIN — FLUPHENAZINE HYDROCHLORIDE 10 MG: 10 TABLET, FILM COATED ORAL at 08:31

## 2023-03-11 RX ADMIN — DIVALPROEX SODIUM 1000 MG: 500 TABLET, FILM COATED, EXTENDED RELEASE ORAL at 21:03

## 2023-03-11 RX ADMIN — LORAZEPAM 2 MG: 2 TABLET ORAL at 15:49

## 2023-03-11 RX ADMIN — ACETAMINOPHEN 650 MG: 325 TABLET ORAL at 15:49

## 2023-03-11 RX ADMIN — ACETAMINOPHEN 650 MG: 325 TABLET ORAL at 21:53

## 2023-03-11 RX ADMIN — WATER 10 ML: 1 INJECTION INTRAMUSCULAR; INTRAVENOUS; SUBCUTANEOUS at 23:39

## 2023-03-11 RX ADMIN — FLUTICASONE PROPIONATE 1 SPRAY: 50 SPRAY, METERED NASAL at 08:32

## 2023-03-11 RX ADMIN — FLUPHENAZINE HYDROCHLORIDE 10 MG: 10 TABLET, FILM COATED ORAL at 21:03

## 2023-03-11 RX ADMIN — OLANZAPINE 10 MG: 10 INJECTION, POWDER, FOR SOLUTION INTRAMUSCULAR at 23:38

## 2023-03-11 RX ADMIN — CHLORTHALIDONE 12.5 MG: 25 TABLET ORAL at 08:34

## 2023-03-11 RX ADMIN — IBUPROFEN 600 MG: 600 TABLET, FILM COATED ORAL at 15:49

## 2023-03-11 RX ADMIN — ATORVASTATIN CALCIUM 10 MG: 10 TABLET, FILM COATED ORAL at 08:31

## 2023-03-11 ASSESSMENT — PAIN DESCRIPTION - LOCATION: LOCATION: LEG

## 2023-03-11 ASSESSMENT — PAIN - FUNCTIONAL ASSESSMENT: PAIN_FUNCTIONAL_ASSESSMENT: ACTIVITIES ARE NOT PREVENTED

## 2023-03-11 ASSESSMENT — PAIN SCALES - GENERAL
PAINLEVEL_OUTOF10: 5
PAINLEVEL_OUTOF10: 6

## 2023-03-11 ASSESSMENT — PAIN DESCRIPTION - DESCRIPTORS: DESCRIPTORS: ACHING

## 2023-03-11 ASSESSMENT — PAIN DESCRIPTION - ORIENTATION: ORIENTATION: RIGHT;LEFT

## 2023-03-11 NOTE — BH NOTE
Patient has been pleasant and cooperative since ativan and pain medication administered. Gait is still slow and shuffling but patient reports that \"pain in knees is better\". Cooperative with EVS cleaning dayroom area and his room, bathroom. Patient reported that he had a BM this evening. Appetite and fluid intake good this shift. At present time patient is listening to music and interacting appropriately with staff. Affect brightened.
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
## 2023-03-11 NOTE — PROGRESS NOTES
"  Outpatient Physical Therapy  DAILY TREATMENT     [facility] [place]  [facility], [address]  [place] [address]  Phone:  [phone]    Date: 01/10/2023    Patient: Gil Haskins  YOB: 1993  MRN: [id number]     Time Calculation    Start time: 0415  Stop time: 0500 Time Calculation (min): 45 minutes         Chief Complaint: Back Problem    Visit #: 27    SUBJECTIVE:  The patient reports having more pain sitting at work to his mid back area; no more cramping to the torso.    OBJECTIVE:  Current objective measures:       Decrease tightness bilateral rhomboids/ mid to upper traps; improve strengthening    Therapeutic Exercises (CPT 38341):     1. Asher deadlifts, 1x10 reps @ 10#    2. chair lat rows, 2 x10 reps each @ 20#, x    3. overhead lifts, 2 x10 reps (green tb), blue tb next visit    4. bridges (hamstring curls position), 1x10 reps 5 sec hold    5. shoulder retractions, 1 x20 reps (orange tb), x    6. leg press, 7B @ 1 x15 reps single stance on each    7. standing flies, 1 x10 reps (black tb), x    8. alternating lunges, 2 x 20 reps, with 20# each hand    9. eccentric quads, 2 x10 reps @ 6 #, 22.5 \" seat height    10. Upright bike, seat 8  L4.5  4 minutes    11. 3 way hip, 2 x10 reps (blue tb) each LE    12. supine oblique crunches, 1 x 10 each side    13. chair squats SL, 10x    14. trunk extension in prone, 10x, x    15. superman alt UE/LE, 12x, x    16. open the book, 5x 10 sec, x    17. PNF D1/D2 sh extension/flex, 2 x12 steps each (blue tb)    18. foam roller/ abs/ posterior tilts, 3'    19. BOSU squats, 2 x10 reps    20. push ups angled on exam table, 10x, x    Therapeutic Treatments and Modalities:     2. Neuromuscular Re-education (CPT 48939), (R) knee, steps, lunges, bridges, squats, clean and jerk using barbell, deadlifts  Time-based treatments/modalities:    Physical Therapy Timed Treatment Charges  Manual therapy minutes (CPT 31441): 15 minutes  Neuromusc re-ed, " Pt walked to desk and stated he wanted to lay down in his bed. Asked writer to accompany him. Walked with pt, who still seems to be having bilateral leg pain but he is not able to clearly communicate pain level or details about pain. There is the consideration, also, that pt's need for writer to accompany him is behavioral, but he does have chronic pain. Pt safely in bed. Writer placed urinal next to bed and reminded him that he may use that if it is easier. Pt laying down at this time. balance, coor, post minutes (CPT 15566): 15 minutes  Therapeutic exercise minutes (CPT 80513): 15 minutes    ASSESSMENT:   Response to treatment:   CPA's grade 3-4 to T2-3, T3-4, T4-T5; tenderness to the (L) side at T2-3; patient reported less pain following manual therapy techniques. Foam roller to the thoracic region; decreased tightness in connective tissue      PLAN/RECOMMENDATIONS:   Plan for treatment: therapy treatment to continue next visit.  Planned interventions for next visit: continue with current treatment.
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
## 2023-03-11 NOTE — PROGRESS NOTES
Pt woke up, sitting on side of bed. Retrieved blanket from floor and is now laying back down in the bed.

## 2023-03-11 NOTE — PROGRESS NOTES
Pt up briefly to bathroom independently. Returned to bed. No behavioral issues at this time. Pt did remove his gripper socks and placed them on the floor along with his blanket before going back to sleep.

## 2023-03-11 NOTE — FLOWSHEET NOTE
Patient refuses to wear gripper socks. Patient refuses to wear a fall risk bracelet. Gait slow, steady  and shuffling this shift d/t increased of bilateral knee pain. No assistive devices used. No prn medication received thus far on this shift. Patient up ad terri within the unit. No falls this shift.

## 2023-03-11 NOTE — PLAN OF CARE
Problem: Risk for Elopement  Goal: Patient will not exit the unit/facility without proper excort  Outcome: Progressing     Problem: Pain  Goal: Verbalizes/displays adequate comfort level or baseline comfort level  Outcome: Progressing     Problem: Safety - Adult  Goal: Free from fall injury  Outcome: Progressing     Problem: Coping  Goal: Pt/Family able to verbalize concerns and demonstrate effective coping strategies  Description: INTERVENTIONS:  1. Assist patient/family to identify coping skills, available support systems and cultural and spiritual values  2. Provide emotional support, including active listening and acknowledgement of concerns of patient and caregivers  3. Reduce environmental stimuli, as able  4. Instruct patient/family in relaxation techniques, as appropriate  5. Assess for spiritual pain/suffering and initiate Spiritual Care, Psychosocial Clinical Specialist consults as needed  Outcome: Not Progressing     Problem: Behavior  Goal: Pt/Family maintain appropriate behavior and adhere to behavioral management agreement, if implemented  Description: INTERVENTIONS:  1. Assess patient/family's coping skills and  non-compliant behavior (including use of illegal substances)  2. Notify security of behavior or suspected illegal substances which indicate the need for search of the family and/or belongings  3. Encourage verbalization of thoughts and concerns in a socially appropriate manner  4. Utilize positive, consistent limit setting strategies supporting safety of patient, staff and others  5. Encourage participation in the decision making process about the behavioral management agreement  6. If a visitor's behavior poses a threat to safety call refer to organization policy.   7. Initiate consult with , Psychosocial CNS, Spiritual Care as appropriate  Outcome: Not Progressing     Problem: Involuntary Admit  Goal: Will cooperate with staff recommendations and doctor's orders and will demonstrate appropriate behavior  Description: INTERVENTIONS:  1. Treat underlying conditions and offer medication as ordered  2. Educate regarding involuntary admission procedures and rules  3. Contain excessive/inappropriate behavior per unit and hospital policies  Outcome: Not Progressing     Problem: Psychosis  Goal: Will report no hallucinations or delusions  Description: INTERVENTIONS:  1. Administer medication as  ordered  2. Assist with reality testing to support increasing orientation  3. Assess if patient's hallucinations or delusions are encouraging self harm or harm to others and intervene as appropriate  Outcome: Not Progressing     Problem: Anxiety  Goal: Will report anxiety at manageable levels  Description: INTERVENTIONS:  1. Administer medication as ordered  2. Teach and rehearse alternative coping skills  3. Provide emotional support with 1:1 interaction with staff  Outcome: Not Progressing  Pt was highly aggressive physically and verbally at start of shift. Threatened staff - \"I will kill everyone in here\", said to writer \"I'll smash your head against this wall! \" Pt expressed anger that he is still here, anger towards . Pt urinated in day room purposely at one point and threw urine-soaked towel over desk. Lots of screaming and cursing towards writer. Verbal redirection and playing pt's favorite music initially minimally effective. Pt began calming down around 2130; remains sitting in recliner in day room, RTIS, listening to music. Took scheduled evening meds.

## 2023-03-11 NOTE — PROGRESS NOTES
Pt continues to talk about fluorescent lights and mercury, insists that there is \" gas\" coming up through drains because there isn't a correct elbow pipe in place, and that there is \"evil in the bedroom\" meaning his room. When trying to reorient and offered reassurance about these things, pt keeps saying \"I'm just trying to make amends\" (for his behavior yesterday). Advised he doesn't need to make amends, but to just display better behaviors that will help improve his condition. Pt continues to repeat himself. Still calm at this time.
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
## 2023-03-11 NOTE — PROGRESS NOTES
Department of Psychiatry  Progress Note    Patient's chart was reviewed. Discussed with treatment team. Met with patient. SUBJECTIVE:      Remains severely agitated, delusional, and impaired. There is a more of a manic component to his presentation this admission. Threatens me when approached and invades my personal space. Has been throwing food at/on staff. Throwing objects. ROS:   Patient has new complaints: no  Sleeping adequately:  no  Appetite adequate: yes  Engaged in programming: no    OBJECTIVE:  VITALS:  /80   Pulse 94   Temp 97.8 °F (36.6 °C) (Oral)   Resp 16   Ht 6' 1\" (1.854 m)   Wt 240 lb (108.9 kg)   SpO2 97%   BMI 31.66 kg/m²     Mental Status Examination:    Appearance: poor grooming and hygiene  Behavior/Attitude toward examiner:  agitated  Speech: elevated. Loud. Push of speech. Mood:  I'm going to kill you\"  Affect:  elevated    Thought processes:  disorganized  Thought Content: no SI, no HI, + delusional   Perceptions: +RTIS  Attention: limited   Abstraction: impaired  Cognition:  limited  Insight: impaired   Judgment: impaired    Medication:  Scheduled:   fluPHENAZine HCl  10 mg Oral BID    Or    fluPHENAZine HCl  10 mg IntraMUSCular BID    divalproex  1,000 mg Oral BID    benztropine  1 mg Oral BID    fluticasone  1 spray Each Nostril Daily    atorvastatin  10 mg Oral Daily    chlorthalidone  12.5 mg Oral Daily    potassium chloride  40 mEq Oral Once        PRN:  ziprasidone, LORazepam, ibuprofen, OLANZapine zydis, OLANZapine, acetaminophen, magnesium hydroxide, nicotine polacrilex, aluminum & magnesium hydroxide-simethicone, diphenhydrAMINE, docusate sodium     ASSESSMENT AND PLAN:  57 y.o. who presented  to [facility] on a SOB after police were called by brother regarding patient erratic behavior at home.     Principal Problem:    Schizophrenia ([address]  Active Problems:    Tachycardia    Essential hypertension    Hyperlipidemia    Tardive dyskinesia  Resolved Problems:    Hypokalemia     Plan: 1. Admitted to psychiatry for stabilization and treatment. 2.On admission, ordered q15min checks for safety, programming, and prn medication for anxiety, agitation, and insomnia. 2/25/2023 - Aristada Initio 675mg and Aristada 1064mb given without incident. 2/27/2023 - making gains. 2/28/2023 - continues to make gains. Discontinue constant observation. 3/1/2023 - remains severely symptomatic. 3/2/2023 - Last admission required about 15 days before achieving enough stability to go home. 3/3/2023 - consider first generation antipsychotic as adjunctive treatment. 3/4/2023 - schedule thorazine 200mg PO QHS. Increase prn doses. 3/6/2023 - increased thorazine to 300mg QHS.     3/8/2023 - Thorazine 300mg PO with IM backup (100mg) pending approval from guardian. Chose thorazine because it's something he typically agrees to take. 3/9/2023 - discontinue ativan prn - possibly disinhibiting? Replace with prn zyprexa PO/IM    3/10/2023 - discontinue thorazine. Start Prolixin 10mg BID with IM backup (will transition to decanoate)  and depakote 1000mg BID for mood stabilization. 3. Hospitalist consult for admission. #Hypokalemia - resolved on repeat. -declined replacement      #HTN  -resume HCTZ at request of patient      #HLD   -on statin     Irregular HR  -EKG Sinus rhythm, with PVC's  -HR improved on repeat vitals  -monitor for now. 4. Voluntary by guardian. DONOVANOS 15-20 days. Total time with patient was 50 minutes and more than 50 % of that time was spent counseling the patient on their symptoms, treatment, and expected goals.      Yessy Damian MD

## 2023-03-11 NOTE — PROGRESS NOTES
Pt still in recliner, comparatively calmer. Given a snack and small cup of water, which he drank, and then he took his scheduled medications. Eating snack and listening to music at this time.

## 2023-03-11 NOTE — PROGRESS NOTES
Pt is awake, at TS, talking as well as RTIS. Given snack, cup of ice water, and mouthwash by request. Calm at this time and now sitting in recliner in day room. Preoccupied with \"mercury poisoning\" from the fluorescent lights in the dayroom; insists that he can \"smell\" them leaking. Pt was reportedly having olfactory hallucinations prior to admission this time. Pt also apologizing for \"shouting\" last night, but greatly minimizing his actions (laughing and saying \"Was I that bad?\"). Encouraged him to maintain better behavior today. Pt continues with FOL, talking about being vitamin-deficient, focused on getting multiple doses of Colace at once, insisting that music being played for him is not Marshia Climes but [employer]" music. Pt began talking again about leaving today and reminded that there is no order for discharge at this time.

## 2023-03-11 NOTE — PROGRESS NOTES
Pt slamming door into wall, beating door into nursing station with fist. Attempting to redirect verbally with minimal success. Continues to scream, curse, and demand things. Tried to speak calmly with pt multiple times about behavior and this was met with continued cursing and screeching. Pt finally sat back down in recliner. Writer put music back on for pt.

## 2023-03-11 NOTE — PROGRESS NOTES
Writer removed urinal from pt's room as he has been using the bathroom independently and without issues. Given his propensity for throwing items, removing urinal as a safety precaution.

## 2023-03-11 NOTE — PROGRESS NOTES
Pt appeared to be in pain, while standing at desk to get a fruit cup. Unable to rate pain due to mental status, but indicated pain was in his bilateral calves. Given PRN ibuprofen and tylenol. Now reclining in chair. Also convinced pt to put another pair of slip-resistant socks on for safety.

## 2023-03-11 NOTE — PROGRESS NOTES
Pt marginally calmer. Asked for some of his clothes. Given boxers, pants, and shirt - all free of strings. Pt sitting back in recliner.

## 2023-03-11 NOTE — BH NOTE
Face to face end of shift report received from Nya CAMARILLO RN. Rounding completed. Patient observed in the MHICU asking to come out to the open unit.     Eddie Bob  [birth year]21[birth year]2019  9:13 AM           Mother's caregiver called facility to request to bring Mom in for a visit, caregiver requested that staff ask the patient prior to them coming. Patient reported \"yes\" to the visit but then behavior started to escalate when he was informed that there was no discharge order at this time so his mother would not be able to take him home without a physician order. Patient began screaming at staff and verbally threatening them. Patient then began throwing food and fluids toward the nurses station and [employer] staff. Yelled that he did not want his mother to visit. Told staff to \"die a horrible death\", \"I hope you suffer you assholes\". Refused oral medications. Zyprexa IM administered per order, patient willing to take the injection. Patient tolerated procedure well. Medication was effective, patient sitting in dayroom laughing and talking to himself at this time.

## 2023-03-11 NOTE — BH NOTE
Patient escalating as evidenced by yelling at staff and demanding to leave facility, exit seeking. JANELLE Carrasquillo notified of patient's escalating behavior, new orders received and noted. Medicated with ativan orally, tylenol and ibuprofen administered for complaints of bilateral knee pain and teeth pain. Sitting in dayroom interacting appropriately with staff following medication administration.

## 2023-03-12 PROCEDURE — 6370000000 HC RX 637 (ALT 250 FOR IP): Performed by: PSYCHIATRY & NEUROLOGY

## 2023-03-12 PROCEDURE — 6370000000 HC RX 637 (ALT 250 FOR IP)

## 2023-03-12 PROCEDURE — 1240000000 HC EMOTIONAL WELLNESS R&B

## 2023-03-12 RX ORDER — LORAZEPAM 2 MG/ML
2 INJECTION INTRAMUSCULAR ONCE
Status: DISCONTINUED | OUTPATIENT
Start: 2023-03-12 | End: 2023-03-12

## 2023-03-12 RX ORDER — OLANZAPINE 10 MG/1
10 TABLET, ORALLY DISINTEGRATING ORAL 4 TIMES DAILY PRN
Status: DISCONTINUED | OUTPATIENT
Start: 2023-03-12 | End: 2023-03-17 | Stop reason: HOSPADM

## 2023-03-12 RX ORDER — WATER 1000 ML/1000ML
INJECTION, SOLUTION INTRAVENOUS
Status: COMPLETED
Start: 2023-03-12 | End: 2023-03-13

## 2023-03-12 RX ADMIN — ACETAMINOPHEN 650 MG: 325 TABLET ORAL at 08:12

## 2023-03-12 RX ADMIN — ACETAMINOPHEN 650 MG: 325 TABLET ORAL at 12:06

## 2023-03-12 RX ADMIN — OLANZAPINE 10 MG: 10 TABLET, ORALLY DISINTEGRATING ORAL at 12:17

## 2023-03-12 RX ADMIN — OLANZAPINE 10 MG: 10 TABLET, ORALLY DISINTEGRATING ORAL at 19:38

## 2023-03-12 RX ADMIN — BENZTROPINE MESYLATE 1 MG: 1 TABLET ORAL at 08:13

## 2023-03-12 RX ADMIN — DIVALPROEX SODIUM 1000 MG: 500 TABLET, FILM COATED, EXTENDED RELEASE ORAL at 19:37

## 2023-03-12 RX ADMIN — IBUPROFEN 600 MG: 600 TABLET, FILM COATED ORAL at 08:13

## 2023-03-12 RX ADMIN — FLUPHENAZINE HYDROCHLORIDE 10 MG: 10 TABLET, FILM COATED ORAL at 08:13

## 2023-03-12 RX ADMIN — FLUPHENAZINE HYDROCHLORIDE 10 MG: 10 TABLET, FILM COATED ORAL at 19:36

## 2023-03-12 RX ADMIN — CHLORTHALIDONE 12.5 MG: 25 TABLET ORAL at 08:12

## 2023-03-12 RX ADMIN — BENZTROPINE MESYLATE 1 MG: 1 TABLET ORAL at 19:37

## 2023-03-12 RX ADMIN — DOCUSATE SODIUM 200 MG: 100 CAPSULE, LIQUID FILLED ORAL at 08:12

## 2023-03-12 RX ADMIN — FLUTICASONE PROPIONATE 1 SPRAY: 50 SPRAY, METERED NASAL at 08:21

## 2023-03-12 RX ADMIN — ACETAMINOPHEN 650 MG: 325 TABLET ORAL at 22:53

## 2023-03-12 RX ADMIN — ATORVASTATIN CALCIUM 10 MG: 10 TABLET, FILM COATED ORAL at 08:13

## 2023-03-12 ASSESSMENT — PAIN DESCRIPTION - ORIENTATION
ORIENTATION: RIGHT;LEFT

## 2023-03-12 ASSESSMENT — PAIN - FUNCTIONAL ASSESSMENT
PAIN_FUNCTIONAL_ASSESSMENT: ACTIVITIES ARE NOT PREVENTED

## 2023-03-12 ASSESSMENT — PAIN SCALES - GENERAL
PAINLEVEL_OUTOF10: 3
PAINLEVEL_OUTOF10: 6
PAINLEVEL_OUTOF10: 6
PAINLEVEL_OUTOF10: 4
PAINLEVEL_OUTOF10: 0

## 2023-03-12 ASSESSMENT — PAIN DESCRIPTION - DESCRIPTORS
DESCRIPTORS: SHARP
DESCRIPTORS: ACHING;DISCOMFORT
DESCRIPTORS: SHARP

## 2023-03-12 ASSESSMENT — PAIN DESCRIPTION - LOCATION
LOCATION: KNEE;TEETH
LOCATION: KNEE
LOCATION: KNEE
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
## 2023-03-12 NOTE — PROGRESS NOTES
Pt described bilateral pain in legs, still does not answer clearly when asked to rate pain. Given PRN tylenol. Pt is now convinced that taking motrin and tylenol together is \"clashing\", though he has been requesting these together for some time.  Now perseverating again on getting another psychiatrist.

## 2023-03-12 NOTE — PROGRESS NOTES
Pt sleeping since approximately 0045. Up to bathroom twice and then returned to bed. No further behavioral issues at this time.

## 2023-03-12 NOTE — PROGRESS NOTES
Department of Psychiatry  AttendingProgress Note  Chief Complaint: Schizophrenia    Patient's chart was reviewed and collaborated with  about the treatment plan. SUBJECTIVE:    Pt resting in chair listening to music much of the morning. Pt allowed me to approach him and talk. He tells me he has errands to run today and needs to leave by five. Pt talking with nurses, staff calmly for much of the morning. He became irritable midday, threw food when he was told he was not leaving today. Able to be redirected, oral meds given for agitation. He did apologize for behaviors. Patient is feeling better. Suicidal ideation:  denies suicidal ideation. Patient does not have medication side effects. ROS: Patient has new complaints: no  Sleeping adequately:  Yes   Appetite adequate: Yes  Attending groups: No:   Visitors:No    OBJECTIVE    Physical  VITALS:  /84   Pulse 92   Temp 97.8 °F (36.6 °C) (Oral)   Resp 16   Ht 6' 1\" (1.854 m)   Wt 240 lb (108.9 kg)   SpO2 96%   BMI 31.66 kg/m²     Mental Status Examination:  Patients appearance was well-appearing, street clothes, and in chair. Thoughts are [place]. Homicidal ideations none. No abnormal movements, tics or mannerisms. Memory intact [place] 0. Concentration Fair. Alert and oriented X 4. Insight and Judgement delusions. Patient was cooperative.  Patient gait abnormal. Mood constricted and irritable, affect flat affect Hallucinations Absent, suicidal ideations no specific plan to harm self Speech pressured and soft    Data  Labs:   Admission on 02/23/2023   Component Date Value Ref Range Status    WBC 02/23/2023 9.2  4.0 - 11.0 K/uL Final    RBC 02/23/2023 5.26  4.20 - 5.90 M/uL Final    Hemoglobin 02/23/2023 16.1  13.5 - 17.5 g/dL Final    Hematocrit 02/23/2023 48.4  40.5 - 52.5 % Final    MCV 02/23/2023 91.9  80.0 - 100.0 fL Final    MCH 02/23/2023 30.6  26.0 - 34.0 pg Final    MCHC 02/23/2023 33.3  31.0 - 36.0 g/dL Final    RDW 02/23/2023 13.6  12.4 - 15.4 % Final    Platelets 67/76/9681 213  135 - 450 K/uL Final    MPV 02/23/2023 8.3  5.0 - 10.5 fL Final    Neutrophils % 02/23/2023 82.3  % Final    Lymphocytes % 02/23/2023 9.0  % Final    Monocytes % 02/23/2023 7.6  % Final    Eosinophils % 02/23/2023 0.7  % Final    Basophils % 02/23/2023 0.4  % Final    Neutrophils Absolute 02/23/2023 7.5  1.7 - 7.7 K/uL Final    Lymphocytes Absolute 02/23/2023 0.8 (A)  1.0 - 5.1 K/uL Final    Monocytes Absolute 02/23/2023 0.7  0.0 - 1.3 K/uL Final    Eosinophils Absolute 02/23/2023 0.1  0.0 - 0.6 K/uL Final    Basophils Absolute 02/23/2023 0.0  0.0 - 0.2 K/uL Final    Sodium 02/23/2023 143  136 - 145 mmol/L Final    Potassium reflex Magnesium 02/23/2023 3.0 (A)  3.5 - 5.1 mmol/L Final    Chloride 02/23/2023 105  99 - 110 mmol/L Final    CO2 02/23/2023 25  21 - 32 mmol/L Final    Anion Gap 02/23/2023 13  3 - 16 Final    Glucose 02/23/2023 106 (A)  70 - 99 mg/dL Final    BUN 02/23/2023 20  7 - 20 mg/dL Final    Creatinine 02/23/2023 0.8  0.8 - 1.3 mg/dL Final    Est, Glom Filt Rate 02/23/2023 >60  >60 Final    Comment: Pediatric calculator link  [facility]jit.[email or web address] org/professionals/kdoqi/gfr_calculatorped  Effective Oct 3, 2022  These results are not intended for use in patients  <25years of age. eGFR results are calculated without  a race factor using the 2021 CKD-EPI equation. Careful  clinical correlation is recommended, particularly when  comparing to results calculated using previous equations. The CKD-EPI equation is less accurate in patients with  extremes of muscle mass, extra-renal metabolism of  creatinine, excessive creatinine ingestion, or following  therapy that affects renal tubular secretion.       Calcium 02/23/2023 9.2  8.3 - 10.6 mg/dL Final    Total Protein 02/23/2023 6.1 (A)  6.4 - 8.2 g/dL Final    Albumin 02/23/2023 4.2  3.4 - 5.0 g/dL Final    Albumin/Globulin Ratio 02/23/2023 2.2  1.1 - 2.2 Final    Total Bilirubin 02/23/2023 0.5  0.0 - 1.0 mg/dL Final    Alkaline Phosphatase 02/23/2023 54  40 - 129 U/L Final    ALT 02/23/2023 29  10 - 40 U/L Final    AST 02/23/2023 44 (A)  15 - 37 U/L Final    Ethanol Lvl 02/23/2023 None Detected  mg/dL Final    Comment:    None Detected  Conversion factor:  100 mg/dl = .100 g/dl  For Medical Purposes Only      Amphetamine Screen, Urine 03/04/2023 Neg  Negative <1000ng/mL Final    Barbiturate Screen, Ur 03/04/2023 Neg  Negative <200 ng/mL Final    Benzodiazepine Screen, Urine 03/04/2023 Neg  Negative <200 ng/mL Final    Cannabinoid Scrn, Ur 03/04/2023 Neg  Negative <50 ng/mL Final    Cocaine Metabolite Screen, Urine 03/04/2023 Neg  Negative <300 ng/mL Final    Opiate Scrn, Ur 03/04/2023 Neg  Negative <300 ng/mL Final    Comment: \"Therapeutic levels of pain medication, especially oxycontin and synthetic  opioids, may not be detected by this Methodology. Pain management screen  panel  Drug panel-PM-Hi Res Ur, Interp (PAIN) should be considered for drug  monitoring \". PCP Screen, Urine 03/04/2023 Neg  Negative <25 ng/mL Final    Methadone Screen, Urine 03/04/2023 Neg  Negative <300 ng/mL Final    Oxycodone Urine 03/04/2023 Neg  Negative <100 ng/ml Final    FENTANYL SCREEN, URINE 03/04/2023 Neg  Negative <5 ng/mL Final    pH, UA 03/04/2023 6.0   Final    Comment: Urine pH less than 5.0 or greater than 8.0 may indicate sample adulteration. Another sample should be collected if clinically  indicated. Drug Screen Comment: 03/04/2023 see below   Final    Comment: This method is a screening test to detect only these drug  classes as part of a medical workup. Confirmatory testing  by another method should be ordered if clinically indicated.       Salicylate, Serum 19/79/6245 <0.3 (A)  15.0 - 30.0 mg/dL Final    Comment: Therapeutic Range: 15.0-30.0 mg/dL  Toxic: >30.0 mg/dL      Acetaminophen Level 02/23/2023 <5 (A)  10 - 30 ug/mL Final    Comment: Therapeutic Range: 10.0-30.0 ug/mL  Toxic: >=150 ug/mL      SARS-CoV-2 RNA, RT PCR 02/24/2023 NOT DETECTED  NOT DETECTED Final    Comment: Not Detected results do not preclude SARS-CoV-2 infection and  should not be used as the sole basis for patient management  decisions. Results must be combined with clinical observations,  patient history, and epidemiological information. Testing was performed using CHE TRAV SARS-CoV-2 and Influenza A/B  nucleic acid assay. This test is a multiplex Real-Time Reverse  Transcriptase Polymerase Chain Reaction (RT-PCR)-based in vitro  diagnostic test intended for the qualitative detection of nucleic  acids from SARS-CoV-2, influenza A, and influenza B in nasopharyngeal  and nasal swab specimens for use under the FDAs Emergency Use  Authorization (EUA) only. Patient Fact Sheet:  FindDrives.pl  Provider Fact Sheet: FindDrives.pl  EUA: FindDrives.pl  IFU: FindDrives.pl    Methodology:  RT-PCR      INFLUENZA A 02/24/2023 NOT DETECTED  NOT DETECTED Final    INFLUENZA B 02/24/2023 NOT DETECTED  NOT DETECTED Final    Magnesium 02/23/2023 2.10  1.80 - 2.40 mg/dL Final    Sodium 02/26/2023 135 (A)  136 - 145 mmol/L Final    Potassium reflex Magnesium 02/26/2023 4.0  3.5 - 5.1 mmol/L Final    Chloride 02/26/2023 100  99 - 110 mmol/L Final    CO2 02/26/2023 25  21 - 32 mmol/L Final    Anion Gap 02/26/2023 10  3 - 16 Final    Glucose 02/26/2023 137 (A)  70 - 99 mg/dL Final    BUN 02/26/2023 16  7 - 20 mg/dL Final    Creatinine 02/26/2023 0.8  0.8 - 1.3 mg/dL Final    Est, Glom Filt Rate 02/26/2023 >60  >60 Final    Comment: Pediatric calculator link  [facility].[email or web address] org/professionals/kdoqi/gfr_calculatorped  Effective Oct 3, 2022  These results are not intended for use in patients  <25years of age. eGFR results are calculated without  a race factor using the 2021 CKD-EPI equation.   Careful  clinical correlation is recommended, particularly when  comparing to results calculated using previous equations. The CKD-EPI equation is less accurate in patients with  extremes of muscle mass, extra-renal metabolism of  creatinine, excessive creatinine ingestion, or following  therapy that affects renal tubular secretion. Calcium 02/26/2023 9.1  8.3 - 10.6 mg/dL Final    Sodium 02/28/2023 140  136 - 145 mmol/L Final    Potassium reflex Magnesium 02/28/2023 3.9  3.5 - 5.1 mmol/L Final    Chloride 02/28/2023 103  99 - 110 mmol/L Final    CO2 02/28/2023 26  21 - 32 mmol/L Final    Anion Gap 02/28/2023 11  3 - 16 Final    Glucose 02/28/2023 158 (A)  70 - 99 mg/dL Final    BUN 02/28/2023 13  7 - 20 mg/dL Final    Creatinine 02/28/2023 0.8  0.8 - 1.3 mg/dL Final    Est, Glom Filt Rate 02/28/2023 >60  >60 Final    Comment: Pediatric calculator link  [email or web address] org/professionals/kdoqi/gfr_calculatorped  Effective Oct 3, 2022  These results are not intended for use in patients  <25years of age. eGFR results are calculated without  a race factor using the 2021 CKD-EPI equation. Careful  clinical correlation is recommended, particularly when  comparing to results calculated using previous equations. The CKD-EPI equation is less accurate in patients with  extremes of muscle mass, extra-renal metabolism of  creatinine, excessive creatinine ingestion, or following  therapy that affects renal tubular secretion.       Calcium 02/28/2023 9.1  8.3 - 10.6 mg/dL Final    Ventricular Rate 03/02/2023 90  BPM Final    Atrial Rate 03/02/2023 90  BPM Final    P-R Interval 03/02/2023 160  ms Final    QRS Duration 03/02/2023 84  ms Final    Q-T Interval 03/02/2023 364  ms Final    QTc Calculation (Bazett) 03/02/2023 445  ms Final    P Axis 03/02/2023 45  degrees Final    R Axis 03/02/2023 -66  degrees Final    T [place] 03/02/2023 3  degrees Final    Diagnosis 03/02/2023 Sinus rhythm with Premature supraventricular complexesLeft anterior fascicular blockAbnormal ECGWhen compared with ECG of 18-AUG-2022 13:28,Premature supraventricular complexes are now PresentConfirmed by Elizabeth Osorio MD, [address] ([id number]) on 3/2/2023 11:27:39 AM   Final    Color, UA 03/04/2023 Yellow  Straw/Yellow Final    Clarity, UA 03/04/2023 Clear  Clear Final    Glucose, Ur 03/04/2023 Negative  Negative mg/dL Final    Bilirubin Urine 03/04/2023 Negative  Negative Final    Ketones, Urine 03/04/2023 Negative  Negative mg/dL Final    Specific Gravity, UA 03/04/2023 1.025  1.005 - 1.030 Final    Blood, Urine 03/04/2023 Negative  Negative Final    pH, UA 03/04/2023 6.0  5.0 - 8.0 Final    Protein, UA 03/04/2023 Negative  Negative mg/dL Final    Urobilinogen, Urine 03/04/2023 0.2  <2.0 E.U./dL Final    Nitrite, Urine 03/04/2023 Negative  Negative Final    Leukocyte Esterase, Urine 03/04/2023 Negative  Negative Final    Microscopic Examination 03/04/2023 Not Indicated   Final    Urine Type 03/04/2023 NotGiven   Final            Medications  Current Facility-Administered Medications: fluPHENAZine HCl (PROLIXIN) tablet 10 mg, 10 mg, Oral, BID **OR** fluPHENAZine HCl (PROLIXIN) injection 10 mg, 10 mg, IntraMUSCular, BID  divalproex (DEPAKOTE ER) extended release tablet 1,000 mg, 1,000 mg, Oral, BID  ibuprofen (ADVIL;MOTRIN) tablet 600 mg, 600 mg, Oral, Q6H PRN  OLANZapine zydis (ZYPREXA) disintegrating tablet 5 mg, 5 mg, Oral, 4x Daily PRN  OLANZapine (ZYPREXA) injection 10 mg, 10 mg, IntraMUSCular, BID PRN  benztropine (COGENTIN) tablet 1 mg, 1 mg, Oral, BID  fluticasone (FLONASE) 50 MCG/ACT nasal spray 1 spray, 1 spray, Each Nostril, Daily  atorvastatin (LIPITOR) tablet 10 mg, 10 mg, Oral, Daily  chlorthalidone (HYGROTON) tablet 12.5 mg, 12.5 mg, Oral, Daily  acetaminophen (TYLENOL) tablet 650 mg, 650 mg, Oral, Q4H PRN  magnesium hydroxide (MILK OF MAGNESIA) 400 MG/5ML suspension 30 mL, 30 mL, Oral, Daily PRN  nicotine polacrilex (COMMIT) lozenge 2 mg, 2 mg, Oral, Q1H PRN  aluminum & magnesium hydroxide-simethicone (MAALOX) 614-988-06 MG/5ML suspension 30 mL, 30 mL, Oral, Q6H PRN  diphenhydrAMINE (BENADRYL) injection 50 mg, 50 mg, IntraMUSCular, Q4H PRN  potassium chloride (KLOR-CON M) extended release tablet 40 mEq, 40 mEq, Oral, Once  docusate sodium (COLACE) capsule 200 mg, 200 mg, Oral, Daily PRN    ASSESSMENT AND PLAN    Principal Problem:    Schizophrenia ([address]  Active Problems:    Tachycardia    Essential hypertension    Hyperlipidemia    Tardive dyskinesia  Resolved Problems:    Hypokalemia       1. Patient's symptoms   show no change  2. Probable discharge is next week  3. Discharge planning is incomplete  4. Suicidal ideation is better  5. Total time with patient was 40 minutes and more than 50 % of that time was spent counseling the patient on their symptoms, treatment and expected goals.      Feliciano Blankenship, PMHNP-BC

## 2023-03-12 NOTE — PROGRESS NOTES
Pt wanted to use phone to call his brother \"Sebastien\". Writer unable to find a number for the pt so pt called brother, Viktor Hines, and left a voicemail. Pt told Viktor Hines on the message that he is expecting to leave on \"Sunday or Monday\". This was immediately after this writer and the pt had discussed how he still does not have a discharge order or completed plan at this time. Pt still calm, sitting in dayroom, listening to music, RTIS.

## 2023-03-12 NOTE — PLAN OF CARE
Problem: Risk for Elopement  Goal: Patient will not exit the unit/facility without proper excort  Outcome: Progressing     Problem: Coping  Goal: Pt/Family able to verbalize concerns and demonstrate effective coping strategies  Description: INTERVENTIONS:  1. Assist patient/family to identify coping skills, available support systems and cultural and spiritual values  2. Provide emotional support, including active listening and acknowledgement of concerns of patient and caregivers  3. Reduce environmental stimuli, as able  4. Instruct patient/family in relaxation techniques, as appropriate  5. Assess for spiritual pain/suffering and initiate Spiritual Care, Psychosocial Clinical Specialist consults as needed  Outcome: Progressing     Problem: Behavior  Goal: Pt/Family maintain appropriate behavior and adhere to behavioral management agreement, if implemented  Description: INTERVENTIONS:  1. Assess patient/family's coping skills and  non-compliant behavior (including use of illegal substances)  2. Notify security of behavior or suspected illegal substances which indicate the need for search of the family and/or belongings  3. Encourage verbalization of thoughts and concerns in a socially appropriate manner  4. Utilize positive, consistent limit setting strategies supporting safety of patient, staff and others  5. Encourage participation in the decision making process about the behavioral management agreement  6. If a visitor's behavior poses a threat to safety call refer to organization policy. 7. Initiate consult with , Psychosocial CNS, Spiritual Care as appropriate  Outcome: Progressing     Problem: Involuntary Admit  Goal: Will cooperate with staff recommendations and doctor's orders and will demonstrate appropriate behavior  Description: INTERVENTIONS:  1. Treat underlying conditions and offer medication as ordered  2. Educate regarding involuntary admission procedures and rules  3.  Contain excessive/inappropriate behavior per unit and hospital policies  Outcome: Progressing     Problem: Anxiety  Goal: Will report anxiety at manageable levels  Description: INTERVENTIONS:  1. Administer medication as ordered  2. Teach and rehearse alternative coping skills  3. Provide emotional support with 1:1 interaction with staff  Outcome: Progressing     Problem: Pain  Goal: Verbalizes/displays adequate comfort level or baseline comfort level  Outcome: Progressing     Problem: Safety - Adult  Goal: Free from fall injury  Outcome: Progressing     Problem: Psychosis  Goal: Will report no hallucinations or delusions  Description: INTERVENTIONS:  1. Administer medication as  ordered  2. Assist with reality testing to support increasing orientation  3. Assess if patient's hallucinations or delusions are encouraging self harm or harm to others and intervene as appropriate  Outcome: Not Progressing   Pt napping at start of shift. No behaviors so far this evening. Compliant with medications, + snacks, listening to music. RTIS continues as does FOL and perseveration on a number of topics. CFS, denies SI/HI at this time.

## 2023-03-12 NOTE — BH NOTE
Patient has been in his room listening to music much of the day, with staff present. Remains focused on no discharge today, doesn't want to go home to his mothers, and that his brother is a Trini Costello who is killing his mother. Patient has been able to remain calm with much redirection and encouragement from staff with patient only occasionally becoming loud and inappropriate with speech. Patient requested to call his brother Mary Lou Sánchez. First try he left a voicemail, second try he left an inappropriate voicemail and patient was informed that he could not use the phone again this evening. Patient verbalized agreement and then started discussing that he was moving into an apartment, stated, \"Me and Mumsie are moving out of that house, dayna Feliciano, and I will let Mumsie know\". Able to calm patient down with conversation. Able to redirect patient with music for brief periods of time, however, he immediately returns back to discussing same issues that he has focused on for most of the shift. Patient was apologetic when his behavior or tone were inappropriate.

## 2023-03-12 NOTE — PLAN OF CARE
Problem: Risk for Elopement  Goal: Patient will not exit the unit/facility without proper excort  3/12/2023 0935 by Bar Navarrete RN  Outcome: Progressing  3/11/2023 2118 by Saturnino Godwin RN  Outcome: Progressing     Problem: Coping  Goal: Pt/Family able to verbalize concerns and demonstrate effective coping strategies  Description: INTERVENTIONS:  1. Assist patient/family to identify coping skills, available support systems and cultural and spiritual values  2. Provide emotional support, including active listening and acknowledgement of concerns of patient and caregivers  3. Reduce environmental stimuli, as able  4. Instruct patient/family in relaxation techniques, as appropriate  5. Assess for spiritual pain/suffering and initiate Spiritual Care, Psychosocial Clinical Specialist consults as needed  3/12/2023 0935 by Bar Navarrete RN  Outcome: Not Progressing  3/11/2023 2118 by Saturnino Godwin RN  Outcome: Progressing     Problem: Behavior  Goal: Pt/Family maintain appropriate behavior and adhere to behavioral management agreement, if implemented  Description: INTERVENTIONS:  1. Assess patient/family's coping skills and  non-compliant behavior (including use of illegal substances)  2. Notify security of behavior or suspected illegal substances which indicate the need for search of the family and/or belongings  3. Encourage verbalization of thoughts and concerns in a socially appropriate manner  4. Utilize positive, consistent limit setting strategies supporting safety of patient, staff and others  5. Encourage participation in the decision making process about the behavioral management agreement  6. If a visitor's behavior poses a threat to safety call refer to organization policy.   7. Initiate consult with , Psychosocial CNS, Spiritual Care as appropriate  3/12/2023 0935 by Bar Navarrete RN  Outcome: Not Progressing  3/11/2023 2118 by Saturnino Godwin RN  Outcome: Progressing     Problem: Involuntary Admit  Goal: Will cooperate with staff recommendations and doctor's orders and will demonstrate appropriate behavior  Description: INTERVENTIONS:  1. Treat underlying conditions and offer medication as ordered  2. Educate regarding involuntary admission procedures and rules  3. Contain excessive/inappropriate behavior per unit and hospital policies  6/29/1326 0984 by Omkar Samaniego RN  Outcome: Completed  3/11/2023 2118 by Jesús Melgar RN  Outcome: Progressing     Problem: Coping  Goal: Pt/Family able to verbalize concerns and demonstrate effective coping strategies  Description: INTERVENTIONS:  1. Assist patient/family to identify coping skills, available support systems and cultural and spiritual values  2. Provide emotional support, including active listening and acknowledgement of concerns of patient and caregivers  3. Reduce environmental stimuli, as able  4. Instruct patient/family in relaxation techniques, as appropriate  5. Assess for spiritual pain/suffering and initiate Spiritual Care, Psychosocial Clinical Specialist consults as needed  3/12/2023 0935 by Omkar Samaniego RN  Outcome: Not Progressing  3/11/2023 2118 by Jesús Melgar RN  Outcome: Progressing     Problem: Behavior  Goal: Pt/Family maintain appropriate behavior and adhere to behavioral management agreement, if implemented  Description: INTERVENTIONS:  1. Assess patient/family's coping skills and  non-compliant behavior (including use of illegal substances)  2. Notify security of behavior or suspected illegal substances which indicate the need for search of the family and/or belongings  3. Encourage verbalization of thoughts and concerns in a socially appropriate manner  4. Utilize positive, consistent limit setting strategies supporting safety of patient, staff and others  5. Encourage participation in the decision making process about the behavioral management agreement  6.  If a visitor's behavior poses a threat to safety call refer to organization policy. 7. Initiate consult with , Psychosocial CNS, Spiritual Care as appropriate  3/12/2023 0935 by Junito Rubio RN  Outcome: Not Progressing  3/11/2023 2118 by Abel Lopez RN  Outcome: Progressing     Problem: Psychosis  Goal: Will report no hallucinations or delusions  Description: INTERVENTIONS:  1. Administer medication as  ordered  2. Assist with reality testing to support increasing orientation  3. Assess if patient's hallucinations or delusions are encouraging self harm or harm to others and intervene as appropriate  3/12/2023 0935 by Junito Rubio RN  Outcome: Not Progressing  3/11/2023 2118 by Abel Lopez RN  Outcome: Not Progressing     Problem: Anxiety  Goal: Will report anxiety at manageable levels  Description: INTERVENTIONS:  1. Administer medication as ordered  2. Teach and rehearse alternative coping skills  3.  Provide emotional support with 1:1 interaction with staff  3/12/2023 0935 by Junito Rubio RN  Outcome: Not Progressing  3/11/2023 2118 by Abel Lopez RN  Outcome: Progressing

## 2023-03-13 PROCEDURE — 2580000003 HC RX 258

## 2023-03-13 PROCEDURE — 6370000000 HC RX 637 (ALT 250 FOR IP)

## 2023-03-13 PROCEDURE — 1240000000 HC EMOTIONAL WELLNESS R&B

## 2023-03-13 PROCEDURE — 6360000002 HC RX W HCPCS: Performed by: PSYCHIATRY & NEUROLOGY

## 2023-03-13 PROCEDURE — 99233 SBSQ HOSP IP/OBS HIGH 50: CPT | Performed by: PSYCHIATRY & NEUROLOGY

## 2023-03-13 PROCEDURE — 2500000003 HC RX 250 WO HCPCS: Performed by: PSYCHIATRY & NEUROLOGY

## 2023-03-13 PROCEDURE — 6370000000 HC RX 637 (ALT 250 FOR IP): Performed by: PSYCHIATRY & NEUROLOGY

## 2023-03-13 RX ORDER — DIVALPROEX SODIUM 500 MG/1
2000 TABLET, EXTENDED RELEASE ORAL NIGHTLY
Status: DISCONTINUED | OUTPATIENT
Start: 2023-03-13 | End: 2023-03-14

## 2023-03-13 RX ORDER — ZIPRASIDONE MESYLATE 20 MG/ML
20 INJECTION, POWDER, LYOPHILIZED, FOR SOLUTION INTRAMUSCULAR NIGHTLY
Status: DISCONTINUED | OUTPATIENT
Start: 2023-03-13 | End: 2023-03-14

## 2023-03-13 RX ORDER — DIVALPROEX SODIUM 500 MG/1
2000 TABLET, EXTENDED RELEASE ORAL NIGHTLY
Status: DISCONTINUED | OUTPATIENT
Start: 2023-03-14 | End: 2023-03-13

## 2023-03-13 RX ORDER — WATER 1000 ML/1000ML
INJECTION, SOLUTION INTRAVENOUS
Status: COMPLETED
Start: 2023-03-13 | End: 2023-03-13

## 2023-03-13 RX ORDER — FLUPHENAZINE HYDROCHLORIDE 10 MG/1
20 TABLET ORAL 2 TIMES DAILY
Status: DISCONTINUED | OUTPATIENT
Start: 2023-03-13 | End: 2023-03-15

## 2023-03-13 RX ORDER — FLUPHENAZINE HYDROCHLORIDE 2.5 MG/ML
20 INJECTION, SOLUTION INTRAMUSCULAR 2 TIMES DAILY
Status: DISCONTINUED | OUTPATIENT
Start: 2023-03-13 | End: 2023-03-15

## 2023-03-13 RX ADMIN — WATER 10 ML: 1 INJECTION INTRAMUSCULAR; INTRAVENOUS; SUBCUTANEOUS at 04:55

## 2023-03-13 RX ADMIN — OLANZAPINE 10 MG: 10 INJECTION, POWDER, FOR SOLUTION INTRAMUSCULAR at 04:00

## 2023-03-13 RX ADMIN — BENZTROPINE MESYLATE 1 MG: 1 TABLET ORAL at 08:43

## 2023-03-13 RX ADMIN — FLUPHENAZINE HYDROCHLORIDE 20 MG: 2.5 INJECTION, SOLUTION INTRAMUSCULAR at 09:25

## 2023-03-13 RX ADMIN — FLUPHENAZINE HYDROCHLORIDE 20 MG: 10 TABLET, FILM COATED ORAL at 20:22

## 2023-03-13 RX ADMIN — WATER 10 ML: 1 INJECTION INTRAMUSCULAR; INTRAVENOUS; SUBCUTANEOUS at 21:13

## 2023-03-13 RX ADMIN — FLUTICASONE PROPIONATE 1 SPRAY: 50 SPRAY, METERED NASAL at 08:44

## 2023-03-13 RX ADMIN — ATORVASTATIN CALCIUM 10 MG: 10 TABLET, FILM COATED ORAL at 08:43

## 2023-03-13 RX ADMIN — ZIPRASIDONE MESYLATE 20 MG: 20 INJECTION, POWDER, LYOPHILIZED, FOR SOLUTION INTRAMUSCULAR at 21:13

## 2023-03-13 RX ADMIN — CHLORTHALIDONE 12.5 MG: 25 TABLET ORAL at 08:43

## 2023-03-13 ASSESSMENT — PAIN DESCRIPTION - LOCATION: LOCATION: KNEE

## 2023-03-13 ASSESSMENT — PAIN DESCRIPTION - ORIENTATION: ORIENTATION: RIGHT;LEFT

## 2023-03-13 ASSESSMENT — PAIN DESCRIPTION - FREQUENCY: FREQUENCY: CONTINUOUS

## 2023-03-13 ASSESSMENT — PAIN - FUNCTIONAL ASSESSMENT: PAIN_FUNCTIONAL_ASSESSMENT: PREVENTS OR INTERFERES SOME ACTIVE ACTIVITIES AND ADLS

## 2023-03-13 ASSESSMENT — PAIN DESCRIPTION - PAIN TYPE: TYPE: CHRONIC PAIN

## 2023-03-13 ASSESSMENT — PAIN DESCRIPTION - DESCRIPTORS: DESCRIPTORS: JABBING;SHARP

## 2023-03-13 ASSESSMENT — PAIN SCALES - GENERAL
PAINLEVEL_OUTOF10: 10
PAINLEVEL_OUTOF10: 5
PAINLEVEL_OUTOF10: 0

## 2023-03-13 ASSESSMENT — PAIN DESCRIPTION - ONSET: ONSET: ON-GOING

## 2023-03-13 ASSESSMENT — PAIN SCALES - WONG BAKER: WONGBAKER_NUMERICALRESPONSE: 0

## 2023-03-13 NOTE — BH NOTE
Writer spoke to patients sister in-law Sun Sarabia. Sun Sarabia mentioned that they still would like for Durga Hudson to be stabilized and come home. Sun Sarabia said they wanted to move forward on court ordered forced medications. Sun Sarabia also requested a list of nursing homes for patient for down the road so that she can tour them in April when his family members are in town. Writer will follow up tomorrow.
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
## 2023-03-13 NOTE — PROGRESS NOTES
Pt given PRN tylenol for 6/10 bilateral knee pain that he describes as \"sharp\". Pt is conversational and calm.

## 2023-03-13 NOTE — PROGRESS NOTES
Pt back to desk, ruminating again about still being on the unit, stating \"I can't breathe! I have a respiratory condition! \" Rechecked vitals and WNL. RR 18 and O2 95%. Tried to redirect and talk to pt about anxiety making him feel it was hard to breathe and pt continued to perseverate. Offered reading material and he declined. Offering coloring pages and markers and he agreed to those. Given coloring pages and markers and suggested he use them in his room. Pt went to room, came back out agitated saying he couldn't color in his room because the desk doesn't move. Suggested he color while in bed but this was not well received. Now in bed, RTIS, cursing, and stating he is no longer taking antipsychotics. Attempts to talk to pt about coping techniques are dismissed by pt.

## 2023-03-13 NOTE — PROGRESS NOTES
Pt back at desk, brightened, stated to [employer], look! All that screaming helped my breathing! \" And pt took several deep breaths. Advised pt that screaming was not the best method for handling that and tried to broach discussion about handling anxiety to no effect. Pt now sitting on bench in day room, RTIS, drinking tea and milk. Mood improved.

## 2023-03-13 NOTE — PROGRESS NOTES
Pt awake and hyper verbal at desk at start of shift. Apologizing for behavior last evening and asked for his evening medications. Reassured that he is okay and writer stated glad that pt had a better day today. Pt displays agitation when talking about his stay here and how his medications are \"messed up\", so given PRN zyprexa. Cooperative with medications. Now laying down in room, occasional RTIS.

## 2023-03-13 NOTE — BH NOTE
Pt has laid down off and on. When calm he called his brother. He told writer that he is \"going to pee in a cup and throw it in your face. \" He continues to be belligerent at times and stated \"when my lunch comes I'm going to throw it in your face. \" When lunch came, writer moved it behind the desk. He was given lunch 1 item at a time in a flattened styrofoam container. He was unable to eat the hamburger and was given a serving of his sauerkraut from the fridge. He was given his milk in small increments until he finished it. He then went back to bed.
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
## 2023-03-13 NOTE — PLAN OF CARE
Problem: Psychosis  Goal: Will report no hallucinations or delusions  Description: INTERVENTIONS:  1. Administer medication as  ordered  2. Assist with reality testing to support increasing orientation  3. Assess if patient's hallucinations or delusions are encouraging self harm or harm to others and intervene as appropriate  3/12/2023 2014 by Monique Keller RN  Outcome: Not Progressing  3/12/2023 0935 by Ori Gonzales RN  Outcome: Not Progressing     Problem: Coping  Goal: Pt/Family able to verbalize concerns and demonstrate effective coping strategies  Description: INTERVENTIONS:  1. Assist patient/family to identify coping skills, available support systems and cultural and spiritual values  2. Provide emotional support, including active listening and acknowledgement of concerns of patient and caregivers  3. Reduce environmental stimuli, as able  4. Instruct patient/family in relaxation techniques, as appropriate  5. Assess for spiritual pain/suffering and initiate Spiritual Care, Psychosocial Clinical Specialist consults as needed  3/12/2023 2014 by Monique Keller RN  Outcome: Not Progressing  3/12/2023 0935 by Ori Gonzales RN  Outcome: Not Progressing     Problem: Behavior  Goal: Pt/Family maintain appropriate behavior and adhere to behavioral management agreement, if implemented  Description: INTERVENTIONS:  1. Assess patient/family's coping skills and  non-compliant behavior (including use of illegal substances)  2. Notify security of behavior or suspected illegal substances which indicate the need for search of the family and/or belongings  3. Encourage verbalization of thoughts and concerns in a socially appropriate manner  4. Utilize positive, consistent limit setting strategies supporting safety of patient, staff and others  5. Encourage participation in the decision making process about the behavioral management agreement  6.  If a visitor's behavior poses a threat to safety call refer to organization policy. 7. Initiate consult with , Psychosocial CNS, Spiritual Care as appropriate  3/12/2023 2014 by Brooksie Lanes, RN  Outcome: Not Progressing  3/12/2023 0935 by Grabiel Bedoya RN  Outcome: Not Progressing     Problem: Psychosis  Goal: Will report no hallucinations or delusions  Description: INTERVENTIONS:  1. Administer medication as  ordered  2. Assist with reality testing to support increasing orientation  3. Assess if patient's hallucinations or delusions are encouraging self harm or harm to others and intervene as appropriate  3/12/2023 2014 by Brooksie Lanes, RN  Outcome: Not Progressing  3/12/2023 0935 by Grabiel Bedoya RN  Outcome: Not Progressing     Problem: Anxiety  Goal: Will report anxiety at manageable levels  Description: INTERVENTIONS:  1. Administer medication as ordered  2. Teach and rehearse alternative coping skills  3. Provide emotional support with 1:1 interaction with staff  3/12/2023 2014 by Brooksie Lanes, RN  Outcome: Not Progressing  3/12/2023 0935 by Grabiel Bedoya RN  Outcome: Not Progressing    Pt continues to be agitated about his medications and stay here on the unit. No change in psychosis. Apologizing for behavior last evening and cooperative with meds this evening. CFS, denies all, but remains RTIS.

## 2023-03-13 NOTE — PLAN OF CARE
Problem: Psychosis  Goal: Will report no hallucinations or delusions  Description: INTERVENTIONS:  1. Administer medication as  ordered  2. Assist with reality testing to support increasing orientation  3.  Assess if patient's hallucinations or delusions are encouraging self harm or harm to others and intervene as appropriate  3/12/2023 2014 by Andreia Barragan RN  Outcome: Not Progressing

## 2023-03-13 NOTE — PROGRESS NOTES
Pt awake, RTIS, intrusive, monopolizing, and attention-seeking. Hyper-focused on olfactory hallucinations. Verbal redirection not effective.
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
## 2023-03-14 PROCEDURE — 6370000000 HC RX 637 (ALT 250 FOR IP): Performed by: PSYCHIATRY & NEUROLOGY

## 2023-03-14 PROCEDURE — 1240000000 HC EMOTIONAL WELLNESS R&B

## 2023-03-14 PROCEDURE — 6370000000 HC RX 637 (ALT 250 FOR IP)

## 2023-03-14 RX ORDER — DIVALPROEX SODIUM 500 MG/1
2000 TABLET, EXTENDED RELEASE ORAL ONCE
Status: DISCONTINUED | OUTPATIENT
Start: 2023-03-14 | End: 2023-03-14

## 2023-03-14 RX ORDER — BENZTROPINE MESYLATE 1 MG/1
1 TABLET ORAL 3 TIMES DAILY PRN
Status: DISCONTINUED | OUTPATIENT
Start: 2023-03-14 | End: 2023-03-17 | Stop reason: HOSPADM

## 2023-03-14 RX ADMIN — FLUTICASONE PROPIONATE 1 SPRAY: 50 SPRAY, METERED NASAL at 07:56

## 2023-03-14 RX ADMIN — CHLORTHALIDONE 12.5 MG: 25 TABLET ORAL at 07:53

## 2023-03-14 RX ADMIN — DOCUSATE SODIUM 200 MG: 100 CAPSULE, LIQUID FILLED ORAL at 07:55

## 2023-03-14 RX ADMIN — ATORVASTATIN CALCIUM 10 MG: 10 TABLET, FILM COATED ORAL at 07:52

## 2023-03-14 RX ADMIN — FLUPHENAZINE HYDROCHLORIDE 20 MG: 10 TABLET, FILM COATED ORAL at 20:42

## 2023-03-14 RX ADMIN — BENZTROPINE MESYLATE 1 MG: 1 TABLET ORAL at 07:52

## 2023-03-14 RX ADMIN — FLUPHENAZINE HYDROCHLORIDE 20 MG: 10 TABLET, FILM COATED ORAL at 07:52

## 2023-03-14 ASSESSMENT — PAIN DESCRIPTION - LOCATION
LOCATION: BACK
LOCATION: LEG
LOCATION: BACK

## 2023-03-14 ASSESSMENT — PAIN DESCRIPTION - ONSET
ONSET: ON-GOING

## 2023-03-14 ASSESSMENT — PAIN - FUNCTIONAL ASSESSMENT
PAIN_FUNCTIONAL_ASSESSMENT: ACTIVITIES ARE NOT PREVENTED
PAIN_FUNCTIONAL_ASSESSMENT: PREVENTS OR INTERFERES SOME ACTIVE ACTIVITIES AND ADLS
PAIN_FUNCTIONAL_ASSESSMENT: ACTIVITIES ARE NOT PREVENTED

## 2023-03-14 ASSESSMENT — PAIN DESCRIPTION - PAIN TYPE
TYPE: CHRONIC PAIN

## 2023-03-14 ASSESSMENT — PAIN SCALES - GENERAL
PAINLEVEL_OUTOF10: 2
PAINLEVEL_OUTOF10: 1
PAINLEVEL_OUTOF10: 1

## 2023-03-14 ASSESSMENT — PAIN DESCRIPTION - DESCRIPTORS
DESCRIPTORS: ACHING;JABBING
DESCRIPTORS: ACHING
DESCRIPTORS: ACHING

## 2023-03-14 ASSESSMENT — PAIN DESCRIPTION - FREQUENCY
FREQUENCY: CONTINUOUS

## 2023-03-14 ASSESSMENT — PAIN DESCRIPTION - ORIENTATION: ORIENTATION: RIGHT;LEFT

## 2023-03-14 NOTE — FLOWSHEET NOTE
Purposeful Rounding    Patient Location: Patient room    Patient willing to engage in conversation: No    Presentation/behavior: Other resting*    Affect: Neutral/Euthymic(normal)    Concerns reported: pt noted to be resting, eyes closed, respirations even and easy    PRN medications given: n/a    Environmental assessment: Room free from clutter, Clear path to bathroom , and Adequate lighting    Fall prevention interventions in place: Yellow non-skid socks on    Daily Lux Fall Risk Score: 40-medium  1:1 line of sight observation continues      Electronically signed by Jayson Hoyos RN on 3/14/23 at 3:19 PM EDT

## 2023-03-14 NOTE — PROGRESS NOTES
Patient awake at 02:50 & went to the bathroom & return to bed & sleep. Patient back awake at 03:30, talking to himself, in a high pitched voice. Patient to the team station at 03:45 & stated that he was dehydrated & was going to die. \"I'm dehydrated. I can't eat or drink. . I'm so thirsty, but I can't swallow. Patient asked for milk. Patient took 1 sip & swallowed & drink another sip & spit it out on the counter of the team station. \"You heard how much I peed in the toilet, now I'm dehydrated. \" Patient was instructed that if he urinated a large amount, then he wasn't dehydrated. Patient then stated,\"You know nothing. You need to send me to the emergency. Patient was again encouraged to take his cogentin. \"That will make me worse. Call a hearse. I'm going to die. \"  Patient asked for water & took 1 drink with a straw & swallowed it & then spit out, the 2nd drink he took. Patient returned to his room & bed. & continues taking to himself at intervals & continues to focus on dying & not being able to swallow.  Lynnette Ridley R.N.

## 2023-03-14 NOTE — PLAN OF CARE
Problem: Risk for Elopement  Goal: Patient will not exit the unit/facility without proper excort  Outcome: Progressing     Problem: Coping  Goal: Pt/Family able to verbalize concerns and demonstrate effective coping strategies  Description: INTERVENTIONS:  1. Assist patient/family to identify coping skills, available support systems and cultural and spiritual values  2. Provide emotional support, including active listening and acknowledgement of concerns of patient and caregivers  3. Reduce environmental stimuli, as able  4. Instruct patient/family in relaxation techniques, as appropriate  5. Assess for spiritual pain/suffering and initiate Spiritual Care, Psychosocial Clinical Specialist consults as needed  Outcome: Progressing     Problem: Behavior  Goal: Pt/Family maintain appropriate behavior and adhere to behavioral management agreement, if implemented  Description: INTERVENTIONS:  1. Assess patient/family's coping skills and  non-compliant behavior (including use of illegal substances)  2. Notify security of behavior or suspected illegal substances which indicate the need for search of the family and/or belongings  3. Encourage verbalization of thoughts and concerns in a socially appropriate manner  4. Utilize positive, consistent limit setting strategies supporting safety of patient, staff and others  5. Encourage participation in the decision making process about the behavioral management agreement  6. If a visitor's behavior poses a threat to safety call refer to organization policy. 7. Initiate consult with , Psychosocial CNS, Spiritual Care as appropriate  Outcome: Progressing     Problem: Psychosis  Goal: Will report no hallucinations or delusions  Description: INTERVENTIONS:  1. Administer medication as  ordered  2. Assist with reality testing to support increasing orientation  3.  Assess if patient's hallucinations or delusions are encouraging self harm or harm to others and intervene as appropriate  Outcome: Progressing     Problem: Anxiety  Goal: Will report anxiety at manageable levels  Description: INTERVENTIONS:  1. Administer medication as ordered  2. Teach and rehearse alternative coping skills  3. Provide emotional support with 1:1 interaction with staff  Outcome: Progressing     Problem: Pain  Goal: Verbalizes/displays adequate comfort level or baseline comfort level  Outcome: Progressing     Problem: Safety - Adult  Goal: Free from fall injury  Outcome: Progressing    Patient was visible on small side of unit. Able to verbalize his needs appropriately or be redirected to do so. He has not had any physical escalations, continues at times to be verbally aggressive. He was cooperative with assessment this shift. He denies SI/HI/AVH but noted to be talking to self at times. Some complaints of back pain but refused medication, utilized heat pack which was somewhat effective. He has been free from falls and continues to be 1:1 line of sight for safety.

## 2023-03-14 NOTE — FLOWSHEET NOTE
Purposeful Rounding    Patient Location: Day room    Patient willing to engage in conversation: Yes    Presentation/behavior: Cooperative but agitated a time during conversation but able to be redirected  Affect: Labile    Concerns reported: pt able to sit at table while eating lunch. Patient's had adequate nutritional intake. He was pleasant and able to have appropriate conversation with this writer. If patient began to get agitated, he was able to be redirected to a different topic.       PRN medications given: n/a    Environmental assessment: Room free from clutter, Clear path to bathroom , and Adequate lighting    Fall prevention interventions in place: Yellow non-skid socks on    Daily Lux Fall Risk Score: 40-medium  1:1 line of sight observation continues      Electronically signed by Jayson Hoyos RN on 3/14/23 at 12:44 PM EDT

## 2023-03-14 NOTE — FLOWSHEET NOTE
Purposeful Rounding    Patient Location: Patient room    Patient willing to engage in conversation: Yes    Presentation/behavior: Anxious and Cooperative    Affect: Anxious    Concerns reported: pt noted to be in his assigned room, lying down, muttering to self    PRN medications given: n/a    Environmental assessment: Room free from clutter, Clear path to bathroom , and Adequate lighting    Fall prevention interventions in place: Yellow non-skid socks on      Daily Lux Fall Risk Score: 40-medium  1:1 line of sight observation continues    Electronically signed by Roxanna Batista RN on 3/14/23 at 9:14 AM EDT

## 2023-03-14 NOTE — FLOWSHEET NOTE
Purposeful Rounding    Patient Location: Day room    Patient willing to engage in conversation: Yes    Presentation/behavior: Cooperative and Pleasant    Affect: Flat/blunted and Labile    Concerns reported: pt requesting to speak with his brother, Lennox Steele. Phone provided to patient and call made. Patient had appropriate phone call, was pleasant. Discussed his worry about getting his credit card bills paid. Brother reassured him that these would be taken care of. PRN medications given: n/a    Environmental assessment: Room free from clutter, Clear path to bathroom , and Adequate lighting    Fall prevention interventions in place: Yellow non-skid socks on    Daily Lux Fall Risk Score: 40-medium  1:1 line of sight continues.        Electronically signed by Rasheed Devlin RN on 3/14/23 at 5:21 PM EDT

## 2023-03-14 NOTE — FLOWSHEET NOTE
Purposeful Rounding    Patient Location: Day room    Patient willing to engage in conversation: Yes    Presentation/behavior: Cooperative    Affect: Flat/blunted    Concerns reported: pt requesting to shave. Patient was provided electric razor, able to shave with staff supervision. Also complaining of some back pain, refused medication at this time. Patient offered heat pack and agreeable. Heat pack provided, patient back in bed at this time.     PRN medications given: n/a    Environmental assessment: Room free from clutter, Clear path to bathroom , and Adequate lighting    Fall prevention interventions in place: Yellow non-skid socks on    Daily Lux Fall Risk Score: 40-medium  1:1 line of sight observation continues      Electronically signed by Rosalina Young RN on 3/14/23 at 10:43 AM EDT

## 2023-03-14 NOTE — PROGRESS NOTES
Patient awake and cooperative. Requesting his morning medications. Medications given and all were taken without issues. Patient ate a pop tart, PBJ, and 2 boiled eggs with 2 milks for breakfast.  He then stated he wanted to go back to bed and to wake him at noon. Patient is noted to be in his assigned room, lying in bed is talking to self but in a low calm voice.

## 2023-03-14 NOTE — PROGRESS NOTES
Patient cooperative with physical assessment today. He states he has been passing gas and did request stool softener this shift. Abdomen non tender, bowel sounds x 4. Skin assessment showed bruising to bilateral arms d/t multiple injections, small knot felt on left arm, not warm to the touch and non tender. No swelling noted to feet or legs. He was complaining of lower back pain but refused pain medication offered, states its from laying in bed too much. He was given heat pack for comfort.

## 2023-03-14 NOTE — FLOWSHEET NOTE
Purposeful Rounding    Patient Location: Patient room    Patient willing to engage in conversation: Yes    Presentation/behavior: Agitated    Affect: Irritable    Concerns reported: pt noted to be in room lying in bed, muttering to self, making threats against provider    PRN medications given: n/a    Environmental assessment: Room free from clutter, Clear path to bathroom , and Adequate lighting    Fall prevention interventions in place: Yellow non-skid socks on    Daily Lux Fall Risk Score: 40-medium    1:1 line of sight observation continues      Electronically signed by Latia Reagan RN on 3/14/23 at 10:07 AM EDT

## 2023-03-14 NOTE — FLOWSHEET NOTE
Purposeful Rounding    Patient Location: Day room    Patient willing to engage in conversation: Yes    Presentation/behavior: Cooperative    Affect: Flat/blunted and Restless    Concerns reported: pt took phone call from mom. He was able to maintain appropriate conversation and remained calm.      PRN medications given: n/a    Environmental assessment: Room free from clutter, Clear path to bathroom , and Adequate lighting    Fall prevention interventions in place: Yellow non-skid socks on    Daily Lux Fall Risk Score: med-40    1:1 line of sight continues      Electronically signed by Monalisa Alegre RN on 3/14/23 at 7:02 PM EDT

## 2023-03-14 NOTE — PLAN OF CARE
Problem: Risk for Elopement  Goal: Patient will not exit the unit/facility without proper excort  3/13/2023 2339 by Urbano Daniels RN  Outcome: Progressing  3/13/2023 2203 by Urbano Daniels RN  Outcome: Progressing     Problem: Coping  Goal: Pt/Family able to verbalize concerns and demonstrate effective coping strategies  Description: INTERVENTIONS:  1. Assist patient/family to identify coping skills, available support systems and cultural and spiritual values  2. Provide emotional support, including active listening and acknowledgement of concerns of patient and caregivers  3. Reduce environmental stimuli, as able  4. Instruct patient/family in relaxation techniques, as appropriate  5. Assess for spiritual pain/suffering and initiate Spiritual Care, Psychosocial Clinical Specialist consults as needed  3/13/2023 2339 by Urbano Daniels RN  Outcome: Progressing  3/13/2023 2203 by Urbano Daniels RN  Outcome: Progressing     Problem: Behavior  Goal: Pt/Family maintain appropriate behavior and adhere to behavioral management agreement, if implemented  Description: INTERVENTIONS:  1. Assess patient/family's coping skills and  non-compliant behavior (including use of illegal substances)  2. Notify security of behavior or suspected illegal substances which indicate the need for search of the family and/or belongings  3. Encourage verbalization of thoughts and concerns in a socially appropriate manner  4. Utilize positive, consistent limit setting strategies supporting safety of patient, staff and others  5. Encourage participation in the decision making process about the behavioral management agreement  6. If a visitor's behavior poses a threat to safety call refer to organization policy.   7. Initiate consult with , Psychosocial CNS, Spiritual Care as appropriate  3/13/2023 2339 by Urbano Daniels RN  Outcome: Progressing  3/13/2023 2203 by Urbano Daniels RN  Outcome: Progressing     Problem: Psychosis  Goal: Will report no hallucinations or delusions  Description: INTERVENTIONS:  1. Administer medication as  ordered  2. Assist with reality testing to support increasing orientation  3. Assess if patient's hallucinations or delusions are encouraging self harm or harm to others and intervene as appropriate  3/13/2023 2339 by Shahana Aldana RN  Outcome: Progressing  3/13/2023 2203 by Shahana Aldana RN  Outcome: Progressing     Problem: Anxiety  Goal: Will report anxiety at manageable levels  Description: INTERVENTIONS:  1. Administer medication as ordered  2. Teach and rehearse alternative coping skills  3. Provide emotional support with 1:1 interaction with staff  3/13/2023 2339 by Shahana Aldana RN  Outcome: Progressing  3/13/2023 2203 by Shahana Aldana RN  Outcome: Progressing     Problem: Safety - Adult  Goal: Free from fall injury  3/13/2023 2339 by Shahana Aldana RN  Outcome: Progressing  3/13/2023 2203 by Shahana Aldana RN  Outcome: Progressing     Problem: Safety - Adult  Goal: Free from fall injury  3/13/2023 2339 by Shahana Aldana RN  Outcome: Progressing  3/13/2023 2203 by Shahana Aldana RN  Outcome: Progressing   Patient has been talking to him self when awake & has been loud & verbally abusive to staff. After patient received his geodon injection, patient said to writer. \"You are in big doo doo. You all here will be sued. \" Patient later stated to writer,\"You are a piece of dog shit. You are just shit. \" Patient showed writer both upper arms that had bruises. \"That's where I got shots. \" Patient kept asking if he would have to get the geodon injection everyday. Writer instructed patient, that he would get the geodon injection, nightly,if he refused the depakote  pills. \"Why can I not have geodon pills? \" Patient was instructed that writer would pass this request to the day shift charge nurse & would document in his chart, that he requested the po geodon. Patient kept asking when he would be discharged. \"It better be no later than Thursday. \" Patient was instructed that he had to talk to Dr. Reed Wilson about this. \"Dr Reed Wilson is incompetent,\" patient yelled. Patient has appeared asleep at 22:15, after receiving geodon at 21:13. Patient's gait has been steady, when up.  Melissa Ridley R.N.

## 2023-03-14 NOTE — PROGRESS NOTES
Department of Psychiatry  Progress Note    Patient's chart was reviewed. Discussed with treatment team. Met with patient. SUBJECTIVE:      Continues to take prolixin 20mg BID. Declined Depakote last night. Spoke with me this morning for more than 20 minutes without getting agitated. Insists he will not take Depakote nor cogentin any longer but is agreeable to continuing Prolixin for now. Overall much less agitated today. Continues to express delusional thought. ROS:   Patient has new complaints: dry mouth  Sleeping adequately:  no  Appetite adequate: yes  Engaged in programming: no    OBJECTIVE:  VITALS:  /77   Pulse (!) 122   Temp 97.7 °F (36.5 °C) (Oral)   Resp 16   Ht 6' 1\" (1.854 m)   Wt 240 lb (108.9 kg)   SpO2 95%   BMI 31.66 kg/m²     Mental Status Examination:    Appearance: improved grooming and hygiene  Behavior/Attitude toward examiner:  less agitated  Speech: less elevated. Loud. Push of speech. Mood:  \"ok\"  Affect:  less elevated    Thought processes:  disorganized  Thought Content: no SI, no HI, + delusional   Perceptions: +RTIS  Attention: limited   Abstraction: impaired  Cognition:  limited  Insight: impaired   Judgment: impaired    Medication:  Scheduled:   fluPHENAZine HCl  20 mg Oral BID    Or    fluPHENAZine HCl  20 mg IntraMUSCular BID    fluticasone  1 spray Each Nostril Daily    atorvastatin  10 mg Oral Daily    chlorthalidone  12.5 mg Oral Daily    potassium chloride  40 mEq Oral Once        PRN:  benztropine, OLANZapine zydis, ibuprofen, OLANZapine, acetaminophen, magnesium hydroxide, nicotine polacrilex, aluminum & magnesium hydroxide-simethicone, docusate sodium     ASSESSMENT AND PLAN:  57 y.o. who presented  to [facility] on a SOB after police were called by brother regarding patient erratic behavior at home.     Principal Problem:    Schizophrenia ([address]  Active Problems:    Tachycardia    Essential hypertension    Hyperlipidemia    Tardive dyskinesia  Resolved Problems:    Hypokalemia     Plan: 1. Admitted to psychiatry for stabilization and treatment. 2.On admission, ordered q15min checks for safety, programming, and prn medication for anxiety, agitation, and insomnia. 2/25/2023 - Aristada Initio 675mg and Aristada 1064mb given without incident. 2/27/2023 - making gains. 2/28/2023 - continues to make gains. Discontinue constant observation. 3/1/2023 - remains severely symptomatic. 3/2/2023 - Last admission required about 15 days before achieving enough stability to go home. 3/3/2023 - consider first generation antipsychotic as adjunctive treatment. 3/4/2023 - schedule thorazine 200mg PO QHS. Increase prn doses. 3/6/2023 - increased thorazine to 300mg QHS.     3/8/2023 - Thorazine 300mg PO with IM backup (100mg) pending approval from guardian. Chose thorazine because it's something he typically agrees to take. 3/9/2023 - discontinue ativan prn - possibly disinhibiting? Replace with prn zyprexa PO/IM    3/10/2023 - discontinue thorazine. Start Prolixin 10mg BID with IM backup (will transition to decanoate)  and depakote 1000mg BID for mood stabilization. 3/13/2023 - increase Prolixin to 20mg BID with IM backup. Change Depakote to 2000mg QHS (has been taking evening dose). 3/14/2023 - continue Prolixin. Discontinue Depakote. Change cogentin to PRN. 3. Hospitalist consult for admission. #Hypokalemia - resolved on repeat. -declined replacement      #HTN  -resume HCTZ at request of patient      #HLD   -on statin     Irregular HR  -EKG Sinus rhythm, with PVC's  -HR improved on repeat vitals  -monitor for now. 4. Voluntary by guardian. ELOS 20-25 days. Goal is stabilization and discharge to family. In the meantime, investigate state options. Total time with patient was 50 minutes and more than 50 % of that time was spent counseling the patient on their symptoms, treatment, and expected goals. Davis Loya MD

## 2023-03-14 NOTE — PROGRESS NOTES
Patient refused oral medication. He was medicated with geodon 20 mg IM to left vastus lateralis muscle mass. He tolerated injection well. He did ask if we were going to call security in to assist and patient was assured that current staff were able to help him receive his medication. Patient tolerated injection well.

## 2023-03-14 NOTE — PROGRESS NOTES
Patient requested to brush his teeth & use mouth & did so appropriately, with with writer observing. All supplies were removed from his room. Patient also drank 3 milks & ate 1 poptart. Patient is currently resting in bed.  Sandy Ridley R.N.

## 2023-03-14 NOTE — PROGRESS NOTES
Patient woke up at 20:20 & went to the bathroom & returned to his bed. Patient immediately started talking to himself. Writer approached patient about taking his medications & patient initially stated that he wasn't taking any medications. Writer instructed patient, there was order that if patient refuses the pills, he would have to get shots. Writer explained to him several times in his room, while he sat on his bed & at the team station, that if he refused his depakote pills, he would have to have a shot of geodon & if he refused his prolixin pills he would have to get a shot of prolixin. Patient agreed to take his prolixin po, but initially stated that he would only take 1pill. After much firm direction , that it was 2 prolixin pills or a shot. Patient took prolixin 20 mg po & opened his mouth to show writer that he had swallowed it. Patient was cooperative & allowed writer to obtain his vitals. Patient firmly declined the depakote & agreed to take geodon IM. \"Im never taking depakote again. \"It made me blah\" & patient hung his tongue out of his mouth. Patient also refused the cogentin, after several attempts. Patient denied taking it many times since admission, including on the previous shift. \"Your god damn records are wrong. I might of taken once or twice, when I 1st came here. \" Patient was instructed on the purpose of the cogentin. \"You are a bitch & a liar. \" Roberto Ridley R.N.

## 2023-03-14 NOTE — PROGRESS NOTES
Patient scoring 3 on MEWS, charge nurse notified.  Patient is alert, calm, no signs of distress.

## 2023-03-14 NOTE — FLOWSHEET NOTE
Purposeful Rounding    Patient Location: Day room    Patient willing to engage in conversation: Yes    Presentation/behavior: Anxious and Cooperative    Affect: Flat/blunted and Restless    Concerns reported: no concerns, pt discussed being cooperative to take his morning medications.     PRN medications given: n/a    Environmental assessment: Room free from clutter, Clear path to bathroom , and Adequate lighting    Fall prevention interventions in place: Yellow non-skid socks on    Daily Lux Fall Risk Score: 40-medium  1:1 line of sight observation continues    Electronically signed by Anila Odonnell RN on 3/14/23 at 7:46 AM EDT

## 2023-03-14 NOTE — PROGRESS NOTES
Patient has been more cooperative, since being awake. Patient asked to shower & was cooperative with doing so & has been able to follow directions from writer. \"I learned my lesson. I won't yell anymore. \" Patient has been talking loudly to self, while resting in bed & has been loud at times & has been cursing. Patient stated,\"You are  the only one who has talked to me. \" Patient has also been talking about vampires, at intervals. Patient drank 2 cups of cranberry juice, without difficulty, after saying that he couldn't swallow.  Haile Ridley R.N.

## 2023-03-15 PROCEDURE — 1240000000 HC EMOTIONAL WELLNESS R&B

## 2023-03-15 PROCEDURE — 6370000000 HC RX 637 (ALT 250 FOR IP)

## 2023-03-15 PROCEDURE — 6370000000 HC RX 637 (ALT 250 FOR IP): Performed by: PSYCHIATRY & NEUROLOGY

## 2023-03-15 PROCEDURE — 6360000002 HC RX W HCPCS: Performed by: PSYCHIATRY & NEUROLOGY

## 2023-03-15 RX ORDER — FLUPHENAZINE HYDROCHLORIDE 2.5 MG/ML
10 INJECTION, SOLUTION INTRAMUSCULAR 2 TIMES DAILY
Status: DISCONTINUED | OUTPATIENT
Start: 2023-03-15 | End: 2023-03-17 | Stop reason: HOSPADM

## 2023-03-15 RX ORDER — FLUPHENAZINE DECANOATE 25 MG/ML
37.5 INJECTION, SOLUTION INTRAMUSCULAR; SUBCUTANEOUS
Status: DISCONTINUED | OUTPATIENT
Start: 2023-03-15 | End: 2023-03-17 | Stop reason: HOSPADM

## 2023-03-15 RX ORDER — FLUPHENAZINE HYDROCHLORIDE 10 MG/1
10 TABLET ORAL 2 TIMES DAILY
Status: DISCONTINUED | OUTPATIENT
Start: 2023-03-15 | End: 2023-03-17 | Stop reason: HOSPADM

## 2023-03-15 RX ADMIN — OLANZAPINE 10 MG: 10 TABLET, ORALLY DISINTEGRATING ORAL at 02:21

## 2023-03-15 RX ADMIN — FLUPHENAZINE HYDROCHLORIDE 20 MG: 10 TABLET, FILM COATED ORAL at 09:46

## 2023-03-15 RX ADMIN — FLUTICASONE PROPIONATE 1 SPRAY: 50 SPRAY, METERED NASAL at 10:13

## 2023-03-15 RX ADMIN — ATORVASTATIN CALCIUM 10 MG: 10 TABLET, FILM COATED ORAL at 09:46

## 2023-03-15 RX ADMIN — FLUPHENAZINE DECANOATE 37.5 MG: 25 INJECTION, SOLUTION INTRAMUSCULAR; SUBCUTANEOUS at 13:47

## 2023-03-15 RX ADMIN — DOCUSATE SODIUM 200 MG: 100 CAPSULE, LIQUID FILLED ORAL at 09:50

## 2023-03-15 RX ADMIN — ACETAMINOPHEN 650 MG: 325 TABLET ORAL at 01:38

## 2023-03-15 RX ADMIN — OLANZAPINE 10 MG: 10 TABLET, ORALLY DISINTEGRATING ORAL at 21:28

## 2023-03-15 RX ADMIN — FLUPHENAZINE HYDROCHLORIDE 10 MG: 10 TABLET, FILM COATED ORAL at 20:29

## 2023-03-15 RX ADMIN — CHLORTHALIDONE 12.5 MG: 25 TABLET ORAL at 09:46

## 2023-03-15 ASSESSMENT — PAIN - FUNCTIONAL ASSESSMENT: PAIN_FUNCTIONAL_ASSESSMENT: PREVENTS OR INTERFERES SOME ACTIVE ACTIVITIES AND ADLS

## 2023-03-15 ASSESSMENT — PAIN DESCRIPTION - ORIENTATION: ORIENTATION: LOWER;RIGHT;LEFT

## 2023-03-15 ASSESSMENT — PAIN DESCRIPTION - LOCATION: LOCATION: LEG

## 2023-03-15 ASSESSMENT — PAIN DESCRIPTION - DESCRIPTORS: DESCRIPTORS: ACHING

## 2023-03-15 ASSESSMENT — PAIN SCALES - GENERAL: PAINLEVEL_OUTOF10: 4

## 2023-03-15 NOTE — PLAN OF CARE
Patient was given tylenol 650 mg, po per request for complaint of bilateral lower leg pain.  Orlin Ridley R.N.

## 2023-03-15 NOTE — PROGRESS NOTES
Patient has been awake since 00:35. Patient asked for the ceiling lights to be turned on in his room. It's cold in here & the lights hep to get some heat in my room. \" Patient declined, when asked if he wanted another blanket. \"I got 2. \" Patient sat out in the dayroom briefly & ate a  some potato salad,,that patient reported was left over from dinner. Patient also drank 2 milks. Patient stated, I slept 2 solid hours. I'm good. Patient has only slept about 1 hour. Patient declined offer of per zyprexa. I'll go back to bed & daydream & maybe, I'll go back to sleep. Manoj Matamoros. ROSALVA. Patient continues awake in bed, talking to himself.  Kalpesh Ridley R.N.

## 2023-03-15 NOTE — PROGRESS NOTES
Patient was sitting in a chair in the dayroom, coloring, & playing cards with writer for about 3 hours. . Patient with rambling disorganized thoughts with rapid pressured speech. Patient kept saying that he was the son of Omra. Patient was also focused on politics &, talking about President Rashaad hating white people & being communist. Patient was difficult to direct his thoughts to another subject. Patient with no agitation noted, so far this shift. Patient appeared asleep in bed, at this time.  Liz Ridley R.N.

## 2023-03-15 NOTE — PLAN OF CARE
Problem: Risk for Elopement  Goal: Patient will not exit the unit/facility without proper excort  Outcome: Progressing  Flowsheets (Taken 3/15/2023 8978)  Nursing Interventions for Elopement Risk:   Assist with personal care needs such as toileting, eating, dressing, as needed to reduce the risk of wandering   Reduce environmental triggers     Problem: Coping  Goal: Pt/Family able to verbalize concerns and demonstrate effective coping strategies  Description: INTERVENTIONS:  1. Assist patient/family to identify coping skills, available support systems and cultural and spiritual values  2. Provide emotional support, including active listening and acknowledgement of concerns of patient and caregivers  3. Reduce environmental stimuli, as able  4. Instruct patient/family in relaxation techniques, as appropriate  5. Assess for spiritual pain/suffering and initiate Spiritual Care, Psychosocial Clinical Specialist consults as needed  Outcome: Progressing     Problem: Behavior  Goal: Pt/Family maintain appropriate behavior and adhere to behavioral management agreement, if implemented  Description: INTERVENTIONS:  1. Assess patient/family's coping skills and  non-compliant behavior (including use of illegal substances)  2. Notify security of behavior or suspected illegal substances which indicate the need for search of the family and/or belongings  3. Encourage verbalization of thoughts and concerns in a socially appropriate manner  4. Utilize positive, consistent limit setting strategies supporting safety of patient, staff and others  5. Encourage participation in the decision making process about the behavioral management agreement  6. If a visitor's behavior poses a threat to safety call refer to organization policy.  7. Initiate consult with , Psychosocial CNS, Spiritual Care as appropriate  Outcome: Progressing     Problem: Psychosis  Goal: Will report no hallucinations or delusions  Description:  INTERVENTIONS:  1. Administer medication as  ordered  2. Assist with reality testing to support increasing orientation  3. Assess if patient's hallucinations or delusions are encouraging self harm or harm to others and intervene as appropriate  Outcome: Progressing     Problem: Anxiety  Goal: Will report anxiety at manageable levels  Description: INTERVENTIONS:  1. Administer medication as ordered  2. Teach and rehearse alternative coping skills  3.  Provide emotional support with 1:1 interaction with staff  Outcome: Progressing     Problem: Pain  Goal: Verbalizes/displays adequate comfort level or baseline comfort level  3/15/2023 1341 by Polly Gurrola RN  Outcome: Progressing     Problem: Safety - Adult  Goal: Free from fall injury  Outcome: Progressing

## 2023-03-15 NOTE — PROGRESS NOTES
Patient continues awake & was getting increased restless, irritable & easily agitated . Patient talking louder to self & has been cursing. Patient initially declined offer of zyprexa zydis 10 mg po, but did so, with encouragement.  Kaushik Ridley R.N.

## 2023-03-15 NOTE — PLAN OF CARE
Patient started off the shift, talking to his brother on the phone & was noted to be laughing at intervals. Patient reported having a nice phone call. Patient was overheard saying,\"I'm not paying for this. The people who called the police are paying for this. I was kidnapped by the police & brought here. \" Patient has been coloring, talking to self & laughing at intervals. Patient was medication compliant. Patient has been able to carry a pleasant conversation with Lissette Matthews. Patient's gait has been steady, with an unusual gait. Patient had a boiled egg for a snack & drank 3 cups of cranberry juice.  Iza Rdiley R.N.

## 2023-03-15 NOTE — PROGRESS NOTES
Patient appeared asleep at 03:00, after taking zyprexa at 02:21. Patient continued asleep until 05:40, when patient woke up & went to the bathroom & returned to bed.  Francheska Ridley R.N.

## 2023-03-15 NOTE — PROGRESS NOTES
Department of Psychiatry  Progress Note    Patient's chart was reviewed. Discussed with treatment team. Met with patient. SUBJECTIVE:      Making gains - second day in a row he's been able to have a reasonable conversation with me. Much less agitated. Remains delusional.    Agrees to transition to Prolixin decanoate. ROS:   Patient has new complaints: no  Sleeping adequately:  no  Appetite adequate: yes  Engaged in programming: no    OBJECTIVE:  VITALS:  /75   Pulse 75   Temp 97.2 °F (36.2 °C) (Oral)   Resp 18   Ht 6' 1\" (1.854 m)   Wt 240 lb (108.9 kg)   SpO2 95%   BMI 31.66 kg/m²     Mental Status Examination:    Appearance: improved grooming and hygiene  Behavior/Attitude toward examiner:  less agitated  Speech: less elevated  Mood:  \"ok\"  Affect:  less elevated    Thought processes:  disorganized  Thought Content: no SI, no HI, + delusional   Perceptions: less RTIS  Attention: improving   Abstraction: limited  Cognition:  limited  Insight: impaired  Judgment: improving     Medication:  Scheduled:   fluPHENAZine HCl  20 mg Oral BID    Or    fluPHENAZine HCl  20 mg IntraMUSCular BID    fluticasone  1 spray Each Nostril Daily    atorvastatin  10 mg Oral Daily    chlorthalidone  12.5 mg Oral Daily    potassium chloride  40 mEq Oral Once        PRN:  benztropine, OLANZapine zydis, ibuprofen, OLANZapine, acetaminophen, magnesium hydroxide, nicotine polacrilex, aluminum & magnesium hydroxide-simethicone, docusate sodium     ASSESSMENT AND PLAN:  57 y.o. who presented  to [facility] on a SOB after police were called by brother regarding patient erratic behavior at home. Principal Problem:    Schizophrenia ([address]  Active Problems:    Tachycardia    Essential hypertension    Hyperlipidemia    Tardive dyskinesia  Resolved Problems:    Hypokalemia     Plan: 1. Admitted to psychiatry for stabilization and treatment.     2.On admission, ordered q15min checks for safety, programming, and prn medication for anxiety, agitation, and insomnia. 2/25/2023 - Aristada Initio 675mg and Aristada 1064mb given without incident. 2/27/2023 - making gains. 2/28/2023 - continues to make gains. Discontinue constant observation. 3/1/2023 - remains severely symptomatic. 3/2/2023 - Last admission required about 15 days before achieving enough stability to go home. 3/3/2023 - consider first generation antipsychotic as adjunctive treatment. 3/4/2023 - schedule thorazine 200mg PO QHS. Increase prn doses. 3/6/2023 - increased thorazine to 300mg QHS.     3/8/2023 - Thorazine 300mg PO with IM backup (100mg) pending approval from guardian. Chose thorazine because it's something he typically agrees to take. 3/9/2023 - discontinue ativan prn - possibly disinhibiting? Replace with prn zyprexa PO/IM    3/10/2023 - discontinue thorazine. Start Prolixin 10mg BID with IM backup (will transition to decanoate)  and depakote 1000mg BID for mood stabilization. 3/13/2023 - increase Prolixin to 20mg BID with IM backup. Change Depakote to 2000mg QHS (has been taking evening dose). 3/14/2023 - continue Prolixin. Discontinue Depakote. Change cogentin to PRN.    3/15/2023 - Prolixin Decanoate 37.5 mg Q 3wks first dose today. Reduce PO prolixin to 10mg BID. 3. Hospitalist consult for admission. #Hypokalemia - resolved on repeat. -declined replacement      #HTN  -resume HCTZ at request of patient      #HLD   -on statin     Irregular HR  -EKG Sinus rhythm, with PVC's  -HR improved on repeat vitals  -monitor for now. 4. Voluntary by guardian. ELOS 20-25 days. Goal is stabilization and discharge to family. ACT team?     Total time with patient was 50 minutes and more than 50 % of that time was spent counseling the patient on their symptoms, treatment, and expected goals.      Harika Segundo MD

## 2023-03-15 NOTE — PROGRESS NOTES
Pt awake, compliant with his morning meds, took a shower, appears calm but is having some agitation stating he  \"wants to go home\"

## 2023-03-16 PROCEDURE — 6370000000 HC RX 637 (ALT 250 FOR IP)

## 2023-03-16 PROCEDURE — 6370000000 HC RX 637 (ALT 250 FOR IP): Performed by: PSYCHIATRY & NEUROLOGY

## 2023-03-16 PROCEDURE — 1240000000 HC EMOTIONAL WELLNESS R&B

## 2023-03-16 RX ADMIN — DOCUSATE SODIUM 200 MG: 100 CAPSULE, LIQUID FILLED ORAL at 11:23

## 2023-03-16 RX ADMIN — ATORVASTATIN CALCIUM 10 MG: 10 TABLET, FILM COATED ORAL at 08:13

## 2023-03-16 RX ADMIN — OLANZAPINE 10 MG: 10 TABLET, ORALLY DISINTEGRATING ORAL at 21:45

## 2023-03-16 RX ADMIN — FLUPHENAZINE HYDROCHLORIDE 10 MG: 10 TABLET, FILM COATED ORAL at 08:13

## 2023-03-16 RX ADMIN — FLUPHENAZINE HYDROCHLORIDE 10 MG: 10 TABLET, FILM COATED ORAL at 21:05

## 2023-03-16 RX ADMIN — CHLORTHALIDONE 12.5 MG: 25 TABLET ORAL at 08:13

## 2023-03-16 NOTE — PROGRESS NOTES
Pt has been calm and cooperative for most of the evening shift. Has been asleep now for approx 2 hours. Respirations easy and even at this time. Pt remains line of sight for safety.

## 2023-03-16 NOTE — PLAN OF CARE
Problem: Coping  Goal: Pt/Family able to verbalize concerns and demonstrate effective coping strategies  Description: INTERVENTIONS:  1. Assist patient/family to identify coping skills, available support systems and cultural and spiritual values  2. Provide emotional support, including active listening and acknowledgement of concerns of patient and caregivers  3. Reduce environmental stimuli, as able  4. Instruct patient/family in relaxation techniques, as appropriate  5. Assess for spiritual pain/suffering and initiate Spiritual Care, Psychosocial Clinical Specialist consults as needed  3/15/2023 2046 by Jessica Perry RN  Outcome: Progressing     Problem: Behavior  Goal: Pt/Family maintain appropriate behavior and adhere to behavioral management agreement, if implemented  Description: INTERVENTIONS:  1. Assess patient/family's coping skills and  non-compliant behavior (including use of illegal substances)  2. Notify security of behavior or suspected illegal substances which indicate the need for search of the family and/or belongings  3. Encourage verbalization of thoughts and concerns in a socially appropriate manner  4. Utilize positive, consistent limit setting strategies supporting safety of patient, staff and others  5. Encourage participation in the decision making process about the behavioral management agreement  6. If a visitor's behavior poses a threat to safety call refer to organization policy. 7. Initiate consult with , Psychosocial CNS, Spiritual Care as appropriate  3/15/2023 2046 by Jessica Perry RN  Outcome: Progressing       Pt denies all during shift assessment, remains cooperative, took his evening scheduled med after his dose change. Fixated on going home, continues to talk about it. Currently resting in bed.

## 2023-03-16 NOTE — GROUP NOTE
Group Therapy Note    Date: 3/16/2023    Group Start Time: 1030  Group End Time: 0429  Group Topic: Art Therapy     [address]        Group Therapy Note    Attendees: 10    Group members/patients engaged in a self-care group; identifying the 7 areas of self-care: Mental, Physical, Emotional, Social, Spiritual, Vocational, Financial. Patients were encouraged to identify ways to engage in these areas of self-care and create small vignette drawings of their self-care strategies and activities. Patients were encouraged to share their drawings with both the group facilitator and peers. Notes:  Aquilino Millard remained present and actively engaged in the art therapy drawing intervention and interpersonal interactions with staff  and group facilitator. He was able to identify ways to care for self and made positive effort to explain his drawings to staff. Aquilino Millard was able to manage his emotions during group setting, in spite of becoming frustrated with select group members choice in music. Aquilino Millard remained in upbeat spirits and without behavioral difficulties during group. Status After Intervention:  Improved    Participation Level:  Active Listener and Interactive    Participation Quality: Appropriate, Attentive, and Sharing      Speech:  slurred but appropriate to patient      Thought Process/Content: Logical (moderate - some presence of Holiness preoccupation)      Affective Functioning: Congruent      Mood: brightened      Level of consciousness:  Alert, Oriented x4, and Attentive      Response to Learning: Able to verbalize current knowledge/experience, Able to verbalize/acknowledge new learning, and Progressing to goal      Endings: None Reported    Modes of Intervention: Education, Exploration, Problem-solving, and Activity      Discipline Responsible: Psychoeducational Specialist      Signature: Miguel Gil MS, ATR-BC
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
## 2023-03-16 NOTE — PROGRESS NOTES
This writer accompanied pt to large side of unit to attend group. He managed to keep behavior in control and only required minimal redirection. He was accepting of redirection and later apologized to other pt for \"not being nice. \"  Pt managed to be on the large side for the entire group and then asked if it would be ok to go back to his room to rest.  He did take a nap and continues to maintain positive behavior. Pt took his medication and ate all meals.

## 2023-03-16 NOTE — PROGRESS NOTES
Pt was upset about having scheduled prolixin tonight and became agitated. He was under the impression that once he had the 21 day injection that he would no longer need the oral. Call placed to Dr. Adrianna Lainez  about this. Dr. Adrianna Lainez decreased pt prolixin to 10mg BID from 20mg. Pt was educated on the prolixin injection and the need to continue to oral medication for 48-72 hours until the injection becomes therapeutic. Pt verbalized understanding and agrees to take scheduled night time medication.

## 2023-03-16 NOTE — PROGRESS NOTES
Pt states \" sree purged the prolixin from my system its gone. I forgot I could purge it from my system by myself, see. Don't tell doctor Nita Rutledge, what he doesn't know wont hurt him. There is no mental illness, it is self inflicted from drug use\" pt then demonstrated \"purging\" by breathing in and out slowly and stating  \"that's how I purge, see, the shot is no longer in my system\".

## 2023-03-16 NOTE — BH NOTE
0300  Pt up in dayroom eating snacks. Pt cooperative at this times Pt returned to bed to rest. Stated he didn't think he would sleep.
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
## 2023-03-17 VITALS
SYSTOLIC BLOOD PRESSURE: 150 MMHG | TEMPERATURE: 98.6 F | OXYGEN SATURATION: 96 % | HEART RATE: 79 BPM | RESPIRATION RATE: 18 BRPM | DIASTOLIC BLOOD PRESSURE: 87 MMHG | BODY MASS INDEX: 31.81 KG/M2 | WEIGHT: 240 LBS | HEIGHT: 73 IN

## 2023-03-17 PROCEDURE — 6370000000 HC RX 637 (ALT 250 FOR IP)

## 2023-03-17 PROCEDURE — 6370000000 HC RX 637 (ALT 250 FOR IP): Performed by: PSYCHIATRY & NEUROLOGY

## 2023-03-17 PROCEDURE — 5130000000 HC BRIDGE APPOINTMENT

## 2023-03-17 RX ORDER — FLUPHENAZINE DECANOATE 25 MG/ML
37.5 INJECTION, SOLUTION INTRAMUSCULAR; SUBCUTANEOUS
Qty: 1.5 ML | Refills: 3 | Status: SHIPPED | OUTPATIENT
Start: 2023-04-05

## 2023-03-17 RX ORDER — LORAZEPAM 1 MG/1
2 TABLET ORAL 2 TIMES DAILY PRN
Qty: 30 TABLET | Refills: 0 | Status: SHIPPED | OUTPATIENT
Start: 2023-03-17 | End: 2023-04-05

## 2023-03-17 RX ORDER — FLUPHENAZINE DECANOATE 25 MG/ML
25 INJECTION, SOLUTION INTRAMUSCULAR; SUBCUTANEOUS ONCE
Status: CANCELLED
Start: 2023-04-05 | End: 2023-04-05

## 2023-03-17 RX ORDER — LORAZEPAM 2 MG/1
2 TABLET ORAL ONCE
Status: COMPLETED | OUTPATIENT
Start: 2023-03-17 | End: 2023-03-17

## 2023-03-17 RX ADMIN — OLANZAPINE 10 MG: 10 TABLET, ORALLY DISINTEGRATING ORAL at 06:28

## 2023-03-17 RX ADMIN — FLUPHENAZINE HYDROCHLORIDE 10 MG: 10 TABLET, FILM COATED ORAL at 07:28

## 2023-03-17 RX ADMIN — CHLORTHALIDONE 12.5 MG: 25 TABLET ORAL at 07:28

## 2023-03-17 RX ADMIN — LORAZEPAM 2 MG: 2 TABLET ORAL at 14:58

## 2023-03-17 RX ADMIN — ACETAMINOPHEN 650 MG: 325 TABLET ORAL at 11:59

## 2023-03-17 RX ADMIN — OLANZAPINE 10 MG: 10 TABLET, ORALLY DISINTEGRATING ORAL at 09:34

## 2023-03-17 RX ADMIN — FLUTICASONE PROPIONATE 1 SPRAY: 50 SPRAY, METERED NASAL at 07:28

## 2023-03-17 RX ADMIN — ATORVASTATIN CALCIUM 10 MG: 10 TABLET, FILM COATED ORAL at 07:28

## 2023-03-17 ASSESSMENT — PAIN DESCRIPTION - LOCATION: LOCATION: KNEE

## 2023-03-17 ASSESSMENT — PAIN SCALES - GENERAL: PAINLEVEL_OUTOF10: 8

## 2023-03-17 ASSESSMENT — PAIN DESCRIPTION - ORIENTATION: ORIENTATION: RIGHT;LEFT

## 2023-03-17 NOTE — PROGRESS NOTES
Begin tapering off Gabapentin. Call [phone] to schedule labs.   Pt is awake at this time and anxious/restless. He is asking questions r/t to d/c and stating he cant breath and having \"respiratory tardive dyskinesia. \" Oxygen is currently 96 on RA. Emotional support provided. Encourage patient to do relaxation and breathing exercises. Pt then wants to shower. After he showers he comes out requesting zyprexa stating he \"needs it now. \" He continues to pace from room to unit and becomes increasing agitated. Prn medication given at this time. Bible also provided per request. Snack given. Pt sitting in day room now and getting louder stating the doctors don't know what they are doing and wondering when he will be out of this place.

## 2023-03-17 NOTE — BH NOTE
Vandana Perez will be discharged today per Psychiatrist order. He has been calm and cooperative with staff today. States he is looking forward to returning home to his mother. Safety plan filled out with staff assistance. He has not had any angry outbursts and was able to remain appropriate when meeting with [facility]. He does request Ativan for his \"respiratory tardive dyskinesia\" and per Dr. Taylor Nguyễn will receive a one time order prior to discharge. His respiratory status is stable with normal respiration pattern, depth, and effort. After review of chart and with team, the patients belief of \"respiratory tardive dyskinesia\" is an ongoing delusion.
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
## 2023-03-17 NOTE — BH NOTE
Updated Tim Ibrahim, patients mother and guardian on discharge. Discussed discharge plans, upcoming injection appointment, and answered questions related to community mental health needs. Patient met with [facility] for intake.

## 2023-03-17 NOTE — PLAN OF CARE
Problem: Risk for Elopement  Goal: Patient will not exit the unit/facility without proper excort  Outcome: Completed     Problem: Coping  Goal: Pt/Family able to verbalize concerns and demonstrate effective coping strategies  Description: INTERVENTIONS:  1. Assist patient/family to identify coping skills, available support systems and cultural and spiritual values  2. Provide emotional support, including active listening and acknowledgement of concerns of patient and caregivers  3. Reduce environmental stimuli, as able  4. Instruct patient/family in relaxation techniques, as appropriate  5. Assess for spiritual pain/suffering and initiate Spiritual Care, Psychosocial Clinical Specialist consults as needed  Outcome: Completed     Problem: Behavior  Goal: Pt/Family maintain appropriate behavior and adhere to behavioral management agreement, if implemented  Description: INTERVENTIONS:  1. Assess patient/family's coping skills and  non-compliant behavior (including use of illegal substances)  2. Notify security of behavior or suspected illegal substances which indicate the need for search of the family and/or belongings  3. Encourage verbalization of thoughts and concerns in a socially appropriate manner  4. Utilize positive, consistent limit setting strategies supporting safety of patient, staff and others  5. Encourage participation in the decision making process about the behavioral management agreement  6. If a visitor's behavior poses a threat to safety call refer to organization policy. 7. Initiate consult with , Psychosocial CNS, Spiritual Care as appropriate  Outcome: Completed     Problem: Psychosis  Goal: Will report no hallucinations or delusions  Description: INTERVENTIONS:  1. Administer medication as  ordered  2. Assist with reality testing to support increasing orientation  3.  Assess if patient's hallucinations or delusions are encouraging self harm or harm to others and intervene as appropriate  Outcome: Completed     Problem: Anxiety  Goal: Will report anxiety at manageable levels  Description: INTERVENTIONS:  1. Administer medication as ordered  2. Teach and rehearse alternative coping skills  3.  Provide emotional support with 1:1 interaction with staff  Outcome: Completed     Problem: Pain  Goal: Verbalizes/displays adequate comfort level or baseline comfort level  Outcome: Completed     Problem: Safety - Adult  Goal: Free from fall injury  Outcome: Completed

## 2023-03-17 NOTE — BH NOTE
[facility]  Discharge Note    Pt discharged with followings belongings:   Dental Appliances: None  Vision - Corrective Lenses: Eyeglasses  Hearing Aid: None  Jewelry: None  Body Piercings Removed: N/A  Clothing: Pants, Undergarments, Shirt (1 pair of boxers, 1 pair of pants, 1 shirt - on his person)  Other Valuables: Other (Comment) (Patient does not have valuables)   Valuables sent home with patient or returned to patient. Patient educated on aftercare instructions: yes. Information faxed to [facility] by this writer  at 4:30 PM .Patient verbalize understanding of AVS:  yes. Status EXAM upon discharge:  Mental Status and Behavioral Exam  Normal: Yes (WNL for patients baseline)  Level of Assistance: Independent/Self  Facial Expression: Exaggerated  Affect: Appropriate, Congruent  Level of Consciousness: Alert  Frequency of Checks: 1 staff: 1 patient  - continuous (line of sight)  Mood:Normal: Yes (baselines for patient)  Mood: Labile  Motor Activity:Normal: No  Motor Activity: Unusual posture/gait  Eye Contact: Good  Observed Behavior: Cooperative  Sexual Misconduct History: Current - no (no current or past hx)  Preception: [place] to person, [place] to time, [place] to place  Attention:Normal: No  Attention: Distractible  Thought Processes: Loose association, Perseveration  Thought Content:Normal: No  Thought Content: Paranoia, Preoccupations, Delusions  Depression Symptoms: Increased irritability  Anxiety Symptoms: Generalized  Rebeka Symptoms: Flight of ideas, Pressured speech, Labile  Hallucinations: Auditory (comment) (grandiose.  states he hears god.)  Delusions: Yes  Delusions: Grandeur, Paranoid, Persecutory, Bahai (delusional at baseline)  Memory:Normal: No  Memory: Confabulation, Poor recent  Insight and Judgment: No  Insight and Judgment: Poor insight, Poor judgment    Tobacco Screening:  Practical Counseling, on admission, soledad X, if applicable and completed (first 3 are required if patient doesn't refuse):            ( ) Recognizing danger situations (included triggers and roadblocks)                    ( ) Coping skills (new ways to manage stress,relaxation techniques, changing routine, distraction)                                                           ( ) Basic information about quitting (benefits of quitting, techniques in how to quit, available resources  ( ) Referral for counseling faxed to [facility]                                                                                                                   ( ) Patient refused counseling  ( ) Patient refused referral  ( ) Patient refused prescription upon discharge  (X) Patient has not smoked in the last 30 days    Metabolic Screening:    Lab Results   Component Value Date    LABA1C 5.3 08/14/2022       Lab Results   Component Value Date    CHOL 213 (H) 08/14/2022    CHOL 204 (H) 02/27/2022    CHOL 155 10/15/2021    CHOL 162 07/29/2020    CHOL 209 (H) 06/05/2019    CHOL 193 02/27/2019    CHOL 255 (H) 06/01/2018    CHOL 215 (H) 03/19/2018    CHOL 148 08/31/2017     Lab Results   Component Value Date    TRIG 118 08/14/2022    TRIG 81 02/27/2022    TRIG 82 10/15/2021    TRIG 135 07/29/2020    TRIG 239 (H) 06/05/2019    TRIG 181 (H) 02/27/2019    TRIG 177 (H) 06/01/2018    TRIG 212 (H) 03/19/2018    TRIG 112 08/31/2017     Lab Results   Component Value Date    HDL 49 08/14/2022    HDL 48 02/27/2022    HDL 42 10/15/2021    HDL 48 07/29/2020    HDL 45 06/05/2019    HDL 46 02/27/2019    HDL 50 06/01/2018    HDL 46 03/19/2018    HDL 50 08/31/2017     No components found for: LDLCAL  Lab Results   Component Value Date    LABVLDL 24 08/14/2022    LABVLDL 16 02/27/2022    LABVLDL 16 10/15/2021    LABVLDL 27 07/29/2020    LABVLDL 48 06/05/2019    LABVLDL 36 02/27/2019    LABVLDL 35 06/01/2018    LABVLDL 42 03/19/2018    LABVLDL 22 08/31/2017       Randy Terrell RN      Bridge Appointment completed: Reviewed Discharge Instructions with patient. Patient verbalizes understanding and agreement with the discharge plan using the teachback method.

## 2023-03-17 NOTE — PROGRESS NOTES
Pt resting in room laying in bed with no s/s of distress. He is noted to reposition at times. Pt denies current needs.

## 2023-03-17 NOTE — PROGRESS NOTES
Pt out at desk complaining of tardive dyskinesia stating he cant breath and he pushes his tongues out of his mouth and starts mumbling. Vitals are stable. Emotional support provided to patient. Prn zyprexa given for agitation at this time.  Shortly after he reports the dyskinesia is gone and is requesting he has zyprexa prescribed at home because this helped his TD.
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
## 2023-03-17 NOTE — BH NOTE
Writer has been in contact with [facility]. Omar is working on getting a representative to the hospital today to complete an assessment to get patient established. Omar reported that it is unsafe for staff to come to patients house. Consulted with [facility] staff, they do not complete assessments in patients homes.

## 2023-03-17 NOTE — GROUP NOTE
Group Therapy Note    Date: 3/16/2023    Group Start Time: 2030  Group End Time: 2105  Group Topic: 100 Mame Toney RN        Group Therapy Note    Attendees: 13       Patient's Goal:  To come to group     Notes:  States getting out for group is a good thing, but being around people was making him [place]. \"    Status After Intervention:  Unchanged    Participation Level: Minimal    Participation Quality: Appropriate for the most part      Speech:  pressured slightly      Thought Process/Content: Logical      Affective Functioning: Constricted/Restricted      Mood: anxious      Level of consciousness:  Alert      Response to Learning: Pt was glad to be in group, but probably wasn't learning much      Endings: None Reported    Modes of Intervention: Support and Socialization      Discipline Responsible: Registered Nurse      Signature:  Narendra Carcamo RN

## 2023-03-17 NOTE — PROGRESS NOTES
Pt awake but calm and cooperative he uses the bathroom and comes to nursing station briefly before lying back down. No request at this time.

## 2023-04-03 ENCOUNTER — FOLLOWUP TELEPHONE ENCOUNTER (OUTPATIENT)
Dept: PSYCHIATRY | Age: 63
End: 2023-04-03

## 2023-04-03 NOTE — TELEPHONE ENCOUNTER
Reminder call placed for appointment tomorrow. Patient mother/guardian states pt has appt with [facility] on Friday for injection.

## 2023-11-11 ENCOUNTER — APPOINTMENT (OUTPATIENT)
Dept: CT IMAGING | Age: 63
DRG: 351 | End: 2023-11-11
Payer: COMMERCIAL

## 2023-11-11 ENCOUNTER — HOSPITAL ENCOUNTER (INPATIENT)
Age: 63
LOS: 2 days | Discharge: HOME | DRG: 351 | End: 2023-11-14
Attending: EMERGENCY MEDICINE | Admitting: INTERNAL MEDICINE
Payer: COMMERCIAL

## 2023-11-11 ENCOUNTER — APPOINTMENT (OUTPATIENT)
Dept: GENERAL RADIOLOGY | Age: 63
DRG: 351 | End: 2023-11-11
Payer: COMMERCIAL

## 2023-11-11 DIAGNOSIS — R46.89 AGGRESSIVE BEHAVIOR: ICD-10-CM

## 2023-11-11 DIAGNOSIS — R00.0 TACHYCARDIA: ICD-10-CM

## 2023-11-11 DIAGNOSIS — E87.6 HYPOKALEMIA: ICD-10-CM

## 2023-11-11 DIAGNOSIS — R79.89 ELEVATED TROPONIN: Primary | ICD-10-CM

## 2023-11-11 LAB
ALBUMIN SERPL-MCNC: 4.3 G/DL (ref 3.4–5)
ALBUMIN/GLOB SERPL: 1.7 {RATIO} (ref 1.1–2.2)
ALP SERPL-CCNC: 62 U/L (ref 40–129)
ALT SERPL-CCNC: 26 U/L (ref 10–40)
AMPHETAMINES UR QL SCN>1000 NG/ML: NORMAL
ANION GAP SERPL CALCULATED.3IONS-SCNC: 11 MMOL/L (ref 3–16)
APAP SERPL-MCNC: <5 UG/ML (ref 10–30)
AST SERPL-CCNC: 22 U/L (ref 15–37)
BACTERIA URNS QL MICRO: ABNORMAL /HPF
BARBITURATES UR QL SCN>200 NG/ML: NORMAL
BASOPHILS # BLD: 0 K/UL (ref 0–0.2)
BASOPHILS NFR BLD: 0.4 %
BENZODIAZ UR QL SCN>200 NG/ML: NORMAL
BILIRUB SERPL-MCNC: <0.2 MG/DL (ref 0–1)
BILIRUB UR QL STRIP.AUTO: NEGATIVE
BUN SERPL-MCNC: 20 MG/DL (ref 7–20)
CALCIUM SERPL-MCNC: 9.6 MG/DL (ref 8.3–10.6)
CANNABINOIDS UR QL SCN>50 NG/ML: NORMAL
CHLORIDE SERPL-SCNC: 97 MMOL/L (ref 99–110)
CLARITY UR: CLEAR
CO2 SERPL-SCNC: 26 MMOL/L (ref 21–32)
COCAINE UR QL SCN: NORMAL
COLOR UR: YELLOW
CREAT SERPL-MCNC: 1 MG/DL (ref 0.8–1.3)
D DIMER: 0.3 UG/ML FEU (ref 0–0.6)
DEPRECATED RDW RBC AUTO: 13.7 % (ref 12.4–15.4)
DRUG SCREEN COMMENT UR-IMP: NORMAL
EOSINOPHIL # BLD: 0.2 K/UL (ref 0–0.6)
EOSINOPHIL NFR BLD: 1.8 %
EPI CELLS #/AREA URNS HPF: ABNORMAL /HPF (ref 0–5)
ETHANOLAMINE SERPL-MCNC: NORMAL MG/DL (ref 0–0.08)
FENTANYL SCREEN, URINE: NORMAL
FINE GRAN CASTS #/AREA URNS HPF: ABNORMAL /LPF (ref 0–2)
GFR SERPLBLD CREATININE-BSD FMLA CKD-EPI: >60 ML/MIN/{1.73_M2}
GLUCOSE SERPL-MCNC: 173 MG/DL (ref 70–99)
GLUCOSE UR STRIP.AUTO-MCNC: NEGATIVE MG/DL
HCT VFR BLD AUTO: 51.7 % (ref 40.5–52.5)
HGB BLD-MCNC: 17.5 G/DL (ref 13.5–17.5)
HGB UR QL STRIP.AUTO: ABNORMAL
HYALINE CASTS #/AREA URNS LPF: ABNORMAL /LPF (ref 0–2)
KETONES UR STRIP.AUTO-MCNC: NEGATIVE MG/DL
LEUKOCYTE ESTERASE UR QL STRIP.AUTO: NEGATIVE
LYMPHOCYTES # BLD: 0.9 K/UL (ref 1–5.1)
LYMPHOCYTES NFR BLD: 10.2 %
MAGNESIUM SERPL-MCNC: 2.1 MG/DL (ref 1.8–2.4)
MCH RBC QN AUTO: 30.4 PG (ref 26–34)
MCHC RBC AUTO-ENTMCNC: 33.9 G/DL (ref 31–36)
MCV RBC AUTO: 89.8 FL (ref 80–100)
METHADONE UR QL SCN>300 NG/ML: NORMAL
MONOCYTES # BLD: 0.6 K/UL (ref 0–1.3)
MONOCYTES NFR BLD: 7 %
MUCOUS THREADS #/AREA URNS LPF: ABNORMAL /LPF
NEUTROPHILS # BLD: 6.8 K/UL (ref 1.7–7.7)
NEUTROPHILS NFR BLD: 80.6 %
NITRITE UR QL STRIP.AUTO: NEGATIVE
NT-PROBNP SERPL-MCNC: 66 PG/ML (ref 0–124)
OPIATES UR QL SCN>300 NG/ML: NORMAL
OXYCODONE UR QL SCN: NORMAL
PCP UR QL SCN>25 NG/ML: NORMAL
PH UR STRIP.AUTO: 6 [PH] (ref 5–8)
PH UR STRIP: 6 [PH]
PLATELET # BLD AUTO: 238 K/UL (ref 135–450)
PMV BLD AUTO: 8.5 FL (ref 5–10.5)
POTASSIUM SERPL-SCNC: 2.9 MMOL/L (ref 3.5–5.1)
PROT SERPL-MCNC: 6.8 G/DL (ref 6.4–8.2)
PROT UR STRIP.AUTO-MCNC: ABNORMAL MG/DL
RBC # BLD AUTO: 5.76 M/UL (ref 4.2–5.9)
RBC #/AREA URNS HPF: ABNORMAL /HPF (ref 0–4)
SALICYLATES SERPL-MCNC: <0.3 MG/DL (ref 15–30)
SODIUM SERPL-SCNC: 134 MMOL/L (ref 136–145)
SP GR UR STRIP.AUTO: 1.02 (ref 1–1.03)
TROPONIN, HIGH SENSITIVITY: 23 NG/L (ref 0–22)
TROPONIN, HIGH SENSITIVITY: 41 NG/L (ref 0–22)
UA COMPLETE W REFLEX CULTURE PNL UR: ABNORMAL
UA DIPSTICK W REFLEX MICRO PNL UR: YES
URN SPEC COLLECT METH UR: ABNORMAL
UROBILINOGEN UR STRIP-ACNC: 0.2 E.U./DL
WBC # BLD AUTO: 8.5 K/UL (ref 4–11)
WBC #/AREA URNS HPF: ABNORMAL /HPF (ref 0–5)

## 2023-11-11 PROCEDURE — 85379 FIBRIN DEGRADATION QUANT: CPT

## 2023-11-11 PROCEDURE — 81001 URINALYSIS AUTO W/SCOPE: CPT

## 2023-11-11 PROCEDURE — 84484 ASSAY OF TROPONIN QUANT: CPT

## 2023-11-11 PROCEDURE — 71046 X-RAY EXAM CHEST 2 VIEWS: CPT

## 2023-11-11 PROCEDURE — 80053 COMPREHEN METABOLIC PANEL: CPT

## 2023-11-11 PROCEDURE — 85520 HEPARIN ASSAY: CPT

## 2023-11-11 PROCEDURE — 83735 ASSAY OF MAGNESIUM: CPT

## 2023-11-11 PROCEDURE — 85610 PROTHROMBIN TIME: CPT

## 2023-11-11 PROCEDURE — 85730 THROMBOPLASTIN TIME PARTIAL: CPT

## 2023-11-11 PROCEDURE — 80143 DRUG ASSAY ACETAMINOPHEN: CPT

## 2023-11-11 PROCEDURE — 93005 ELECTROCARDIOGRAM TRACING: CPT | Performed by: NURSE PRACTITIONER

## 2023-11-11 PROCEDURE — 99285 EMERGENCY DEPT VISIT HI MDM: CPT

## 2023-11-11 PROCEDURE — 82077 ASSAY SPEC XCP UR&BREATH IA: CPT

## 2023-11-11 PROCEDURE — 83880 ASSAY OF NATRIURETIC PEPTIDE: CPT

## 2023-11-11 PROCEDURE — 85025 COMPLETE CBC W/AUTO DIFF WBC: CPT

## 2023-11-11 PROCEDURE — 6370000000 HC RX 637 (ALT 250 FOR IP): Performed by: NURSE PRACTITIONER

## 2023-11-11 PROCEDURE — 80179 DRUG ASSAY SALICYLATE: CPT

## 2023-11-11 PROCEDURE — 70450 CT HEAD/BRAIN W/O DYE: CPT

## 2023-11-11 PROCEDURE — 80307 DRUG TEST PRSMV CHEM ANLYZR: CPT

## 2023-11-11 PROCEDURE — 36415 COLL VENOUS BLD VENIPUNCTURE: CPT

## 2023-11-11 RX ORDER — ASPIRIN 325 MG
325 TABLET ORAL ONCE
Status: COMPLETED | OUTPATIENT
Start: 2023-11-11 | End: 2023-11-11

## 2023-11-11 RX ADMIN — ASPIRIN 325 MG: 325 TABLET ORAL at 22:47

## 2023-11-11 RX ADMIN — POTASSIUM BICARBONATE 40 MEQ: 782 TABLET, EFFERVESCENT ORAL at 22:01

## 2023-11-11 ASSESSMENT — ENCOUNTER SYMPTOMS
COLOR CHANGE: 0
SORE THROAT: 0
ABDOMINAL PAIN: 0
RHINORRHEA: 0
SHORTNESS OF BREATH: 0
FACIAL SWELLING: 0

## 2023-11-12 PROBLEM — Z00.8 PATIENT NEEDS PSYCHIATRIC HOLD FOR EVALUATION: Status: ACTIVE | Noted: 2023-11-12

## 2023-11-12 LAB
ALBUMIN SERPL-MCNC: 4 G/DL (ref 3.4–5)
ALBUMIN/GLOB SERPL: 1.7 {RATIO} (ref 1.1–2.2)
ALP SERPL-CCNC: 58 U/L (ref 40–129)
ALT SERPL-CCNC: 24 U/L (ref 10–40)
ANION GAP SERPL CALCULATED.3IONS-SCNC: 10 MMOL/L (ref 3–16)
ANTI-XA UNFRAC HEPARIN: <0.1 IU/ML (ref 0.3–0.7)
APTT BLD: 22.9 SEC (ref 22.7–35.9)
AST SERPL-CCNC: 27 U/L (ref 15–37)
BASOPHILS # BLD: 0 K/UL (ref 0–0.2)
BASOPHILS NFR BLD: 0.6 %
BILIRUB SERPL-MCNC: 0.5 MG/DL (ref 0–1)
BUN SERPL-MCNC: 14 MG/DL (ref 7–20)
CALCIUM SERPL-MCNC: 9.2 MG/DL (ref 8.3–10.6)
CHLORIDE SERPL-SCNC: 94 MMOL/L (ref 99–110)
CK SERPL-CCNC: 455 U/L (ref 39–308)
CK SERPL-CCNC: 981 U/L (ref 39–308)
CO2 SERPL-SCNC: 28 MMOL/L (ref 21–32)
CREAT SERPL-MCNC: 0.8 MG/DL (ref 0.8–1.3)
DEPRECATED RDW RBC AUTO: 13.5 % (ref 12.4–15.4)
EKG ATRIAL RATE: 109 BPM
EKG ATRIAL RATE: 80 BPM
EKG DIAGNOSIS: NORMAL
EKG DIAGNOSIS: NORMAL
EKG P AXIS: 40 DEGREES
EKG P-R INTERVAL: 154 MS
EKG P-R INTERVAL: 160 MS
EKG Q-T INTERVAL: 334 MS
EKG Q-T INTERVAL: 370 MS
EKG QRS DURATION: 84 MS
EKG QRS DURATION: 90 MS
EKG QTC CALCULATION (BAZETT): 426 MS
EKG QTC CALCULATION (BAZETT): 449 MS
EKG R AXIS: -49 DEGREES
EKG R AXIS: -49 DEGREES
EKG T AXIS: -43 DEGREES
EKG T AXIS: 11 DEGREES
EKG VENTRICULAR RATE: 109 BPM
EKG VENTRICULAR RATE: 80 BPM
EOSINOPHIL # BLD: 0.4 K/UL (ref 0–0.6)
EOSINOPHIL NFR BLD: 4.1 %
GFR SERPLBLD CREATININE-BSD FMLA CKD-EPI: >60 ML/MIN/{1.73_M2}
GLUCOSE SERPL-MCNC: 157 MG/DL (ref 70–99)
HCT VFR BLD AUTO: 49 % (ref 40.5–52.5)
HGB BLD-MCNC: 16.9 G/DL (ref 13.5–17.5)
INR PPP: 1 (ref 0.84–1.16)
LYMPHOCYTES # BLD: 1.6 K/UL (ref 1–5.1)
LYMPHOCYTES NFR BLD: 18.4 %
MAGNESIUM SERPL-MCNC: 2.1 MG/DL (ref 1.8–2.4)
MCH RBC QN AUTO: 31.1 PG (ref 26–34)
MCHC RBC AUTO-ENTMCNC: 34.6 G/DL (ref 31–36)
MCV RBC AUTO: 90 FL (ref 80–100)
MONOCYTES # BLD: 0.7 K/UL (ref 0–1.3)
MONOCYTES NFR BLD: 8.7 %
NEUTROPHILS # BLD: 5.8 K/UL (ref 1.7–7.7)
NEUTROPHILS NFR BLD: 68.2 %
PLATELET # BLD AUTO: 210 K/UL (ref 135–450)
PMV BLD AUTO: 8.1 FL (ref 5–10.5)
POTASSIUM SERPL-SCNC: 3.2 MMOL/L (ref 3.5–5.1)
PROT SERPL-MCNC: 6.3 G/DL (ref 6.4–8.2)
PROTHROMBIN TIME: 13.2 SEC (ref 11.5–14.8)
RBC # BLD AUTO: 5.44 M/UL (ref 4.2–5.9)
SODIUM SERPL-SCNC: 132 MMOL/L (ref 136–145)
TROPONIN, HIGH SENSITIVITY: 26 NG/L (ref 0–22)
TROPONIN, HIGH SENSITIVITY: 43 NG/L (ref 0–22)
WBC # BLD AUTO: 8.6 K/UL (ref 4–11)

## 2023-11-12 PROCEDURE — 80053 COMPREHEN METABOLIC PANEL: CPT

## 2023-11-12 PROCEDURE — 82550 ASSAY OF CK (CPK): CPT

## 2023-11-12 PROCEDURE — 83735 ASSAY OF MAGNESIUM: CPT

## 2023-11-12 PROCEDURE — 85025 COMPLETE CBC W/AUTO DIFF WBC: CPT

## 2023-11-12 PROCEDURE — 93010 ELECTROCARDIOGRAM REPORT: CPT | Performed by: INTERNAL MEDICINE

## 2023-11-12 PROCEDURE — 2060000000 HC ICU INTERMEDIATE R&B

## 2023-11-12 PROCEDURE — 6370000000 HC RX 637 (ALT 250 FOR IP): Performed by: EMERGENCY MEDICINE

## 2023-11-12 PROCEDURE — 6370000000 HC RX 637 (ALT 250 FOR IP): Performed by: INTERNAL MEDICINE

## 2023-11-12 PROCEDURE — 2580000003 HC RX 258: Performed by: INTERNAL MEDICINE

## 2023-11-12 PROCEDURE — 36415 COLL VENOUS BLD VENIPUNCTURE: CPT

## 2023-11-12 PROCEDURE — 84484 ASSAY OF TROPONIN QUANT: CPT

## 2023-11-12 PROCEDURE — 6360000002 HC RX W HCPCS: Performed by: INTERNAL MEDICINE

## 2023-11-12 PROCEDURE — 99253 IP/OBS CNSLTJ NEW/EST LOW 45: CPT | Performed by: INTERNAL MEDICINE

## 2023-11-12 RX ORDER — ZIPRASIDONE HYDROCHLORIDE 20 MG/1
CAPSULE ORAL
Status: DISPENSED
Start: 2023-11-12 | End: 2023-11-12

## 2023-11-12 RX ORDER — ONDANSETRON 2 MG/ML
4 INJECTION INTRAMUSCULAR; INTRAVENOUS EVERY 6 HOURS PRN
Status: DISCONTINUED | OUTPATIENT
Start: 2023-11-12 | End: 2023-11-14 | Stop reason: HOSPADM

## 2023-11-12 RX ORDER — PROMETHAZINE HYDROCHLORIDE 25 MG/1
12.5 TABLET ORAL EVERY 6 HOURS PRN
Status: DISCONTINUED | OUTPATIENT
Start: 2023-11-12 | End: 2023-11-14 | Stop reason: HOSPADM

## 2023-11-12 RX ORDER — HEPARIN SODIUM 10000 [USP'U]/100ML
8.9 INJECTION, SOLUTION INTRAVENOUS CONTINUOUS
Status: DISCONTINUED | OUTPATIENT
Start: 2023-11-12 | End: 2023-11-13

## 2023-11-12 RX ORDER — LANOLIN ALCOHOL/MO/W.PET/CERES
3 CREAM (GRAM) TOPICAL NIGHTLY
Status: DISCONTINUED | OUTPATIENT
Start: 2023-11-12 | End: 2023-11-14 | Stop reason: HOSPADM

## 2023-11-12 RX ORDER — ZIPRASIDONE HYDROCHLORIDE 20 MG/1
20 CAPSULE ORAL ONCE
Status: COMPLETED | OUTPATIENT
Start: 2023-11-12 | End: 2023-11-12

## 2023-11-12 RX ORDER — HEPARIN SODIUM 1000 [USP'U]/ML
2000 INJECTION, SOLUTION INTRAVENOUS; SUBCUTANEOUS PRN
Status: DISCONTINUED | OUTPATIENT
Start: 2023-11-12 | End: 2023-11-13 | Stop reason: ALTCHOICE

## 2023-11-12 RX ORDER — POTASSIUM CHLORIDE 7.45 MG/ML
10 INJECTION INTRAVENOUS PRN
Status: DISCONTINUED | OUTPATIENT
Start: 2023-11-12 | End: 2023-11-14 | Stop reason: HOSPADM

## 2023-11-12 RX ORDER — SODIUM CHLORIDE 0.9 % (FLUSH) 0.9 %
10 SYRINGE (ML) INJECTION PRN
Status: DISCONTINUED | OUTPATIENT
Start: 2023-11-12 | End: 2023-11-14 | Stop reason: HOSPADM

## 2023-11-12 RX ORDER — MAGNESIUM SULFATE IN WATER 40 MG/ML
2000 INJECTION, SOLUTION INTRAVENOUS PRN
Status: DISCONTINUED | OUTPATIENT
Start: 2023-11-12 | End: 2023-11-14 | Stop reason: HOSPADM

## 2023-11-12 RX ORDER — SODIUM CHLORIDE, SODIUM LACTATE, POTASSIUM CHLORIDE, CALCIUM CHLORIDE 600; 310; 30; 20 MG/100ML; MG/100ML; MG/100ML; MG/100ML
INJECTION, SOLUTION INTRAVENOUS CONTINUOUS
Status: ACTIVE | OUTPATIENT
Start: 2023-11-12 | End: 2023-11-12

## 2023-11-12 RX ORDER — POTASSIUM CHLORIDE 20 MEQ/1
40 TABLET, EXTENDED RELEASE ORAL PRN
Status: DISCONTINUED | OUTPATIENT
Start: 2023-11-12 | End: 2023-11-14 | Stop reason: HOSPADM

## 2023-11-12 RX ORDER — ATORVASTATIN CALCIUM 10 MG/1
10 TABLET, FILM COATED ORAL DAILY
Status: DISCONTINUED | OUTPATIENT
Start: 2023-11-12 | End: 2023-11-14 | Stop reason: HOSPADM

## 2023-11-12 RX ORDER — HEPARIN SODIUM 1000 [USP'U]/ML
4000 INJECTION, SOLUTION INTRAVENOUS; SUBCUTANEOUS PRN
Status: DISCONTINUED | OUTPATIENT
Start: 2023-11-12 | End: 2023-11-13 | Stop reason: ALTCHOICE

## 2023-11-12 RX ORDER — SODIUM CHLORIDE, SODIUM LACTATE, POTASSIUM CHLORIDE, CALCIUM CHLORIDE 600; 310; 30; 20 MG/100ML; MG/100ML; MG/100ML; MG/100ML
INJECTION, SOLUTION INTRAVENOUS CONTINUOUS
Status: DISCONTINUED | OUTPATIENT
Start: 2023-11-12 | End: 2023-11-12

## 2023-11-12 RX ORDER — FLUTICASONE PROPIONATE 50 MCG
2 SPRAY, SUSPENSION (ML) NASAL DAILY
Status: DISCONTINUED | OUTPATIENT
Start: 2023-11-12 | End: 2023-11-13 | Stop reason: SDUPTHER

## 2023-11-12 RX ORDER — HEPARIN SODIUM 1000 [USP'U]/ML
4000 INJECTION, SOLUTION INTRAVENOUS; SUBCUTANEOUS ONCE
Status: COMPLETED | OUTPATIENT
Start: 2023-11-12 | End: 2023-11-12

## 2023-11-12 RX ORDER — ACETAMINOPHEN 325 MG/1
650 TABLET ORAL EVERY 6 HOURS PRN
Status: DISCONTINUED | OUTPATIENT
Start: 2023-11-12 | End: 2023-11-14 | Stop reason: HOSPADM

## 2023-11-12 RX ORDER — NICOTINE 21 MG/24HR
1 PATCH, TRANSDERMAL 24 HOURS TRANSDERMAL DAILY
Status: DISCONTINUED | OUTPATIENT
Start: 2023-11-12 | End: 2023-11-12

## 2023-11-12 RX ORDER — SODIUM CHLORIDE 9 MG/ML
INJECTION, SOLUTION INTRAVENOUS PRN
Status: DISCONTINUED | OUTPATIENT
Start: 2023-11-12 | End: 2023-11-14 | Stop reason: HOSPADM

## 2023-11-12 RX ORDER — ACETAMINOPHEN 650 MG/1
650 SUPPOSITORY RECTAL EVERY 6 HOURS PRN
Status: DISCONTINUED | OUTPATIENT
Start: 2023-11-12 | End: 2023-11-14 | Stop reason: HOSPADM

## 2023-11-12 RX ORDER — SODIUM CHLORIDE 0.9 % (FLUSH) 0.9 %
10 SYRINGE (ML) INJECTION EVERY 12 HOURS SCHEDULED
Status: DISCONTINUED | OUTPATIENT
Start: 2023-11-12 | End: 2023-11-14 | Stop reason: HOSPADM

## 2023-11-12 RX ORDER — HEPARIN SODIUM 1000 [USP'U]/ML
60 INJECTION, SOLUTION INTRAVENOUS; SUBCUTANEOUS ONCE
Status: DISCONTINUED | OUTPATIENT
Start: 2023-11-12 | End: 2023-11-12

## 2023-11-12 RX ADMIN — POTASSIUM BICARBONATE 40 MEQ: 782 TABLET, EFFERVESCENT ORAL at 03:12

## 2023-11-12 RX ADMIN — Medication 10 ML: at 20:36

## 2023-11-12 RX ADMIN — Medication 3 MG: at 03:14

## 2023-11-12 RX ADMIN — ZIPRASIDONE HYDROCHLORIDE 20 MG: 20 CAPSULE ORAL at 00:46

## 2023-11-12 RX ADMIN — HEPARIN SODIUM 8.9 UNITS/KG/HR: 10000 INJECTION, SOLUTION INTRAVENOUS at 03:34

## 2023-11-12 RX ADMIN — SODIUM CHLORIDE, POTASSIUM CHLORIDE, SODIUM LACTATE AND CALCIUM CHLORIDE: 600; 310; 30; 20 INJECTION, SOLUTION INTRAVENOUS at 03:17

## 2023-11-12 RX ADMIN — HEPARIN SODIUM 4000 UNITS: 1000 INJECTION INTRAVENOUS; SUBCUTANEOUS at 03:32

## 2023-11-12 RX ADMIN — Medication 3 MG: at 20:36

## 2023-11-12 NOTE — CONSULTS
UNEMPLOYED   Tobacco Use    Smoking status: Former     Packs/day: 0.10     Years: 4.00     Additional pack years: 0.00     Total pack years: 0.40     Types: Cigarettes     Quit date: 1980     Years since quittin.8    Smokeless tobacco: Never    Tobacco comments:     1 pk per year - social smoker, didn't smoke much at all    Vaping Use    Vaping Use: Never used   Substance and Sexual Activity    Alcohol use: No    Drug use: No     Comment: Pt states he does \"natural stuff\"    Sexual activity: Never   Other Topics Concern    Not on file   Social History Narrative    Not on file     Social Determinants of Health     Financial Resource Strain: Low Risk  (2023)    Overall Financial Resource Strain (CARDIA)     Difficulty of Paying Living Expenses: Not very hard   Food Insecurity: No Food Insecurity (2023)    Hunger Vital Sign     Worried About Running Out of Food in the Last Year: Never true     Ran Out of Food in the Last Year: Never true   Transportation Needs: No Transportation Needs (2023)    Transportation Problems ([address] [place] [address])     In the past 12 months, has lack of reliable transportation kept you from medical appointments, meetings, work or from getting things needed for daily living?: Not on file   Recent Concern: Transportation Needs - Unmet Transportation Needs (2023)    Candice Jerez - Transportation     Lack of Transportation (Medical): Yes     Lack of Transportation (Non-Medical): Yes   Physical Activity: Not on file   Stress: No Stress Concern Present (2023)    [place] [facility] [address]     Feeling of Stress :  Only a little   Social Connections: Not on file   Intimate Partner Violence: Not At Risk (2023)    Humiliation, Afraid, Rape, and Kick questionnaire     Fear of Current or Ex-Partner: No     Emotionally Abused: No     Physically Abused: No     Sexually Abused: No   Housing Stability: Low Risk  (2023)    Housing

## 2023-11-12 NOTE — ACP (ADVANCE CARE PLANNING)
Advance Care Planning     General Advance Care Planning (ACP) Conversation    Date of Conversation: 11/11/2023  Conducted with: Patient with Decision Making Capacity    Healthcare Decision Maker:    Primary Decision Maker: [facility] [facility] - Parent, Legal Guardian - [phone]  Click here to complete [address] including selection of the Healthcare Decision Maker Relationship (ie \"Primary\"). Today we documented Decision Maker(s) consistent with Legal Next of Kin hierarchy. Content/Action Overview:   Has ACP document(s) on file - reflects the patient's care preferences  Reviewed DNR/DNI and patient elects Full Code (Attempt Resuscitation)        Length of Voluntary ACP Conversation in minutes:  <16 minutes (Non-Billable)    Sharri James

## 2023-11-12 NOTE — CARE COORDINATION
Case Management Assessment  Initial Evaluation    Date/Time of Evaluation: 11/12/2023 4:14 PM  Assessment Completed by: Humera Simon    If patient is discharged prior to next notation, then this note serves as note for discharge by case management. Patient Name: Maya Woods                   YOB: 1960  Diagnosis: Hypokalemia [E87.6]  Tachycardia [R00.0]  Aggressive behavior [R46.89]  Elevated troponin [R79.89]  Patient needs psychiatric hold for evaluation [Z00.8]                   Date / Time: 11/11/2023  8:37 PM    Patient Admission Status: Inpatient   Readmission Risk (Low < 19, Mod (19-27), High > 27): Readmission Risk Score: 7.9    Current PCP: JOSE Montano  PCP verified by CM? Yes Charlotte Burk)    Chart Reviewed: Yes      History Provided by: Patient  Patient Orientation: Alert and Oriented    Patient Cognition: Alert    Hospitalization in the last 30 days (Readmission):  No    If yes, Readmission Assessment in CM Navigator will be completed. Advance Directives:      Code Status: Full Code   Patient's Primary Decision Maker is: Named in [facility]    Primary Decision Maker: [facility] - Parent, Legal [place] lines - [phone]    Discharge Planning:    Patient lives with: Parent Type of Home: House  Primary Care Giver: Self  Patient Support Systems include: Parent   Current Financial resources: Medicaid  Current community resources: None  Current services prior to admission: None            Current DME:              Type of Home Care services:  None    ADLS  Prior functional level: Independent in ADLs/IADLs  Current functional level: Independent in ADLs/IADLs    PT AM-PAC:   /24  OT AM-PAC:   /24    Family can provide assistance at DC: Yes  Would you like Case Management to discuss the discharge plan with any other family members/significant others, and if so, who?  Yes (mother)  Plans to Return to Present Housing: Yes  Other Identified Issues/Barriers to RETURNING to current

## 2023-11-12 NOTE — H&P
Hospital Medicine History & Physical      PCP: JOSE Reeves    Date of Admission: 11/12/2023    Date of Service: Pt seen/examined on 11/12/2023    Pt seen/examined face to face on and admitted as inpatient with expected LOS to be two days but can change depending on diagnostic work up and treatment response. Chief Complaint:    Chief Complaint   Patient presents with    Manic Behavior     Maniac behaviors. Mobile crisis has signed hold on pt. Mobile crisis got a call from family stating he was delusional and having temper outbursts. History Of Present Illness:      70 y.o. male who presented to [facility] with past medical history of hypertension, hyperlipidemia presented ED with chief complaint of manic behavior      Unable to obtain patient from history as patient is very manic tangential in response does not answer questions limitlessly. On chart review, patient was noted to have any violent behavior by mobile crisis and the patient was brought here due to having delusions of being threatening. No known alleviating exacerbating factors      Past Medical History:          Diagnosis Date    Elevated liver enzymes 9/1/2017    Hypertension     Pure hypercholesterolemia 8/31/2017       Past Surgical History:          Procedure Laterality Date    NOSE SURGERY      deviated septum       Medications Prior to Admission:      Prior to Admission medications    Medication Sig Start Date End Date Taking?  Authorizing Provider   fluPHENAZine decanoate (PROLIXIN) 25 MG/ML injection Inject 1.5 mLs into the muscle every 21 days 4/5/23   Lior Camargo MD   chlorthalidone (HYGROTON) 25 MG tablet Take 1 tablet by mouth daily 8/31/22   Lior Camargo MD   pitavastatin (LIVALO) 2 MG TABS tablet Take 0.5 tablets by mouth nightly 8/30/22   Lior Camargo MD   fluticasone Elizabeth Mitts) 50 MCG/ACT nasal spray  2/22/22   ProviderPerry MD       Allergies:  Clozaril [clozapine],

## 2023-11-12 NOTE — ED NOTES
2142 Pt was fidgeting and breathing hard while obtaining an ECG     Jerome oMntgomery  11/11/23 2142

## 2023-11-12 NOTE — ED NOTES
Patient states he wants to take his contacts out and throw them away, patient assisted to bathroom by       Yakelin King RN  11/12/23 2080

## 2023-11-12 NOTE — ED NOTES
Patient talking very loudly saying he is coming off the [address] [place] [address], [address] [place] [address]  11/11/23 8784

## 2023-11-12 NOTE — ED NOTES
0040 - Call placed to Cardiology for stat consult     [facility]  11/12/23 Ana M Haque - Dr. Sterling Grossman with Cardiology returned the page and spoke directly to Dr. Jordan Roque at this time       Angel Mini  11/12/23 0044

## 2023-11-12 NOTE — ED PROVIDER NOTES
(EFFER-K) effervescent tablet 40 mEq (40 mEq Oral Given 11/11/23 2201)   aspirin tablet 325 mg (325 mg Oral Given 11/11/23 2247)   ziprasidone (GEODON) capsule 20 mg (20 mg Oral Given 11/12/23 0046)   potassium bicarb-citric acid (EFFER-K) effervescent tablet 40 mEq (40 mEq Oral Given 11/12/23 0312)   heparin (porcine) injection 4,000 Units (4,000 Units IntraVENous Given 11/12/23 0332)        Emergency department course:  ED Course as of 11/12/23 0705   Sat Nov 11, 2023 2250 EKG #1 performed at 2134 on my interpretation shows sinus tachycardia at 109. WI interval 154. QRS 84. . QTc 449. There is significant baseline artifact with left anterior fascicular block similar to prior EKG of March 2, 2023 though artifact on that EKG also makes comparison difficult [AM]   2252 EKG #2 performed at 2240 and on my read at 2241 shows normal sinus rhythm at 80. WI interval 160. QRS 90. . QTc 426. There are no acute ST segment elevations or depressions present. Left anterior fascicular block is present. [AM]   2252 Patient's high-sensitivity troponin has doubled and thus he cannot be medically cleared at this point although there are no ischemic changes on his EKG. Potassium is low and so this was replaced in the emergency department. [AM]   2256 Patient is tachycardic but PE is not #1 diagnosis to explain his symptoms. We will add a dimer as his pretest probability is low based on clinical management tool. If D-dimer is elevated, we will obtain a CT PE protocol. [AM]   Sun Nov 12, 2023   0038 Dr. Mira Halsted refusing to admit the patient until we discussed with cardiology. Awaiting callback at this time [AM]   [phone] Patient continues to be manic, restless, agitated.   Geodon ordered [AM]   0045 I discussed the case with Dr. Bakari Mccarthy of cardiology who had no further recommendations at this time but would agree with admission at this facility [AM]      ED Course User Index  [AM] Yvonne Null MD
11/11/2023  57:43, by [address] [place] [address] - Abnormal; Notable for the following components:    Salicylate, Serum <0.8 (*)     All other components within normal limits    Narrative:     Stacey Rollins tel. [phone],  Chemistry results called to and read back by Trish Moreno rn, 11/11/2023  21:44, by Irais Ferro   TROPONIN - Abnormal; Notable for the following components:    Troponin, High Sensitivity 23 (*)     All other components within normal limits    Narrative:     Stacey Lovesugar tel. [phone],  Chemistry results called to and read back by Trish Moreno rn, 11/11/2023  21:44, by Irais Ferro   TROPONIN - Abnormal; Notable for the following components:    Troponin, High Sensitivity 41 (*)     All other components within normal limits   MICROSCOPIC URINALYSIS - Abnormal; Notable for the following components:    Mucus, UA 2+ (*)     Bacteria, UA 1+ (*)     All other components within normal limits   TROPONIN - Abnormal; Notable for the following components:    Troponin, High Sensitivity 43 (*)     All other components within normal limits   CK - Abnormal; Notable for the following components: Total  (*)     All other components within normal limits   ANTI-XA, UNFRACTIONATED HEPARIN - Abnormal; Notable for the following components:    Anti-XA Unfrac Heparin <0.10 (*)     All other components within normal limits   TROPONIN - Abnormal; Notable for the following components:    Troponin, High Sensitivity 26 (*)     All other components within normal limits   COMPREHENSIVE METABOLIC PANEL W/ REFLEX TO MG FOR LOW K - Abnormal; Notable for the following components:    Sodium 132 (*)     Potassium reflex Magnesium 3.2 (*)     Chloride 94 (*)     Glucose 157 (*)     Total Protein 6.3 (*)     All other components within normal limits   CK - Abnormal; Notable for the following components:     Total  (*)     All other components within normal limits   BASIC METABOLIC PANEL W/ REFLEX TO MG FOR LOW K - Abnormal;

## 2023-11-13 PROBLEM — R79.89 ELEVATED TROPONIN: Status: ACTIVE | Noted: 2023-11-13

## 2023-11-13 PROBLEM — M62.82 NON-TRAUMATIC RHABDOMYOLYSIS: Status: ACTIVE | Noted: 2023-11-13

## 2023-11-13 LAB
AMPHETAMINES UR QL SCN>1000 NG/ML: NORMAL
ANION GAP SERPL CALCULATED.3IONS-SCNC: 8 MMOL/L (ref 3–16)
BARBITURATES UR QL SCN>200 NG/ML: NORMAL
BASOPHILS # BLD: 0 K/UL (ref 0–0.2)
BASOPHILS NFR BLD: 0.4 %
BENZODIAZ UR QL SCN>200 NG/ML: NORMAL
BUN SERPL-MCNC: 11 MG/DL (ref 7–20)
CALCIUM SERPL-MCNC: 9.2 MG/DL (ref 8.3–10.6)
CANNABINOIDS UR QL SCN>50 NG/ML: NORMAL
CHLORIDE SERPL-SCNC: 101 MMOL/L (ref 99–110)
CK SERPL-CCNC: 402 U/L (ref 39–308)
CK SERPL-CCNC: 467 U/L (ref 39–308)
CO2 SERPL-SCNC: 28 MMOL/L (ref 21–32)
COCAINE UR QL SCN: NORMAL
CREAT SERPL-MCNC: 0.9 MG/DL (ref 0.8–1.3)
DEPRECATED RDW RBC AUTO: 13.4 % (ref 12.4–15.4)
DRUG SCREEN COMMENT UR-IMP: NORMAL
EKG ATRIAL RATE: 71 BPM
EKG DIAGNOSIS: NORMAL
EKG P AXIS: 53 DEGREES
EKG P-R INTERVAL: 164 MS
EKG Q-T INTERVAL: 388 MS
EKG QRS DURATION: 76 MS
EKG QTC CALCULATION (BAZETT): 400 MS
EKG R AXIS: -58 DEGREES
EKG T AXIS: 47 DEGREES
EKG VENTRICULAR RATE: 64 BPM
EOSINOPHIL # BLD: 0.6 K/UL (ref 0–0.6)
EOSINOPHIL NFR BLD: 6.4 %
FENTANYL SCREEN, URINE: NORMAL
GFR SERPLBLD CREATININE-BSD FMLA CKD-EPI: >60 ML/MIN/{1.73_M2}
GLUCOSE SERPL-MCNC: 108 MG/DL (ref 70–99)
HCT VFR BLD AUTO: 48.4 % (ref 40.5–52.5)
HGB BLD-MCNC: 16.6 G/DL (ref 13.5–17.5)
LYMPHOCYTES # BLD: 1.9 K/UL (ref 1–5.1)
LYMPHOCYTES NFR BLD: 21.9 %
MAGNESIUM SERPL-MCNC: 2.1 MG/DL (ref 1.8–2.4)
MCH RBC QN AUTO: 30.7 PG (ref 26–34)
MCHC RBC AUTO-ENTMCNC: 34.4 G/DL (ref 31–36)
MCV RBC AUTO: 89.4 FL (ref 80–100)
METHADONE UR QL SCN>300 NG/ML: NORMAL
MONOCYTES # BLD: 0.6 K/UL (ref 0–1.3)
MONOCYTES NFR BLD: 6.8 %
NEUTROPHILS # BLD: 5.6 K/UL (ref 1.7–7.7)
NEUTROPHILS NFR BLD: 64.5 %
OPIATES UR QL SCN>300 NG/ML: NORMAL
OXYCODONE UR QL SCN: NORMAL
PCP UR QL SCN>25 NG/ML: NORMAL
PH UR STRIP: 6 [PH]
PLATELET # BLD AUTO: 184 K/UL (ref 135–450)
PMV BLD AUTO: 8.5 FL (ref 5–10.5)
POTASSIUM SERPL-SCNC: 3.3 MMOL/L (ref 3.5–5.1)
RBC # BLD AUTO: 5.42 M/UL (ref 4.2–5.9)
SODIUM SERPL-SCNC: 137 MMOL/L (ref 136–145)
WBC # BLD AUTO: 8.7 K/UL (ref 4–11)

## 2023-11-13 PROCEDURE — 36415 COLL VENOUS BLD VENIPUNCTURE: CPT

## 2023-11-13 PROCEDURE — 99232 SBSQ HOSP IP/OBS MODERATE 35: CPT

## 2023-11-13 PROCEDURE — 80048 BASIC METABOLIC PNL TOTAL CA: CPT

## 2023-11-13 PROCEDURE — 80307 DRUG TEST PRSMV CHEM ANLYZR: CPT

## 2023-11-13 PROCEDURE — 93005 ELECTROCARDIOGRAM TRACING: CPT | Performed by: INTERNAL MEDICINE

## 2023-11-13 PROCEDURE — 93010 ELECTROCARDIOGRAM REPORT: CPT | Performed by: INTERNAL MEDICINE

## 2023-11-13 PROCEDURE — 2580000003 HC RX 258

## 2023-11-13 PROCEDURE — 6360000002 HC RX W HCPCS: Performed by: INTERNAL MEDICINE

## 2023-11-13 PROCEDURE — 6370000000 HC RX 637 (ALT 250 FOR IP)

## 2023-11-13 PROCEDURE — 83735 ASSAY OF MAGNESIUM: CPT

## 2023-11-13 PROCEDURE — 2580000003 HC RX 258: Performed by: INTERNAL MEDICINE

## 2023-11-13 PROCEDURE — 6360000002 HC RX W HCPCS

## 2023-11-13 PROCEDURE — 85025 COMPLETE CBC W/AUTO DIFF WBC: CPT

## 2023-11-13 PROCEDURE — 82550 ASSAY OF CK (CPK): CPT

## 2023-11-13 PROCEDURE — 93306 TTE W/DOPPLER COMPLETE: CPT

## 2023-11-13 PROCEDURE — 6370000000 HC RX 637 (ALT 250 FOR IP): Performed by: INTERNAL MEDICINE

## 2023-11-13 PROCEDURE — 2060000000 HC ICU INTERMEDIATE R&B

## 2023-11-13 RX ORDER — SODIUM CHLORIDE 9 MG/ML
INJECTION, SOLUTION INTRAVENOUS CONTINUOUS
Status: DISCONTINUED | OUTPATIENT
Start: 2023-11-13 | End: 2023-11-14

## 2023-11-13 RX ORDER — ENOXAPARIN SODIUM 100 MG/ML
30 INJECTION SUBCUTANEOUS 2 TIMES DAILY
Status: DISCONTINUED | OUTPATIENT
Start: 2023-11-13 | End: 2023-11-14 | Stop reason: HOSPADM

## 2023-11-13 RX ORDER — ENOXAPARIN SODIUM 100 MG/ML
40 INJECTION SUBCUTANEOUS DAILY
Status: DISCONTINUED | OUTPATIENT
Start: 2023-11-13 | End: 2023-11-13

## 2023-11-13 RX ORDER — ARIPIPRAZOLE 10 MG/1
10 TABLET ORAL 2 TIMES DAILY
COMMUNITY

## 2023-11-13 RX ORDER — PITAVASTATIN MAGNESIUM 2 MG/1
2 TABLET, FILM COATED ORAL DAILY
COMMUNITY

## 2023-11-13 RX ORDER — LORAZEPAM 1 MG/1
1-2 TABLET ORAL NIGHTLY
Status: ON HOLD | COMMUNITY
End: 2023-11-13

## 2023-11-13 RX ORDER — ATORVASTATIN CALCIUM 10 MG/1
10 TABLET, FILM COATED ORAL DAILY
Status: ON HOLD | COMMUNITY
End: 2023-11-13 | Stop reason: SINTOL

## 2023-11-13 RX ORDER — ARIPIPRAZOLE 10 MG/1
10 TABLET ORAL 2 TIMES DAILY
Status: DISCONTINUED | OUTPATIENT
Start: 2023-11-13 | End: 2023-11-14 | Stop reason: HOSPADM

## 2023-11-13 RX ORDER — FLUTICASONE PROPIONATE 50 MCG
2 SPRAY, SUSPENSION (ML) NASAL DAILY
Status: DISCONTINUED | OUTPATIENT
Start: 2023-11-13 | End: 2023-11-14 | Stop reason: HOSPADM

## 2023-11-13 RX ORDER — CHLORTHALIDONE 25 MG/1
25 TABLET ORAL DAILY
Status: DISCONTINUED | OUTPATIENT
Start: 2023-11-14 | End: 2023-11-14 | Stop reason: HOSPADM

## 2023-11-13 RX ADMIN — POTASSIUM CHLORIDE 40 MEQ: 1500 TABLET, EXTENDED RELEASE ORAL at 13:07

## 2023-11-13 RX ADMIN — ENOXAPARIN SODIUM 30 MG: 100 INJECTION SUBCUTANEOUS at 20:10

## 2023-11-13 RX ADMIN — ARIPIPRAZOLE 10 MG: 10 TABLET ORAL at 20:10

## 2023-11-13 RX ADMIN — ENOXAPARIN SODIUM 40 MG: 100 INJECTION SUBCUTANEOUS at 09:35

## 2023-11-13 RX ADMIN — SODIUM CHLORIDE: 9 INJECTION, SOLUTION INTRAVENOUS at 17:04

## 2023-11-13 RX ADMIN — ARIPIPRAZOLE 10 MG: 10 TABLET ORAL at 11:02

## 2023-11-13 RX ADMIN — Medication 10 ML: at 09:35

## 2023-11-13 RX ADMIN — FLUTICASONE PROPIONATE 2 SPRAY: 50 SPRAY, METERED NASAL at 17:00

## 2023-11-13 RX ADMIN — Medication 3 MG: at 20:10

## 2023-11-13 RX ADMIN — ATORVASTATIN CALCIUM 10 MG: 10 TABLET, FILM COATED ORAL at 09:34

## 2023-11-13 ASSESSMENT — ENCOUNTER SYMPTOMS
GASTROINTESTINAL NEGATIVE: 1
RESPIRATORY NEGATIVE: 1

## 2023-11-13 NOTE — CARE COORDINATION
Case Management/Follow up:    Chart reviewed for length of stay. Hospital day # 1  Unit:  PCU    Diagnosis and current status as per MD progress: Essential hypertension; Following consults Cardiology and Psychiatry    Anticipated d/c date: 1-2 days    Expected plan for discharge: TBD    Potential barriers: 3 day HOLD    Precert required/date initiated: N/A    Confirmed plan with patient and/or family: yes    Comments: Patient remains on 3 day HOLD for further Psychiatry evaluation. Will follow. Sari Trammell RN

## 2023-11-14 VITALS
SYSTOLIC BLOOD PRESSURE: 128 MMHG | DIASTOLIC BLOOD PRESSURE: 67 MMHG | BODY MASS INDEX: 32.91 KG/M2 | WEIGHT: 248.3 LBS | OXYGEN SATURATION: 93 % | HEART RATE: 64 BPM | HEIGHT: 73 IN | RESPIRATION RATE: 18 BRPM | TEMPERATURE: 98.9 F

## 2023-11-14 LAB
ANION GAP SERPL CALCULATED.3IONS-SCNC: 11 MMOL/L (ref 3–16)
BASOPHILS # BLD: 0 K/UL (ref 0–0.2)
BASOPHILS NFR BLD: 0.5 %
BUN SERPL-MCNC: 17 MG/DL (ref 7–20)
CALCIUM SERPL-MCNC: 8.8 MG/DL (ref 8.3–10.6)
CHLORIDE SERPL-SCNC: 106 MMOL/L (ref 99–110)
CK SERPL-CCNC: 235 U/L (ref 39–308)
CO2 SERPL-SCNC: 24 MMOL/L (ref 21–32)
CREAT SERPL-MCNC: 0.8 MG/DL (ref 0.8–1.3)
DEPRECATED RDW RBC AUTO: 13.4 % (ref 12.4–15.4)
EOSINOPHIL # BLD: 0.5 K/UL (ref 0–0.6)
EOSINOPHIL NFR BLD: 7.5 %
GFR SERPLBLD CREATININE-BSD FMLA CKD-EPI: >60 ML/MIN/{1.73_M2}
GLUCOSE SERPL-MCNC: 107 MG/DL (ref 70–99)
HCT VFR BLD AUTO: 49.6 % (ref 40.5–52.5)
HGB BLD-MCNC: 16.8 G/DL (ref 13.5–17.5)
LYMPHOCYTES # BLD: 1.8 K/UL (ref 1–5.1)
LYMPHOCYTES NFR BLD: 25.2 %
MCH RBC QN AUTO: 30.6 PG (ref 26–34)
MCHC RBC AUTO-ENTMCNC: 33.9 G/DL (ref 31–36)
MCV RBC AUTO: 90.3 FL (ref 80–100)
MONOCYTES # BLD: 0.5 K/UL (ref 0–1.3)
MONOCYTES NFR BLD: 7.5 %
NEUTROPHILS # BLD: 4.3 K/UL (ref 1.7–7.7)
NEUTROPHILS NFR BLD: 59.3 %
PLATELET # BLD AUTO: 184 K/UL (ref 135–450)
PMV BLD AUTO: 8.7 FL (ref 5–10.5)
POTASSIUM SERPL-SCNC: 3.6 MMOL/L (ref 3.5–5.1)
RBC # BLD AUTO: 5.5 M/UL (ref 4.2–5.9)
SODIUM SERPL-SCNC: 141 MMOL/L (ref 136–145)
WBC # BLD AUTO: 7.2 K/UL (ref 4–11)

## 2023-11-14 PROCEDURE — 85025 COMPLETE CBC W/AUTO DIFF WBC: CPT

## 2023-11-14 PROCEDURE — 6370000000 HC RX 637 (ALT 250 FOR IP): Performed by: INTERNAL MEDICINE

## 2023-11-14 PROCEDURE — 6370000000 HC RX 637 (ALT 250 FOR IP)

## 2023-11-14 PROCEDURE — 80048 BASIC METABOLIC PNL TOTAL CA: CPT

## 2023-11-14 PROCEDURE — 36415 COLL VENOUS BLD VENIPUNCTURE: CPT

## 2023-11-14 PROCEDURE — 99232 SBSQ HOSP IP/OBS MODERATE 35: CPT

## 2023-11-14 PROCEDURE — 6360000002 HC RX W HCPCS: Performed by: INTERNAL MEDICINE

## 2023-11-14 PROCEDURE — 82550 ASSAY OF CK (CPK): CPT

## 2023-11-14 PROCEDURE — 2580000003 HC RX 258: Performed by: INTERNAL MEDICINE

## 2023-11-14 PROCEDURE — 99255 IP/OBS CONSLTJ NEW/EST HI 80: CPT | Performed by: PSYCHIATRY & NEUROLOGY

## 2023-11-14 RX ADMIN — CHLORTHALIDONE 25 MG: 25 TABLET ORAL at 08:44

## 2023-11-14 RX ADMIN — ATORVASTATIN CALCIUM 10 MG: 10 TABLET, FILM COATED ORAL at 08:44

## 2023-11-14 RX ADMIN — ENOXAPARIN SODIUM 30 MG: 100 INJECTION SUBCUTANEOUS at 08:44

## 2023-11-14 RX ADMIN — Medication 10 ML: at 08:45

## 2023-11-14 RX ADMIN — ARIPIPRAZOLE 10 MG: 10 TABLET ORAL at 08:44

## 2023-11-14 RX ADMIN — FLUTICASONE PROPIONATE 2 SPRAY: 50 SPRAY, METERED NASAL at 08:47

## 2023-11-14 ASSESSMENT — ENCOUNTER SYMPTOMS
GASTROINTESTINAL NEGATIVE: 1
RESPIRATORY NEGATIVE: 1

## 2023-11-14 NOTE — PLAN OF CARE
Problem: Safety - Adult  Goal: Free from fall injury  Outcome: Progressing  Flowsheets (Taken 11/14/2023 0034)  Free From Fall Injury: Instruct family/caregiver on patient safety     Problem: Discharge Planning  Goal: Discharge to home or other facility with appropriate resources  Outcome: Progressing  Flowsheets (Taken 11/14/2023 0034)  Discharge to home or other facility with appropriate resources: Identify barriers to discharge with patient and caregiver

## 2023-11-14 NOTE — PLAN OF CARE
Problem: Discharge Planning  Goal: Discharge to home or other facility with appropriate resources  11/14/2023 0857 by Hola العلي RN  Outcome: Progressing     Problem: Risk for Elopement  Goal: Patient will not exit the unit/facility without proper excort  Outcome: Progressing  Flowsheets  Taken 11/14/2023 0842 by Hola العلي RN  Nursing Interventions for Elopement Risk:   Place patient in room far away from exits and stairways   Reduce environmental triggers   Shoes and clothing collected and placed in gown attire   Make sure patient has all necessary personal care items   Assist with personal care needs such as toileting, eating, dressing, as needed to reduce the risk of wandering  Taken 11/14/2023 0410 by Oracio Turcios RN  Nursing Interventions for Elopement Risk:   Place patient in room far away from exits and stairways   Reduce environmental triggers   Shoes and clothing collected and placed in gown attire   Make sure patient has all necessary personal care items   Assist with personal care needs such as toileting, eating, dressing, as needed to reduce the risk of wandering  Taken 11/13/2023 2005 by Oracio Turcios RN  Nursing Interventions for Elopement Risk:   Place patient in room far away from exits and stairways   Reduce environmental triggers   Shoes and clothing collected and placed in gown attire   Make sure patient has all necessary personal care items   Assist with personal care needs such as toileting, eating, dressing, as needed to reduce the risk of wandering     Problem: Safety - Adult  Goal: Free from fall injury  11/14/2023 0857 by Hola العلي RN  Outcome: Progressing

## 2023-11-14 NOTE — CARE COORDINATION
DISCHARGE ORDER  Date/Time 2023 1:52 PM  Completed by: Kamila Bartholomew, Case Management    Patient Name: Ninoska Genao      : 1960  Admitting Diagnosis: Hypokalemia [E87.6]  Tachycardia [R00.0]  Aggressive behavior [R46.89]  Elevated troponin [R79.89]  Patient needs psychiatric hold for evaluation [Z00.8]      Admit order Date and Status: 23 Stable  (verify MD's last order for status of admission)      Noted discharge order. If applicable PT/OT recommendation at Discharge: NA      Confirmed discharge plan: Yes  with whom____Hakeem___________  If pt confirmed DC plan does family need to be contacted by CM Yes if yes who__mom Nicola Lancaster and MAISHA Simons____    Discharge Plan: Reviewed chart, noted DC order, met with pt at bedside. Pt to be DC home today, MAISHA providing transportation. Denies and HC/DME at DC. Number given to [facility] for activities for patient to get involved with. Date of Last IMM Given: NA    Reviewed chart. Role of discharge planner explained and patient verbalized understanding. Discharge order is noted. Has Home O2 in place on admit:  No  Informed of need to bring portable home O2 tank on day of discharge for nursing to connect prior to leaving:   Not Indicated  Verbalized agreement/Understanding:   Not Indicated    Pt is being d/c'd to home today. Pt's O2 sats are 93% on RA. Discharge timeout done with PT, Jason Briseno and Lex Forman. All discharge needs and concerns addressed.

## 2023-11-14 NOTE — CONSULTS
Non-pressured  Mood:  \"fine\"  Affect:  mood congruent  Thought processes:  goal directed  Thought Content: no SI, no HI, less delusional   Perceptions: not RTIS  Attention: intact  Abstraction: fair  Cognition:  intact  Insight: fair  Judgment: fair    LAB: Reviewed all labs obtained during admission to date. Formulation: This is a domiciled, never , psychiatrically disabled 68-year-old with schizophrenia who was brought to our emergency department  on a Statement of Belief by squad after being seen by mobile crisis for agitation. He was admitted to medicine because of elevated troponin and rhabdomyolysis. Psychiatry was consulted. Mr. Lisbeth Crockett is known to us. He is at (slight better than) baseline. Dx:   Primary Psychiatric (DSM V) Diagnosis: schizophrenia   Secondary Psychiatric (DSM V) Diagnoses: none  Chemical Dependency Diagnoses: none    Recommendations:  1. Does not require admission. 2.  Follow-up with his outpatient provider. 3. Safety plan discussed with the patient at length including returning to the ED if he develops thoughts of harming himself or others. 4. Continue current medical regimen. Spent > 80 minutes face to face with patient of which >50% was spent counseling and providing education regarding diagnosis, treatment options, and prognosis. Thank you for the consult.     Eneida León MD  Staff Psychiatrist

## 2023-12-13 PROBLEM — R79.89 ELEVATED TROPONIN: Status: RESOLVED | Noted: 2023-11-13 | Resolved: 2023-12-13

## 2023-12-13 PROBLEM — Z00.8 PATIENT NEEDS PSYCHIATRIC HOLD FOR EVALUATION: Status: RESOLVED | Noted: 2023-11-12 | Resolved: 2023-12-13

## 2024-03-11 NOTE — BH NOTE
Head,  normocephalic,  atraumatic,  Face,  Face within normal limits,  Ears,  External ears within normal limits,  Nose/Nasopharynx and mouth: patient wearing mask Writer spoke with pt's mother/guardian, Compa Taylor, who stated, \"Can we please work on getting him sent somewhere? He won't take his medication and he needs it. He believes there is nothing wrong with him and if he comes back here he won't take. I can't take care of him. \" Writer explained to pt's mother that he is making threats to harm/kill staff and being verbally abusive, to which she stated, \"Yes, that is what he always does. \"

## 2024-03-28 ENCOUNTER — TELEPHONE (OUTPATIENT)
Dept: ORTHOPEDIC SURGERY | Age: 64
End: 2024-03-28

## 2024-03-28 NOTE — TELEPHONE ENCOUNTER
Orthopedic Nurse Navigator Summary    Patient Name:  Hakeem Newton  Anticipated Date of Surgery:  04/15/24  Attended Pre-op Education Class:  Video sent to patient email 02/14/24  PCP: Lynn Berger CNP  Date of PCP visit for H&P:   Is patient in a Pain Management program:  Review of Medical history reveals history of: HTN, Hyperlipidemia, Schizophrenia, Tardive dyskinesia, Risk for violence    Critical Lab Values  - Hemoglobin (g/dL):  Date: 04/06/24 Value 18.3  - Hematocrit(%): Date:  04/06/24 Value 55.1  - HgbA1C:  Date:  Value  - Albumin:  Date:   Value   - BUN:  Date:   Value   - Creatinine:  Date:   Value     04/06/24 MRSA swab- negative    Coronary Artery Disease/HTN/CHF history: HTN  Does the patient see a Cardiologist: No  Date of most recent cardiac appt:  On any anticoagulation:  No    Diabetes History:  No  Most recent HgbA1C:  Pulmonary:  COPD/Emphysema/Use of home oxygen: No  Alcohol use: No    BMI greater than 40 at time of scheduling:  No  Additional medical concerns:  Additional recommendations for above concerns:  Attended Pre-Hab program:    Anticipated Discharge Disposition:  Home with [facility]  Who will be with patient at home following discharge:  mother, brother, and sister in law will help him PO  Equipment patient already has:  walker, cane  Bedroom on first or second floor:  first  Bathroom on first or second floor:  first  Weight bearing status:  wbat  Pre-op ambulatory status: painful ambulation  Number of entry steps:  3 from garage with handrail  Caregiver assistance:  full time    Breana Dennis RN  Date:   03/28/24

## 2024-04-15 ENCOUNTER — APPOINTMENT (OUTPATIENT)
Dept: GENERAL RADIOLOGY | Age: 64
End: 2024-04-15
Attending: ORTHOPAEDIC SURGERY
Payer: COMMERCIAL

## 2024-04-15 ENCOUNTER — ANESTHESIA (OUTPATIENT)
Dept: OPERATING ROOM | Age: 64
End: 2024-04-15
Payer: COMMERCIAL

## 2024-04-15 ENCOUNTER — HOSPITAL ENCOUNTER (OUTPATIENT)
Age: 64
Setting detail: OBSERVATION
Discharge: HOME | End: 2024-04-16
Attending: ORTHOPAEDIC SURGERY | Admitting: ORTHOPAEDIC SURGERY
Payer: COMMERCIAL

## 2024-04-15 ENCOUNTER — ANESTHESIA EVENT (OUTPATIENT)
Dept: OPERATING ROOM | Age: 64
End: 2024-04-15
Payer: COMMERCIAL

## 2024-04-15 PROBLEM — M17.11 PRIMARY OSTEOARTHRITIS OF RIGHT KNEE: Status: ACTIVE | Noted: 2024-04-15

## 2024-04-15 LAB
ABO + RH BLD: NORMAL
ALBUMIN SERPL-MCNC: 4 G/DL (ref 3.4–5)
ALBUMIN/GLOB SERPL: 1.8 {RATIO} (ref 1.1–2.2)
ALP SERPL-CCNC: 76 U/L (ref 40–129)
ALT SERPL-CCNC: 19 U/L (ref 10–40)
ANION GAP SERPL CALCULATED.3IONS-SCNC: 9 MMOL/L (ref 3–16)
AST SERPL-CCNC: 15 U/L (ref 15–37)
BILIRUB SERPL-MCNC: 0.4 MG/DL (ref 0–1)
BLD GP AB SCN SERPL QL: NORMAL
BUN SERPL-MCNC: 14 MG/DL (ref 7–20)
CALCIUM SERPL-MCNC: 9.2 MG/DL (ref 8.3–10.6)
CHLORIDE SERPL-SCNC: 104 MMOL/L (ref 99–110)
CO2 SERPL-SCNC: 26 MMOL/L (ref 21–32)
CREAT SERPL-MCNC: 0.9 MG/DL (ref 0.8–1.3)
GFR SERPLBLD CREATININE-BSD FMLA CKD-EPI: >90 ML/MIN/{1.73_M2}
GLUCOSE SERPL-MCNC: 97 MG/DL (ref 70–99)
POTASSIUM SERPL-SCNC: 4.2 MMOL/L (ref 3.5–5.1)
PROT SERPL-MCNC: 6.2 G/DL (ref 6.4–8.2)
SODIUM SERPL-SCNC: 139 MMOL/L (ref 136–145)

## 2024-04-15 PROCEDURE — 3600000004 HC SURGERY LEVEL 4 BASE: Performed by: ORTHOPAEDIC SURGERY

## 2024-04-15 PROCEDURE — 6360000002 HC RX W HCPCS: Performed by: ANESTHESIOLOGY

## 2024-04-15 PROCEDURE — 2580000003 HC RX 258: Performed by: ORTHOPAEDIC SURGERY

## 2024-04-15 PROCEDURE — 64447 NJX AA&/STRD FEMORAL NRV IMG: CPT | Performed by: ANESTHESIOLOGY

## 2024-04-15 PROCEDURE — 3700000001 HC ADD 15 MINUTES (ANESTHESIA): Performed by: ORTHOPAEDIC SURGERY

## 2024-04-15 PROCEDURE — C1713 ANCHOR/SCREW BN/BN,TIS/BN: HCPCS | Performed by: ORTHOPAEDIC SURGERY

## 2024-04-15 PROCEDURE — 2500000003 HC RX 250 WO HCPCS: Performed by: ORTHOPAEDIC SURGERY

## 2024-04-15 PROCEDURE — 2580000003 HC RX 258: Performed by: NURSE ANESTHETIST, CERTIFIED REGISTERED

## 2024-04-15 PROCEDURE — 6370000000 HC RX 637 (ALT 250 FOR IP): Performed by: ORTHOPAEDIC SURGERY

## 2024-04-15 PROCEDURE — G0378 HOSPITAL OBSERVATION PER HR: HCPCS

## 2024-04-15 PROCEDURE — 6360000002 HC RX W HCPCS: Performed by: ORTHOPAEDIC SURGERY

## 2024-04-15 PROCEDURE — 2720000010 HC SURG SUPPLY STERILE: Performed by: ORTHOPAEDIC SURGERY

## 2024-04-15 PROCEDURE — 86900 BLOOD TYPING SEROLOGIC ABO: CPT

## 2024-04-15 PROCEDURE — C1776 JOINT DEVICE (IMPLANTABLE): HCPCS | Performed by: ORTHOPAEDIC SURGERY

## 2024-04-15 PROCEDURE — A4217 STERILE WATER/SALINE, 500 ML: HCPCS | Performed by: ORTHOPAEDIC SURGERY

## 2024-04-15 PROCEDURE — 6360000002 HC RX W HCPCS: Performed by: NURSE ANESTHETIST, CERTIFIED REGISTERED

## 2024-04-15 PROCEDURE — 3600000014 HC SURGERY LEVEL 4 ADDTL 15MIN: Performed by: ORTHOPAEDIC SURGERY

## 2024-04-15 PROCEDURE — 3700000000 HC ANESTHESIA ATTENDED CARE: Performed by: ORTHOPAEDIC SURGERY

## 2024-04-15 PROCEDURE — 7100000000 HC PACU RECOVERY - FIRST 15 MIN: Performed by: ORTHOPAEDIC SURGERY

## 2024-04-15 PROCEDURE — 86850 RBC ANTIBODY SCREEN: CPT

## 2024-04-15 PROCEDURE — 2709999900 HC NON-CHARGEABLE SUPPLY: Performed by: ORTHOPAEDIC SURGERY

## 2024-04-15 PROCEDURE — 73560 X-RAY EXAM OF KNEE 1 OR 2: CPT

## 2024-04-15 PROCEDURE — 86901 BLOOD TYPING SEROLOGIC RH(D): CPT

## 2024-04-15 PROCEDURE — 80053 COMPREHEN METABOLIC PANEL: CPT

## 2024-04-15 PROCEDURE — 2500000003 HC RX 250 WO HCPCS: Performed by: NURSE ANESTHETIST, CERTIFIED REGISTERED

## 2024-04-15 PROCEDURE — 7100000001 HC PACU RECOVERY - ADDTL 15 MIN: Performed by: ORTHOPAEDIC SURGERY

## 2024-04-15 DEVICE — KNEE K1 TOT HEMI STD CEM IMPL CAPPED K1 ZIM: Type: IMPLANTABLE DEVICE | Site: KNEE | Status: FUNCTIONAL

## 2024-04-15 DEVICE — CEMENT BNE 20ML 41GM FULL DOSE PMMA W/ TOBRA M VISC RADPQ: Type: IMPLANTABLE DEVICE | Site: KNEE | Status: FUNCTIONAL

## 2024-04-15 DEVICE — PSN MC VE ASF R 13MM 8-11 GH: Type: IMPLANTABLE DEVICE | Site: KNEE | Status: FUNCTIONAL

## 2024-04-15 DEVICE — IMPLANTABLE DEVICE: Type: IMPLANTABLE DEVICE | Site: KNEE | Status: FUNCTIONAL

## 2024-04-15 DEVICE — PSN TIB STM 5 DEG SZ G R: Type: IMPLANTABLE DEVICE | Site: KNEE | Status: FUNCTIONAL

## 2024-04-15 DEVICE — COMPONENT PAT DIA35MM THK9MM STD KNEE VIVACIT-E CEM PERSONA: Type: IMPLANTABLE DEVICE | Site: KNEE | Status: FUNCTIONAL

## 2024-04-15 RX ORDER — BUPIVACAINE HYDROCHLORIDE 5 MG/ML
INJECTION, SOLUTION EPIDURAL; INTRACAUDAL PRN
Status: DISCONTINUED | OUTPATIENT
Start: 2024-04-15 | End: 2024-04-15 | Stop reason: SDUPTHER

## 2024-04-15 RX ORDER — ARIPIPRAZOLE 5 MG/1
5 TABLET ORAL 2 TIMES DAILY
Status: DISCONTINUED | OUTPATIENT
Start: 2024-04-15 | End: 2024-04-16 | Stop reason: HOSPADM

## 2024-04-15 RX ORDER — HYDROMORPHONE HYDROCHLORIDE 1 MG/ML
0.5 INJECTION, SOLUTION INTRAMUSCULAR; INTRAVENOUS; SUBCUTANEOUS EVERY 5 MIN PRN
Status: DISCONTINUED | OUTPATIENT
Start: 2024-04-15 | End: 2024-04-15 | Stop reason: HOSPADM

## 2024-04-15 RX ORDER — MEPERIDINE HYDROCHLORIDE 25 MG/ML
12.5 INJECTION INTRAMUSCULAR; INTRAVENOUS; SUBCUTANEOUS EVERY 5 MIN PRN
Status: DISCONTINUED | OUTPATIENT
Start: 2024-04-15 | End: 2024-04-15 | Stop reason: HOSPADM

## 2024-04-15 RX ORDER — SUCCINYLCHOLINE/SOD CL,ISO/PF 200MG/10ML
SYRINGE (ML) INTRAVENOUS PRN
Status: DISCONTINUED | OUTPATIENT
Start: 2024-04-15 | End: 2024-04-15 | Stop reason: SDUPTHER

## 2024-04-15 RX ORDER — SODIUM CHLORIDE 0.9 % (FLUSH) 0.9 %
5-40 SYRINGE (ML) INJECTION EVERY 12 HOURS SCHEDULED
Status: DISCONTINUED | OUTPATIENT
Start: 2024-04-15 | End: 2024-04-16 | Stop reason: HOSPADM

## 2024-04-15 RX ORDER — SODIUM CHLORIDE, SODIUM LACTATE, POTASSIUM CHLORIDE, CALCIUM CHLORIDE 600; 310; 30; 20 MG/100ML; MG/100ML; MG/100ML; MG/100ML
INJECTION, SOLUTION INTRAVENOUS CONTINUOUS
Status: DISCONTINUED | OUTPATIENT
Start: 2024-04-15 | End: 2024-04-15 | Stop reason: HOSPADM

## 2024-04-15 RX ORDER — FENTANYL CITRATE 50 UG/ML
INJECTION, SOLUTION INTRAMUSCULAR; INTRAVENOUS PRN
Status: DISCONTINUED | OUTPATIENT
Start: 2024-04-15 | End: 2024-04-15 | Stop reason: SDUPTHER

## 2024-04-15 RX ORDER — ONDANSETRON 4 MG/1
4 TABLET, ORALLY DISINTEGRATING ORAL EVERY 8 HOURS PRN
Status: DISCONTINUED | OUTPATIENT
Start: 2024-04-15 | End: 2024-04-16 | Stop reason: HOSPADM

## 2024-04-15 RX ORDER — SODIUM CHLORIDE 9 MG/ML
INJECTION, SOLUTION INTRAVENOUS PRN
Status: DISCONTINUED | OUTPATIENT
Start: 2024-04-15 | End: 2024-04-15 | Stop reason: HOSPADM

## 2024-04-15 RX ORDER — ONDANSETRON 2 MG/ML
4 INJECTION INTRAMUSCULAR; INTRAVENOUS EVERY 6 HOURS PRN
Status: DISCONTINUED | OUTPATIENT
Start: 2024-04-15 | End: 2024-04-16 | Stop reason: HOSPADM

## 2024-04-15 RX ORDER — FLUTICASONE PROPIONATE 50 MCG
1 SPRAY, SUSPENSION (ML) NASAL PRN
Status: DISCONTINUED | OUTPATIENT
Start: 2024-04-15 | End: 2024-04-15

## 2024-04-15 RX ORDER — ONDANSETRON 2 MG/ML
4 INJECTION INTRAMUSCULAR; INTRAVENOUS
Status: DISCONTINUED | OUTPATIENT
Start: 2024-04-15 | End: 2024-04-15 | Stop reason: HOSPADM

## 2024-04-15 RX ORDER — FENTANYL CITRATE 50 UG/ML
INJECTION, SOLUTION INTRAMUSCULAR; INTRAVENOUS
Status: COMPLETED
Start: 2024-04-15 | End: 2024-04-15

## 2024-04-15 RX ORDER — ERGOCALCIFEROL 1.25 MG/1
50000 CAPSULE ORAL WEEKLY
Status: DISCONTINUED | OUTPATIENT
Start: 2024-04-21 | End: 2024-04-16 | Stop reason: HOSPADM

## 2024-04-15 RX ORDER — LORAZEPAM 0.5 MG/1
0.5 TABLET ORAL EVERY 6 HOURS PRN
COMMUNITY

## 2024-04-15 RX ORDER — SODIUM CHLORIDE 9 MG/ML
INJECTION, SOLUTION INTRAVENOUS PRN
Status: DISCONTINUED | OUTPATIENT
Start: 2024-04-15 | End: 2024-04-16 | Stop reason: HOSPADM

## 2024-04-15 RX ORDER — SODIUM CHLORIDE 0.9 % (FLUSH) 0.9 %
5-40 SYRINGE (ML) INJECTION EVERY 12 HOURS SCHEDULED
Status: DISCONTINUED | OUTPATIENT
Start: 2024-04-15 | End: 2024-04-15 | Stop reason: HOSPADM

## 2024-04-15 RX ORDER — SODIUM CHLORIDE 0.9 % (FLUSH) 0.9 %
5-40 SYRINGE (ML) INJECTION PRN
Status: DISCONTINUED | OUTPATIENT
Start: 2024-04-15 | End: 2024-04-16 | Stop reason: HOSPADM

## 2024-04-15 RX ORDER — SENNA AND DOCUSATE SODIUM 50; 8.6 MG/1; MG/1
1 TABLET, FILM COATED ORAL 2 TIMES DAILY
Status: DISCONTINUED | OUTPATIENT
Start: 2024-04-15 | End: 2024-04-16 | Stop reason: HOSPADM

## 2024-04-15 RX ORDER — SODIUM CHLORIDE 0.9 % (FLUSH) 0.9 %
5-40 SYRINGE (ML) INJECTION PRN
Status: DISCONTINUED | OUTPATIENT
Start: 2024-04-15 | End: 2024-04-15 | Stop reason: HOSPADM

## 2024-04-15 RX ORDER — ROCURONIUM BROMIDE 10 MG/ML
INJECTION, SOLUTION INTRAVENOUS PRN
Status: DISCONTINUED | OUTPATIENT
Start: 2024-04-15 | End: 2024-04-15 | Stop reason: SDUPTHER

## 2024-04-15 RX ORDER — ASPIRIN 81 MG/1
81 TABLET ORAL 2 TIMES DAILY
Status: DISCONTINUED | OUTPATIENT
Start: 2024-04-16 | End: 2024-04-16 | Stop reason: HOSPADM

## 2024-04-15 RX ORDER — BUPIVACAINE HYDROCHLORIDE 5 MG/ML
INJECTION, SOLUTION EPIDURAL; INTRACAUDAL
Status: COMPLETED
Start: 2024-04-15 | End: 2024-04-15

## 2024-04-15 RX ORDER — MORPHINE SULFATE 2 MG/ML
2 INJECTION, SOLUTION INTRAMUSCULAR; INTRAVENOUS
Status: DISCONTINUED | OUTPATIENT
Start: 2024-04-15 | End: 2024-04-16 | Stop reason: HOSPADM

## 2024-04-15 RX ORDER — LORAZEPAM 0.5 MG/1
0.5 TABLET ORAL DAILY PRN
Status: DISCONTINUED | OUTPATIENT
Start: 2024-04-15 | End: 2024-04-16 | Stop reason: HOSPADM

## 2024-04-15 RX ORDER — ATORVASTATIN CALCIUM 10 MG/1
10 TABLET, FILM COATED ORAL NIGHTLY
Status: DISCONTINUED | OUTPATIENT
Start: 2024-04-15 | End: 2024-04-16 | Stop reason: HOSPADM

## 2024-04-15 RX ORDER — CYCLOBENZAPRINE HCL 10 MG
10 TABLET ORAL EVERY 12 HOURS PRN
Status: DISCONTINUED | OUTPATIENT
Start: 2024-04-15 | End: 2024-04-16 | Stop reason: HOSPADM

## 2024-04-15 RX ORDER — ACETAMINOPHEN 325 MG/1
650 TABLET ORAL EVERY 6 HOURS
Status: DISCONTINUED | OUTPATIENT
Start: 2024-04-15 | End: 2024-04-16 | Stop reason: HOSPADM

## 2024-04-15 RX ORDER — SODIUM CHLORIDE 9 MG/ML
INJECTION, SOLUTION INTRAVENOUS CONTINUOUS PRN
Status: DISCONTINUED | OUTPATIENT
Start: 2024-04-15 | End: 2024-04-15 | Stop reason: SDUPTHER

## 2024-04-15 RX ORDER — MORPHINE SULFATE 2 MG/ML
4 INJECTION, SOLUTION INTRAMUSCULAR; INTRAVENOUS
Status: DISCONTINUED | OUTPATIENT
Start: 2024-04-15 | End: 2024-04-16 | Stop reason: HOSPADM

## 2024-04-15 RX ORDER — MIDAZOLAM HYDROCHLORIDE 1 MG/ML
INJECTION INTRAMUSCULAR; INTRAVENOUS PRN
Status: DISCONTINUED | OUTPATIENT
Start: 2024-04-15 | End: 2024-04-15 | Stop reason: SDUPTHER

## 2024-04-15 RX ORDER — HYDRALAZINE HYDROCHLORIDE 20 MG/ML
10 INJECTION INTRAMUSCULAR; INTRAVENOUS
Status: DISCONTINUED | OUTPATIENT
Start: 2024-04-15 | End: 2024-04-15 | Stop reason: HOSPADM

## 2024-04-15 RX ORDER — OXYCODONE HYDROCHLORIDE 5 MG/1
5 TABLET ORAL
Status: DISCONTINUED | OUTPATIENT
Start: 2024-04-15 | End: 2024-04-15 | Stop reason: HOSPADM

## 2024-04-15 RX ORDER — OXYCODONE HYDROCHLORIDE 5 MG/1
10 TABLET ORAL EVERY 4 HOURS PRN
Status: DISCONTINUED | OUTPATIENT
Start: 2024-04-15 | End: 2024-04-16 | Stop reason: HOSPADM

## 2024-04-15 RX ORDER — MIDAZOLAM HYDROCHLORIDE 1 MG/ML
INJECTION INTRAMUSCULAR; INTRAVENOUS
Status: COMPLETED
Start: 2024-04-15 | End: 2024-04-15

## 2024-04-15 RX ORDER — VANCOMYCIN HYDROCHLORIDE 1 G/20ML
INJECTION, POWDER, LYOPHILIZED, FOR SOLUTION INTRAVENOUS PRN
Status: DISCONTINUED | OUTPATIENT
Start: 2024-04-15 | End: 2024-04-15 | Stop reason: HOSPADM

## 2024-04-15 RX ORDER — LIDOCAINE HYDROCHLORIDE 10 MG/ML
1 INJECTION, SOLUTION EPIDURAL; INFILTRATION; INTRACAUDAL; PERINEURAL
Status: DISCONTINUED | OUTPATIENT
Start: 2024-04-15 | End: 2024-04-15 | Stop reason: HOSPADM

## 2024-04-15 RX ORDER — NALOXONE HYDROCHLORIDE 0.4 MG/ML
INJECTION, SOLUTION INTRAMUSCULAR; INTRAVENOUS; SUBCUTANEOUS PRN
Status: DISCONTINUED | OUTPATIENT
Start: 2024-04-15 | End: 2024-04-15 | Stop reason: HOSPADM

## 2024-04-15 RX ORDER — PROPOFOL 10 MG/ML
INJECTION, EMULSION INTRAVENOUS PRN
Status: DISCONTINUED | OUTPATIENT
Start: 2024-04-15 | End: 2024-04-15 | Stop reason: SDUPTHER

## 2024-04-15 RX ORDER — OXYCODONE HYDROCHLORIDE 5 MG/1
5 TABLET ORAL EVERY 4 HOURS PRN
Status: DISCONTINUED | OUTPATIENT
Start: 2024-04-15 | End: 2024-04-16 | Stop reason: HOSPADM

## 2024-04-15 RX ORDER — CHOLECALCIFEROL (VITAMIN D3) 1250 MCG
50000 CAPSULE ORAL DAILY
COMMUNITY

## 2024-04-15 RX ORDER — LIDOCAINE HYDROCHLORIDE 20 MG/ML
INJECTION, SOLUTION INTRAVENOUS PRN
Status: DISCONTINUED | OUTPATIENT
Start: 2024-04-15 | End: 2024-04-15 | Stop reason: SDUPTHER

## 2024-04-15 RX ORDER — DEXAMETHASONE SODIUM PHOSPHATE 4 MG/ML
INJECTION, SOLUTION INTRA-ARTICULAR; INTRALESIONAL; INTRAMUSCULAR; INTRAVENOUS; SOFT TISSUE PRN
Status: DISCONTINUED | OUTPATIENT
Start: 2024-04-15 | End: 2024-04-15 | Stop reason: SDUPTHER

## 2024-04-15 RX ADMIN — SUGAMMADEX 200 MG: 100 INJECTION, SOLUTION INTRAVENOUS at 17:01

## 2024-04-15 RX ADMIN — SODIUM CHLORIDE: 9 INJECTION, SOLUTION INTRAVENOUS at 15:45

## 2024-04-15 RX ADMIN — TRANEXAMIC ACID 1701 MG: 100 INJECTION, SOLUTION INTRAVENOUS at 16:52

## 2024-04-15 RX ADMIN — WATER 2000 MG: 1 INJECTION INTRAMUSCULAR; INTRAVENOUS; SUBCUTANEOUS at 14:48

## 2024-04-15 RX ADMIN — ROCURONIUM BROMIDE 10 MG: 10 INJECTION, SOLUTION INTRAVENOUS at 15:34

## 2024-04-15 RX ADMIN — DEXAMETHASONE SODIUM PHOSPHATE 8 MG: 4 INJECTION INTRA-ARTICULAR; INTRALESIONAL; INTRAMUSCULAR; INTRAVENOUS; SOFT TISSUE at 15:11

## 2024-04-15 RX ADMIN — WATER 2000 MG: 1 INJECTION INTRAMUSCULAR; INTRAVENOUS; SUBCUTANEOUS at 21:50

## 2024-04-15 RX ADMIN — ARIPIPRAZOLE 5 MG: 5 TABLET ORAL at 21:49

## 2024-04-15 RX ADMIN — MIDAZOLAM HYDROCHLORIDE 2 MG: 2 INJECTION, SOLUTION INTRAMUSCULAR; INTRAVENOUS at 14:28

## 2024-04-15 RX ADMIN — BUPIVACAINE HYDROCHLORIDE 30 ML: 5 INJECTION, SOLUTION EPIDURAL; INTRACAUDAL; PERINEURAL at 13:14

## 2024-04-15 RX ADMIN — SODIUM CHLORIDE, PRESERVATIVE FREE 10 ML: 5 INJECTION INTRAVENOUS at 21:53

## 2024-04-15 RX ADMIN — PROPOFOL 140 MG: 10 INJECTION, EMULSION INTRAVENOUS at 14:45

## 2024-04-15 RX ADMIN — SODIUM CHLORIDE: 9 INJECTION, SOLUTION INTRAVENOUS at 14:18

## 2024-04-15 RX ADMIN — MIDAZOLAM HYDROCHLORIDE 2 MG: 2 INJECTION, SOLUTION INTRAMUSCULAR; INTRAVENOUS at 13:08

## 2024-04-15 RX ADMIN — ACETAMINOPHEN 650 MG: 325 TABLET ORAL at 21:49

## 2024-04-15 RX ADMIN — SENNOSIDES AND DOCUSATE SODIUM 1 TABLET: 50; 8.6 TABLET ORAL at 21:50

## 2024-04-15 RX ADMIN — FENTANYL CITRATE 100 MCG: 50 INJECTION, SOLUTION INTRAMUSCULAR; INTRAVENOUS at 13:08

## 2024-04-15 RX ADMIN — LIDOCAINE HYDROCHLORIDE 50 MG: 20 INJECTION, SOLUTION INTRAVENOUS at 14:45

## 2024-04-15 RX ADMIN — ATORVASTATIN CALCIUM 10 MG: 10 TABLET, FILM COATED ORAL at 21:49

## 2024-04-15 RX ADMIN — Medication 140 MG: at 14:45

## 2024-04-15 RX ADMIN — PHENYLEPHRINE HYDROCHLORIDE 100 MCG: 10 INJECTION INTRAVENOUS at 16:34

## 2024-04-15 RX ADMIN — LORAZEPAM 0.5 MG: 0.5 TABLET ORAL at 23:49

## 2024-04-15 RX ADMIN — FENTANYL CITRATE 100 MCG: 50 INJECTION, SOLUTION INTRAMUSCULAR; INTRAVENOUS at 14:35

## 2024-04-15 RX ADMIN — TRANEXAMIC ACID 1701 MG: 100 INJECTION, SOLUTION INTRAVENOUS at 14:47

## 2024-04-15 RX ADMIN — ROCURONIUM BROMIDE 50 MG: 10 INJECTION, SOLUTION INTRAVENOUS at 14:45

## 2024-04-15 ASSESSMENT — PAIN SCALES - GENERAL: PAINLEVEL_OUTOF10: 0

## 2024-04-15 ASSESSMENT — PAIN - FUNCTIONAL ASSESSMENT: PAIN_FUNCTIONAL_ASSESSMENT: 0-10

## 2024-04-15 NOTE — ANESTHESIA PROCEDURE NOTES
Peripheral Block    Patient location during procedure: pre-op  Reason for block: post-op pain management and at surgeon's request  Start time: 4/15/2024 1:08 PM  End time: 4/15/2024 1:14 PM  Staffing  Performed: anesthesiologist   Anesthesiologist: Nahid Samuel MD  Performed by: Nahid Samuel MD  Authorized by: Nahid Samuel MD    Preanesthetic Checklist  Completed: patient identified, IV checked, site marked, risks and benefits discussed, surgical/procedural consents, equipment checked, pre-op evaluation, timeout performed, anesthesia consent given, oxygen available and monitors applied/VS acknowledged  Peripheral Block   Patient position: supine  Prep: ChloraPrep  Provider prep: mask and sterile gloves  Patient monitoring: cardiac monitor, continuous pulse ox, frequent blood pressure checks and IV access  Block type: Femoral  Adductor canal  Laterality: right  Injection technique: single-shot  Guidance: ultrasound guided  Local infiltration: lidocaine  Infiltration strength: 1 %  Local infiltration: lidocaine    Needle   Needle type: insulated echogenic nerve stimulator needle   Needle gauge: 22 G  Needle localization: ultrasound guidance  Needle length: 5 cm  Assessment   Injection assessment: negative aspiration for heme, no paresthesia on injection and local visualized surrounding nerve on ultrasound  Slow fractionated injection: yes  Hemodynamics: stable  Outcomes: uncomplicated and patient tolerated procedure well    Additional Notes  Sartorius and Vastus Medialis Muscle, Femoral artery and Saphenous nerve are identified; the tip of the needle and the spread of the local anesthetic around the Saphenous nerve are visualized. The Saphenous nerve appeared to be anatomically normal and there were no abnormal pathologically findings seen.

## 2024-04-15 NOTE — OP NOTE
ORTHOPAEDIC OPERATIVE NOTE     PATIENT NAME   Hakeem Newton  63 y.o.     SURGEON: Braden Villa M.D.     ASSISTANT: LEE Arora is a board certified physician assistant who is medically necessary as a first assistant in the operating room with me. He is responsible for assisting with positioning of the patient,retraction,cauterization and implantation of the arthroplasty components. His presence in the operating room with me as a first assistant during arthroplasty procedures enables me to perform the surgical procedure in a more timely and efficient manner. The hospital does not provide me with a first assistant in the operating room who has the same surgical skills as my physician assistant.      SURGERY DATE: 4/15/2024     PRE-OPERATIVE DIAGNOSIS: Right knee osteoarthritis     POST-OPERATIVE DIAGNOSIS: Right knee osteoarthritis     PROCEDURE PERFORMED: Right total knee replacement using robotic assistance ([employer] Robot); all three components were cemented ; posterior cruciate retaining implants. Medial parapatellar arthrotomy.     Implants used:    Chanell Persona  Femur 9  Tibia G  Liner 13 mm  Patella 35 mm     ANESTHESIA: general and regional     COMPLICATIONS: None apparent.     POST-OP CONDITION:  To recovery room.     EBL: 50 cc     ANTIBIOTIC: 2g Ancef preop     INDICATIONS FOR SURGERY: The patient has a long history of knee pain affecting the activities of daily living.  Pt had severe pain during ambulation and activities of daily life. The pain was refractory to conservative management including injections (corticosteroid/viscosupplementation); PT, Ambulatory aids; oral medication (NSAIDs and pain medication). Preop workup showed radiographic changes with osteophyte formation; loss of cartilage space; subchondral sclerosis and deformity.  The patient is scheduled for a total knee replacement.  The risks, benefits and alternatives of surgery were clearly discussed and the patient

## 2024-04-15 NOTE — ANESTHESIA PRE PROCEDURE
CREATININE 0.8 11/14/2023 04:35 AM    GFRAA >60 08/17/2022 06:09 AM    AGRATIO 1.7 11/12/2023 06:15 AM    LABGLOM >60 11/14/2023 04:35 AM    GLUCOSE 107 11/14/2023 04:35 AM    PROT 6.3 11/12/2023 06:15 AM    CALCIUM 8.8 11/14/2023 04:35 AM    BILITOT 0.5 11/12/2023 06:15 AM    ALKPHOS 58 11/12/2023 06:15 AM    AST 27 11/12/2023 06:15 AM    ALT 24 11/12/2023 06:15 AM       POC Tests: No results for input(s): \"POCGLU\", \"POCNA\", \"POCK\", \"POCCL\", \"POCBUN\", \"POCHEMO\", \"POCHCT\" in the last 72 hours.    Coags:   Lab Results   Component Value Date/Time    PROTIME 13.2 11/11/2023 11:14 PM    INR 1.00 11/11/2023 11:14 PM    APTT 22.9 11/11/2023 11:14 PM       HCG (If Applicable): No results found for: \"PREGTESTUR\", \"PREGSERUM\", \"HCG\", \"HCGQUANT\"     ABGs: No results found for: \"PHART\", \"PO2ART\", \"PUR1TDD\", \"AYB1KQC\", \"BEART\", \"R6UCPMIK\"     Type & Screen (If Applicable):  No results found for: \"LABABO\", \"LABRH\"    Drug/Infectious Status (If Applicable):  No results found for: \"HIV\", \"HEPCAB\"    COVID-19 Screening (If Applicable):   Lab Results   Component Value Date/Time    COVID19 NOT DETECTED 02/24/2023 12:10 AM           Anesthesia Evaluation  Patient summary reviewed and Nursing notes reviewed   no history of anesthetic complications:   Airway: Mallampati: II  TM distance: >3 FB   Neck ROM: full  Mouth opening: > = 3 FB   Dental: normal exam         Pulmonary:Negative Pulmonary ROS and normal exam                               Cardiovascular:    (+) hypertension:                  Neuro/Psych:   (+) neuromuscular disease:, psychiatric history:             ROS comment: Schizophrenia GI/Hepatic/Renal: Neg GI/Hepatic/Renal ROS            Endo/Other: Negative Endo/Other ROS                    Abdominal:             Vascular: negative vascular ROS.         Other Findings:       Anesthesia Plan      general     ASA 3     (Right adductor canal nerve block for post op pain control per surgeon request)  Induction:

## 2024-04-15 NOTE — DISCHARGE INSTRUCTIONS
DISCHARGE ORDER SET  [facility]  [phone]    MEDICATION RECONCILIATION  Follow instructions given to you from the clinic regarding the medications. Some of these will not be listed on your hospital papers due to being sent to pharmacy separately. Please take medications as prescribed which were sent to the pharmacy (oxycodone). Remember the celecoxib (celebrex), duricef and omeprazole which were called in for you as well. Specific instructions will be listed on these medications.     ( x )  Post Discharge Instructions  For the first several weeks after surgery you will need to attend PT frequently as scheduled.   Elevate extremity if swelling occurs  Continue the exercise program as prescribed by PT  Use walker, crutches or cane with weightbearing instructions as indicated   Do not ambulate without assistance until cleared to do so by PT  Use Spirometer every 2 hrs while awake    ( x ) Initiate bowel care with the following medications   Over-the-Counter Senokot  (or Over-the-Counter Colace (Docusate Sodium)  1-2 tabs by mouth twice daily, continue while on narcotics, hold for loose stools.     ( x  ) Physical Therapy Orders    Range-of-Motion, strengthening, gait training, ADL's.    Outpatient physical therapy 3 times per week for 6 weeks.    May discontinue therapy when full extension is obtained and flexion is greater than 120 degrees.    ( x )  Weight bearing/limitations      ( x ) Full weight bearing. The knee immobilizer (brace) can be discontinued once your thigh muscle function returns to baseline and you can stand on the knee safely.     ( x  ) Dressing/wound care  You may loosen Ace bandages as needed. Remove first day after surgery.  Keep white sealed dressing on until your clinic follow up. Change as needed. Call with any concerns.      You may shower after 3 days. No tub baths.   Do not scrub dressing. Pat dry with soft towel.   NO LOTIONS OR CREAMS     7. (  x ) DVT

## 2024-04-15 NOTE — H&P
Update History & Physical    The patient's History and Physical was reviewed with the patient and I examined the patient. There was no change. The surgical site was confirmed by the patient and me.       Plan: The risks, benefits, expected outcome, and alternative to the recommended procedure have been discussed with the patient. Patient understands and wants to proceed with the procedure.     Braden Villa MD  4/15/2024

## 2024-04-15 NOTE — ANESTHESIA POSTPROCEDURE EVALUATION
Department of Anesthesiology  Postprocedure Note    Patient: Hakeem Newton  MRN: [id number]  YOB: 1960  Date of evaluation: 4/15/2024    Procedure Summary       Date: 04/15/24 Room / Location: [place] [address] / [facility]    Anesthesia Start: 1428 Anesthesia Stop: 1725    Procedure: RIGHT TOTAL KNEE ARTHROPLASTY BELGICA ROBOTIC (Right: Knee) Diagnosis:       Primary osteoarthritis of right knee      (Primary osteoarthritis of right knee [M17.11])    Surgeons: Braden Villa MD Responsible Provider: Brock Dennis DO    Anesthesia Type: general ASA Status: 3            Anesthesia Type: No value filed.    Justin Phase I: Justin Score: 8    Justin Phase II:      Vitals:    04/15/24 1725   BP: 135/82   Pulse: 71   Resp: 12   Temp:    SpO2: 96       Anesthesia Post Evaluation    Patient location during evaluation: PACU  Patient participation: complete - patient participated  Level of consciousness: awake and awake and alert  Pain score: 2  Airway patency: patent  Nausea & Vomiting: no nausea and no vomiting  Cardiovascular status: hemodynamically stable  Respiratory status: acceptable and room air  Hydration status: euvolemic  Pain management: adequate and satisfactory to patient        No notable events documented.

## 2024-04-15 NOTE — CONSULTS
[Cariprazine Hcl]      Fam HX: family history includes Cancer in his father; Heart Failure (age of onset: 89) in his mother; Hypertension in his mother; Prostate Cancer (age of onset: 47) in his father.  Soc HX:   Social History     Socioeconomic History    Marital status: Single     Spouse name: None    Number of children: 1    Years of education: 14    Highest education level: None   Occupational History     Employer: UNEMPLOYED   Tobacco Use    Smoking status: Former     Current packs/day: 0.00     Average packs/day: 0.1 packs/day for 4.0 years (0.4 ttl pk-yrs)     Types: Cigarettes     Start date: 1976     Quit date: 1980     Years since quittin.3    Smokeless tobacco: Never    Tobacco comments:     1 pk per year - social smoker, didn't smoke much at all    Vaping Use    Vaping Use: Never used   Substance and Sexual Activity    Alcohol use: No    Drug use: No    Sexual activity: Never     Social Determinants of Health     Financial Resource Strain: Low Risk  (2023)    Overall Financial Resource Strain (CARDIA)     Difficulty of Paying Living Expenses: Not very hard   Transportation Needs: No Transportation Needs (2023)    Transportation Problems ([facility] HRSN)   Recent Concern: Transportation Needs - Unmet Transportation Needs (2023)    PRAPARE - Transportation     Lack of Transportation (Medical): Yes     Lack of Transportation (Non-Medical): Yes   Stress: No Stress Concern Present (2023)    [employer] - Occupational Stress Questionnaire     Feeling of Stress : Only a little   Intimate Partner Violence: Not At Risk (2023)    Humiliation, Afraid, Rape, and Kick questionnaire     Fear of Current or Ex-Partner: No     Emotionally Abused: No     Physically Abused: No     Sexually Abused: No   Housing Stability: Low Risk  (2023)    Housing Stability Vital Sign     Unable to Pay for Housing in the Last Year: No     Number of Places Lived in the

## 2024-04-16 VITALS
BODY MASS INDEX: 34.25 KG/M2 | OXYGEN SATURATION: 93 % | RESPIRATION RATE: 16 BRPM | DIASTOLIC BLOOD PRESSURE: 62 MMHG | HEIGHT: 73 IN | SYSTOLIC BLOOD PRESSURE: 104 MMHG | HEART RATE: 77 BPM | WEIGHT: 258.4 LBS | TEMPERATURE: 97.6 F

## 2024-04-16 LAB
DEPRECATED RDW RBC AUTO: 13.1 % (ref 12.4–15.4)
HCT VFR BLD AUTO: 46.4 % (ref 40.5–52.5)
HGB BLD-MCNC: 15.6 G/DL (ref 13.5–17.5)
MCH RBC QN AUTO: 30.3 PG (ref 26–34)
MCHC RBC AUTO-ENTMCNC: 33.6 G/DL (ref 31–36)
MCV RBC AUTO: 90 FL (ref 80–100)
PLATELET # BLD AUTO: 203 K/UL (ref 135–450)
PMV BLD AUTO: 8.1 FL (ref 5–10.5)
RBC # BLD AUTO: 5.16 M/UL (ref 4.2–5.9)
WBC # BLD AUTO: 13.5 K/UL (ref 4–11)

## 2024-04-16 PROCEDURE — 97166 OT EVAL MOD COMPLEX 45 MIN: CPT

## 2024-04-16 PROCEDURE — 97530 THERAPEUTIC ACTIVITIES: CPT

## 2024-04-16 PROCEDURE — 85027 COMPLETE CBC AUTOMATED: CPT

## 2024-04-16 PROCEDURE — 97116 GAIT TRAINING THERAPY: CPT

## 2024-04-16 PROCEDURE — 6370000000 HC RX 637 (ALT 250 FOR IP): Performed by: ORTHOPAEDIC SURGERY

## 2024-04-16 PROCEDURE — G0378 HOSPITAL OBSERVATION PER HR: HCPCS

## 2024-04-16 PROCEDURE — 36415 COLL VENOUS BLD VENIPUNCTURE: CPT

## 2024-04-16 PROCEDURE — 97110 THERAPEUTIC EXERCISES: CPT

## 2024-04-16 PROCEDURE — 2580000003 HC RX 258: Performed by: ORTHOPAEDIC SURGERY

## 2024-04-16 PROCEDURE — 97162 PT EVAL MOD COMPLEX 30 MIN: CPT

## 2024-04-16 PROCEDURE — 6360000002 HC RX W HCPCS: Performed by: ORTHOPAEDIC SURGERY

## 2024-04-16 RX ORDER — ASPIRIN 81 MG/1
81 TABLET ORAL 2 TIMES DAILY
Qty: 30 TABLET | Refills: 3 | COMMUNITY
Start: 2024-04-16

## 2024-04-16 RX ORDER — SENNA AND DOCUSATE SODIUM 50; 8.6 MG/1; MG/1
1 TABLET, FILM COATED ORAL 2 TIMES DAILY
COMMUNITY
Start: 2024-04-16

## 2024-04-16 RX ADMIN — ACETAMINOPHEN 650 MG: 325 TABLET ORAL at 06:34

## 2024-04-16 RX ADMIN — WATER 2000 MG: 1 INJECTION INTRAMUSCULAR; INTRAVENOUS; SUBCUTANEOUS at 06:34

## 2024-04-16 RX ADMIN — ARIPIPRAZOLE 5 MG: 5 TABLET ORAL at 08:16

## 2024-04-16 RX ADMIN — SODIUM CHLORIDE, PRESERVATIVE FREE 10 ML: 5 INJECTION INTRAVENOUS at 08:17

## 2024-04-16 RX ADMIN — OXYCODONE HYDROCHLORIDE 5 MG: 5 TABLET ORAL at 15:59

## 2024-04-16 RX ADMIN — ACETAMINOPHEN 650 MG: 325 TABLET ORAL at 01:13

## 2024-04-16 RX ADMIN — OXYCODONE HYDROCHLORIDE 5 MG: 5 TABLET ORAL at 11:25

## 2024-04-16 RX ADMIN — SENNOSIDES AND DOCUSATE SODIUM 1 TABLET: 50; 8.6 TABLET ORAL at 08:16

## 2024-04-16 RX ADMIN — ASPIRIN 81 MG: 81 TABLET, COATED ORAL at 08:16

## 2024-04-16 RX ADMIN — ACETAMINOPHEN 650 MG: 325 TABLET ORAL at 14:53

## 2024-04-16 ASSESSMENT — PAIN DESCRIPTION - LOCATION
LOCATION: KNEE
LOCATION: KNEE

## 2024-04-16 ASSESSMENT — PAIN DESCRIPTION - DESCRIPTORS
DESCRIPTORS: DISCOMFORT
DESCRIPTORS: DISCOMFORT

## 2024-04-16 ASSESSMENT — PAIN SCALES - GENERAL
PAINLEVEL_OUTOF10: 0
PAINLEVEL_OUTOF10: 5
PAINLEVEL_OUTOF10: 5
PAINLEVEL_OUTOF10: 0

## 2024-04-16 ASSESSMENT — PAIN DESCRIPTION - ORIENTATION
ORIENTATION: RIGHT
ORIENTATION: RIGHT

## 2024-04-16 ASSESSMENT — PAIN DESCRIPTION - ONSET: ONSET: GRADUAL

## 2024-04-16 ASSESSMENT — PAIN DESCRIPTION - FREQUENCY: FREQUENCY: INTERMITTENT

## 2024-04-16 ASSESSMENT — PAIN DESCRIPTION - PAIN TYPE: TYPE: SURGICAL PAIN

## 2024-04-16 ASSESSMENT — PAIN - FUNCTIONAL ASSESSMENT: PAIN_FUNCTIONAL_ASSESSMENT: ACTIVITIES ARE NOT PREVENTED

## 2024-04-16 NOTE — DISCHARGE SUMMARY
BEACON DISCHARGE SUMMARY     Right total knee replacement    The patient was taken to the operating room  where the aforementioned procedure was preformed.  The patient was taken to the post operative anesthesia recovery unit in stable condition. The patient was then transferred to the orthopaedic floor for post operative pain management and convalesce       The patient was followed medically in the hospital for the above surgical procedures performed and below medical issues during their hospital stay    Principal Problem:    Primary osteoarthritis of right knee  Resolved Problems:    * No resolved hospital problems. *         (x )The patient was placed on anticoagulation therapy for DVT prophylaxis       The patient was discharged in stable condition .      Please see medical reconciliation for discharge medications.    The discharge instructions were explained to the patient and the family.  The patient will follow up in the office in 2 weeks for repeat examination and xray .

## 2024-04-16 NOTE — CARE COORDINATION
Case Management Assessment  Initial Evaluation    Date/Time of Evaluation: 4/16/2024 1:34 PM  Assessment Completed by: SHY Walker    If patient is discharged prior to next notation, then this note serves as note for discharge by case management.    Patient Name: Hakeem Newton                   YOB: 1960  Diagnosis: Primary osteoarthritis of right knee [M17.11]                   Date / Time: 4/15/2024 11:34 AM    Patient Admission Status: Observation   Readmission Risk (Low < 19, Mod (19-27), High > 27): Readmission Risk Score: 7.7    Current PCP: Lynn Berger APRN - CNP  PCP verified by CM? Yes    Chart Reviewed: Yes      History Provided by: Patient  Patient Orientation: Alert and Oriented    Patient Cognition: Alert    Hospitalization in the last 30 days (Readmission):  No    If yes, Readmission Assessment in CM Navigator will be completed.    Advance Directives:      Code Status: Full Code   Patient's Primary Decision Maker is: Named in Scanned ACP Document    Primary Decision Maker: SEEMARAMU - Parent, Legal Guardian - [phone]    Discharge Planning:    Patient lives with: Parent Type of Home: House  Primary Care Giver: Self  Patient Support Systems include: Parent, Family Members   Current Financial resources: Medicaid  Current community resources: None  Current services prior to admission: Durable Medical Equipment            Current DME: Cane, Walker            Type of Home Care services:  None    ADLS  Prior functional level: Independent in ADLs/IADLs  Current functional level: Independent in ADLs/IADLs    PT AM-PAC: 19 /24  OT AM-PAC: 18 /24    Family can provide assistance at DC: Yes  Would you like Case Management to discuss the discharge plan with any other family members/significant others, and if so, who? Yes  Plans to Return to Present Housing: Yes  Other Identified Issues/Barriers to RETURNING to current housing: yes  Potential Assistance needed at discharge: Outpatient

## 2024-04-16 NOTE — PLAN OF CARE
Problem: Discharge Planning  Goal: Discharge to home or other facility with appropriate resources  4/16/2024 1050 by Zo Jacobs, RN  Outcome: Progressing   -case management involved in dc planning    Problem: Pain  Goal: Verbalizes/displays adequate comfort level or baseline comfort level  4/16/2024 1050 by Zo Jacobs, RN  Outcome: Progressing   -pt denying pain and displaying/verbalizing adequate comfort level  Problem: Safety - Adult  Goal: Free from fall injury  4/16/2024 1050 by Zo Jacobs, RN  Outcome: Progressing   -fall precautions in place, call light within reach

## 2024-04-16 NOTE — PROGRESS NOTES
[facility] PRE-SURGICAL TESTING INSTRUCTIONS                      PRIOR TO PROCEDURE DATE:    1. PLEASE FOLLOW ANY INSTRUCTIONS GIVEN TO YOU PER YOUR SURGEON.      2. Arrange for someone to drive you home and be with you for the first 24 hours after discharge for your safety after your procedure for which you received sedation. Ensure it is someone we can share information with regarding your discharge.     NOTE: At this time ONLY 2 ADULTS may accompany you   One person ENCOURAGED to stay at hospital entire time if outpatient surgery      3. You must contact your surgeon for instructions IF:  You are taking any blood thinners, aspirin, anti-inflammatory or vitamins.  There is a change in your physical condition such as a cold, fever, rash, cuts, sores, or any other infection, especially near your surgical site.    4. Do not drink alcohol the day before or day of your procedure.  Do not use any recreational marijuana at least 24 hours or street drugs (heroin, cocaine) at minimum 5 days prior to your procedure.     5. A Pre-Surgical History and Physical MUST be completed WITHIN 30 DAYS OR LESS prior to your procedure.by your Physician or an Urgent Care        THE DAY OF YOUR PROCEDURE:  1.  Follow instructions for ARRIVAL TIME as DIRECTED BY YOUR SURGEON.     2. Enter the MAIN entrance from [facility] and follow the signs to the free Parking Garage or  Parking (offered free of charge 7 am-5pm).      3. Enter the Main Entrance of the hospital (do not enter from the lower level of the parking garage). Upon entrance, check in with the  at the surgical information desk on your LEFT.   Bring your insurance card and photo ID to register      4. DO NOT EAT ANYTHING 8 hours prior to arrival for surgery.  You may have up to 8 ounces of water 4 hours prior to your arrival for surgery.   NOTE: ALL Gastric, Bariatric & Bowel surgery patients - you MUST follow your surgeon's instructions regarding 
  4 Eyes Skin Assessment     NAME:  Hakeem Newton  YOB: 1960  MEDICAL RECORD NUMBER:  [id number]    The patient is being assessed for  Admission    I agree that at least one RN has performed a thorough Head to Toe Skin Assessment on the patient. ALL assessment sites listed below have been assessed.      Areas assessed by both nurses:    Head, Face, Ears, Shoulders, Back, Chest, Arms, Elbows, Hands, Sacrum. Buttock, Coccyx, Ischium, Legs. Feet and Heels, and Under Medical Devices         Does the Patient have a Wound? No noted wound(s)       Osman Prevention initiated by RN: No  Wound Care Orders initiated by RN: No    Pressure Injury (Stage 3,4, Unstageable, DTI, NWPT, and Complex wounds) if present, place Wound referral order by RN under : No    New Ostomies, if present place, Ostomy referral order under : No     Nurse 1 eSignature: Electronically signed by Zo Jacobs RN on 4/15/24 at 7:46 PM EDT    **SHARE this note so that the co-signing nurse can place an eSignature**    Nurse 2 eSignature: Electronically signed by Anai Stringer RN on 4/15/24 at 10:54 PM EDT    
4/2/2024 1245pm Requested H&P done 4/2 with Lynn Berger, [phone] to be faxed; spoke with Ryley-zaar    Spoke with sister in law Simons, she called to ask about making an appointment for labs, EKG, CXR ordered by surgeon.  Informed her of our [facility] on [place] or any labs ([facility], [facility], , etc.) however that we do not do visits anymore that included CXR, EKG, etc.  I did reach out to Kary at surgeon's office and informed and she said she will reach out to her to help guide her as to where to go for that. I did call Kristyn back and informed. -zara      4/5 PER PATIENTS SISTERRACHEL SIGNS OWN DOCUMENTS.   WILL BE GETTING LABS AND EKG NEXT WEEK AT [facility]  
Assisted anesthesia with nerve block. Placed patient on 2L RAVG8CG O2 prior to start of procedure. Time out performed. Vitals monitored during procedure and remained stable. Patient tolerated procedure well. Bed in lowest position and call light in reach. Will continue to monitor.   
Brought to PACU from OR. Arouses easily to name. Cooperative. Placed on monitors. VSS. RLE ace wrap d/I. Ice applied to right knee. Palpable DP/PT pulse. Denies pain.  
IV removed successfully. Pt and family member was educated on discharge paperwork and sent home with extra dressings. All questions were answered. Pt going home with all belongings.  
On call MD stated to reinforce dressing, monitor, and Pt will be rounded on in the AM.  Will continue to monitor.  
Ortho    Pt seen and examined  Comfortable  Mobilized already  In good spirits    NVI  Dressings removed. No active bleed. Prineo intact. Likely hemarthrosis that decompressed overnight but no continued issue. Fresh dressing placed    PLAN  OK for DC today from Orthopaedic perspective    F/u as scheduled.     Braden Villa MD    
PACU Transfer Note    Vitals:    04/15/24 1830   BP:    Pulse: 70   Resp: 16   Temp: 97.6 °F (36.4 °C)   SpO2:        In: 1484 [I.V.:1350]  Out: 30     Pain assessment:  none  Pain Level: 0    Report given to Receiving unit RN at bedside in pacu. Denies pain/nausea. RLE ace d/I. Family updated. Pt sent to room per Kye pacu transporter.    4/15/2024 6:34 PM      
Patient's bed alarm going off; arrived at patients bedside. Patient asked to go to the bathroom, patient said he had not been up to the bathroom yet and patient was unsure of weight bearing status; looked up chart and found that patient had full weight bearing orders; patient stood and blood started pooling from under his dressing and dripping on the floor. Placed patient back in bed; notified primary RN and reinforced post op dressing. Ortho MD on call notified.   
Post op xrays done.  
Total Joint Same Day Readiness Screen  PAT Questionnaire    Does patient have at least one day of 24 hr assist of capable caregiver at d/c?  [x] Yes = 0  [] No = 2      Was patient using an assistive device to walk prior to surgery?  [] No = 0  [x] Yes = 1    How many steps do you have to get to the floor where you plan to initially sleep and use the restroom? (At least 1/2 bath)  [x] 0-2 steps= 0 [] 3+ steps = 1    Has patient fallen in the last 3 months? If yes, how many times?  [x] 0 falls = 0 [] 1 fall = 1     [] 2+ falls = 2    Does patient have a hx of post-op nausea/vomiting?  [x] No = 0 [] Yes = 2    Other Factors    Age  [x] <70 = 0 [] 71-79 = 1  [] 80+ = 2    BMI  [] <30 = 0       [x]31-39 = 1    [] >40 = 2    Co-morbidities  [] 0 = 0           [] 1-2 = 1       [x] 3+ = 2    Sleep apnea  [x] No = 0         [] Yes = 1    Hx of prolonged emergence from general anesthesia  [x] No = 0         [] Yes = 1      Score: 4      Interpretation:  Red (10-16): Low probability of safe same day discharge  Yellow (6-9): Moderate probability of safe same day discharge  Green (0-5): High probability of safe same day discharge      
Total Joint video emailed & reviewed to patient by THIS NURSE. Hibiclens instructions reviewed to use x 5 days preop.  CHRISTY screening done. TJ book, IS instructions, TJ video link, and fall contract placed on chart for DOS.  
No  Patient's  Info: sister provides rides  Additional Comments: Pt reports been having knee pain since Thanksgiving.  Since then has not left the house much.  Reports sister and brother in law will be staying with them.  Pt assists with mother's care.  Vision/Hearing  Vision  Vision: Impaired  Vision Exceptions: Wears glasses at all times  Hearing  Hearing: Within functional limits    Cognition   Orientation  Overall Orientation Status: Within Functional Limits  Cognition  Overall Cognitive Status: WFL     Objective      Observation/Palpation  Observation: R LE incision dressing  Edema: R knee edema (lateral>medial)      AROM RLE (degrees)  RLE General AROM: R AROM: 14-79 deg  AROM LLE (degrees)  LLE AROM : WFL  Strength RLE  Comment: R ankle DF 5/5, R knee extension and hip flexion at least 3/5 (unable to resist due to POD1)  Strength LLE  Comment: L ankle DF 5/5, L knee extension 4+/5, L hip flexion 4+/5         Transfers  Sit to Stand: Minimal Assistance;Contact guard assistance (CGA-minAx1, x6 from chair, repeat cues for safe handplacement with RW)  Stand to Sit: Contact guard assistance;Minimal Assistance (CGA-minAx1, decreased eccentric control, repeat cues for safe hand placement with RW)  Ambulation  WB Status: FWBAT  Ambulation  Device: Rolling Walker  Assistance: Contact guard assistance  Quality of Gait: cues for step-to gait pattern sequencing, flexed trunk posture, heavy UE support on RW  Distance: 85ft, 10ft, 85ft  Comments: pt req seated rest breaks between ambulation trials  Stairs/Curb  Stairs?: Yes  Stairs  # Steps : 2 (2x2)  Stairs Height: 6\"  Rails: Left ascending  Device:  (use of L handrail and 1 axillary crutch)  Assistance: Contact guard assistance  Comment: demo and cues for step sequencing with L leg ascending/R leg descending     Balance  Sitting - Static: Good  Sitting - Dynamic: Good (SBA sitting EOB for LE exercises)  Standing - Static: Fair (CGA in stance with RW)  Standing - 
Value Date/Time    LABA1C 5.3 08/14/2022 05:40 PM     TSH:   Lab Results   Component Value Date/Time    TSH 1.08 08/14/2022 05:40 PM     Troponin: No results found for: \"TROPONINT\"  Lactic Acid: No results for input(s): \"LACTA\" in the last 72 hours.  BNP: No results for input(s): \"PROBNP\" in the last 72 hours.  UA:  Lab Results   Component Value Date/Time    NITRU Negative 11/11/2023 08:50 PM    COLORU Yellow 11/11/2023 08:50 PM    PHUR 6.0 11/13/2023 12:30 PM    WBCUA 0-2 11/11/2023 08:50 PM    RBCUA 0-2 11/11/2023 08:50 PM    MUCUS 2+ 11/11/2023 08:50 PM    BACTERIA 1+ 11/11/2023 08:50 PM    CLARITYU Clear 11/11/2023 08:50 PM    SPECGRAV 1.025 11/11/2023 08:50 PM    LEUKOCYTESUR Negative 11/11/2023 08:50 PM    UROBILINOGEN 0.2 11/11/2023 08:50 PM    BILIRUBINUR Negative 11/11/2023 08:50 PM    BLOODU SMALL 11/11/2023 08:50 PM    GLUCOSEU Negative 11/11/2023 08:50 PM    KETUA Negative 11/11/2023 08:50 PM     Urine Cultures: No results found for: \"LABURIN\"  Blood Cultures: No results found for: \"BC\"  No results found for: \"BLOODCULT2\"  Organism: No results found for: \"ORG\"      Electronically signed by SAULO Pena CNP on 4/16/2024 at 9:44 AM    
1: FX transfers with SPVN  Short Term Goal 2: toileting with SPVN  Short Term Goal 3: UB/LB dressing with SPVN + AE       Therapy Time   Individual Concurrent Group Co-treatment   Time In 1003         Time Out 1026         Minutes 23             Timed Code Treatment Minutes:   8    Total Treatment Minutes:  23      Elda Ramírez, OT     
Static: Fair (CGA in stance with RW)  Standing - Dynamic: Fair (CGA with ambulation and RW)  Exercise Treatment: B 1x10 ankle pumps, R heel slides supine        OutComes Score                                                  AM-PAC - Mobility    AM-PAC Basic Mobility - Inpatient   How much help is needed turning from your back to your side while in a flat bed without using bedrails?: None  How much help is needed moving from lying on your back to sitting on the side of a flat bed without using bedrails?: A Little  How much help is needed moving to and from a bed to a chair?: A Little  How much help is needed standing up from a chair using your arms?: A Little  How much help is needed walking in hospital room?: A Little  How much help is needed climbing 3-5 steps with a railing?: A Little  [facility] Score : 19  AM-PAC Inpatient T-Scale Score : 45.44  Mobility Inpatient CMS 0-100% Score: 41.77  Mobility Inpatient CMS G-Code Modifier : CK         Tinneti Score       Goals  Short Term Goals  Time Frame for Short Term Goals: discharge  Short Term Goal 1: Pt will demo supine < > sit independently  Short Term Goal 2: Pt will transfer sit < > stand with SBA and safe hand placement using RW  Short Term Goal 3: Pt will ambulate 150ft with SBA and use of RW  Short Term Goal 4: Pt will navigate 3 steps with use of 1 handrail and 1 axillary crutch  Patient Goals   Patient Goals : \"want to d/c home\"       Education  Patient Education  Education Given To: Patient  Education Provided: Role of Therapy;Plan of Care;Equipment;Precautions  Education Provided Comments: FWBAT, safe hand placement with RW, importance of ROM, benefits of sitting up in chair, use of call light, benefits of OP PT, PT rec following d/c, step-to gait pattern  Education Method: Demonstration;Verbal  Barriers to Learning: None  Education Outcome: Verbalized understanding;Continued education needed;Demonstrated understanding      Therapy

## 2024-04-18 ENCOUNTER — HOSPITAL ENCOUNTER (OUTPATIENT)
Dept: PHYSICAL THERAPY | Age: 64
Setting detail: THERAPIES SERIES
Discharge: HOME | End: 2024-04-18
Payer: COMMERCIAL

## 2024-04-18 DIAGNOSIS — M25.561 ACUTE PAIN OF RIGHT KNEE: Primary | ICD-10-CM

## 2024-04-18 PROCEDURE — 97140 MANUAL THERAPY 1/> REGIONS: CPT | Performed by: PHYSICAL THERAPIST

## 2024-04-18 PROCEDURE — 97110 THERAPEUTIC EXERCISES: CPT | Performed by: PHYSICAL THERAPIST

## 2024-04-18 PROCEDURE — 97161 PT EVAL LOW COMPLEX 20 MIN: CPT | Performed by: PHYSICAL THERAPIST

## 2024-04-18 PROCEDURE — 97112 NEUROMUSCULAR REEDUCATION: CPT | Performed by: PHYSICAL THERAPIST

## 2024-04-18 NOTE — PLAN OF CARE
indicated to include: Passive Range of Motion and Gr I-IV mobilizations  Modalities as needed that may include: Cryotherapy, Electrical Stimulation, Biofeedback, and Vasoneumatic Compression  Patient education on joint protection, postural re-education, activity modification, and progression of HEP    Plan: POC initiated as per evaluation    Electronically Signed by Alexys Freeman, PT [id number]     Date: 04/18/2024     Note: Portions of this note have been templated and/or copied from initial evaluation, reassessments and prior notes for documentation efficiency.    Note: If patient does not return for scheduled/recommended follow up visits, this note will serve as a discharge from care along with the most recent update on progress.    Complex Ortho Evaluation

## 2024-04-22 ENCOUNTER — HOSPITAL ENCOUNTER (OUTPATIENT)
Dept: PHYSICAL THERAPY | Age: 64
Setting detail: THERAPIES SERIES
Discharge: HOME | End: 2024-04-22
Payer: COMMERCIAL

## 2024-04-22 PROCEDURE — 97112 NEUROMUSCULAR REEDUCATION: CPT | Performed by: PHYSICAL THERAPIST

## 2024-04-22 PROCEDURE — 97110 THERAPEUTIC EXERCISES: CPT | Performed by: PHYSICAL THERAPIST

## 2024-04-22 PROCEDURE — 97140 MANUAL THERAPY 1/> REGIONS: CPT | Performed by: PHYSICAL THERAPIST

## 2024-04-25 ENCOUNTER — HOSPITAL ENCOUNTER (OUTPATIENT)
Dept: PHYSICAL THERAPY | Age: 64
Setting detail: THERAPIES SERIES
Discharge: HOME | End: 2024-04-25
Payer: COMMERCIAL

## 2024-04-25 PROCEDURE — 97530 THERAPEUTIC ACTIVITIES: CPT | Performed by: PHYSICAL THERAPIST

## 2024-04-25 PROCEDURE — 97116 GAIT TRAINING THERAPY: CPT | Performed by: PHYSICAL THERAPIST

## 2024-04-25 PROCEDURE — 97140 MANUAL THERAPY 1/> REGIONS: CPT | Performed by: PHYSICAL THERAPIST

## 2024-04-25 PROCEDURE — 97110 THERAPEUTIC EXERCISES: CPT | Performed by: PHYSICAL THERAPIST

## 2024-04-25 PROCEDURE — 97112 NEUROMUSCULAR REEDUCATION: CPT | Performed by: PHYSICAL THERAPIST

## 2024-04-25 NOTE — FLOWSHEET NOTE
4+/5 or better of quadriceps to allow for proper muscle and joint use in functional mobility, ADLs and prior level of function   Status: [] Progressing: [] Met: [] Not Met: [] Adjusted  Patient will return to  walk 1 mile  without AD, or increased symptoms or restriction to work towards return to prior level of function.        Status: [] Progressing: [] Met: [] Not Met: [] Adjusted    Overall Progression Towards Functional goals/ Treatment Progress Update:  [] Patient is progressing as expected towards functional goals listed.    [] Progression is slowed due to complexities/Impairments listed.  [] Progression has been slowed due to co-morbidities.  [x] Plan just implemented, too soon (<30days) to assess goals progression   [] Goals require adjustment due to lack of progress  [] Patient is not progressing as expected and requires additional follow up with physician  [] Other:     TREATMENT PLAN     Frequency/Duration: 2x/week for  12  weeks     Plan: POC initiated as per evaluation    Electronically Signed by Alexys Freeman, PT [id number]     Date: 04/25/2024     Note: Portions of this note have been templated and/or copied from initial evaluation, reassessments and prior notes for documentation efficiency.    Note: If patient does not return for scheduled/recommended follow up visits, this note will serve as a discharge from care along with the most recent update on progress.    Complex Ortho Evaluation

## 2024-04-29 ENCOUNTER — HOSPITAL ENCOUNTER (OUTPATIENT)
Dept: PHYSICAL THERAPY | Age: 64
Setting detail: THERAPIES SERIES
Discharge: HOME | End: 2024-04-29
Payer: COMMERCIAL

## 2024-04-29 PROCEDURE — 97110 THERAPEUTIC EXERCISES: CPT | Performed by: PHYSICAL THERAPIST

## 2024-04-29 PROCEDURE — 97140 MANUAL THERAPY 1/> REGIONS: CPT | Performed by: PHYSICAL THERAPIST

## 2024-04-29 PROCEDURE — G0283 ELEC STIM OTHER THAN WOUND: HCPCS | Performed by: PHYSICAL THERAPIST

## 2024-04-29 NOTE — FLOWSHEET NOTE
manual traction) for the purpose of modulating pain, promoting relaxation,  increasing ROM, reducing/eliminating soft tissue swelling/inflammation/restriction, improving soft tissue extensibility and allowing for proper ROM for normal function with self care, mobility, lifting and ambulation  (80234) UNATTENDED ESTIM. Electrical stimulation (unattended), to 1 or more areas for indication(s) other than wound care, as part of a therapy plan of care. ([facility] )      GOALS     Patient stated goal: \"walk normal\"  Status:  [] Progressing: [] Met: [] Not Met: [] Adjusted    Therapist goals for Patient:   Short Term Goals: To be achieved in: 2 weeks  Independent in HEP and progression per patient tolerance, in order to progress toward full function and prevent re-injury.    Status: [] Progressing: [] Met: [] Not Met: [] Adjusted  Patient will have a decrease in pain to 2/10 to help facilitate improvement in movement, function, and ADLs as indicated by functional deficits.   Status: [] Progressing: [] Met: [] Not Met: [] Adjusted    Long Term Goals: To be achieved in:  12  weeks  Disability index score of 20% or less for the LEFS to assist with return to prior level of function.    Status: [] Progressing: [] Met: [] Not Met: [] Adjusted  Improve R knee AROM to 0-120 degrees or  better to allow for proper joint functioning for stairs to return to lower level bedroom  Status: [] Progressing: [] Met: [] Not Met: [] Adjusted  Pt to improve strength to 4+/5 or better of quadriceps to allow for proper muscle and joint use in functional mobility, ADLs and prior level of function   Status: [] Progressing: [] Met: [] Not Met: [] Adjusted  Patient will return to  walk 1 mile  without AD, or increased symptoms or restriction to work towards return to prior level of function.        Status: [] Progressing: [] Met: [] Not Met: [] Adjusted    Overall Progression Towards Functional goals/ Treatment Progress Update:  [] Patient is

## 2024-05-02 ENCOUNTER — HOSPITAL ENCOUNTER (OUTPATIENT)
Dept: PHYSICAL THERAPY | Age: 64
Setting detail: THERAPIES SERIES
Discharge: HOME | End: 2024-05-02

## 2024-05-02 NOTE — FLOWSHEET NOTE
manual traction) for the purpose of modulating pain, promoting relaxation,  increasing ROM, reducing/eliminating soft tissue swelling/inflammation/restriction, improving soft tissue extensibility and allowing for proper ROM for normal function with self care, mobility, lifting and ambulation  (07802) UNATTENDED ESTIM. Electrical stimulation (unattended), to 1 or more areas for indication(s) other than wound care, as part of a therapy plan of care. ([facility] [place] )      GOALS     Patient stated goal: \"walk normal\"  Status:  [] Progressing: [] Met: [] Not Met: [] Adjusted    Therapist goals for Patient:   Short Term Goals: To be achieved in: 2 weeks  Independent in HEP and progression per patient tolerance, in order to progress toward full function and prevent re-injury.    Status: [] Progressing: [] Met: [] Not Met: [] Adjusted  Patient will have a decrease in pain to 2/10 to help facilitate improvement in movement, function, and ADLs as indicated by functional deficits.   Status: [] Progressing: [] Met: [] Not Met: [] Adjusted    Long Term Goals: To be achieved in:  12  weeks  Disability index score of 20% or less for the LEFS to assist with return to prior level of function.    Status: [] Progressing: [] Met: [] Not Met: [] Adjusted  Improve R knee AROM to 0-120 degrees or  better to allow for proper joint functioning for stairs to return to lower level bedroom  Status: [] Progressing: [] Met: [] Not Met: [] Adjusted  Pt to improve strength to 4+/5 or better of quadriceps to allow for proper muscle and joint use in functional mobility, ADLs and prior level of function   Status: [] Progressing: [] Met: [] Not Met: [] Adjusted  Patient will return to  walk 1 mile  without AD, or increased symptoms or restriction to work towards return to prior level of function.        Status: [] Progressing: [] Met: [] Not Met: [] Adjusted    Overall Progression Towards Functional goals/ Treatment Progress Update:  [] Patient is

## 2024-05-03 NOTE — PROGRESS NOTES
Assumed care of pt. He is sitting in a chair at the , constant RTIS. When greeting the pt, he stated \"This is between me and my mother\" and continued to respond, looking to the side of writer. Pt was slightly agitated during VS check, VS WNL, MEWS of 1. Any questions asked of pt or attempts to engage in conversation are met with inappropriate answers or continued responding. Pt still talking about Dr. 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 Pt states that he is a \"national treasure\", talking about wet dreams, his mother, flu shots, flushing toilets, death threats, being a mole/spy and a guinea pig. Pt is speaking in a much softer voice than usual, but his speech is clearer. Goal Outcome Evaluation:                              VSS. No complaint of pain. Passing flatus per patient. No bowel movement. NG tube to intermittent LWS. Had 450 mL out of NG tube. Chloraseptic spray given for throat discomfort.

## 2024-05-06 ENCOUNTER — HOSPITAL ENCOUNTER (OUTPATIENT)
Dept: PHYSICAL THERAPY | Age: 64
Setting detail: THERAPIES SERIES
Discharge: HOME | End: 2024-05-06
Payer: COMMERCIAL

## 2024-05-06 PROCEDURE — 97140 MANUAL THERAPY 1/> REGIONS: CPT | Performed by: PHYSICAL THERAPIST

## 2024-05-06 PROCEDURE — G0283 ELEC STIM OTHER THAN WOUND: HCPCS | Performed by: PHYSICAL THERAPIST

## 2024-05-06 PROCEDURE — 97110 THERAPEUTIC EXERCISES: CPT | Performed by: PHYSICAL THERAPIST

## 2024-05-06 NOTE — FLOWSHEET NOTE
Tests:  NT    Exercises/Interventions     Observation: Pt has difficulty with terminal knee extension as session progressed.  Antalgic gait pattern with RW.    Therapeutic Ex (29560)  Resistance Sets/time Reps Notes/Cues/Progressions   NuStep (Seat 12, Arms 10) L6 10 min 323 steps    Heel Slide with Gait Belt  10\" 10 x  PT assists in set up   Gastroc Stretch with Gait Belt  10\" 10 x  PT assist in set up   SLR from heel Prop   15 x  No PT assist, good extension during raise   Sit to Stand   Attempted, unable to perform without UE assist                  NMR re-education (33076)       VMS Burst 10\"/10\" cycle with Quad Set       SAQ  1 15 x  Difficulty with terminal extension   LAQ  1 15 x  Difficulty with terminal extension          Therapeutic Activity (02405)                            Manual Intervention (74628) Time: 10 min   Patellar med/lat glides gr 2.  Passive ROM into Extension, Flexion; assistance and training with compression stockings            Gait Training (94135)                 Time:         Modalities:    Electrical Stimulation: PreMod Applied to Knee Right for pain modulation and symptom control for 12 minutes  Cold Pack to R knee    Education/Home Exercise Program: Patient HEP program created electronically.  Refer to [employer] access code: [email or web address]    Exercises  - Seated Knee Extension AAROM  - 1 x daily - 7 x weekly - 10 reps  - Supine Quad Set  - 2 x daily - 7 x weekly - 10 reps - 3 sec hold  - Assisted Ankle dorsiflexion with band  - 2 x daily - 7 x weekly - 10 reps - 10 sec hold  - Supine Heel Slide with Strap  - 2 x daily - 7 x weekly - 10 reps - 10 sec hold  - Supine Knee Extension Mobilization with Weight  - 2 x daily - 7 x weekly - 1 reps - 5 min hold    ASSESSMENT     Today's Assessment: Patient had fair  tolerance to today's session, reporting improved ROM, muscular fatigue, increased pain, increased soreness, and difficulty with program completed. Unable to progress  intensity on LE

## 2024-05-08 ENCOUNTER — HOSPITAL ENCOUNTER (OUTPATIENT)
Dept: PHYSICAL THERAPY | Age: 64
Setting detail: THERAPIES SERIES
Discharge: HOME | End: 2024-05-08
Payer: COMMERCIAL

## 2024-05-08 PROCEDURE — 97112 NEUROMUSCULAR REEDUCATION: CPT | Performed by: PHYSICAL THERAPIST

## 2024-05-08 PROCEDURE — G0283 ELEC STIM OTHER THAN WOUND: HCPCS | Performed by: PHYSICAL THERAPIST

## 2024-05-08 PROCEDURE — 97140 MANUAL THERAPY 1/> REGIONS: CPT | Performed by: PHYSICAL THERAPIST

## 2024-05-08 PROCEDURE — 97110 THERAPEUTIC EXERCISES: CPT | Performed by: PHYSICAL THERAPIST

## 2024-05-08 NOTE — FLOWSHEET NOTE
[facility]- Outpatient Rehabilitation and Therapy   [address], [place] [address]   office: [phone] fax: [phone]    Physical Therapy: TREATMENT/PROGRESS NOTE   Patient: Hakeem Newton (64 y.o. male)   Examination Date: 2024   :  1960 MRN: [id number]   Visit #:  visits through 2024  Insurance Allowable Auth Needed   30 (5 previous this calendar year) [x]Yes    []No    Insurance: Payor: [employer] / Plan: [employer] [place] MEDICAID / Product Type: *No Product type* /   Insurance ID: [id number] - (Medicaid Managed)  Secondary Insurance (if applicable):    Treatment Diagnosis:     ICD-10-CM    1. Acute pain of right knee  M25.561          Medical Diagnosis:  Unilateral primary osteoarthritis, right knee [M17.11]   Referring Physician: Braden Villa MD  PCP: Lynn Berger APRN - CNP     Plan of care signed (Y/N): no    Date of Patient follow up with Physician: 24     Progress Report/POC: NO  POC update due: (10 visits /OR AUTH LIMITS, whichever is less)  2024                                             Precautions/ Contra-indications:           Latex allergy:  NO  Pacemaker:    NO  Contraindications for Manipulation: recent surgical history (relative)  Date of Surgery: 4/15/24  Other:    SUBJECTIVE EXAMINATION     Patient stated complaint/comments: Pt c/o R quad tightness, soreness, occasional popping.  Pt saw MD for this, and it was determined it was likely scar tissue.        Test used Initial score  2024   Pain Summary VAS 5/10 R knee 5-6/10   Functional questionnaire LEFS 16/80 = 80% impairment    Other: R Knee AAROM Flexion 92 deg 113 deg PROM    R Knee AAROM Extenstion 4 deg 0 deg PROM       OBJECTIVE EXAMINATION     Gait:   Dysfunction Noted  Gait Deviations: antalgic pattern  decreased josephine  Decreased R knee flexion   Assistive Device Used: Rolling walker (RW)  Level of Assistance: Independent    Balance:

## 2024-05-10 ENCOUNTER — TELEPHONE (OUTPATIENT)
Dept: ORTHOPEDIC SURGERY | Age: 64
End: 2024-05-10

## 2024-05-13 ENCOUNTER — HOSPITAL ENCOUNTER (OUTPATIENT)
Dept: PHYSICAL THERAPY | Age: 64
Setting detail: THERAPIES SERIES
Discharge: HOME | End: 2024-05-13
Payer: COMMERCIAL

## 2024-05-13 PROCEDURE — 97116 GAIT TRAINING THERAPY: CPT | Performed by: PHYSICAL THERAPIST

## 2024-05-13 PROCEDURE — 97110 THERAPEUTIC EXERCISES: CPT | Performed by: PHYSICAL THERAPIST

## 2024-05-13 NOTE — FLOWSHEET NOTE
endurance, ROM performed to prevent loss of range of motion, maintain or improve muscular strength or increase flexibility, following either an injury or surgery.   ([id number]) GAIT RE-EDUCATION - Provided training and instruction to the patient for proper joint and muscle recruitment and positioning and eccentric body weight control with ambulation re-education including up and down stairs. Therapeutic procedure, one or more areas, each 15 minutes;       GOALS     Patient stated goal: \"walk normal\"  Status:  [] Progressing: [] Met: [] Not Met: [] Adjusted    Therapist goals for Patient:   Short Term Goals: To be achieved in: 2 weeks  Independent in HEP and progression per patient tolerance, in order to progress toward full function and prevent re-injury.    Status: [] Progressing: [] Met: [] Not Met: [] Adjusted  Patient will have a decrease in pain to 2/10 to help facilitate improvement in movement, function, and ADLs as indicated by functional deficits.   Status: [] Progressing: [] Met: [] Not Met: [] Adjusted    Long Term Goals: To be achieved in:  12  weeks  Disability index score of 20% or less for the LEFS to assist with return to prior level of function.    Status: [] Progressing: [] Met: [] Not Met: [] Adjusted  Improve R knee AROM to 0-120 degrees or  better to allow for proper joint functioning for stairs to return to lower level bedroom  Status: [] Progressing: [] Met: [] Not Met: [] Adjusted  Pt to improve strength to 4+/5 or better of quadriceps to allow for proper muscle and joint use in functional mobility, ADLs and prior level of function   Status: [] Progressing: [] Met: [] Not Met: [] Adjusted  Patient will return to  walk 1 mile  without AD, or increased symptoms or restriction to work towards return to prior level of function.        Status: [] Progressing: [] Met: [] Not Met: [] Adjusted    Overall Progression Towards Functional goals/ Treatment Progress Update:  [x] Patient is progressing as

## 2024-05-16 ENCOUNTER — HOSPITAL ENCOUNTER (OUTPATIENT)
Dept: PHYSICAL THERAPY | Age: 64
Setting detail: THERAPIES SERIES
Discharge: HOME | End: 2024-05-16
Payer: COMMERCIAL

## 2024-05-16 PROCEDURE — 97116 GAIT TRAINING THERAPY: CPT | Performed by: PHYSICAL THERAPIST

## 2024-05-16 PROCEDURE — 97110 THERAPEUTIC EXERCISES: CPT | Performed by: PHYSICAL THERAPIST

## 2024-05-17 ENCOUNTER — APPOINTMENT (OUTPATIENT)
Dept: CT IMAGING | Age: 64
End: 2024-05-17
Payer: COMMERCIAL

## 2024-05-17 ENCOUNTER — APPOINTMENT (OUTPATIENT)
Dept: GENERAL RADIOLOGY | Age: 64
End: 2024-05-17
Payer: COMMERCIAL

## 2024-05-17 ENCOUNTER — HOSPITAL ENCOUNTER (EMERGENCY)
Age: 64
Discharge: HOME | End: 2024-05-17
Attending: STUDENT IN AN ORGANIZED HEALTH CARE EDUCATION/TRAINING PROGRAM
Payer: COMMERCIAL

## 2024-05-17 VITALS
DIASTOLIC BLOOD PRESSURE: 87 MMHG | TEMPERATURE: 98.1 F | HEART RATE: 83 BPM | BODY MASS INDEX: 33.13 KG/M2 | SYSTOLIC BLOOD PRESSURE: 127 MMHG | HEIGHT: 73 IN | WEIGHT: 250 LBS | OXYGEN SATURATION: 93 % | RESPIRATION RATE: 16 BRPM

## 2024-05-17 DIAGNOSIS — F41.9 ANXIETY: Primary | ICD-10-CM

## 2024-05-17 DIAGNOSIS — F20.9 SCHIZOPHRENIA, UNSPECIFIED TYPE (HCC): ICD-10-CM

## 2024-05-17 DIAGNOSIS — R06.02 SHORTNESS OF BREATH: ICD-10-CM

## 2024-05-17 DIAGNOSIS — G24.4 OROFACIAL DYSKINESIA: ICD-10-CM

## 2024-05-17 LAB
ALBUMIN SERPL-MCNC: 4 G/DL (ref 3.4–5)
ALBUMIN/GLOB SERPL: 1.5 {RATIO} (ref 1.1–2.2)
ALP SERPL-CCNC: 95 U/L (ref 40–129)
ALT SERPL-CCNC: 18 U/L (ref 10–40)
ANION GAP SERPL CALCULATED.3IONS-SCNC: 10 MMOL/L (ref 3–16)
APAP SERPL-MCNC: <5 UG/ML (ref 10–30)
AST SERPL-CCNC: 15 U/L (ref 15–37)
BACTERIA URNS QL MICRO: ABNORMAL /HPF
BASOPHILS # BLD: 0.1 K/UL (ref 0–0.2)
BASOPHILS NFR BLD: 0.8 %
BILIRUB SERPL-MCNC: <0.2 MG/DL (ref 0–1)
BILIRUB UR QL STRIP.AUTO: NEGATIVE
BUN SERPL-MCNC: 22 MG/DL (ref 7–20)
CALCIUM SERPL-MCNC: 9.2 MG/DL (ref 8.3–10.6)
CHLORIDE SERPL-SCNC: 103 MMOL/L (ref 99–110)
CLARITY UR: CLEAR
CO2 SERPL-SCNC: 26 MMOL/L (ref 21–32)
COLOR UR: YELLOW
CREAT SERPL-MCNC: 0.8 MG/DL (ref 0.8–1.3)
D-DIMER QUANTITATIVE: 2.58 UG/ML FEU (ref 0–0.6)
DEPRECATED RDW RBC AUTO: 13.4 % (ref 12.4–15.4)
EKG ATRIAL RATE: 68 BPM
EKG DIAGNOSIS: NORMAL
EKG P AXIS: 43 DEGREES
EKG P-R INTERVAL: 164 MS
EKG Q-T INTERVAL: 382 MS
EKG QRS DURATION: 84 MS
EKG QTC CALCULATION (BAZETT): 406 MS
EKG R AXIS: -57 DEGREES
EKG T AXIS: 28 DEGREES
EKG VENTRICULAR RATE: 68 BPM
EOSINOPHIL # BLD: 0.5 K/UL (ref 0–0.6)
EOSINOPHIL NFR BLD: 6.6 %
ETHANOLAMINE SERPL-MCNC: NORMAL MG/DL (ref 0–0.08)
GFR SERPLBLD CREATININE-BSD FMLA CKD-EPI: >90 ML/MIN/{1.73_M2}
GLUCOSE SERPL-MCNC: 126 MG/DL (ref 70–99)
GLUCOSE UR STRIP.AUTO-MCNC: NEGATIVE MG/DL
HCT VFR BLD AUTO: 49.1 % (ref 40.5–52.5)
HGB BLD-MCNC: 16.4 G/DL (ref 13.5–17.5)
HGB UR QL STRIP.AUTO: ABNORMAL
KETONES UR STRIP.AUTO-MCNC: NEGATIVE MG/DL
LEUKOCYTE ESTERASE UR QL STRIP.AUTO: NEGATIVE
LYMPHOCYTES # BLD: 1.2 K/UL (ref 1–5.1)
LYMPHOCYTES NFR BLD: 16.5 %
MCH RBC QN AUTO: 30 PG (ref 26–34)
MCHC RBC AUTO-ENTMCNC: 33.5 G/DL (ref 31–36)
MCV RBC AUTO: 89.7 FL (ref 80–100)
MONOCYTES # BLD: 0.5 K/UL (ref 0–1.3)
MONOCYTES NFR BLD: 6.6 %
MUCOUS THREADS #/AREA URNS LPF: ABNORMAL /LPF
NEUTROPHILS # BLD: 4.9 K/UL (ref 1.7–7.7)
NEUTROPHILS NFR BLD: 69.5 %
NITRITE UR QL STRIP.AUTO: NEGATIVE
NT-PROBNP SERPL-MCNC: 109 PG/ML (ref 0–124)
PH UR STRIP.AUTO: 6 [PH] (ref 5–8)
PLATELET # BLD AUTO: 196 K/UL (ref 135–450)
PMV BLD AUTO: 8 FL (ref 5–10.5)
POTASSIUM SERPL-SCNC: 4.3 MMOL/L (ref 3.5–5.1)
PROT SERPL-MCNC: 6.6 G/DL (ref 6.4–8.2)
PROT UR STRIP.AUTO-MCNC: NEGATIVE MG/DL
RBC # BLD AUTO: 5.47 M/UL (ref 4.2–5.9)
RBC #/AREA URNS HPF: ABNORMAL /HPF (ref 0–4)
SALICYLATES SERPL-MCNC: <0.3 MG/DL (ref 15–30)
SODIUM SERPL-SCNC: 139 MMOL/L (ref 136–145)
SP GR UR STRIP.AUTO: 1.02 (ref 1–1.03)
TROPONIN, HIGH SENSITIVITY: 10 NG/L (ref 0–22)
UA COMPLETE W REFLEX CULTURE PNL UR: ABNORMAL
UA DIPSTICK W REFLEX MICRO PNL UR: YES
URN SPEC COLLECT METH UR: ABNORMAL
UROBILINOGEN UR STRIP-ACNC: 0.2 E.U./DL
WBC # BLD AUTO: 7 K/UL (ref 4–11)
WBC #/AREA URNS HPF: ABNORMAL /HPF (ref 0–5)

## 2024-05-17 PROCEDURE — 85379 FIBRIN DEGRADATION QUANT: CPT

## 2024-05-17 PROCEDURE — 6360000004 HC RX CONTRAST MEDICATION: Performed by: PHYSICIAN ASSISTANT

## 2024-05-17 PROCEDURE — 80179 DRUG ASSAY SALICYLATE: CPT

## 2024-05-17 PROCEDURE — 71260 CT THORAX DX C+: CPT

## 2024-05-17 PROCEDURE — 85025 COMPLETE CBC W/AUTO DIFF WBC: CPT

## 2024-05-17 PROCEDURE — 93005 ELECTROCARDIOGRAM TRACING: CPT | Performed by: PHYSICIAN ASSISTANT

## 2024-05-17 PROCEDURE — 36415 COLL VENOUS BLD VENIPUNCTURE: CPT

## 2024-05-17 PROCEDURE — 80143 DRUG ASSAY ACETAMINOPHEN: CPT

## 2024-05-17 PROCEDURE — 82077 ASSAY SPEC XCP UR&BREATH IA: CPT

## 2024-05-17 PROCEDURE — 83880 ASSAY OF NATRIURETIC PEPTIDE: CPT

## 2024-05-17 PROCEDURE — 81001 URINALYSIS AUTO W/SCOPE: CPT

## 2024-05-17 PROCEDURE — 6370000000 HC RX 637 (ALT 250 FOR IP): Performed by: PHYSICIAN ASSISTANT

## 2024-05-17 PROCEDURE — 84484 ASSAY OF TROPONIN QUANT: CPT

## 2024-05-17 PROCEDURE — 93010 ELECTROCARDIOGRAM REPORT: CPT | Performed by: INTERNAL MEDICINE

## 2024-05-17 PROCEDURE — 99285 EMERGENCY DEPT VISIT HI MDM: CPT

## 2024-05-17 PROCEDURE — 71046 X-RAY EXAM CHEST 2 VIEWS: CPT

## 2024-05-17 PROCEDURE — 80053 COMPREHEN METABOLIC PANEL: CPT

## 2024-05-17 RX ORDER — BENZTROPINE MESYLATE 1 MG/1
1 TABLET ORAL ONCE
Status: COMPLETED | OUTPATIENT
Start: 2024-05-17 | End: 2024-05-17

## 2024-05-17 RX ORDER — LORAZEPAM 1 MG/1
1 TABLET ORAL ONCE
Status: COMPLETED | OUTPATIENT
Start: 2024-05-17 | End: 2024-05-17

## 2024-05-17 RX ORDER — BENZTROPINE MESYLATE 1 MG/1
1 TABLET ORAL DAILY PRN
Qty: 7 TABLET | Refills: 0 | Status: SHIPPED | OUTPATIENT
Start: 2024-05-17

## 2024-05-17 RX ADMIN — BENZTROPINE MESYLATE 1 MG: 1 TABLET ORAL at 20:17

## 2024-05-17 RX ADMIN — IOPAMIDOL 75 ML: 755 INJECTION, SOLUTION INTRAVENOUS at 18:49

## 2024-05-17 RX ADMIN — LORAZEPAM 1 MG: 1 TABLET ORAL at 17:32

## 2024-05-17 NOTE — ED NOTES
Presenting Problem:Patient presents to the ED voluntarily. Initially, pt's family was seeking increase in abilify. They reported that pt has been having on-going anxiety attacks. Pt reports that he came in for anxiety/panic attacks and would just like his medications fixed. Pt reports that he doesn't want to stay but \"will if he has to\".     Pt is observed to be sitting calmly in his bed after being given 1 mg of ativan. Pt is observed to having involuntary facial movements, ie: opening eyes wide, sticking tongue out, mouth movements. Pt is making teeth chattering movements and reports he feels that he cannot get the air in. Pt's on-going frustration is reported to be his anxiety/panic attacks.     Appearance/Hygiene:  well-appearing, street clothes, seated in bed, good grooming, and good hygiene   Motor Behavior: WNL    Attitude: cooperative  Affect: anxiety   Speech: normal pitch and normal volume  Mood: anxious   Thought Processes: Goal directed  Perceptions: Absent   Thought content:  future oriented   Orientation: A&Ox4   Memory: intact  Concentration: Good    Insight/ judgement: normal insight and judgment    Psychosocial and contextual factors: Pt is a 64 year old. He does not currently work. He lives with his mother, who is reportedly his guardian. Pt has support from his sister-in-law, Kristyn. Pt recently had knee surgery in April 2024.     C-SSRS flowsheet is  Complete.    Psychiatric History (including current outpatient provider and past inpatient admissions): Pt has extensive mental health history, including schizophrenia dx and numerous admissions. Most recent admission was November 2023. Pt is denying SI/HI, denying hx of suicide attempts, denying AVH.     Access to Firearms: denies access to firearms.     ASSESSMENT FOR IMMINENT FUTURE DANGER:    RISK FACTORS:    [x]  Age <25 or >55   [x]  Male gender   []  Depressed mood   []  Active suicidal ideation     []  Suicide plan     []  Suicide attempt

## 2024-05-17 NOTE — ED NOTES
Collateral Contact:  Name: Kristyn  Phone: [phone]  Relation to Patient: Sister-in-law  Last Contact with Patient: Today, transported to ED  Concerns: Kristyn reports that the pt has been having panic attacks 10 days out of the last 12. She reports he currently lives with his 90-some  year old mother, who is currently guardian. Kristyn is heavily involved in pt's care and attends all his doctor's appointments. Kristyn reports that pt had knee surgery on 4/15/24, which has caused stress for pt. He stopped taking pain medications last night and has been in pain. Kristyn reports that pt has been taking PRN ativan for panic attacks, but she is afraid they will run out or this will cause more problems. He currently sees a psychiatrist through [facility]. The psych decreased his Abilify from 20 mg to 10 mg/day. Since the change, anxiety has increased. There is no current appointment with psych scheduled because the psychiatrist recently quit [facility]. Pt has been on Abilify consistently since the end of January. Kristyn reports that he was on 20 mg and feels it was working but states the pt stopped because it was changing his personality. Kristyn reports that they encouraged pt to get back on medications because without it, they would take his keys away. Pt went back onto Abilify but requested his psychiatrist decreases dosage. Kristyn would like pt to be admitted to manage medications and potentially increase Abilify.     Kristyn is supportive of plan to admit Hakeem Newton

## 2024-05-18 NOTE — ED NOTES
LPC discussed options with pt and caregiver. Pt/caregiver agreeable to recommendation per GRACE Carrasquillo-BC to increase abilify to 7.5 mg 2x/day and connect with psychiatry at either [facility] or [facility]. LPC provided resources for [facility].     Nguyen Mendoza, LADY

## 2024-05-18 NOTE — ED NOTES
Level of Care Disposition: Discharge      Patient was seen by ED provider and SW/Nursing staff.  The case was presented to psychiatric provider on-call FELIZ Carrasquillo.  Based on the ED evaluation and information presented to the provider by Nguyen Mendoza LPC , it is the recommendation of the on call psychiatric provider that the patient be discharged from a psychiatric standpoint with the following referrals: GCDAKOTA Mendoza LPC

## 2024-05-18 NOTE — ED PROVIDER NOTES
[facility] ED  EMERGENCY DEPARTMENT ENCOUNTER        Pt Name: Hakeem Newton  MRN: [id number]  Birthdate 1960  Date of evaluation: 5/17/2024  Provider: Breana Prater PA-C  PCP: Lynn Berger APRN - CNP  Note Started: 5:05 PM EDT 5/17/24       I have seen and evaluated this patient with my supervising physician Dr Short      CHIEF COMPLAINT       Chief Complaint   Patient presents with    Anxiety     Patient with hx of schizophrenia, well managed on abilify and compliant with meds with the help of his family. They state that his dose went from 20mg/d to 10mg/d a few months ago and he has been having anxiety attacks which they have been treating with PRN ativan, but some days they are having to use it more than once per day. Family would like the anxiety (which presents as pt complaints of shortness of breath and mouth movements) addressed.       HISTORY OF PRESENT ILLNESS: 1 or more Elements     History From: Patient and family  Limitations to history : None    Hakeem Newton is a 64 y.o. male who presents to the emergency department brought in by family for evaluation of anxiety and his schizophrenia does not think his medications are working as well anymore believes he needs his Abilify dose increased currently taking 5 mg twice a day these [facility] states the psychiatrist has since left the practice and he does not have a follow-up appointment and she is not able to get a hold of anyone and states he has been at this hospital in the past and would like him to see the psychiatrist here.  Having increased anxiety the past several days and he has been giving him more of his Ativan prescribed 0.5 mg twice a day as needed.  He did not receive any Ativan today however.  Family is requesting him to stay overnight for further evaluation and to get his medications changed.  Patient states he is having trouble breathing.  Family states this is what he always says when 
occur that he should discontinue immediately.  Follow-up to his psychiatrist in 2 to 3 days.    ECG    The Ekg interpreted by me shows sinus rhythm with a rate of 68 bpm.  Left axis deviation.  No acute injury pattern.  , QRS 84, QTc 406.    All diagnostic, treatment, and disposition decisions were made by myself in conjunction with the advanced practice provider/resident.    I personally saw the patient and made/approved the management plan and take responsibility for the patient management.     For all further details of the patient's emergency department visit, please see the advanced practice provider's/resident's documentation.    Comment: Please note this report has been produced using speech recognition software and may contain errors related to that system including errors in grammar, punctuation, and spelling, as well as words and phrases that may be inappropriate. If there are any questions or concerns please feel free to contact the dictating provider for clarification.      Anthony Short,   05/18/24 0040

## 2024-05-20 ENCOUNTER — HOSPITAL ENCOUNTER (OUTPATIENT)
Dept: PHYSICAL THERAPY | Age: 64
Setting detail: THERAPIES SERIES
Discharge: HOME | End: 2024-05-20
Payer: COMMERCIAL

## 2024-05-20 PROCEDURE — 97110 THERAPEUTIC EXERCISES: CPT | Performed by: PHYSICAL THERAPIST

## 2024-05-20 PROCEDURE — G0283 ELEC STIM OTHER THAN WOUND: HCPCS | Performed by: PHYSICAL THERAPIST

## 2024-05-20 PROCEDURE — 97116 GAIT TRAINING THERAPY: CPT | Performed by: PHYSICAL THERAPIST

## 2024-05-20 NOTE — FLOWSHEET NOTE
[facility]- Outpatient Rehabilitation and Therapy   [address], [place] [address]   office: [phone] fax: [phone]    Physical Therapy: TREATMENT/PROGRESS NOTE   Patient: Hakeem Newton (64 y.o. male)   Examination Date: 2024   :  1960 MRN: [id number]   Visit #: 10 /24 visits through 2024  Insurance Allowable Auth Needed   30 (5 previous this calendar year) [x]Yes    []No    Insurance: Payor: [employer] / Plan: [employer] [place] MEDICAID / Product Type: *No Product type* /   Insurance ID: [id number] - (Medicaid Managed)  Secondary Insurance (if applicable):    Treatment Diagnosis:     ICD-10-CM    1. Acute pain of right knee  M25.561          Medical Diagnosis:  Unilateral primary osteoarthritis, right knee [M17.11]   Referring Physician: Braden Villa MD  PCP: Lynn Berger APRN - CNP     Plan of care signed (Y/N): no    Date of Patient follow up with Physician: 24     Progress Report/POC: NO  POC update due: (10 visits /OR AUTH LIMITS, whichever is less)  6/15/2024                                             Precautions/ Contra-indications:           Latex allergy:  NO  Pacemaker:    NO  Contraindications for Manipulation: recent surgical history (relative)  Date of Surgery: 4/15/24  Other:    SUBJECTIVE EXAMINATION     Patient stated complaint/comments: Patient reports discontinuation of Celebrex, which has caused an increase in pain/soreness.        Test used Initial score  2024   Pain Summary VAS 5/10 R knee 8/10 bilateral knees.    Functional questionnaire LEFS 16/80 = 80% impairment    Other: R Knee AAROM Flexion 92 deg 118 deg PROM    R Knee AAROM Extenstion 4 deg 0 deg PROM       OBJECTIVE EXAMINATION     Gait:   Ambulates with RW throughout clinic this visit due to increase pain and soreness.     Balance:     NT    Functional Tests:  NT    Exercises/Interventions     Observation: Pt able to tolerate increased resistance

## 2024-05-21 ENCOUNTER — HOSPITAL ENCOUNTER (INPATIENT)
Age: 64
LOS: 6 days | Discharge: HOME | DRG: 750 | End: 2024-05-27
Attending: STUDENT IN AN ORGANIZED HEALTH CARE EDUCATION/TRAINING PROGRAM | Admitting: PSYCHIATRY & NEUROLOGY
Payer: COMMERCIAL

## 2024-05-21 DIAGNOSIS — F20.9 SCHIZOPHRENIA, UNSPECIFIED TYPE (HCC): Primary | ICD-10-CM

## 2024-05-21 LAB
ALBUMIN SERPL-MCNC: 4.1 G/DL (ref 3.4–5)
ALBUMIN/GLOB SERPL: 1.5 {RATIO} (ref 1.1–2.2)
ALP SERPL-CCNC: 94 U/L (ref 40–129)
ALT SERPL-CCNC: 18 U/L (ref 10–40)
AMPHETAMINES UR QL SCN>1000 NG/ML: NORMAL
ANION GAP SERPL CALCULATED.3IONS-SCNC: 14 MMOL/L (ref 3–16)
APAP SERPL-MCNC: <5 UG/ML (ref 10–30)
AST SERPL-CCNC: 16 U/L (ref 15–37)
BARBITURATES UR QL SCN>200 NG/ML: NORMAL
BASOPHILS # BLD: 0.1 K/UL (ref 0–0.2)
BASOPHILS NFR BLD: 0.7 %
BENZODIAZ UR QL SCN>200 NG/ML: NORMAL
BILIRUB SERPL-MCNC: 0.3 MG/DL (ref 0–1)
BUN SERPL-MCNC: 20 MG/DL (ref 7–20)
CALCIUM SERPL-MCNC: 9.7 MG/DL (ref 8.3–10.6)
CANNABINOIDS UR QL SCN>50 NG/ML: NORMAL
CHLORIDE SERPL-SCNC: 103 MMOL/L (ref 99–110)
CO2 SERPL-SCNC: 21 MMOL/L (ref 21–32)
COCAINE UR QL SCN: NORMAL
CREAT SERPL-MCNC: 1 MG/DL (ref 0.8–1.3)
DEPRECATED RDW RBC AUTO: 14 % (ref 12.4–15.4)
DRUG SCREEN COMMENT UR-IMP: NORMAL
EOSINOPHIL # BLD: 0.1 K/UL (ref 0–0.6)
EOSINOPHIL NFR BLD: 1.5 %
ETHANOLAMINE SERPL-MCNC: NORMAL MG/DL (ref 0–0.08)
FENTANYL SCREEN, URINE: NORMAL
GFR SERPLBLD CREATININE-BSD FMLA CKD-EPI: 83 ML/MIN/{1.73_M2}
GLUCOSE SERPL-MCNC: 114 MG/DL (ref 70–99)
HCT VFR BLD AUTO: 51.9 % (ref 40.5–52.5)
HGB BLD-MCNC: 17.1 G/DL (ref 13.5–17.5)
LYMPHOCYTES # BLD: 0.8 K/UL (ref 1–5.1)
LYMPHOCYTES NFR BLD: 8.9 %
MCH RBC QN AUTO: 30.1 PG (ref 26–34)
MCHC RBC AUTO-ENTMCNC: 33 G/DL (ref 31–36)
MCV RBC AUTO: 91.3 FL (ref 80–100)
METHADONE UR QL SCN>300 NG/ML: NORMAL
MONOCYTES # BLD: 0.6 K/UL (ref 0–1.3)
MONOCYTES NFR BLD: 6 %
NEUTROPHILS # BLD: 7.9 K/UL (ref 1.7–7.7)
NEUTROPHILS NFR BLD: 82.9 %
OPIATES UR QL SCN>300 NG/ML: NORMAL
OXYCODONE UR QL SCN: NORMAL
PCP UR QL SCN>25 NG/ML: NORMAL
PH UR STRIP: 5 [PH]
PLATELET # BLD AUTO: 220 K/UL (ref 135–450)
PMV BLD AUTO: 8.1 FL (ref 5–10.5)
POTASSIUM SERPL-SCNC: 4.2 MMOL/L (ref 3.5–5.1)
PROT SERPL-MCNC: 6.9 G/DL (ref 6.4–8.2)
RBC # BLD AUTO: 5.68 M/UL (ref 4.2–5.9)
SALICYLATES SERPL-MCNC: <0.3 MG/DL (ref 15–30)
SARS-COV-2 RDRP RESP QL NAA+PROBE: NOT DETECTED
SODIUM SERPL-SCNC: 138 MMOL/L (ref 136–145)
TSH SERPL DL<=0.005 MIU/L-ACNC: 1.58 UIU/ML (ref 0.27–4.2)
WBC # BLD AUTO: 9.5 K/UL (ref 4–11)

## 2024-05-21 PROCEDURE — 80143 DRUG ASSAY ACETAMINOPHEN: CPT

## 2024-05-21 PROCEDURE — 80053 COMPREHEN METABOLIC PANEL: CPT

## 2024-05-21 PROCEDURE — 85025 COMPLETE CBC W/AUTO DIFF WBC: CPT

## 2024-05-21 PROCEDURE — 87635 SARS-COV-2 COVID-19 AMP PRB: CPT

## 2024-05-21 PROCEDURE — 84443 ASSAY THYROID STIM HORMONE: CPT

## 2024-05-21 PROCEDURE — 99285 EMERGENCY DEPT VISIT HI MDM: CPT

## 2024-05-21 PROCEDURE — 80179 DRUG ASSAY SALICYLATE: CPT

## 2024-05-21 PROCEDURE — 80307 DRUG TEST PRSMV CHEM ANLYZR: CPT

## 2024-05-21 PROCEDURE — 80061 LIPID PANEL: CPT

## 2024-05-21 PROCEDURE — 6370000000 HC RX 637 (ALT 250 FOR IP): Performed by: NURSE PRACTITIONER

## 2024-05-21 PROCEDURE — 1240000000 HC EMOTIONAL WELLNESS R&B

## 2024-05-21 PROCEDURE — 6370000000 HC RX 637 (ALT 250 FOR IP): Performed by: PSYCHIATRY & NEUROLOGY

## 2024-05-21 PROCEDURE — 83036 HEMOGLOBIN GLYCOSYLATED A1C: CPT

## 2024-05-21 PROCEDURE — 36415 COLL VENOUS BLD VENIPUNCTURE: CPT

## 2024-05-21 PROCEDURE — 82077 ASSAY SPEC XCP UR&BREATH IA: CPT

## 2024-05-21 RX ORDER — POLYETHYLENE GLYCOL 3350 17 G
2 POWDER IN PACKET (EA) ORAL
Status: DISCONTINUED | OUTPATIENT
Start: 2024-05-21 | End: 2024-05-27 | Stop reason: HOSPADM

## 2024-05-21 RX ORDER — HYDROXYZINE HYDROCHLORIDE 25 MG/1
25 TABLET, FILM COATED ORAL 3 TIMES DAILY PRN
Status: DISCONTINUED | OUTPATIENT
Start: 2024-05-21 | End: 2024-05-27 | Stop reason: HOSPADM

## 2024-05-21 RX ORDER — NICOTINE 21 MG/24HR
1 PATCH, TRANSDERMAL 24 HOURS TRANSDERMAL DAILY
Status: DISCONTINUED | OUTPATIENT
Start: 2024-05-22 | End: 2024-05-27 | Stop reason: HOSPADM

## 2024-05-21 RX ORDER — MAGNESIUM HYDROXIDE/ALUMINUM HYDROXICE/SIMETHICONE 120; 1200; 1200 MG/30ML; MG/30ML; MG/30ML
30 SUSPENSION ORAL EVERY 6 HOURS PRN
Status: DISCONTINUED | OUTPATIENT
Start: 2024-05-21 | End: 2024-05-27 | Stop reason: HOSPADM

## 2024-05-21 RX ORDER — BENZTROPINE MESYLATE 1 MG/1
1 TABLET ORAL ONCE
Status: COMPLETED | OUTPATIENT
Start: 2024-05-21 | End: 2024-05-21

## 2024-05-21 RX ORDER — OLANZAPINE 10 MG/1
10 TABLET ORAL EVERY 4 HOURS PRN
Status: DISCONTINUED | OUTPATIENT
Start: 2024-05-21 | End: 2024-05-27 | Stop reason: HOSPADM

## 2024-05-21 RX ORDER — BENZTROPINE MESYLATE 1 MG/1
1 TABLET ORAL DAILY PRN
Status: DISCONTINUED | OUTPATIENT
Start: 2024-05-21 | End: 2024-05-27 | Stop reason: HOSPADM

## 2024-05-21 RX ORDER — BENZTROPINE MESYLATE 1 MG/ML
2 INJECTION INTRAMUSCULAR; INTRAVENOUS 2 TIMES DAILY PRN
Status: DISCONTINUED | OUTPATIENT
Start: 2024-05-21 | End: 2024-05-27 | Stop reason: HOSPADM

## 2024-05-21 RX ORDER — TRAZODONE HYDROCHLORIDE 50 MG/1
50 TABLET ORAL NIGHTLY PRN
Status: DISCONTINUED | OUTPATIENT
Start: 2024-05-21 | End: 2024-05-27 | Stop reason: HOSPADM

## 2024-05-21 RX ORDER — ACETAMINOPHEN 325 MG/1
650 TABLET ORAL EVERY 4 HOURS PRN
Status: DISCONTINUED | OUTPATIENT
Start: 2024-05-21 | End: 2024-05-27 | Stop reason: HOSPADM

## 2024-05-21 RX ORDER — ATORVASTATIN CALCIUM 10 MG/1
10 TABLET, FILM COATED ORAL DAILY
Status: DISCONTINUED | OUTPATIENT
Start: 2024-05-22 | End: 2024-05-23

## 2024-05-21 RX ORDER — ARIPIPRAZOLE 10 MG/1
5 TABLET ORAL 2 TIMES DAILY
Status: DISCONTINUED | OUTPATIENT
Start: 2024-05-21 | End: 2024-05-22

## 2024-05-21 RX ORDER — IBUPROFEN 400 MG/1
400 TABLET ORAL EVERY 6 HOURS PRN
Status: DISCONTINUED | OUTPATIENT
Start: 2024-05-21 | End: 2024-05-27 | Stop reason: HOSPADM

## 2024-05-21 RX ORDER — LORAZEPAM 0.5 MG/1
0.5 TABLET ORAL DAILY PRN
Status: DISCONTINUED | OUTPATIENT
Start: 2024-05-21 | End: 2024-05-27 | Stop reason: HOSPADM

## 2024-05-21 RX ADMIN — BENZTROPINE MESYLATE 1 MG: 1 TABLET ORAL at 22:18

## 2024-05-21 RX ADMIN — BENZTROPINE MESYLATE 1 MG: 1 TABLET ORAL at 16:48

## 2024-05-21 RX ADMIN — ACETAMINOPHEN 650 MG: 325 TABLET ORAL at 23:54

## 2024-05-21 ASSESSMENT — PAIN - FUNCTIONAL ASSESSMENT
PAIN_FUNCTIONAL_ASSESSMENT: NONE - DENIES PAIN
PAIN_FUNCTIONAL_ASSESSMENT: ACTIVITIES ARE NOT PREVENTED

## 2024-05-21 ASSESSMENT — PAIN SCALES - GENERAL
PAINLEVEL_OUTOF10: 0
PAINLEVEL_OUTOF10: 8

## 2024-05-21 ASSESSMENT — PAIN DESCRIPTION - ORIENTATION: ORIENTATION: RIGHT

## 2024-05-21 ASSESSMENT — SLEEP AND FATIGUE QUESTIONNAIRES
DO YOU USE A SLEEP AID: NO
DO YOU HAVE DIFFICULTY SLEEPING: NO
AVERAGE NUMBER OF SLEEP HOURS: 6

## 2024-05-21 ASSESSMENT — PAIN DESCRIPTION - DESCRIPTORS: DESCRIPTORS: ACHING;CRAMPING

## 2024-05-21 ASSESSMENT — PAIN DESCRIPTION - LOCATION: LOCATION: KNEE

## 2024-05-21 NOTE — ED PROVIDER NOTES
[facility] ED  EMERGENCY DEPARTMENT ENCOUNTER        Pt Name: Hakeem Newton  MRN: [id number]  Birthdate 1960  Date of evaluation: 5/21/2024  Provider: SAULO Ty CNP  PCP: Lynn Berger APRN - CNP  Note Started: 5:04 PM EDT 5/21/24      RADHA. I have evaluated this patient.        CHIEF COMPLAINT       Chief Complaint   Patient presents with    Psychiatric Evaluation     Pt brought in by police and crisis center. Crisis center states pts sister in law called stated pt was here 4 days ago, she is his care giver and he should have been admitted. Sister in law stated pt is abusing meds and got mad today because she wouldn't let him drive to get more medication. Pt just keeps stating \"I was just here for this. I have better things to be doing\"       HISTORY OF PRESENT ILLNESS: 1 or more Elements     History From: Patient     Limitations to history : None    Social Determinants Significantly Affecting Health : None    Chief Complaint: Psychiatric evaluation     Hakeem Newton is a 64 y.o. male who presents to the emergency department today with symptoms of delusional behaviors.  Has a history of schizophrenia.  Lives with his mother at home and has help managed with some family who checks on him on a daily basis.  Apparently over the last few days he has had some behavioral type outburst has had some delusional thinking.  Apparently mobile crisis was notified and they evaluated him during that evaluation they felt that he would be better served in the hospital setting.  He was transported here in the emergency department for psychiatric evaluation.  At no time is he been suicidal.  He does not particularly seem to be hallucinating.  He is awake alert and oriented.  Has no medical complaints.    Nursing Notes were all reviewed and agreed with or any disagreements were addressed in the HPI.    REVIEW OF SYSTEMS :      Review of Systems    Positives and Pertinent negatives as per HPI.

## 2024-05-22 PROBLEM — Z96.651 S/P TOTAL KNEE REPLACEMENT, RIGHT: Status: ACTIVE | Noted: 2024-05-22

## 2024-05-22 PROCEDURE — 6370000000 HC RX 637 (ALT 250 FOR IP): Performed by: PSYCHIATRY & NEUROLOGY

## 2024-05-22 PROCEDURE — 99221 1ST HOSP IP/OBS SF/LOW 40: CPT | Performed by: NURSE PRACTITIONER

## 2024-05-22 PROCEDURE — 1240000000 HC EMOTIONAL WELLNESS R&B

## 2024-05-22 PROCEDURE — 97530 THERAPEUTIC ACTIVITIES: CPT

## 2024-05-22 PROCEDURE — 97535 SELF CARE MNGMENT TRAINING: CPT

## 2024-05-22 PROCEDURE — 97165 OT EVAL LOW COMPLEX 30 MIN: CPT

## 2024-05-22 PROCEDURE — 93005 ELECTROCARDIOGRAM TRACING: CPT | Performed by: PSYCHIATRY & NEUROLOGY

## 2024-05-22 PROCEDURE — 97161 PT EVAL LOW COMPLEX 20 MIN: CPT

## 2024-05-22 RX ORDER — BENZTROPINE MESYLATE 1 MG/1
1 TABLET ORAL 2 TIMES DAILY
Status: DISCONTINUED | OUTPATIENT
Start: 2024-05-22 | End: 2024-05-27 | Stop reason: HOSPADM

## 2024-05-22 RX ORDER — ARIPIPRAZOLE 15 MG/1
15 TABLET ORAL DAILY
Status: DISCONTINUED | OUTPATIENT
Start: 2024-05-22 | End: 2024-05-23

## 2024-05-22 RX ADMIN — BENZTROPINE MESYLATE 1 MG: 1 TABLET ORAL at 13:25

## 2024-05-22 RX ADMIN — BENZTROPINE MESYLATE 1 MG: 1 TABLET ORAL at 07:52

## 2024-05-22 RX ADMIN — ACETAMINOPHEN 650 MG: 325 TABLET ORAL at 07:52

## 2024-05-22 RX ADMIN — ARIPIPRAZOLE 15 MG: 15 TABLET ORAL at 13:26

## 2024-05-22 RX ADMIN — ARIPIPRAZOLE 5 MG: 10 TABLET ORAL at 07:52

## 2024-05-22 ASSESSMENT — PAIN SCALES - GENERAL
PAINLEVEL_OUTOF10: 4
PAINLEVEL_OUTOF10: 0

## 2024-05-22 ASSESSMENT — SLEEP AND FATIGUE QUESTIONNAIRES
DO YOU HAVE DIFFICULTY SLEEPING: NO
DO YOU USE A SLEEP AID: NO
AVERAGE NUMBER OF SLEEP HOURS: 6

## 2024-05-22 ASSESSMENT — PATIENT HEALTH QUESTIONNAIRE - PHQ9
SUM OF ALL RESPONSES TO PHQ QUESTIONS 1-9: 0
SUM OF ALL RESPONSES TO PHQ9 QUESTIONS 1 & 2: 0
1. LITTLE INTEREST OR PLEASURE IN DOING THINGS: NOT AT ALL
SUM OF ALL RESPONSES TO PHQ QUESTIONS 1-9: 0
2. FEELING DOWN, DEPRESSED OR HOPELESS: NOT AT ALL

## 2024-05-22 ASSESSMENT — PAIN DESCRIPTION - ORIENTATION: ORIENTATION: RIGHT

## 2024-05-22 ASSESSMENT — PAIN DESCRIPTION - DESCRIPTORS: DESCRIPTORS: ACHING;THROBBING

## 2024-05-22 ASSESSMENT — PAIN DESCRIPTION - LOCATION: LOCATION: KNEE

## 2024-05-22 NOTE — BH NOTE
Braden Newton called to get consent to treat, which was given. Collateral given Sister ROSALVA Leon states pt stopped  Abilify 20mg qam and qpm beginning of January. Was restarted on  Abilify 5mg qam and qpm on January 28th of this year and that it is not helping his anxiety or agitation. Pt received 7 Cogentin and took all of them in 3 days, he threw away his Abilify 10mg and his lorazepam. He has become more anxious and very upset that he has had his keys taken away.

## 2024-05-22 NOTE — PROGRESS NOTES
[facility] [place] [facility]  Admission Note     Admission Type:   Admission Type: Involuntary    Reason for admission:  Reason for Admission: taking too much of cogentin at home.      Addictive Behavior: none       Medical Problems:   Past Medical History:   Diagnosis Date    Arthritis     Elevated liver enzymes 09/01/2017    Hypertension     Pure hypercholesterolemia 08/31/2017    Schizophrenia (HCC)        Status EXAM:  Mental Status and Behavioral Exam  Normal: No  Level of Assistance: Independent/Self  Facial Expression: Brightened, Elevated, Worried  Affect: Blunt, Unstable  Level of Consciousness: Alert  Frequency of Checks: 4 times per hour, close  Mood:Normal: No  Mood: Anxious, Labile, Helpless, Irritable  Motor Activity:Normal: Yes  Eye Contact: Good  Observed Behavior: Impulsive, Cooperative, Guarded  Sexual Misconduct History: Current - no  Preception: [place] to person, [place] to place  Attention:Normal: No  Attention: Distractible  Thought Processes: Circumstantial  Thought Content:Normal: No  Thought Content: Delusions, Paranoia, Preoccupations  Depression Symptoms: Change in energy level, Feelings of helplessness, Impaired concentration, Isolative, Loss of interest, Sleep disturbance  Anxiety Symptoms: Generalized  Rebeka Symptoms: Grandiosity, Increased energy, Labile, Less need to sleep, Poor judgment  Hallucinations: None, Other (comment) (Juan denied all A/V tonight)  Delusions: Yes  Delusions: Controlled, Controlling, Grandeur, Paranoid, Persecutory, Somatic  Memory:Normal: No  Memory: Poor recent, Poor remote  Insight and Judgment: No  Insight and Judgment: Poor judgment, Poor insight    Tobacco Screening:  Practical Counseling, on admission, soledad X, if applicable and completed (first 3 are required if patient doesn't refuse):            ( ) Recognizing danger situations (included triggers and roadblocks)                    ( ) Coping skills (new ways to manage stress,relaxation techniques,

## 2024-05-22 NOTE — H&P
Hospital Medicine History & Physical      PCP: Lynn Berger, APRN - CNP    Date of Admission: 5/21/2024    Date of Service: Pt seen/examined on 05/22/24      Chief Complaint:    Chief Complaint   Patient presents with    Psychiatric Evaluation     Pt brought in by police and crisis center. Crisis center states pts sister in law called stated pt was here 4 days ago, she is his care giver and he should have been admitted. Sister in law stated pt is abusing meds and got mad today because she wouldn't let him drive to get more medication. Pt just keeps stating \"I was just here for this. I have better things to be doing\"         History Of Present Illness:      The patient is a 64 y.o. male with PMH of tardive dyskinesia, schizophrenia, HTN, HLD who presented to [facility] for erratic behavior.  Patient was seen and evaluated in the ED by the ED medical provider, patient was medically cleared for admission to [facility].  This note serves as an admission medical H&P.    Tobacco use: former  ETOH use: denies  Illicit drug use: denies    Patient denies any medical complaints    Past Medical History:        Diagnosis Date    Arthritis     Elevated liver enzymes 09/01/2017    Hypertension     Pure hypercholesterolemia 08/31/2017    Schizophrenia (HCC)        Past Surgical History:        Procedure Laterality Date    NOSE SURGERY      deviated septum    TOTAL KNEE ARTHROPLASTY Right 4/15/2024    RIGHT TOTAL KNEE ARTHROPLASTY BELGICA ROBOTIC performed by Braden Villa MD at [facility] OR       Medications Prior to Admission:    Prior to Admission medications    Medication Sig Start Date End Date Taking? Authorizing Provider   benztropine (COGENTIN) 1 MG tablet Take 1 tablet by mouth daily as needed (involuntary movements) 5/17/24   Breana Prater PA-C   aspirin 81 MG EC tablet Take 1 tablet by mouth in the morning and at bedtime Pt already has this  Patient not taking: Reported on 5/17/2024 4/16/24   Braden Villa MD

## 2024-05-22 NOTE — GROUP NOTE
Group Therapy Note    Date: 5/22/2024    Group Start Time: 1100  Group End Time: 1145  Group Topic: Cognitive Skills    [facility]    Nguyen Beltran        Group Therapy Note    Attendees: 4    Group members were given a work sheet and asked to write down A-Z coping skills that they have used or would like to use. Once finished, the group discussed their stressors and how to use their coping skills to work through those stressors.     Notes:  Patient attended group for the full duration. Patient remained engaged and interacted appropriately with other members of the group.     Status After Intervention:  Improved    Participation Level: Active Listener and Interactive    Participation Quality: Appropriate and Attentive      Speech:  normal      Thought Process/Content: Logical      Affective Functioning: Congruent      Mood: euthymic      Level of consciousness:  Alert      Response to Learning: Able to verbalize current knowledge/experience      Endings: None Reported    Modes of Intervention: Socialization and Exploration      Discipline Responsible: Behavorial Health Tech      Signature:  SHY MARTINEZ
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
## 2024-05-22 NOTE — PROGRESS NOTES
Patient arrived to the [facility] at this time. He is alert and oriented to person, place, and situation. He is able to contribute to his admission. He is able to verbalize his needs. He denied SI/HI,A/V hallucinations.He is cooperative with vital signs and able to verbalize needs. He said that he is here because of the cogentin and that he's been told he's taking too much.

## 2024-05-22 NOTE — PROGRESS NOTES
Inpatient Physical Therapy Evaluation, Treatment, & Discharge Summary    Unit: [facility]  Date:  5/22/2024  Patient Name:    Hakeem Newton  Admitting diagnosis:  Schizophrenia (HCC) [F20.9]  Schizophrenia, unspecified type (HCC) [F20.9]  Admit Date:  5/21/2024  Precautions/Restrictions/WB Status/ Lines/ Wounds/ Oxygen: Standard BHI Precautions      Pt seen for cotreatment this date due to staff recommendation and behavioral concerns    Treatment Time:  09:00-09:30  Treatment Number:  1   Timed Code Treatment Minutes: 20 minutes  Total Treatment Minutes:  30  minutes    Patient Stated Goals for Therapy: no goal states this date      Discharge Recommendations: Home with 24hr assistance and Outpatient PT  DME needs for discharge: Needs Met       Therapy recommendation for EMS Transport: can transport by wheelchair    Therapy recommendations for staff:   Supervision for ambulation with use of rolling walker (RW) within community room    History of Present Illness:   Per ED note 5/21/24: \" 64 y.o. male who presents to the emergency department today with symptoms of delusional behaviors.  Has a history of schizophrenia.  Lives with his mother at home and has help managed with some family who checks on him on a daily basis.  Apparently over the last few days he has had some behavioral type outburst has had some delusional thinking.  Apparently mobile crisis was notified and they evaluated him during that evaluation they felt that he would be better served in the hospital setting.  He was transported here in the emergency department for psychiatric evaluation.  At no time is he been suicidal.  He does not particularly seem to be hallucinating.  He is awake alert and oriented.  Has no medical complaints.\"     AM-PAC Mobility Score       AM-PAC Inpatient Mobility without Stair Climbing Raw Score : 17      Subjective  Patient  sitting in transport chair in community room  with hospital staff in room.  Pt agreeable to this PT

## 2024-05-22 NOTE — PLAN OF CARE
Pt pleasant cooperative visible and social with select peers. Pt denies SI,HI,AVH, no distress noted.  Pt complies with medication and care. Pt upset with his Brother and [employer] states the are hiding keys, accusing him of things he didn't do and \"mean\" to him. Pt up ambulates for short distances with assist of walker and stand by assist to stand.

## 2024-05-22 NOTE — PROGRESS NOTES
4 Eyes Skin Assessment     The patient is being assessed for  Admission    I agree that 2 RN's have performed a thorough Head to Toe Skin Assessment on the patient. ALL assessment sites listed below have been assessed.       Areas assessed for pressure by both nurses:   [x]   Head, Face, and Ears   [x]   Shoulders, Back, and Chest  [x]   Arms, Elbows, and Hands   [x]   Coccyx, Sacrum, and Ischum  [x]   Legs, Feet, and Heels - right knee has healing incision from where he got a knee replacement 5 weeks ago. Some redness, no edema noted.                                Skin Assessed Under all Medical Devices by both nurses: n/a          All Mepilex Borders were peeled back and area peeked at by both nurses: n/a  Please list where Mepilex Borders are located:  n/a                 Does the Patient have Skin Breakdown related to pressure?  No              Osman Prevention initiated:  No   Wound Care Orders initiated:  No      WOC nurse consulted for Pressure Injury (Stage 3,4, Unstageable, DTI, NWPT, Complex wounds)and New or Established Ostomies:  NA        Nurse 1 eSignature: Electronically signed by Ekta Lai RN on 5/22/24 at 4:28 AM EDT    **SHARE this note so that the co-signing nurse is able to place an eSignature**    Nurse 2 eSignature: {Esignature:[id number]}
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
## 2024-05-22 NOTE — PROGRESS NOTES
Inpatient Occupational Therapy Evaluation and Treatment    Unit: [facility]  Date:  5/22/2024  Patient Name:    Hakeem Newton  Admitting diagnosis:  Schizophrenia (HCC) [F20.9]  Schizophrenia, unspecified type (HCC) [F20.9]  Admit Date:  5/21/2024  Precautions/Restrictions/WB Status/ Lines/ Wounds/ Oxygen: Standard BHI Precautions    Recently at [facility] for R TKR 4/15/24    Pt seen for cotreatment this date due to limited functional status information    Treatment Time:  [id number]  Treatment Number:  1  Timed Code Treatment Minutes: 10 minutes  Total Treatment Minutes:  20  minutes    Patient Goals for Therapy: none stated          Discharge Recommendations: Home with 24/7 assist   DME needs for discharge: Needs Met       Therapy recommendations for staff:   Supervision for ambulation with use of rolling walker (RW) and gait belt within community room    History of Present Illness: Per ED note FELICIA Henry NP 5/21/24: \" 64 y.o. male who presents to the emergency department today with symptoms of delusional behaviors.  Has a history of schizophrenia.  Lives with his mother at home and has help managed with some family who checks on him on a daily basis.  Apparently over the last few days he has had some behavioral type outburst has had some delusional thinking.  Apparently mobile crisis was notified and they evaluated him during that evaluation they felt that he would be better served in the hospital setting.  He was transported here in the emergency department for psychiatric evaluation.  At no time is he been suicidal.  He does not particularly seem to be hallucinating.  He is awake alert and oriented.  Has no medical complaints.\"     AM-PAC Score: AM-PAC Inpatient Daily Activity Raw Score: 22     Subjective:  Patient  sitting in transport chair in community room  with hospital staff in room.   Pt agreeable to this OT session.     Cognition:    A&O Person and Place   Able to follow 2 step commands  Tangential,
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
## 2024-05-22 NOTE — PROGRESS NOTES
Hakeem has been sitting in the dayroom with his right leg elevated. He has a healing surgical scare to his right knee where he has had a knee replacement. He said that he will not sleep in the room as the bed is dirty and he's afraid his surgical site will get infected. He said the hospital is dirty and he doesn't want to be here any longer than he has to be.He has been cooperative this shift, asking questions appropriately and very cooperative with staff. He is unhappy about the bed being low to the ground and that the toilet seat is low to the ground. He is hoping to be discharged from the hospital later today. If he doesn't discharge from the hospital, elevate toilet seat will be obtained. He denied any other c/o pain at this time. He said that he would not be sleeping this evening and he didn't sleep at all this shift.

## 2024-05-22 NOTE — PLAN OF CARE
Problem: Pain  Goal: Verbalizes/displays adequate comfort level or baseline comfort level  Outcome: Progressing     Problem: Risk for Elopement  Goal: Patient will not exit the unit/facility without proper excort  Outcome: Progressing     Problem: Chronic Conditions and Co-morbidities  Goal: Patient's chronic conditions and co-morbidity symptoms are monitored and maintained or improved  Outcome: Progressing

## 2024-05-23 ENCOUNTER — APPOINTMENT (OUTPATIENT)
Dept: PHYSICAL THERAPY | Age: 64
End: 2024-05-23
Payer: COMMERCIAL

## 2024-05-23 LAB
CHOLEST SERPL-MCNC: 170 MG/DL (ref 0–199)
EKG ATRIAL RATE: 80 BPM
EKG DIAGNOSIS: NORMAL
EKG P AXIS: 56 DEGREES
EKG P-R INTERVAL: 162 MS
EKG Q-T INTERVAL: 362 MS
EKG QRS DURATION: 84 MS
EKG QTC CALCULATION (BAZETT): 417 MS
EKG R AXIS: -53 DEGREES
EKG T AXIS: -22 DEGREES
EKG VENTRICULAR RATE: 80 BPM
EST. AVERAGE GLUCOSE BLD GHB EST-MCNC: 91.1 MG/DL
HBA1C MFR BLD: 4.8 %
HDLC SERPL-MCNC: 54 MG/DL (ref 40–60)
LDLC SERPL CALC-MCNC: 91 MG/DL
TRIGL SERPL-MCNC: 123 MG/DL (ref 0–150)
VLDLC SERPL CALC-MCNC: 25 MG/DL

## 2024-05-23 PROCEDURE — 6370000000 HC RX 637 (ALT 250 FOR IP): Performed by: PSYCHIATRY & NEUROLOGY

## 2024-05-23 PROCEDURE — 93010 ELECTROCARDIOGRAM REPORT: CPT | Performed by: INTERNAL MEDICINE

## 2024-05-23 PROCEDURE — 99233 SBSQ HOSP IP/OBS HIGH 50: CPT | Performed by: PSYCHIATRY & NEUROLOGY

## 2024-05-23 PROCEDURE — 1240000000 HC EMOTIONAL WELLNESS R&B

## 2024-05-23 RX ORDER — ARIPIPRAZOLE 10 MG/1
20 TABLET ORAL DAILY
Status: DISCONTINUED | OUTPATIENT
Start: 2024-05-24 | End: 2024-05-27 | Stop reason: HOSPADM

## 2024-05-23 RX ORDER — ARIPIPRAZOLE 10 MG/1
5 TABLET ORAL ONCE
Status: COMPLETED | OUTPATIENT
Start: 2024-05-23 | End: 2024-05-23

## 2024-05-23 RX ADMIN — IBUPROFEN 400 MG: 400 TABLET, FILM COATED ORAL at 22:40

## 2024-05-23 RX ADMIN — IBUPROFEN 400 MG: 400 TABLET, FILM COATED ORAL at 06:45

## 2024-05-23 RX ADMIN — BENZTROPINE MESYLATE 1 MG: 1 TABLET ORAL at 08:31

## 2024-05-23 RX ADMIN — BENZTROPINE MESYLATE 1 MG: 1 TABLET ORAL at 01:49

## 2024-05-23 RX ADMIN — ARIPIPRAZOLE 5 MG: 10 TABLET ORAL at 14:26

## 2024-05-23 RX ADMIN — IBUPROFEN 400 MG: 400 TABLET, FILM COATED ORAL at 12:18

## 2024-05-23 RX ADMIN — BENZTROPINE MESYLATE 1 MG: 1 TABLET ORAL at 20:11

## 2024-05-23 RX ADMIN — ARIPIPRAZOLE 15 MG: 15 TABLET ORAL at 08:31

## 2024-05-23 ASSESSMENT — PAIN DESCRIPTION - ORIENTATION
ORIENTATION: RIGHT

## 2024-05-23 ASSESSMENT — PAIN DESCRIPTION - DESCRIPTORS
DESCRIPTORS: ACHING
DESCRIPTORS: ACHING
DESCRIPTORS: ACHING;STABBING

## 2024-05-23 ASSESSMENT — PAIN SCALES - GENERAL
PAINLEVEL_OUTOF10: 3
PAINLEVEL_OUTOF10: 1
PAINLEVEL_OUTOF10: 6
PAINLEVEL_OUTOF10: 6

## 2024-05-23 ASSESSMENT — PAIN DESCRIPTION - LOCATION
LOCATION: KNEE

## 2024-05-23 NOTE — PROGRESS NOTES
Behavioral Services  Medicare Certification Upon Admission    I certify that this patient's inpatient psychiatric hospital admission is medically necessary for:    [x] (1) Treatment which could reasonably be expected to improve this patient's condition,       [x] (2) Or for diagnostic study;     AND     [x](2) The inpatient psychiatric services are provided while the individual is under the care of a physician and are included in the individualized plan of care.    Estimated length of stay/service 3-4 day    Plan for post-hospital care incomplete     Electronically signed by CURRY LONG MD on 5/23/2024 at 8:41 AM

## 2024-05-23 NOTE — PROGRESS NOTES
Group Therapy Note    Date: 5/23/2024  Start Time: 1300  End Time:  1400  Number of Participants: 4    Type of Group: Music and Spirituality    Patient's Goal:  Participation    Notes:   facilitated discussion on spirituality, focusing on connection, meaning, and hope, through the medium of music. Pt actively participated by making song selections and sharing reflections on songs.    Participation Level: Active Listener and Interactive    Participation Quality: Appropriate, Attentive, and Sharing      Speech:  normal and slurred      Affective Functioning: Congruent      Endings: None Reported    Modes of Intervention: Support, Socialization, Exploration, Activity, and Media      Discipline Responsible:       Signature:  Braden Sandoval       05/23/24 2675   Encounter Summary   Encounter Overview/Reason Behavioral Health   Service Provided For Patient   Last Encounter    (5/23 Music and Spirituality Group)   Complexity of Encounter Moderate   Begin Time 1300   End Time  1400   Total Time Calculated 60 min   Behavioral Health    Type  Spirituality Group

## 2024-05-23 NOTE — PLAN OF CARE
Problem: Safety - Adult  Goal: Free from fall injury  5/23/2024 1045 by Dorothy Mcbride RN  Outcome: Progressing     Problem: Pain  Goal: Verbalizes/displays adequate comfort level or baseline comfort level  5/23/2024 1045 by Dorothy Mcbride RN  Outcome: Progressing     Problem: Risk for Elopement  Goal: Patient will not exit the unit/facility without proper excort  5/23/2024 1045 by Dorothy Mcbride RN  Outcome: Progressing     Problem: Chronic Conditions and Co-morbidities  Goal: Patient's chronic conditions and co-morbidity symptoms are monitored and maintained or improved  5/23/2024 1045 by Dorothy Mcbride RN  Outcome: Progressing    Patient was visible on unit, social with peers.  Medication complaint.  Attended and participated in groups.  Patient was cooperative with interview.  Denies SI/HI/AVH.   Patient continues to have some delusional statements concerning his sister in law.  Patient stated, \"Maybe I could just stay here to stay away from her, she is crazy!\" He has utilzied PRN medication and ice pack for pain d/t recent right knee replacement. He has been calm and cooperative with all care.

## 2024-05-23 NOTE — PROGRESS NOTES
Department of Psychiatry  Progress Note    Patient's chart was reviewed. Discussed with treatment team. Met with patient.     SUBJECTIVE:  Patient was in the common area icing his knee while we had a conversation. Patient states he is \"good\". He wants to be discharged but does not want to go home if his MAISHA is there. He wants to get a restraining order against her, or have her committed.     He has been pleasant and behaviorally stable since admission.     Patient has been eating. Compliant with scheduled medications. Agreed to increase Ability to 20mg daily.     ROS:   Patient has new complaints: no  Sleeping adequately:  No  Appetite adequate: Yes  Engaged in programming: No:     OBJECTIVE:  VITALS:  /86   Pulse 95   Temp 98.3 °F (36.8 °C) (Oral)   Resp 18   Ht 1.854 m (6' 0.99\")   Wt 113.4 kg (250 lb)   SpO2 94%   BMI 32.99 kg/m²     Mental Status Examination:    Appearance: hospital gown, fair hygiene    Behavior/Attitude toward examiner:  cooperative, attentive and good eye contact  Speech:  elevated volume   Mood:  \"okay\"  Affect:  elevated  Thought processes:  Goal directed, linear, no SYLVIE or gross disorganization  Thought Content: no SI, no HI, + delusions   Perceptions: no AVH, no RTIS  Attention: attention span and concentration were intact to interview   Abstraction: fair   Cognition: Average KALI, Alert and oriented to person, place, time, and situation, recall intact   Insight: fair   Judgment: fair    Medication:  Scheduled:   [START ON 5/24/2024] ARIPiprazole  20 mg Oral Daily    ARIPiprazole  5 mg Oral Once    benztropine  1 mg Oral BID    nicotine  1 patch TransDERmal Daily    atorvastatin  10 mg Oral Daily        PRN:  acetaminophen, ibuprofen, magnesium hydroxide, nicotine polacrilex, aluminum & magnesium hydroxide-simethicone, OLANZapine **OR** OLANZapine (ZyPREXA) 10 mg in sterile water 2 mL injection, benztropine mesylate, traZODone, hydrOXYzine HCl, benztropine, LORazepam

## 2024-05-23 NOTE — PLAN OF CARE
Problem: Safety - Adult  Goal: Free from fall injury  5/23/2024 0104 by Ekta Lai RN  Outcome: Progressing     Problem: Pain  Goal: Verbalizes/displays adequate comfort level or baseline comfort level  5/23/2024 0104 by Ekta Lai RN  Outcome: Progressing     Problem: Risk for Elopement  Goal: Patient will not exit the unit/facility without proper excort  5/23/2024 0104 by Ekta Lai RN  Outcome: Progressing     Problem: Chronic Conditions and Co-morbidities  Goal: Patient's chronic conditions and co-morbidity symptoms are monitored and maintained or improved  5/23/2024 0104 by Ekta Lai RN  Outcome: Progressing   Hakeem is able to verbalize his needs. He is ambulatory with a walker and is able to motivate around the unit without difficulty. He is medication compliant and will discuss with staff his medications at any time. He denied SI/HI,A/V hallucinations this shift. He hasn't tried to elope this shift. Safety checks continue Q 15 minutes through out the shift.

## 2024-05-23 NOTE — GROUP NOTE
Group Therapy Note    Date: 5/23/2024    Group Start Time: 1000  Group End Time: 1045  Group Topic: Psychoeducation    [facility]    Brock Pa        Group Therapy Note    Group members collaborated to identify Language(describe it), Signs(feel it), and Behaviors(show it) for 6 basic human emotions: love, gary, sadness, fear, surprise, anger.    Attendees: 6       Notes:  Patient minimally engaged during session, requiring direct prompting for participation in collaborative discussions. Verbalized understanding of goals for session, verbalized understanding of information relayed during session.    Status After Intervention:  Unchanged    Participation Level: Minimal    Participation Quality: Lethargic      Speech:  hesitant      Thought Process/Content: Logical      Affective Functioning: Neutral, Unremarkable      Mood: depressed      Level of consciousness:  Attentive      Response to Learning: Progressing to goal      Endings: None Reported    Modes of Intervention: Education, Support, Socialization, Exploration, and Problem-solving      Discipline Responsible: Psychoeducational Specialist      Signature:  Brock Pa MM, GRUPO

## 2024-05-23 NOTE — PROGRESS NOTES
1:1 Assessment completed. Patient is alert and oriented x 4, and uses direct,intense at times eye contact. Patient is dressed in hospital clothing.Patient denied SI/HI,A/V hallucinations. The patient stated his  mood was: \"Good\". Patient didn't attend groups this shift.The patient does understand why they are here.(MAISHA wouldn't let him drive to the pharmacy to get more cogentin)Patient is hoping to be discharged from the hospital as soon as possible. Patient is medication compliant.Judgement,insight and impulse control are limited.Patient denied any physical complaints this evening.Vital signs are noted. Safety checks continue Q 15 minutes throughout the shift.      PRNS this shift? none.    Patient slept  9   hours this shift.

## 2024-05-23 NOTE — PROGRESS NOTES
Patient  complaining of right knee pain.  He rates his pain a 9 on a 1-10 scale at this time.  Ibuprofen offered and patient was agreeable.  Ibuprofen given per order.  See MAR

## 2024-05-24 PROCEDURE — 1240000000 HC EMOTIONAL WELLNESS R&B

## 2024-05-24 PROCEDURE — 6370000000 HC RX 637 (ALT 250 FOR IP): Performed by: PSYCHIATRY & NEUROLOGY

## 2024-05-24 PROCEDURE — 99233 SBSQ HOSP IP/OBS HIGH 50: CPT | Performed by: PSYCHIATRY & NEUROLOGY

## 2024-05-24 RX ADMIN — ARIPIPRAZOLE 20 MG: 10 TABLET ORAL at 09:59

## 2024-05-24 RX ADMIN — BENZTROPINE MESYLATE 1 MG: 1 TABLET ORAL at 09:59

## 2024-05-24 RX ADMIN — IBUPROFEN 400 MG: 400 TABLET, FILM COATED ORAL at 05:50

## 2024-05-24 RX ADMIN — BENZTROPINE MESYLATE 1 MG: 1 TABLET ORAL at 20:25

## 2024-05-24 RX ADMIN — IBUPROFEN 400 MG: 400 TABLET, FILM COATED ORAL at 20:28

## 2024-05-24 ASSESSMENT — PAIN SCALES - GENERAL
PAINLEVEL_OUTOF10: 6
PAINLEVEL_OUTOF10: 6
PAINLEVEL_OUTOF10: 2

## 2024-05-24 ASSESSMENT — PAIN DESCRIPTION - ORIENTATION
ORIENTATION: RIGHT
ORIENTATION: RIGHT

## 2024-05-24 ASSESSMENT — PAIN DESCRIPTION - DESCRIPTORS
DESCRIPTORS: ACHING;SHARP
DESCRIPTORS: ACHING;SHARP

## 2024-05-24 ASSESSMENT — PAIN DESCRIPTION - LOCATION
LOCATION: KNEE
LOCATION: KNEE

## 2024-05-24 NOTE — PROGRESS NOTES
Group Therapy Note    Date: 5/23/2024  Start Time: 20:00  End Time:  21:00  Number of Participants: 6    Type of Group: Recreational  wrap up    Wellness Binder Information  Module Name:  /  Session Number:  /    Patient's Goal:  coping skills    Notes:  continuing to work on goal    Status After Intervention:  Unchanged    Participation Level: Active Listener and Interactive    Participation Quality: Appropriate, Attentive, and Sharing      Speech:  pressured      Thought Process/Content: Flight of ideas      Affective Functioning: Exaggerated      Mood: anxious      Level of consciousness:  Alert and Attentive      Response to Learning: Able to change behavior      Endings: None Reported    Modes of Intervention: Socialization and Problem-solving      Discipline Responsible: Behavorial Health Tech      Signature:  Meek Serrato

## 2024-05-24 NOTE — PROGRESS NOTES
Department of Psychiatry  Progress Note    Patient's chart was reviewed. Discussed with treatment team. Met with patient.     SUBJECTIVE:  Patient was in common area today. Walked with his walker to a private room for conversation. Patient states he is doing \"good\". He believes he has brain damage from the injections he received at [facility] in October, and want further monitoring for it..     He talked with his mother over the phone yesterday and the conversation went well. He told his mom he will not come home if Carolyn (MAISHA) is there. When asked where he will go if not home, he stated \"God will provide\".     Patient has been compliant with scheduled medications and is not concerned with the increase of Abilify to 20mg. Received ibuprofen for knee pain last night and this morning. Eating and sleeping well.     Has been behaviorally stable since admission.     ROS:   Patient has new complaints: no  Sleeping adequately: yes:    Appetite adequate: Yes  Engaged in programming: No    OBJECTIVE:  VITALS:  /69   Pulse 84   Temp 98.3 °F (36.8 °C) (Oral)   Resp 18   Ht 1.854 m (6' 0.99\")   Wt 113.4 kg (250 lb)   SpO2 97%   BMI 32.99 kg/m²     Mental Status Examination:    Appearance: hospital gown, fair hygiene    Behavior/Attitude toward examiner:  cooperative, attentive and good eye contact  Speech:  elevated volume   Mood:  \"okay\"  Affect:  elevated  Thought processes:  Goal directed, linear, no SYLVIE or gross disorganization  Thought Content: no SI, no HI, + delusions   Perceptions: no AVH, no RTIS  Attention: attention span and concentration were intact to interview   Abstraction: fair   Cognition: Average KALI, Alert and oriented to person, place, time, and situation, recall intact   Insight: fair   Judgment: fair    Medication:  Scheduled:   ARIPiprazole  20 mg Oral Daily    benztropine  1 mg Oral BID    nicotine  1 patch TransDERmal Daily        PRN:  acetaminophen, ibuprofen, magnesium

## 2024-05-24 NOTE — BH NOTE
[facility]  Treatment Team Note  Review Date & Time: 5/24/24  0925    Patient was not in treatment team      Status EXAM:   Mental Status and Behavioral Exam  Normal: No  Level of Assistance: Independent/Self  Facial Expression: Brightened  Affect: Congruent  Level of Consciousness: Alert  Frequency of Checks: 4 times per hour, close  Mood:Normal: No  Mood: Labile, Worthless, low self-esteem  Motor Activity:Normal: No  Motor Activity: Decreased  Eye Contact: Good  Observed Behavior: Impulsive, Cooperative, Friendly  Sexual Misconduct History: Current - no  Preception: [place] to person, [place] to time, [place] to place  Attention:Normal: No  Attention: Distractible  Thought Processes: Circumstantial  Thought Content:Normal: No  Thought Content: Compulsions, Delusions, Preoccupations, Obsessions  Depression Symptoms: No problems reported or observed.  Anxiety Symptoms: No problems reported or observed.  Rebeka Symptoms: Poor judgment, Grandiosity  Hallucinations: None (Patient denies)  Delusions: Yes  Delusions: Grandeur, Persecutory, Obsessions  Memory:Normal: No  Memory: Poor remote  Insight and Judgment: No  Insight and Judgment: Poor judgment, Poor insight      Suicide Risk CSSR-S:  1) Within the past month, have you wished you were dead or wished you could go to sleep and not wake up? : No  2) Have you actually had any thoughts of killing yourself? : No  6) Have you ever done anything, started to do anything, or prepared to do anything to end your life?: No      PLAN/TREATMENT RECOMMENDATIONS UPDATE: Patient will take medication as prescribed, eat 75% of meals, attend groups, participate in milieu activities, participate in treatment team and care planning for discharge and follow up.           Leida Hurtado RN

## 2024-05-24 NOTE — PROGRESS NOTES
Pt up ad terri, pleasant and cooperative.  Pt attended groups, socialized with peers, and has been eating well.  Pt had no angry outbursts this shift and denies AVH, anxiety, and depression.  Pt does appear to be slightly paranoid about his siblings.      Pt stated that he wants to stay longer to help clear his head and continue medications.  Dr. Tam aware.      Monitored for safety and comfort.

## 2024-05-24 NOTE — BH NOTE
Patient c/o's of right knee pain. Patient requesting Ibuprofen. Patient medicated with Ibuprofen 400 mg po.

## 2024-05-24 NOTE — GROUP NOTE
Group Therapy Note    Date: 5/24/2024    Group Start Time: 1000  Group End Time: 1045  Group Topic: Psychoeducation    [facility]    Brock Pa        Group Therapy Note    Topic: Challenging Negative Perspectives    Group members discussed perspective in theory, influences on perspectives. Discussed intrusive thoughts and reaction vs response. Discussed coping skills in release vs distraction.    Attendees: 4       Notes:  Hakeem was present and attentive across session. Minimally reflective, but shared that he feels his medication is working well for the first time in his life.    Status After Intervention:  Improved    Participation Level: Active Listener and Interactive    Participation Quality: Appropriate and Attentive      Speech:  normal      Thought Process/Content: Logical      Affective Functioning: Congruent      Mood: euthymic      Level of consciousness:  Alert and Oriented x4      Response to Learning: Capable of insight and Progressing to goal      Endings: None Reported    Modes of Intervention: Education, Support, Socialization, Exploration, Clarifying, and Problem-solving      Discipline Responsible: Psychoeducational Specialist      Signature:  Brock Pa, GAL, MT-BC

## 2024-05-24 NOTE — PLAN OF CARE
Patient alert and oriented x 3. Patient in wheelchair d/t post knee replacement. Patient visible on the milieu social with peers. Patient denies SI/HI/A/V/H. Patient took HS medication. Patient had a HS snack. No angry outbursts noted. No c/o's voiced @ present.  Problem: Safety - Adult  Goal: Free from fall injury  5/23/2024 2100 by Natacha Vicente RN  Outcome: Progressing  5/23/2024 1054 by Dorothy Mcbride RN  Outcome: Progressing  5/23/2024 1045 by Dorothy Mcbride RN  Outcome: Progressing     Problem: Pain  Goal: Verbalizes/displays adequate comfort level or baseline comfort level  5/23/2024 2100 by Natacha Vicnete RN  Outcome: Progressing  5/23/2024 1054 by Dorothy Mcbride RN  Outcome: Progressing  5/23/2024 1045 by Dorothy Mcbride RN  Outcome: Progressing     Problem: Risk for Elopement  Goal: Patient will not exit the unit/facility without proper excort  5/23/2024 1054 by Dorothy Mcbride RN  Outcome: Progressing  5/23/2024 1045 by Dorothy Mcbride RN  Outcome: Progressing     Problem: Chronic Conditions and Co-morbidities  Goal: Patient's chronic conditions and co-morbidity symptoms are monitored and maintained or improved  5/23/2024 1054 by Dorothy Mcbride RN  Outcome: Progressing  5/23/2024 1045 by Dorothy Mcbride RN  Outcome: Progressing

## 2024-05-24 NOTE — PLAN OF CARE
Problem: Safety - Adult  Goal: Free from fall injury  Outcome: Progressing     Problem: Risk for Elopement  Goal: Patient will not exit the unit/facility without proper excort  Outcome: Progressing

## 2024-05-24 NOTE — GROUP NOTE
Group Therapy Note    Date: 5/24/2024    Group Start Time: 1100  Group End Time: 1145  Group Topic: Recreational    [facility]    Nguyen Beltran        Group Therapy Note    Attendees: 7    Group members broke into teams and engaged in multiple rounds of Pictionary. The goal of group was to evoke memories, stimulate mental activity, promote social interaction, and improve well-being.     Notes:  Hakeem attended group for the full duration. Hakeem declined participating but stated that he just wanted to sit and listen. Hakeem remained engaged and interacted appropriately with other members of the group.     Status After Intervention:  Improved    Participation Level: Active Listener    Participation Quality: Appropriate      Speech:  normal      Thought Process/Content: Logical      Affective Functioning: Congruent      Mood: euthymic      Level of consciousness:  Alert      Response to Learning: Able to verbalize current knowledge/experience      Endings: None Reported    Modes of Intervention: Socialization, Exploration, and Activity      Discipline Responsible: Behavorial Health Tech      Signature:  SHY MARTINEZ

## 2024-05-25 PROCEDURE — 1240000000 HC EMOTIONAL WELLNESS R&B

## 2024-05-25 PROCEDURE — 6370000000 HC RX 637 (ALT 250 FOR IP): Performed by: PSYCHIATRY & NEUROLOGY

## 2024-05-25 RX ADMIN — BENZTROPINE MESYLATE 1 MG: 1 TABLET ORAL at 09:17

## 2024-05-25 RX ADMIN — IBUPROFEN 400 MG: 400 TABLET, FILM COATED ORAL at 13:35

## 2024-05-25 RX ADMIN — ARIPIPRAZOLE 20 MG: 10 TABLET ORAL at 09:17

## 2024-05-25 RX ADMIN — BENZTROPINE MESYLATE 1 MG: 1 TABLET ORAL at 21:16

## 2024-05-25 RX ADMIN — IBUPROFEN 400 MG: 400 TABLET, FILM COATED ORAL at 19:35

## 2024-05-25 RX ADMIN — IBUPROFEN 400 MG: 400 TABLET, FILM COATED ORAL at 07:35

## 2024-05-25 ASSESSMENT — PAIN DESCRIPTION - ORIENTATION
ORIENTATION: LEFT;RIGHT
ORIENTATION: LEFT

## 2024-05-25 ASSESSMENT — PAIN - FUNCTIONAL ASSESSMENT: PAIN_FUNCTIONAL_ASSESSMENT: PREVENTS OR INTERFERES SOME ACTIVE ACTIVITIES AND ADLS

## 2024-05-25 ASSESSMENT — PAIN DESCRIPTION - DESCRIPTORS
DESCRIPTORS: ACHING
DESCRIPTORS: ACHING

## 2024-05-25 ASSESSMENT — PAIN DESCRIPTION - LOCATION
LOCATION: KNEE
LOCATION: KNEE

## 2024-05-25 ASSESSMENT — PAIN SCALES - GENERAL
PAINLEVEL_OUTOF10: 10
PAINLEVEL_OUTOF10: 8

## 2024-05-25 NOTE — PLAN OF CARE
Problem: Safety - Adult  Goal: Free from fall injury  5/24/2024 1212 by Marcus Elizabeth (Peters) RN  Outcome: Progressing     Problem: Pain  Goal: Verbalizes/displays adequate comfort level or baseline comfort level  Outcome: Progressing     Problem: Chronic Conditions and Co-morbidities  Goal: Patient's chronic conditions and co-morbidity symptoms are monitored and maintained or improved  Outcome: Progressing     Problem: Risk for Elopement  Goal: Patient will not exit the unit/facility without proper excort  5/24/2024 2016 by Natacha Vicente, RN  Outcome: Progressing  5/24/2024 1212 by Marcus Elizabeth (Peters) RN  Outcome: Progressing

## 2024-05-25 NOTE — BH NOTE
Patient alert and oriented x 3. Patient seclusive to his bed and room this evening. Patient denies SI/HI/A/V/H. Patient took HS medication. Patient medicated with Ibuprofen 400 mg po for right knee pain. No angry outbursts noted.

## 2024-05-25 NOTE — PLAN OF CARE
Problem: Safety - Adult  Goal: Free from fall injury  Outcome: Progressing     Problem: Pain  Goal: Verbalizes/displays adequate comfort level or baseline comfort level  Outcome: Progressing     Problem: Risk for Elopement  Goal: Patient will not exit the unit/facility without proper excort  Outcome: Progressing

## 2024-05-26 PROCEDURE — 99233 SBSQ HOSP IP/OBS HIGH 50: CPT | Performed by: PSYCHIATRY & NEUROLOGY

## 2024-05-26 PROCEDURE — 1240000000 HC EMOTIONAL WELLNESS R&B

## 2024-05-26 PROCEDURE — 6370000000 HC RX 637 (ALT 250 FOR IP): Performed by: PSYCHIATRY & NEUROLOGY

## 2024-05-26 RX ORDER — ATORVASTATIN CALCIUM 10 MG/1
10 TABLET, FILM COATED ORAL DAILY
Status: DISCONTINUED | OUTPATIENT
Start: 2024-05-26 | End: 2024-05-27 | Stop reason: HOSPADM

## 2024-05-26 RX ADMIN — ARIPIPRAZOLE 20 MG: 10 TABLET ORAL at 09:11

## 2024-05-26 RX ADMIN — BENZTROPINE MESYLATE 1 MG: 1 TABLET ORAL at 21:03

## 2024-05-26 RX ADMIN — ATORVASTATIN CALCIUM 10 MG: 10 TABLET, FILM COATED ORAL at 09:39

## 2024-05-26 RX ADMIN — IBUPROFEN 400 MG: 400 TABLET, FILM COATED ORAL at 21:02

## 2024-05-26 RX ADMIN — IBUPROFEN 400 MG: 400 TABLET, FILM COATED ORAL at 13:30

## 2024-05-26 RX ADMIN — IBUPROFEN 400 MG: 400 TABLET, FILM COATED ORAL at 07:14

## 2024-05-26 RX ADMIN — BENZTROPINE MESYLATE 1 MG: 1 TABLET ORAL at 09:11

## 2024-05-26 ASSESSMENT — PAIN DESCRIPTION - ORIENTATION
ORIENTATION: LEFT
ORIENTATION: RIGHT

## 2024-05-26 ASSESSMENT — PAIN SCALES - GENERAL
PAINLEVEL_OUTOF10: 6
PAINLEVEL_OUTOF10: 5
PAINLEVEL_OUTOF10: 0
PAINLEVEL_OUTOF10: 6

## 2024-05-26 ASSESSMENT — PAIN DESCRIPTION - LOCATION
LOCATION: KNEE

## 2024-05-26 ASSESSMENT — PAIN DESCRIPTION - DESCRIPTORS: DESCRIPTORS: ACHING

## 2024-05-26 NOTE — PROGRESS NOTES
Department of Psychiatry  Progress Note    Patient's chart was reviewed. Discussed with treatment team. Met with patient.     SUBJECTIVE:    Doing much better overall.    Continues to perseverate on the benefits of cogentin but agrees to stick with BID dosing.     Tolerating 20mg of Abilify well.    Behaviorally stable.    ROS:   Patient has new complaints: no  Sleeping adequately: yes:    Appetite adequate: Yes  Engaged in programming: present in the milieu.    OBJECTIVE:  VITALS:  /87   Pulse 67   Temp 97.1 °F (36.2 °C) (Temporal)   Resp 16   Ht 1.854 m (6' 0.99\")   Wt 113.4 kg (250 lb)   SpO2 95%   BMI 32.99 kg/m²     Mental Status Examination:    Appearance: hospital gown, fair hygiene    Behavior/Attitude toward examiner:  cooperative, attentive and good eye contact  Speech:  elevated volume   Mood:  \"okay\"  Affect:  less elevated  Thought processes:  Goal directed, linear, no SYLVIE or gross disorganization  Thought Content: no SI, no HI, less delusional  Perceptions: no AVH, no RTIS  Attention: attention span and concentration were intact to interview   Abstraction: fair   Cognition: Average KALI, Alert and oriented to person, place, time, and situation, recall intact   Insight: fair   Judgment: fair    Medication:  Scheduled:   atorvastatin  10 mg Oral Daily    ARIPiprazole  20 mg Oral Daily    benztropine  1 mg Oral BID    nicotine  1 patch TransDERmal Daily        PRN:  acetaminophen, ibuprofen, magnesium hydroxide, nicotine polacrilex, aluminum & magnesium hydroxide-simethicone, OLANZapine **OR** OLANZapine (ZyPREXA) 10 mg in sterile water 2 mL injection, benztropine mesylate, traZODone, hydrOXYzine HCl, benztropine, LORazepam     Formulation: This is a domiciled, never , and psychiatrically disabled 63yo with schizophrenia who was brought to our ED on SOB by police due to increased delusional thinking and verbal aggression in the setting of limited treatment adherence     Principal
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
## 2024-05-26 NOTE — PLAN OF CARE
Problem: Safety - Adult  Goal: Free from fall injury  5/26/2024 1044 by Dorothy Mcbride RN  Outcome: Progressing     Problem: Pain  Goal: Verbalizes/displays adequate comfort level or baseline comfort level  5/26/2024 1044 by Dorothy Mcbride RN  Outcome: Progressing     Problem: Risk for Elopement  Goal: Patient will not exit the unit/facility without proper excort  Outcome: Progressing     Problem: Chronic Conditions and Co-morbidities  Goal: Patient's chronic conditions and co-morbidity symptoms are monitored and maintained or improved  5/26/2024 1044 by Dorothy Mcbride RN  Outcome: Progressing    Patient was visible on unit, social with few peers.  Medication complaint.  Attended and participated in groups.  Patient was cooperative with interview.  Denies SI/HI/AVH. Patient has been free from falls, able to verbalize his needs to staff.  Patient utilized PRN medications for right knee pain d/t recent surgery which was effective.  He has been cooperative with all care.

## 2024-05-26 NOTE — PROGRESS NOTES
Patient would like to talk to doctor about getting a 'statin' like lipitor, as he was prescribed it previously but isn't on it now. This writer encouraged patient to discuss it with doctor in the AM.

## 2024-05-26 NOTE — PROGRESS NOTES
Patient expressed wish to be D/C'd to a group home and wanted to talk to the doctor about staying on [facility] until he could get into a group home. This writer encouraged patient to talk to doctor and social work in AM.

## 2024-05-27 VITALS
DIASTOLIC BLOOD PRESSURE: 85 MMHG | TEMPERATURE: 98.2 F | RESPIRATION RATE: 16 BRPM | BODY MASS INDEX: 33.13 KG/M2 | SYSTOLIC BLOOD PRESSURE: 127 MMHG | WEIGHT: 250 LBS | HEIGHT: 73 IN | OXYGEN SATURATION: 96 % | HEART RATE: 80 BPM

## 2024-05-27 PROCEDURE — 6360000002 HC RX W HCPCS: Performed by: PSYCHIATRY & NEUROLOGY

## 2024-05-27 PROCEDURE — 6370000000 HC RX 637 (ALT 250 FOR IP): Performed by: PSYCHIATRY & NEUROLOGY

## 2024-05-27 PROCEDURE — 5130000000 HC BRIDGE APPOINTMENT

## 2024-05-27 PROCEDURE — 99239 HOSP IP/OBS DSCHRG MGMT >30: CPT

## 2024-05-27 RX ORDER — ARIPIPRAZOLE 20 MG/1
20 TABLET ORAL DAILY
Qty: 30 TABLET | Refills: 0 | Status: SHIPPED | OUTPATIENT
Start: 2024-05-28

## 2024-05-27 RX ADMIN — ARIPIPRAZOLE 20 MG: 10 TABLET ORAL at 08:42

## 2024-05-27 RX ADMIN — ATORVASTATIN CALCIUM 10 MG: 10 TABLET, FILM COATED ORAL at 08:42

## 2024-05-27 RX ADMIN — BENZTROPINE MESYLATE 2 MG: 1 INJECTION INTRAMUSCULAR; INTRAVENOUS at 07:35

## 2024-05-27 RX ADMIN — IBUPROFEN 400 MG: 400 TABLET, FILM COATED ORAL at 07:57

## 2024-05-27 ASSESSMENT — PAIN DESCRIPTION - ORIENTATION: ORIENTATION: RIGHT

## 2024-05-27 ASSESSMENT — PAIN DESCRIPTION - LOCATION: LOCATION: KNEE

## 2024-05-27 ASSESSMENT — PAIN - FUNCTIONAL ASSESSMENT: PAIN_FUNCTIONAL_ASSESSMENT: ACTIVITIES ARE NOT PREVENTED

## 2024-05-27 ASSESSMENT — PAIN DESCRIPTION - DESCRIPTORS: DESCRIPTORS: ACHING

## 2024-05-27 ASSESSMENT — PAIN SCALES - GENERAL: PAINLEVEL_OUTOF10: 5

## 2024-05-27 NOTE — PROGRESS NOTES
Patient complaining of having difficulty breathing.  Patient noted to be snorting, tongue rolling.  Vitals obtained.  Cogentin IM given per order.  See MAR.

## 2024-05-27 NOTE — TRANSITION OF CARE
Behavioral Health Transition Record    Patient Name: Hakeem Newton  YOB: 1960   Medical Record Number: [id number]  Date of Admission: 5/21/2024  3:48 PM   Date of Discharge: 5/24/24    Attending Provider: Brock Tam MD   Discharging Provider: Brock Tam MD   To contact this individual call [phone] and ask the  to page.  If unavailable, ask to be transferred to Behavioral Health Provider on call.  A Behavioral Health Provider will be available on call 24/7 and during holidays.    Primary Care Provider: Lynn Berger APRN - CNP    Allergies   Allergen Reactions    Gabapentin Shortness Of Breath    Propranolol Shortness Of Breath    Clozaril [Clozapine]     Crestor [Rosuvastatin] Other (See Comments)     myalgia    Haldol [Haloperidol]      Gives him tardive dysknesia    Invega [Paliperidone Er] Other (See Comments)     Dr. Tam does not think patient is allergic to Paliperidone.  Okay to administer to patient.    Klonopin [Clonazepam]     Mevacor [Lovastatin] Other (See Comments)     myalgia    Rexulti [Brexpiprazole]     Thiothixene (Tiotixene)     Vraylar [Cariprazine Hcl]        Reason for Admission: Hakeem Newton is a 64 y.o. male who presents to the emergency department today with symptoms of delusional behaviors. Has a history of schizophrenia. Lives with his mother at home and has help managed with some family who checks on him on a daily basis. Apparently over the last few days he has had some behavioral type outburst has had some delusional thinking. Apparently mobile crisis was notified and they evaluated him during that evaluation they felt that he would be better served in the hospital setting. He was transported here in the emergency department for psychiatric evaluation. At no time is he been suicidal. He does not particularly seem to be hallucinating. He is awake alert and oriented. Has no medical complaints.     Admission Diagnosis: Schizophrenia 
referral for treatment? No  Patient referred to the following for substance/alcohol abuse treatment with an appointment? No  Patient was offered medication to assist with substance/alcohol abuse cessation at discharge? No      Patient discharged to: Home; Transition record discussed with patient/caregiver and provided this record in hard copy or electronically

## 2024-05-27 NOTE — PROGRESS NOTES
Patient came up to nurses station reporting he felt like he could not breathe. He was making snorting noises and sticking his tongue out frequently. He said he felt it hard to get air in his lungs. O2 ranging 92-95%. This nurse notified day charge nurse and she came to assess patient.

## 2024-05-27 NOTE — PLAN OF CARE
Patient alert and oriented x 3. Patient seclusive to his bed and room this evening. Patient denies SI/HI/A/V/H. Patient took HS medications. Patient medicated with Ibuprofen 400 mg po for right knee pain. No outbursts noted. No distress noted.
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
## 2024-05-27 NOTE — PLAN OF CARE
Problem: Safety - Adult  Goal: Free from fall injury  5/27/2024 1044 by Dorothy Mcbride RN  Outcome: Progressing     Problem: Pain  Goal: Verbalizes/displays adequate comfort level or baseline comfort level  5/27/2024 1044 by Dorothy Mcbride RN  Outcome: Progressing     Problem: Risk for Elopement  Goal: Patient will not exit the unit/facility without proper excort  5/27/2024 1044 by Dorothy Mcbride RN  Outcome: Progressing     Problem: Chronic Conditions and Co-morbidities  Goal: Patient's chronic conditions and co-morbidity symptoms are monitored and maintained or improved  5/27/2024 1044 by Dorothy Mcbride RN  Outcome: Progressing   Patient was visible on unit, social with peers.  Medication complaint.  Attended and participated in groups.  Patient was cooperative with interview.  Denies SI/HI/AVH.   Patient improved since admission and states he is ready for discharge.  He utilized PRN medication for pain d/t recent knee surgery.  Patient has been able to verbalize his needs to staff and has been cooperative with care.

## 2024-05-27 NOTE — PLAN OF CARE
Patient complaining of right knee pain.  He rates his pain a 6 on a 1-10 scale.  Ibuprofen given per order for pain.  See MAR

## 2024-05-27 NOTE — PLAN OF CARE
[facility]  Discharge Note    Pt discharged with followings belongings:   Dental Appliances: None  Vision - Corrective Lenses: Eyeglasses, At bedside  Hearing Aid: None  Jewelry: None  Body Piercings Removed: N/A  Clothing: Footwear, Pants, Shirt (gray sport shorts, gray t-shirt, gray shoes)  Other Valuables: Other (Comment) (none)   Valuables sent home with patient. Patient educated on aftercare instructions: yes  Information faxed  by Primary RN  at 11:57 AM .Patient verbalize understanding of AVS:  yes.    Status EXAM upon discharge:  Mental Status and Behavioral Exam  Normal: No  Level of Assistance: Independent/Self  Facial Expression: Brightened  Affect: Appropriate  Level of Consciousness: Alert  Frequency of Checks: 4 times per hour, close  Mood:Normal: No  Mood: Worthless, low self-esteem  Motor Activity:Normal: No  Motor Activity: Decreased  Eye Contact: Good  Observed Behavior: Cooperative, Impulsive, Friendly  Sexual Misconduct History: Current - no  Preception: [place] to person, [place] to time, [place] to place  Attention:Normal: No  Attention: Distractible  Thought Processes: Flight of ideas  Thought Content:Normal: No  Thought Content: Preoccupations  Depression Symptoms: No problems reported or observed.  Anxiety Symptoms: No problems reported or observed.  Rebeka Symptoms: No problems reported or observed.  Hallucinations: None  Delusions: Yes  Delusions: Persecutory  Memory:Normal: Yes  Memory: Poor remote  Insight and Judgment: No  Insight and Judgment: Poor judgment, Poor insight    Tobacco Screening:  Practical Counseling, on admission, soledad X, if applicable and completed (first 3 are required if patient doesn't refuse):            ( ) Recognizing danger situations (included triggers and roadblocks)                    ( ) Coping skills (new ways to manage stress,relaxation techniques, changing routine, distraction)                                                           ( )

## 2024-05-28 ENCOUNTER — HOSPITAL ENCOUNTER (OUTPATIENT)
Dept: PHYSICAL THERAPY | Age: 64
Setting detail: THERAPIES SERIES
Discharge: HOME | End: 2024-05-28
Payer: COMMERCIAL

## 2024-05-28 ENCOUNTER — FOLLOWUP TELEPHONE ENCOUNTER (OUTPATIENT)
Dept: PSYCHIATRY | Age: 64
End: 2024-05-28

## 2024-05-28 PROCEDURE — 97110 THERAPEUTIC EXERCISES: CPT | Performed by: PHYSICAL THERAPIST

## 2024-05-28 PROCEDURE — G0283 ELEC STIM OTHER THAN WOUND: HCPCS | Performed by: PHYSICAL THERAPIST

## 2024-05-28 PROCEDURE — 97116 GAIT TRAINING THERAPY: CPT | Performed by: PHYSICAL THERAPIST

## 2024-05-28 NOTE — FLOWSHEET NOTE
[facility]- Outpatient Rehabilitation and Therapy   [address], [place] [address]   office: [phone] fax: [phone]    Physical Therapy: TREATMENT/PROGRESS NOTE   Patient: Hakeem Newton (64 y.o. male)   Examination Date: 2024   :  1960 MRN: [id number]   Visit #:  visits through 2024  Insurance Allowable Auth Needed   30 (5 previous this calendar year) [x]Yes    []No    Insurance: Payor: [employer] / Plan: [employer] [place] MEDICAID / Product Type: *No Product type* /   Insurance ID: [id number] - (Medicaid Managed)  Secondary Insurance (if applicable):    Treatment Diagnosis:     ICD-10-CM    1. Acute pain of right knee  M25.561          Medical Diagnosis:  Unilateral primary osteoarthritis, right knee [M17.11]   Referring Physician: Braden Villa MD  PCP: Lynn Berger APRN - CNP     Plan of care signed (Y/N): no    Date of Patient follow up with Physician: 24     Progress Report/POC: NO  POC update due: (10 visits /OR AUTH LIMITS, whichever is less)  6/15/2024                                             Precautions/ Contra-indications:           Latex allergy:  NO  Pacemaker:    NO  Contraindications for Manipulation: recent surgical history (relative)  Date of Surgery: 4/15/24  Other:    SUBJECTIVE EXAMINATION     Patient stated complaint/comments: Pt reports that he was not able to perform any exercises other than heel slides over the weekend.        Test used Initial score  2024   Pain Summary VAS 5/10 R knee \"Left knee hurts more\"   Functional questionnaire LEFS 16/80 = 80% impairment    Other: R Knee AAROM Flexion 92 deg 120 deg PROM    R Knee AAROM Extenstion 4 deg 0 deg PROM       OBJECTIVE EXAMINATION     Gait:   Ambulates with RW throughout clinic this visit due to increase pain and soreness.     Balance:     NT    Functional Tests:  NT    Exercises/Interventions     Observation: Pt has increased soreness with

## 2024-05-30 ENCOUNTER — HOSPITAL ENCOUNTER (OUTPATIENT)
Dept: PHYSICAL THERAPY | Age: 64
Setting detail: THERAPIES SERIES
Discharge: HOME | End: 2024-05-30
Payer: COMMERCIAL

## 2024-05-30 DIAGNOSIS — M25.562 CHRONIC PAIN OF LEFT KNEE: Primary | ICD-10-CM

## 2024-05-30 DIAGNOSIS — G89.29 CHRONIC PAIN OF LEFT KNEE: Primary | ICD-10-CM

## 2024-05-30 PROCEDURE — 97161 PT EVAL LOW COMPLEX 20 MIN: CPT | Performed by: PHYSICAL THERAPIST

## 2024-05-30 PROCEDURE — 97110 THERAPEUTIC EXERCISES: CPT | Performed by: PHYSICAL THERAPIST

## 2024-05-30 PROCEDURE — 97530 THERAPEUTIC ACTIVITIES: CPT | Performed by: PHYSICAL THERAPIST

## 2024-05-30 NOTE — PLAN OF CARE
[facility]- Outpatient Rehabilitation and Therapy   [address], [address], [place] [address]   office: [phone] fax: [phone]     Physical Therapy Initial Evaluation Certification      Dear Braden Villa MD,    We had the pleasure of evaluating the following patient for physical therapy services at [facility] Outpatient Physical [facility].  A summary of our findings can be found in the initial assessment below.  This includes our plan of care.  If you have any questions or concerns regarding these findings, please do not hesitate to contact me at the office phone number listed above.  Thank you for the referral.     Physician Signature:_______________________________Date:__________________  By signing above (or electronic signature), therapist’s plan is approved by physician       Physical Therapy: TREATMENT/PROGRESS NOTE   Patient: Hakeem Newton (64 y.o. male)   Examination Date: 2024   :  1960 MRN: [id number]   Visit #: 1   Insurance Allowable Auth Needed   30 per year  [x]Yes    []No    Insurance: Payor: [employer] / Plan: [employer] [place] MEDICAID / Product Type: *No Product type* /   Insurance ID: [id number] - (Medicaid Managed)  Secondary Insurance (if applicable):    Treatment Diagnosis:   1. Chronic pain of left knee  M25.562     G89.29          Medical Diagnosis:  Unilateral primary osteoarthritis, left knee [M17.12]   Referring Physician: Braden Villa MD  PCP: Lynn Berger APRN - CNP     Plan of care signed (Y/N): N    Date of Patient follow up with Physician:      Progress Report/POC: EVAL today  POC update due: (10 visits /OR AUTH LIMITS, whichever is less)  2024                                             Precautions/ Contra-indications:           Latex allergy:  NO  Pacemaker:    NO  Contraindications for Manipulation: NA  Date of Surgery: NA  Other:    Red Flags:  None    C-SSRS Triggered by Intake questionnaire:   Patient answered

## 2024-06-03 ENCOUNTER — HOSPITAL ENCOUNTER (OUTPATIENT)
Dept: PHYSICAL THERAPY | Age: 64
Setting detail: THERAPIES SERIES
Discharge: HOME | End: 2024-06-03
Payer: COMMERCIAL

## 2024-06-03 PROCEDURE — 97530 THERAPEUTIC ACTIVITIES: CPT | Performed by: PHYSICAL THERAPIST

## 2024-06-03 PROCEDURE — 97110 THERAPEUTIC EXERCISES: CPT | Performed by: PHYSICAL THERAPIST

## 2024-06-04 NOTE — FLOWSHEET NOTE
swelling/inflammation/restriction, and static and dynamic balance. Next visit plan to continue current phase     Electronically Signed by Alexys Freeman, PT [id number]     Date: 06/03/2024     Note: Portions of this note have been templated and/or copied from initial evaluation, reassessments and prior notes for documentation efficiency.    Note: If patient does not return for scheduled/recommended follow up visits, this note will serve as a discharge from care along with the most recent update on progress.

## 2024-06-11 ENCOUNTER — HOSPITAL ENCOUNTER (OUTPATIENT)
Dept: PHYSICAL THERAPY | Age: 64
Setting detail: THERAPIES SERIES
Discharge: HOME | End: 2024-06-11
Payer: COMMERCIAL

## 2024-06-11 PROCEDURE — 97112 NEUROMUSCULAR REEDUCATION: CPT | Performed by: PHYSICAL THERAPIST

## 2024-06-11 PROCEDURE — 97110 THERAPEUTIC EXERCISES: CPT | Performed by: PHYSICAL THERAPIST

## 2024-06-11 NOTE — FLOWSHEET NOTE
[facility]- Outpatient Rehabilitation and Therapy   [address], [place] [address]   office: [phone] fax: [phone]    Physical Therapy: TREATMENT/PROGRESS NOTE   Patient: Hakeem Newton (64 y.o. male)   Examination Date: 2024   :  1960 MRN: [id number]   Visit #:  visits through 2024  Insurance Allowable Auth Needed   30 (5 previous this calendar year, + 1 for L knee) [x]Yes    []No    Insurance: Payor: [employer] / Plan: [employer] [place] MEDICAID / Product Type: *No Product type* /   Insurance ID: [id number] - (Medicaid Managed)  Secondary Insurance (if applicable):    Treatment Diagnosis:     ICD-10-CM    1. Acute pain of right knee  M25.561          Medical Diagnosis:  Unilateral primary osteoarthritis, right knee [M17.11]   Referring Physician: Braden Villa MD  PCP: Lynn Berger APRN - CNP     Plan of care signed (Y/N): no    Date of Patient follow up with Physician: 24     Progress Report/POC: NO  POC update due: (10 visits /OR AUTH LIMITS, whichever is less)  6/15/2024                                             Precautions/ Contra-indications:           Latex allergy:  NO  Pacemaker:    NO  Contraindications for Manipulation: recent surgical history (relative)  Date of Surgery: 4/15/24  Other:    SUBJECTIVE EXAMINATION     Patient stated complaint/comments: Pt reports RLE feels much better and is less swollen.   C/o LLE pain       Test used Initial score  2024   Pain Summary VAS 5/10 R knee \"Left knee hurts more\"   Functional questionnaire LEFS 16/80 = 80% impairment    Other: R Knee AAROM Flexion 92 deg 121 deg PROM    R Knee AAROM Extenstion 4 deg 0 deg PROM       OBJECTIVE EXAMINATION     Gait:   Ambulates with RW throughout clinic this visit due to increase pain and soreness.     Balance:     NT    Functional Tests:  NT    Exercises/Interventions     Observation: Pt limited with exercises due to L knee

## 2024-06-13 ENCOUNTER — TELEPHONE (OUTPATIENT)
Dept: ORTHOPEDIC SURGERY | Age: 64
End: 2024-06-13

## 2024-06-13 NOTE — TELEPHONE ENCOUNTER
Orthopedic Nurse Navigator Summary    Patient Name:  Hakeem Newton  Anticipated Date of Surgery:  07/15/24  Attended Pre-op Education Class:  Video sent to patient email 06/13/24  PCP: Lynn Berger CNP  Date of PCP visit for H&P: Left message that he needs preop  Is patient in a Pain Management program:  Review of Medical history reveals history of: HTN, Hyperlipidemia, Schizophrenia, Tardive dyskinesia, Risk for violence    Critical Lab Values  - Hemoglobin (g/dL):  Date: 06/20/24 Value 17.3  - Hematocrit(%): Date: 06/20/24  Value 52.2  - HgbA1C:  Date: 06/20/24 Value 5.1  - Albumin:  Date:  06/20/24 Value 4.1  - BUN:  Date: 06/20/24  Value 21  - Creatinine:  Date: 06/20/24  Value 1.19    06/20/24 MRSA swab- negative    Coronary Artery Disease/HTN/CHF history: HTN  Does the patient see a Cardiologist: Beau Suazo MD  Date of most recent cardiac appt: 06/20/24  On any anticoagulation:  No    Diabetes History:  No  Most recent HgbA1C:  Pulmonary:  COPD/Emphysema/Use of home oxygen: No  Alcohol use: No    BMI greater than 40 at time of scheduling:    Additional medical concerns:  Additional recommendations for above concerns:  Attended Pre-Hab program:    Anticipated Discharge Disposition:  Home with [facility]  Who will be with patient at home following discharge:  Lives with mother and his brother and sister in law will be helping.  Equipment patient already has:  rolling walker, cane, raised toilet seat, shower chair  Bedroom on first or second floor:  first  Bathroom on first or second floor:  first  Weight bearing status:  wbat  Pre-op ambulatory status: painful ambulation  Number of entry steps:  3 from garage with handrail  Caregiver assistance:  full time    Breana Dennis RN  Date:   06/13/24

## 2024-06-17 ENCOUNTER — HOSPITAL ENCOUNTER (OUTPATIENT)
Dept: PHYSICAL THERAPY | Age: 64
Setting detail: THERAPIES SERIES
Discharge: HOME | End: 2024-06-17
Payer: COMMERCIAL

## 2024-06-17 PROCEDURE — 97530 THERAPEUTIC ACTIVITIES: CPT | Performed by: PHYSICAL THERAPIST

## 2024-06-17 PROCEDURE — 97110 THERAPEUTIC EXERCISES: CPT | Performed by: PHYSICAL THERAPIST

## 2024-06-17 NOTE — FLOWSHEET NOTE
goal: \"walk normal\"  Status:  [x] Progressing: [] Met: [] Not Met: [] Adjusted    Therapist goals for Patient:   Short Term Goals: To be achieved in: 2 weeks  Independent in HEP and progression per patient tolerance, in order to progress toward full function and prevent re-injury.    Status: [x] Progressing: [] Met: [] Not Met: [] Adjusted  Patient will have a decrease in pain to 2/10 to help facilitate improvement in movement, function, and ADLs as indicated by functional deficits.   Status: [] Progressing: [x] Met: [] Not Met: [] Adjusted    Long Term Goals: To be achieved in:  12  weeks  Disability index score of 20% or less for the LEFS to assist with return to prior level of function.    Status: [] Progressing: [] Met: [x] Not Met: [] Adjusted  Improve R knee AROM to 0-120 degrees or  better to allow for proper joint functioning for stairs to return to lower level bedroom  Status: [] Progressing: [x] Met: [] Not Met: [] Adjusted  Pt to improve strength to 4+/5 or better of quadriceps to allow for proper muscle and joint use in functional mobility, ADLs and prior level of function   Status: [x] Progressing: [] Met: [] Not Met: [] Adjusted  Patient will return to  walk 1 mile  without AD, or increased symptoms or restriction to work towards return to prior level of function.        Status: [x] Progressing: [] Met: [] Not Met: [] Adjusted    Overall Progression Towards Functional goals/ Treatment Progress Update:  [x] Patient is progressing as expected towards functional goals listed.    [] Progression is slowed due to complexities/Impairments listed.  [] Progression has been slowed due to co-morbidities.  [] Plan just implemented, too soon (<30days) to assess goals progression   [] Goals require adjustment due to lack of progress  [] Patient is not progressing as expected and requires additional follow up with physician  [] Other:     TREATMENT PLAN     Frequency/Duration: 2x/week for  12  weeks     Plan: Cont

## 2024-06-27 ENCOUNTER — HOSPITAL ENCOUNTER (OUTPATIENT)
Dept: PHYSICAL THERAPY | Age: 64
Setting detail: THERAPIES SERIES
Discharge: HOME | End: 2024-06-27
Payer: COMMERCIAL

## 2024-06-27 PROCEDURE — 97530 THERAPEUTIC ACTIVITIES: CPT | Performed by: PHYSICAL THERAPIST

## 2024-06-27 PROCEDURE — 97110 THERAPEUTIC EXERCISES: CPT | Performed by: PHYSICAL THERAPIST

## 2024-06-27 NOTE — PLAN OF CARE
patient tolerance, in order to progress toward full function and prevent re-injury.    Status: [x] Progressing: [] Met: [] Not Met: [] Adjusted  Patient will have a decrease in pain to 2/10 to help facilitate improvement in movement, function, and ADLs as indicated by functional deficits.   Status: [] Progressing: [x] Met: [] Not Met: [] Adjusted    Long Term Goals: To be achieved in:  12  weeks  Disability index score of 20% or less for the LEFS to assist with return to prior level of function.    Status: [x] Progressing: [] Met: [] Not Met: [] Adjusted  Improve R knee AROM to 0-120 degrees or  better to allow for proper joint functioning for stairs to return to lower level bedroom  Status: [] Progressing: [x] Met: [] Not Met: [] Adjusted  Pt to improve strength to 4+/5 or better of quadriceps to allow for proper muscle and joint use in functional mobility, ADLs and prior level of function   Status: [] Progressing: [x] Met: [] Not Met: [] Adjusted  Patient will return to  walk 1 mile  without AD, or increased symptoms or restriction to work towards return to prior level of function.        Status: [x] Progressing: [] Met: [] Not Met: [] Adjusted    Overall Progression Towards Functional goals/ Treatment Progress Update:  [x] Patient is progressing as expected towards functional goals listed.    [] Progression is slowed due to complexities/Impairments listed.  [] Progression has been slowed due to co-morbidities.  [] Plan just implemented, too soon (<30days) to assess goals progression   [] Goals require adjustment due to lack of progress  [] Patient is not progressing as expected and requires additional follow up with physician  [] Other:     TREATMENT PLAN     Frequency/Duration: 2x/week for  12  weeks     Plan: Discharge - Follow-up with LLE after TKR    Electronically Signed by Alexys Freeman, PT [id number]     Date: 06/27/2024     Note: Portions of this note have been templated and/or copied from initial evaluation,

## 2024-07-05 RX ORDER — LORAZEPAM 0.5 MG/1
0.5 TABLET ORAL EVERY 6 HOURS PRN
COMMUNITY

## 2024-07-05 RX ORDER — AMOXICILLIN 500 MG/1
2000 CAPSULE ORAL ONCE
Status: ON HOLD | COMMUNITY
Start: 2024-06-20 | End: 2024-07-16 | Stop reason: HOSPADM

## 2024-07-05 RX ORDER — ACETAMINOPHEN 160 MG
1 TABLET,DISINTEGRATING ORAL DAILY
COMMUNITY

## 2024-07-05 RX ORDER — ATORVASTATIN CALCIUM 10 MG/1
10 TABLET, FILM COATED ORAL DAILY
COMMUNITY
Start: 2023-10-20

## 2024-07-05 NOTE — PROGRESS NOTES
[facility] Same Day Readiness Screen  PAT Questionnaire    Does patient have at least one day of 24 hr assist of capable caregiver at d/c?  [x] Yes = 0  [] No = 2      Was patient using an assistive device to walk prior to surgery?  [] No = 0  [x] Yes = 1    How many steps do you have to get to the floor where you plan to initially sleep and use the restroom? (At least 1/2 bath)  [] 0-2 steps= 0 [x] 3+ steps = 1    Has patient fallen in the last 3 months? If yes, how many times?  [x] 0 falls = 0 [] 1 fall = 1     [] 2+ falls = 2    Does patient have a hx of post-op nausea/vomiting?  [x] No = 0 [] Yes = 2    Other Factors    Age  [x] <70 = 0 [] 71-79 = 1  [] 80+ = 2    BMI  [] <30 = 0       [x]31-39 = 1    [] >40 = 2    Co-morbidities  [] 0 = 0           [] 1-2 = 1       [x] 3+ = 2    Sleep apnea  [x] No = 0         [] Yes = 1    Hx of prolonged emergence from general anesthesia  [x] No = 0         [] Yes = 1      Score: 5      Interpretation:  Red (10-16): Low probability of safe same day discharge  Yellow (6-9): Moderate probability of safe same day discharge  Green (0-5): High probability of safe same day discharge    Score completed by:  Belinda Messer  PT [id number]

## 2024-07-05 NOTE — PROGRESS NOTES
[facility] PRE-SURGICAL TESTING INSTRUCTIONS                      PRIOR TO PROCEDURE DATE:    1. PLEASE FOLLOW ANY INSTRUCTIONS GIVEN TO YOU PER YOUR SURGEON.      2. Arrange for someone to drive you home and be with you for the first 24 hours after discharge for your safety after your procedure for which you received sedation. Ensure it is someone we can share information with regarding your discharge.     NOTE: At this time ONLY 2 ADULTS may accompany you   One person ENCOURAGED to stay at hospital entire time if outpatient surgery      3. You must contact your surgeon for instructions IF:  You are taking any blood thinners, aspirin, anti-inflammatory or vitamins.  There is a change in your physical condition such as a cold, fever, rash, cuts, sores, or any other infection, especially near your surgical site.    4. Do not drink alcohol the day before or day of your procedure.  Do not use any recreational marijuana at least 24 hours or street drugs (heroin, cocaine) at minimum 5 days prior to your procedure.     5. A Pre-Surgical History and Physical MUST be completed WITHIN 30 DAYS OR LESS prior to your procedure.by your Physician or an Urgent Care        THE DAY OF YOUR PROCEDURE:  1.  Follow instructions for ARRIVAL TIME as DIRECTED BY YOUR SURGEON.     2. Enter the MAIN entrance from [facility] and follow the signs to the free Parking Garage or  Parking (offered free of charge 7 am-5pm).      3. Enter the Main Entrance of the hospital (do not enter from the lower level of the parking garage). Upon entrance, check in with the  at the surgical information desk on your LEFT.   Bring your insurance card and photo ID to register      4. DO NOT EAT ANYTHING 8 hours prior to arrival for surgery.  You may have up to 8 ounces of water 4 hours prior to your arrival for surgery.   NOTE: ALL Gastric, Bariatric & Bowel surgery patients - you MUST follow your surgeon's instructions regarding

## 2024-07-05 NOTE — PROGRESS NOTES
7/5/24 @ 0582 Spoke with Sister in law Kristyn Newton [phone] confirming she is \"pt Caregiver\" and will be present on DOS. Kristyn informs this nurse that pt's Mom, Debora Newton, is 96 years old and is legal guardian and she will not be present on DOS. Kristyn confirms [facility] video emailed back in February on 2/14 & reviewed to patient by Breana on 4/11. Hibiclens instructions reviewed to use x 5 days preop. CHRISTY screening done. TJ book, IS instructions, TJ video link, and fall contract placed on chart for DOS. MD    7/8/24 @ 3961 Telephone Consent obtained via this nurse and PAT Clinical Coordinator Amelia MORALES from Legal Guardian Debora Newton for Surgery and Transferred to [facility] to obtain registration consent from Legal Guardian who confirms will not be here on DOS.  Anesthesia consent will be obtained by this nurse and Dr. Dennis once he is available. MD    7/8/24 @ 4842 Dr. Dennis reviewed anesthesia with Legal Guardian Debora Newton and telephone consent obtained with this nurse and PAT Clinical Coordinator Amelia MORALES as witnesses since legal guardian will not be present on DOS. MD

## 2024-07-08 ENCOUNTER — ANESTHESIA EVENT (OUTPATIENT)
Dept: OPERATING ROOM | Age: 64
End: 2024-07-08
Payer: COMMERCIAL

## 2024-07-08 ENCOUNTER — PRE-PROCEDURE TELEPHONE (OUTPATIENT)
Dept: ANESTHESIOLOGY | Age: 64
End: 2024-07-08

## 2024-07-08 NOTE — PLAN OF CARE
I discussed Hakeem Newton's anesthetic scheduled for 7/15/24 with his Legal Guardian, Debora Newton, who consented to general anesthesia as well as an adductor canal nerve block.    Consent was obtained via telephone in the PAT office today as Debora is not going to be able to make it to the hospital on the day of surgery.    Brock Dennis, DO

## 2024-07-15 ENCOUNTER — ANESTHESIA (OUTPATIENT)
Dept: OPERATING ROOM | Age: 64
End: 2024-07-15
Payer: COMMERCIAL

## 2024-07-15 ENCOUNTER — HOSPITAL ENCOUNTER (OUTPATIENT)
Age: 64
Setting detail: OBSERVATION
Discharge: HOME | End: 2024-07-16
Attending: ORTHOPAEDIC SURGERY | Admitting: ORTHOPAEDIC SURGERY
Payer: COMMERCIAL

## 2024-07-15 ENCOUNTER — APPOINTMENT (OUTPATIENT)
Dept: GENERAL RADIOLOGY | Age: 64
End: 2024-07-15
Attending: ORTHOPAEDIC SURGERY
Payer: COMMERCIAL

## 2024-07-15 PROBLEM — M17.12 OSTEOARTHROSIS, LOCALIZED, PRIMARY, KNEE, LEFT: Status: ACTIVE | Noted: 2024-07-15

## 2024-07-15 LAB
ABO + RH BLD: NORMAL
BLD GP AB SCN SERPL QL: NORMAL

## 2024-07-15 PROCEDURE — 6370000000 HC RX 637 (ALT 250 FOR IP): Performed by: INTERNAL MEDICINE

## 2024-07-15 PROCEDURE — 3600000004 HC SURGERY LEVEL 4 BASE: Performed by: ORTHOPAEDIC SURGERY

## 2024-07-15 PROCEDURE — 3700000000 HC ANESTHESIA ATTENDED CARE: Performed by: ORTHOPAEDIC SURGERY

## 2024-07-15 PROCEDURE — 86850 RBC ANTIBODY SCREEN: CPT

## 2024-07-15 PROCEDURE — 6360000002 HC RX W HCPCS: Performed by: ORTHOPAEDIC SURGERY

## 2024-07-15 PROCEDURE — A4217 STERILE WATER/SALINE, 500 ML: HCPCS | Performed by: ORTHOPAEDIC SURGERY

## 2024-07-15 PROCEDURE — 86901 BLOOD TYPING SEROLOGIC RH(D): CPT

## 2024-07-15 PROCEDURE — 73560 X-RAY EXAM OF KNEE 1 OR 2: CPT

## 2024-07-15 PROCEDURE — 7100000000 HC PACU RECOVERY - FIRST 15 MIN: Performed by: ORTHOPAEDIC SURGERY

## 2024-07-15 PROCEDURE — 94150 VITAL CAPACITY TEST: CPT

## 2024-07-15 PROCEDURE — 2709999900 HC NON-CHARGEABLE SUPPLY: Performed by: ORTHOPAEDIC SURGERY

## 2024-07-15 PROCEDURE — 2580000003 HC RX 258: Performed by: ANESTHESIOLOGY

## 2024-07-15 PROCEDURE — 2580000003 HC RX 258: Performed by: ORTHOPAEDIC SURGERY

## 2024-07-15 PROCEDURE — G0378 HOSPITAL OBSERVATION PER HR: HCPCS

## 2024-07-15 PROCEDURE — 86900 BLOOD TYPING SEROLOGIC ABO: CPT

## 2024-07-15 PROCEDURE — 3600000014 HC SURGERY LEVEL 4 ADDTL 15MIN: Performed by: ORTHOPAEDIC SURGERY

## 2024-07-15 PROCEDURE — 3700000001 HC ADD 15 MINUTES (ANESTHESIA): Performed by: ORTHOPAEDIC SURGERY

## 2024-07-15 PROCEDURE — 6360000002 HC RX W HCPCS

## 2024-07-15 PROCEDURE — 2500000003 HC RX 250 WO HCPCS: Performed by: ORTHOPAEDIC SURGERY

## 2024-07-15 PROCEDURE — 2500000003 HC RX 250 WO HCPCS

## 2024-07-15 PROCEDURE — C1713 ANCHOR/SCREW BN/BN,TIS/BN: HCPCS | Performed by: ORTHOPAEDIC SURGERY

## 2024-07-15 PROCEDURE — 7100000001 HC PACU RECOVERY - ADDTL 15 MIN: Performed by: ORTHOPAEDIC SURGERY

## 2024-07-15 PROCEDURE — 6370000000 HC RX 637 (ALT 250 FOR IP): Performed by: ORTHOPAEDIC SURGERY

## 2024-07-15 PROCEDURE — C1776 JOINT DEVICE (IMPLANTABLE): HCPCS | Performed by: ORTHOPAEDIC SURGERY

## 2024-07-15 PROCEDURE — 64447 NJX AA&/STRD FEMORAL NRV IMG: CPT | Performed by: ANESTHESIOLOGY

## 2024-07-15 PROCEDURE — 2580000003 HC RX 258

## 2024-07-15 PROCEDURE — 2720000010 HC SURG SUPPLY STERILE: Performed by: ORTHOPAEDIC SURGERY

## 2024-07-15 PROCEDURE — 6360000002 HC RX W HCPCS: Performed by: ANESTHESIOLOGY

## 2024-07-15 DEVICE — COMPONENT PAT DIA35MM THK9MM STD KNEE VIVACIT-E CEM PERSONA: Type: IMPLANTABLE DEVICE | Site: KNEE | Status: FUNCTIONAL

## 2024-07-15 DEVICE — CEMENT BNE 40 GM RADIOPAQUE BA SIMPLEX P: Type: IMPLANTABLE DEVICE | Site: KNEE | Status: FUNCTIONAL

## 2024-07-15 DEVICE — IMPL KNEE PSN FEM CR CMT CCR STD SZ9 L: Type: IMPLANTABLE DEVICE | Site: KNEE | Status: FUNCTIONAL

## 2024-07-15 DEVICE — EXTENSION STEM SZ G 5DEG L TIBL KNEE CEM NAT TIB: Type: IMPLANTABLE DEVICE | Site: KNEE | Status: FUNCTIONAL

## 2024-07-15 DEVICE — PSN MC VE ASF L 11MM 8-11 GH: Type: IMPLANTABLE DEVICE | Site: KNEE | Status: FUNCTIONAL

## 2024-07-15 DEVICE — KNEE K1 TOT HEMI STD CEM IMPL CAPPED K1 ZIM: Type: IMPLANTABLE DEVICE | Site: KNEE | Status: FUNCTIONAL

## 2024-07-15 RX ORDER — LIDOCAINE HYDROCHLORIDE 20 MG/ML
INJECTION, SOLUTION INTRAVENOUS PRN
Status: DISCONTINUED | OUTPATIENT
Start: 2024-07-15 | End: 2024-07-15 | Stop reason: SDUPTHER

## 2024-07-15 RX ORDER — ARIPIPRAZOLE 5 MG/1
20 TABLET ORAL DAILY
Status: DISCONTINUED | OUTPATIENT
Start: 2024-07-16 | End: 2024-07-15

## 2024-07-15 RX ORDER — NALOXONE HYDROCHLORIDE 0.4 MG/ML
INJECTION, SOLUTION INTRAMUSCULAR; INTRAVENOUS; SUBCUTANEOUS PRN
Status: DISCONTINUED | OUTPATIENT
Start: 2024-07-15 | End: 2024-07-15 | Stop reason: HOSPADM

## 2024-07-15 RX ORDER — HYDROMORPHONE HYDROCHLORIDE 1 MG/ML
0.5 INJECTION, SOLUTION INTRAMUSCULAR; INTRAVENOUS; SUBCUTANEOUS EVERY 5 MIN PRN
Status: DISCONTINUED | OUTPATIENT
Start: 2024-07-15 | End: 2024-07-15 | Stop reason: HOSPADM

## 2024-07-15 RX ORDER — OXYCODONE HYDROCHLORIDE 5 MG/1
5 TABLET ORAL EVERY 4 HOURS PRN
Status: DISCONTINUED | OUTPATIENT
Start: 2024-07-15 | End: 2024-07-16 | Stop reason: HOSPADM

## 2024-07-15 RX ORDER — SODIUM CHLORIDE 0.9 % (FLUSH) 0.9 %
5-40 SYRINGE (ML) INJECTION EVERY 12 HOURS SCHEDULED
Status: DISCONTINUED | OUTPATIENT
Start: 2024-07-15 | End: 2024-07-15 | Stop reason: HOSPADM

## 2024-07-15 RX ORDER — VANCOMYCIN HYDROCHLORIDE 1 G/20ML
INJECTION, POWDER, LYOPHILIZED, FOR SOLUTION INTRAVENOUS PRN
Status: DISCONTINUED | OUTPATIENT
Start: 2024-07-15 | End: 2024-07-15 | Stop reason: HOSPADM

## 2024-07-15 RX ORDER — ROCURONIUM BROMIDE 10 MG/ML
INJECTION, SOLUTION INTRAVENOUS PRN
Status: DISCONTINUED | OUTPATIENT
Start: 2024-07-15 | End: 2024-07-15 | Stop reason: SDUPTHER

## 2024-07-15 RX ORDER — METOCLOPRAMIDE HYDROCHLORIDE 5 MG/ML
10 INJECTION INTRAMUSCULAR; INTRAVENOUS
Status: DISCONTINUED | OUTPATIENT
Start: 2024-07-15 | End: 2024-07-15 | Stop reason: HOSPADM

## 2024-07-15 RX ORDER — SUCCINYLCHOLINE/SOD CL,ISO/PF 200MG/10ML
SYRINGE (ML) INTRAVENOUS PRN
Status: DISCONTINUED | OUTPATIENT
Start: 2024-07-15 | End: 2024-07-15 | Stop reason: SDUPTHER

## 2024-07-15 RX ORDER — OXYCODONE HYDROCHLORIDE 5 MG/1
10 TABLET ORAL EVERY 4 HOURS PRN
Status: DISCONTINUED | OUTPATIENT
Start: 2024-07-15 | End: 2024-07-16 | Stop reason: HOSPADM

## 2024-07-15 RX ORDER — ARIPIPRAZOLE 10 MG/1
10 TABLET ORAL NIGHTLY
COMMUNITY

## 2024-07-15 RX ORDER — MIDAZOLAM HYDROCHLORIDE 1 MG/ML
INJECTION INTRAMUSCULAR; INTRAVENOUS
Status: COMPLETED
Start: 2024-07-15 | End: 2024-07-15

## 2024-07-15 RX ORDER — ARIPIPRAZOLE 5 MG/1
5 TABLET ORAL DAILY
Status: DISCONTINUED | OUTPATIENT
Start: 2024-07-16 | End: 2024-07-16 | Stop reason: HOSPADM

## 2024-07-15 RX ORDER — NEOSTIGMINE METHYLSULFATE 1 MG/ML
INJECTION, SOLUTION INTRAVENOUS PRN
Status: DISCONTINUED | OUTPATIENT
Start: 2024-07-15 | End: 2024-07-15 | Stop reason: SDUPTHER

## 2024-07-15 RX ORDER — PANTOPRAZOLE SODIUM 20 MG/1
20 TABLET, DELAYED RELEASE ORAL
Status: DISCONTINUED | OUTPATIENT
Start: 2024-07-16 | End: 2024-07-16 | Stop reason: HOSPADM

## 2024-07-15 RX ORDER — HYDROMORPHONE HYDROCHLORIDE 2 MG/ML
INJECTION, SOLUTION INTRAMUSCULAR; INTRAVENOUS; SUBCUTANEOUS PRN
Status: DISCONTINUED | OUTPATIENT
Start: 2024-07-15 | End: 2024-07-15 | Stop reason: SDUPTHER

## 2024-07-15 RX ORDER — GLYCOPYRROLATE 0.2 MG/ML
INJECTION INTRAMUSCULAR; INTRAVENOUS PRN
Status: DISCONTINUED | OUTPATIENT
Start: 2024-07-15 | End: 2024-07-15 | Stop reason: SDUPTHER

## 2024-07-15 RX ORDER — DEXAMETHASONE SODIUM PHOSPHATE 4 MG/ML
INJECTION, SOLUTION INTRA-ARTICULAR; INTRALESIONAL; INTRAMUSCULAR; INTRAVENOUS; SOFT TISSUE PRN
Status: DISCONTINUED | OUTPATIENT
Start: 2024-07-15 | End: 2024-07-15 | Stop reason: SDUPTHER

## 2024-07-15 RX ORDER — HYDRALAZINE HYDROCHLORIDE 20 MG/ML
10 INJECTION INTRAMUSCULAR; INTRAVENOUS
Status: DISCONTINUED | OUTPATIENT
Start: 2024-07-15 | End: 2024-07-15 | Stop reason: HOSPADM

## 2024-07-15 RX ORDER — ATORVASTATIN CALCIUM 10 MG/1
10 TABLET, FILM COATED ORAL NIGHTLY
Status: DISCONTINUED | OUTPATIENT
Start: 2024-07-16 | End: 2024-07-16 | Stop reason: HOSPADM

## 2024-07-15 RX ORDER — FENTANYL CITRATE 50 UG/ML
INJECTION, SOLUTION INTRAMUSCULAR; INTRAVENOUS
Status: COMPLETED
Start: 2024-07-15 | End: 2024-07-15

## 2024-07-15 RX ORDER — PHENYLEPHRINE HYDROCHLORIDE 10 MG/ML
INJECTION INTRAVENOUS PRN
Status: DISCONTINUED | OUTPATIENT
Start: 2024-07-15 | End: 2024-07-15 | Stop reason: SDUPTHER

## 2024-07-15 RX ORDER — SODIUM CHLORIDE, SODIUM LACTATE, POTASSIUM CHLORIDE, CALCIUM CHLORIDE 600; 310; 30; 20 MG/100ML; MG/100ML; MG/100ML; MG/100ML
INJECTION, SOLUTION INTRAVENOUS CONTINUOUS
Status: DISCONTINUED | OUTPATIENT
Start: 2024-07-15 | End: 2024-07-15 | Stop reason: HOSPADM

## 2024-07-15 RX ORDER — VITAMIN B COMPLEX
2 TABLET ORAL DAILY
Status: DISCONTINUED | OUTPATIENT
Start: 2024-07-16 | End: 2024-07-16 | Stop reason: HOSPADM

## 2024-07-15 RX ORDER — LORAZEPAM 0.5 MG/1
0.5 TABLET ORAL 2 TIMES DAILY PRN
Status: DISCONTINUED | OUTPATIENT
Start: 2024-07-15 | End: 2024-07-16 | Stop reason: HOSPADM

## 2024-07-15 RX ORDER — MEPERIDINE HYDROCHLORIDE 25 MG/ML
12.5 INJECTION INTRAMUSCULAR; INTRAVENOUS; SUBCUTANEOUS EVERY 5 MIN PRN
Status: DISCONTINUED | OUTPATIENT
Start: 2024-07-15 | End: 2024-07-15 | Stop reason: HOSPADM

## 2024-07-15 RX ORDER — ONDANSETRON 4 MG/1
4 TABLET, ORALLY DISINTEGRATING ORAL EVERY 8 HOURS PRN
Status: DISCONTINUED | OUTPATIENT
Start: 2024-07-15 | End: 2024-07-16 | Stop reason: HOSPADM

## 2024-07-15 RX ORDER — MIDAZOLAM HYDROCHLORIDE 1 MG/ML
INJECTION INTRAMUSCULAR; INTRAVENOUS PRN
Status: DISCONTINUED | OUTPATIENT
Start: 2024-07-15 | End: 2024-07-15 | Stop reason: SDUPTHER

## 2024-07-15 RX ORDER — ARIPIPRAZOLE 5 MG/1
10 TABLET ORAL NIGHTLY
Status: DISCONTINUED | OUTPATIENT
Start: 2024-07-15 | End: 2024-07-16 | Stop reason: HOSPADM

## 2024-07-15 RX ORDER — ONDANSETRON 2 MG/ML
INJECTION INTRAMUSCULAR; INTRAVENOUS PRN
Status: DISCONTINUED | OUTPATIENT
Start: 2024-07-15 | End: 2024-07-15 | Stop reason: SDUPTHER

## 2024-07-15 RX ORDER — SODIUM CHLORIDE 0.9 % (FLUSH) 0.9 %
5-40 SYRINGE (ML) INJECTION PRN
Status: DISCONTINUED | OUTPATIENT
Start: 2024-07-15 | End: 2024-07-15 | Stop reason: HOSPADM

## 2024-07-15 RX ORDER — FENTANYL CITRATE 50 UG/ML
INJECTION, SOLUTION INTRAMUSCULAR; INTRAVENOUS PRN
Status: DISCONTINUED | OUTPATIENT
Start: 2024-07-15 | End: 2024-07-15 | Stop reason: SDUPTHER

## 2024-07-15 RX ORDER — SODIUM CHLORIDE 9 MG/ML
INJECTION, SOLUTION INTRAVENOUS PRN
Status: DISCONTINUED | OUTPATIENT
Start: 2024-07-15 | End: 2024-07-16 | Stop reason: HOSPADM

## 2024-07-15 RX ORDER — SODIUM CHLORIDE 9 MG/ML
INJECTION, SOLUTION INTRAVENOUS PRN
Status: DISCONTINUED | OUTPATIENT
Start: 2024-07-15 | End: 2024-07-15 | Stop reason: HOSPADM

## 2024-07-15 RX ORDER — OXYCODONE HYDROCHLORIDE 5 MG/1
5 TABLET ORAL
Status: DISCONTINUED | OUTPATIENT
Start: 2024-07-15 | End: 2024-07-15 | Stop reason: HOSPADM

## 2024-07-15 RX ORDER — SENNA AND DOCUSATE SODIUM 50; 8.6 MG/1; MG/1
1 TABLET, FILM COATED ORAL 2 TIMES DAILY
Status: DISCONTINUED | OUTPATIENT
Start: 2024-07-15 | End: 2024-07-16 | Stop reason: HOSPADM

## 2024-07-15 RX ORDER — ONDANSETRON 2 MG/ML
4 INJECTION INTRAMUSCULAR; INTRAVENOUS
Status: DISCONTINUED | OUTPATIENT
Start: 2024-07-15 | End: 2024-07-15 | Stop reason: HOSPADM

## 2024-07-15 RX ORDER — POLYETHYLENE GLYCOL 3350 17 G/17G
17 POWDER, FOR SOLUTION ORAL DAILY PRN
Status: DISCONTINUED | OUTPATIENT
Start: 2024-07-15 | End: 2024-07-16 | Stop reason: HOSPADM

## 2024-07-15 RX ORDER — ACETAMINOPHEN 325 MG/1
650 TABLET ORAL EVERY 6 HOURS
Status: DISCONTINUED | OUTPATIENT
Start: 2024-07-15 | End: 2024-07-16 | Stop reason: HOSPADM

## 2024-07-15 RX ORDER — SODIUM CHLORIDE 0.9 % (FLUSH) 0.9 %
5-40 SYRINGE (ML) INJECTION EVERY 12 HOURS SCHEDULED
Status: DISCONTINUED | OUTPATIENT
Start: 2024-07-15 | End: 2024-07-16 | Stop reason: HOSPADM

## 2024-07-15 RX ORDER — MAGNESIUM HYDROXIDE 1200 MG/15ML
LIQUID ORAL CONTINUOUS PRN
Status: DISCONTINUED | OUTPATIENT
Start: 2024-07-15 | End: 2024-07-15 | Stop reason: HOSPADM

## 2024-07-15 RX ORDER — ASPIRIN 81 MG/1
81 TABLET ORAL 2 TIMES DAILY
Status: DISCONTINUED | OUTPATIENT
Start: 2024-07-16 | End: 2024-07-16 | Stop reason: HOSPADM

## 2024-07-15 RX ORDER — ONDANSETRON 2 MG/ML
4 INJECTION INTRAMUSCULAR; INTRAVENOUS EVERY 6 HOURS PRN
Status: DISCONTINUED | OUTPATIENT
Start: 2024-07-15 | End: 2024-07-16 | Stop reason: HOSPADM

## 2024-07-15 RX ORDER — METHOCARBAMOL 100 MG/ML
INJECTION, SOLUTION INTRAMUSCULAR; INTRAVENOUS PRN
Status: DISCONTINUED | OUTPATIENT
Start: 2024-07-15 | End: 2024-07-15 | Stop reason: SDUPTHER

## 2024-07-15 RX ORDER — PROPOFOL 10 MG/ML
INJECTION, EMULSION INTRAVENOUS PRN
Status: DISCONTINUED | OUTPATIENT
Start: 2024-07-15 | End: 2024-07-15 | Stop reason: SDUPTHER

## 2024-07-15 RX ORDER — BUPIVACAINE HYDROCHLORIDE 5 MG/ML
INJECTION, SOLUTION EPIDURAL; INTRACAUDAL PRN
Status: DISCONTINUED | OUTPATIENT
Start: 2024-07-15 | End: 2024-07-15 | Stop reason: SDUPTHER

## 2024-07-15 RX ORDER — SODIUM CHLORIDE 0.9 % (FLUSH) 0.9 %
5-40 SYRINGE (ML) INJECTION PRN
Status: DISCONTINUED | OUTPATIENT
Start: 2024-07-15 | End: 2024-07-16 | Stop reason: HOSPADM

## 2024-07-15 RX ORDER — HYDROMORPHONE HYDROCHLORIDE 1 MG/ML
0.5 INJECTION, SOLUTION INTRAMUSCULAR; INTRAVENOUS; SUBCUTANEOUS
Status: DISCONTINUED | OUTPATIENT
Start: 2024-07-15 | End: 2024-07-16 | Stop reason: HOSPADM

## 2024-07-15 RX ADMIN — ROCURONIUM BROMIDE 10 MG: 10 INJECTION, SOLUTION INTRAVENOUS at 12:13

## 2024-07-15 RX ADMIN — GLYCOPYRROLATE 0.8 MG: 0.2 INJECTION INTRAMUSCULAR; INTRAVENOUS at 14:13

## 2024-07-15 RX ADMIN — Medication 200 MG: at 12:13

## 2024-07-15 RX ADMIN — ONDANSETRON 4 MG: 2 INJECTION INTRAMUSCULAR; INTRAVENOUS at 12:19

## 2024-07-15 RX ADMIN — DEXMEDETOMIDINE HYDROCHLORIDE 6 MCG: 100 INJECTION, SOLUTION INTRAVENOUS at 14:09

## 2024-07-15 RX ADMIN — ARIPIPRAZOLE 10 MG: 5 TABLET ORAL at 22:21

## 2024-07-15 RX ADMIN — SODIUM CHLORIDE, POTASSIUM CHLORIDE, SODIUM LACTATE AND CALCIUM CHLORIDE: 600; 310; 30; 20 INJECTION, SOLUTION INTRAVENOUS at 14:09

## 2024-07-15 RX ADMIN — DEXMEDETOMIDINE HYDROCHLORIDE 10 MCG: 100 INJECTION, SOLUTION INTRAVENOUS at 12:37

## 2024-07-15 RX ADMIN — HYDROMORPHONE HYDROCHLORIDE 0.5 MG: 2 INJECTION, SOLUTION INTRAMUSCULAR; INTRAVENOUS; SUBCUTANEOUS at 13:01

## 2024-07-15 RX ADMIN — ROCURONIUM BROMIDE 30 MG: 10 INJECTION, SOLUTION INTRAVENOUS at 12:53

## 2024-07-15 RX ADMIN — DEXMEDETOMIDINE HYDROCHLORIDE 4 MCG: 100 INJECTION, SOLUTION INTRAVENOUS at 13:45

## 2024-07-15 RX ADMIN — WATER 2000 MG: 1 INJECTION INTRAMUSCULAR; INTRAVENOUS; SUBCUTANEOUS at 12:18

## 2024-07-15 RX ADMIN — TRANEXAMIC ACID 1000 MG: 100 INJECTION, SOLUTION INTRAVENOUS at 12:21

## 2024-07-15 RX ADMIN — NEOSTIGMINE METHYLSULFATE 5 MG: 1 INJECTION INTRAVENOUS at 14:13

## 2024-07-15 RX ADMIN — ROCURONIUM BROMIDE 20 MG: 10 INJECTION, SOLUTION INTRAVENOUS at 13:30

## 2024-07-15 RX ADMIN — SODIUM CHLORIDE, POTASSIUM CHLORIDE, SODIUM LACTATE AND CALCIUM CHLORIDE: 600; 310; 30; 20 INJECTION, SOLUTION INTRAVENOUS at 12:51

## 2024-07-15 RX ADMIN — METHOCARBAMOL 500 MG: 100 INJECTION, SOLUTION INTRAMUSCULAR; INTRAVENOUS at 13:06

## 2024-07-15 RX ADMIN — PHENYLEPHRINE HYDROCHLORIDE 100 MCG: 10 INJECTION, SOLUTION INTRAVENOUS at 12:29

## 2024-07-15 RX ADMIN — PHENYLEPHRINE HYDROCHLORIDE 100 MCG: 10 INJECTION, SOLUTION INTRAVENOUS at 12:22

## 2024-07-15 RX ADMIN — PHENYLEPHRINE HYDROCHLORIDE 100 MCG: 10 INJECTION, SOLUTION INTRAVENOUS at 14:19

## 2024-07-15 RX ADMIN — HYDROMORPHONE HYDROCHLORIDE 1 MG: 2 INJECTION, SOLUTION INTRAMUSCULAR; INTRAVENOUS; SUBCUTANEOUS at 12:43

## 2024-07-15 RX ADMIN — DEXAMETHASONE SODIUM PHOSPHATE 4 MG: 4 INJECTION INTRA-ARTICULAR; INTRALESIONAL; INTRAMUSCULAR; INTRAVENOUS; SOFT TISSUE at 12:19

## 2024-07-15 RX ADMIN — SODIUM CHLORIDE, POTASSIUM CHLORIDE, SODIUM LACTATE AND CALCIUM CHLORIDE: 600; 310; 30; 20 INJECTION, SOLUTION INTRAVENOUS at 11:05

## 2024-07-15 RX ADMIN — HYDROMORPHONE HYDROCHLORIDE 0.5 MG: 2 INJECTION, SOLUTION INTRAMUSCULAR; INTRAVENOUS; SUBCUTANEOUS at 13:21

## 2024-07-15 RX ADMIN — WATER 2000 MG: 1 INJECTION INTRAMUSCULAR; INTRAVENOUS; SUBCUTANEOUS at 20:33

## 2024-07-15 RX ADMIN — PROPOFOL 30 MG: 10 INJECTION, EMULSION INTRAVENOUS at 14:33

## 2024-07-15 RX ADMIN — SENNOSIDES AND DOCUSATE SODIUM 1 TABLET: 50; 8.6 TABLET ORAL at 20:38

## 2024-07-15 RX ADMIN — ACETAMINOPHEN 650 MG: 325 TABLET ORAL at 20:38

## 2024-07-15 RX ADMIN — PHENYLEPHRINE HYDROCHLORIDE 100 MCG: 10 INJECTION, SOLUTION INTRAVENOUS at 14:17

## 2024-07-15 RX ADMIN — SODIUM CHLORIDE, PRESERVATIVE FREE 10 ML: 5 INJECTION INTRAVENOUS at 20:33

## 2024-07-15 RX ADMIN — ROCURONIUM BROMIDE 40 MG: 10 INJECTION, SOLUTION INTRAVENOUS at 12:15

## 2024-07-15 RX ADMIN — PROPOFOL 200 MG: 10 INJECTION, EMULSION INTRAVENOUS at 12:13

## 2024-07-15 RX ADMIN — BUPIVACAINE HYDROCHLORIDE 30 ML: 5 INJECTION, SOLUTION EPIDURAL; INTRACAUDAL; PERINEURAL at 11:30

## 2024-07-15 RX ADMIN — ROCURONIUM BROMIDE 30 MG: 10 INJECTION, SOLUTION INTRAVENOUS at 12:21

## 2024-07-15 RX ADMIN — PHENYLEPHRINE HYDROCHLORIDE 100 MCG: 10 INJECTION, SOLUTION INTRAVENOUS at 14:07

## 2024-07-15 RX ADMIN — FENTANYL CITRATE 100 MCG: 50 INJECTION, SOLUTION INTRAMUSCULAR; INTRAVENOUS at 11:24

## 2024-07-15 RX ADMIN — MIDAZOLAM HYDROCHLORIDE 2 MG: 2 INJECTION, SOLUTION INTRAMUSCULAR; INTRAVENOUS at 11:24

## 2024-07-15 RX ADMIN — LIDOCAINE HYDROCHLORIDE 100 MG: 20 INJECTION, SOLUTION INTRAVENOUS at 12:13

## 2024-07-15 ASSESSMENT — PAIN - FUNCTIONAL ASSESSMENT: PAIN_FUNCTIONAL_ASSESSMENT: 0-10

## 2024-07-15 ASSESSMENT — PAIN SCALES - GENERAL: PAINLEVEL_OUTOF10: 0

## 2024-07-15 NOTE — H&P
Update History & Physical    The patient's History and Physical was reviewed with the patient and I examined the patient. There was no change. The surgical site was confirmed by the patient and me.       Plan: The risks, benefits, expected outcome, and alternative to the recommended procedure have been discussed with the patient. Patient understands and wants to proceed with the procedure.     Braden Villa MD  7/15/2024

## 2024-07-15 NOTE — PROGRESS NOTES
Patient arrived from OR to PACU # 2 s/p  LEFT TOTAL KNEE ARTHROPLASTY WITH COMPUTER ASSISTED CORBY (Left: Knee) per . Attached to PACU monitoring device report received from CRNA who stated patient had pre-op block. Arrived drowsy from anesthesia, ICE BAG X 2 intact

## 2024-07-15 NOTE — PROGRESS NOTES
Procedure: left adductor canal block  peripheral block  MD: Dr. Samuel   Timeout performed.    IN: 1124  OUT:1130      Pt monitored closely on heart monitor, 2L NC, continuous pulse oximetry, EtCO2, and frequent BPs.   Pt remained alert and oriented x4. pt tolerated procedure well.

## 2024-07-15 NOTE — PROGRESS NOTES
PACU Transfer Note    Vitals:    07/15/24 1930   BP:    Pulse: 76   Resp: 16   Temp: 98.6 °F (37 °C)   SpO2: 95%       No intake/output data recorded.    Pain assessment:  none  Pain Level: 0    Report given to Receiving unit RN at bedside in pacu. VSS, denies pain or nausea. Family updated, at bedside. All belongings w/pt or family.    7/15/2024 7:53 PM

## 2024-07-15 NOTE — PROGRESS NOTES
Patient ate 4 packs of heather crackers, 1 pepsi and 1 chocolate pudding. Denies pain when asked, VSS.

## 2024-07-15 NOTE — ANESTHESIA PRE PROCEDURE
Department of Anesthesiology  Preprocedure Note       Name:  Hakeem Newton   Age:  64 y.o.  :  1960                                          MRN:  [id number]         Date:  7/15/2024      Surgeon: Surgeon(s):  Braden Villa MD    Procedure: Procedure(s):  LEFT TOTAL KNEE ARTHROPLASTY WITH COMPUTER ASSISTED CORBY    Medications prior to admission:   Prior to Admission medications    Medication Sig Start Date End Date Taking? Authorizing Provider   LORazepam (ATIVAN) 0.5 MG tablet Take 1 tablet by mouth every 6 hours as needed for Anxiety. Max Daily Amount: 2 mg   Yes ProviderPerry MD   amoxicillin (AMOXIL) 500 MG capsule Take 4 capsules by mouth once  Prior to Tooth Surgery. MD 24  Yes ProviderPerry MD   atorvastatin (LIPITOR) 10 MG tablet Take 1 tablet by mouth daily 10/20/23  Yes ProviderPerry MD   Cholecalciferol (VITAMIN D3) 50 MCG ( UT) CAPS Take 1 capsule by mouth daily   Yes ProviderPerry MD   ARIPiprazole (ABILIFY) 20 MG tablet Take 1 tablet by mouth daily 24   Lucero Carrasquillo, APRN - CNP       Current medications:    Current Facility-Administered Medications   Medication Dose Route Frequency Provider Last Rate Last Admin   • ortho mix (with morphine) injection   Injection On Call Braden Villa MD       • ceFAZolin (ANCEF) 2,000 mg in sterile water 20 mL IV syringe  2,000 mg IntraVENous Once Braden Villa MD       • tranexamic acid (CYKLOKAPRON) 1,000 mg in sodium chloride 0.9 % 100 mL IVPB  1,000 mg IntraVENous Once Braden Villa MD       • lactated ringers IV soln infusion   IntraVENous Continuous Nahid Samuel MD           Allergies:    Allergies   Allergen Reactions   • Gabapentin Shortness Of Breath   • Lovastatin Other (See Comments) and Anaphylaxis     myalgia   • Propranolol Shortness Of Breath   • Atorvastatin Other (See Comments)     Increased urination   • Brexpiprazole Other (See Comments)   • Cariprazine Other

## 2024-07-15 NOTE — OP NOTE
ORTHOPAEDIC OPERATIVE NOTE     PATIENT NAME   Hakeem Newton  64 y.o.     SURGEON: Braden Villa M.D.     ASSISTANT: LEE Arora is a board certified physician assistant who is medically necessary as a first assistant in the operating room with me. He is responsible for assisting with positioning of the patient,retraction,cauterization and implantation of the arthroplasty components. His presence in the operating room with me as a first assistant during arthroplasty procedures enables me to perform the surgical procedure in a more timely and efficient manner. The hospital does not provide me with a first assistant in the operating room who has the same surgical skills as my physician assistant.      SURGERY DATE: 7/15/2024     PRE-OPERATIVE DIAGNOSIS: left knee osteoarthritis     POST-OPERATIVE DIAGNOSIS: left knee osteoarthritis     PROCEDURE PERFORMED: Left total knee replacement using robotic assistance ([employer])     Implants used:    Chanell Persona  Femur 9  Tibia G  Liner 11 mm MC  Patella 35 mm     ANESTHESIA: general and regional     COMPLICATIONS: None apparent.     POST-OP CONDITION:  To recovery room.     EBL: 50 cc     ANTIBIOTIC: 2g Ancef preop     INDICATIONS FOR SURGERY: The patient has a long history of knee pain affecting the activities of daily living.  Pt had severe pain during ambulation and activities of daily life. The pain was refractory to conservative management including injections (corticosteroid/viscosupplementation); PT, Ambulatory aids; oral medication (NSAIDs and pain medication). Preop workup showed radiographic changes with osteophyte formation; loss of cartilage space; subchondral sclerosis and deformity.  The patient is scheduled for a total knee replacement.  The risks, benefits and alternatives of surgery were clearly discussed and the patient wished to proceed with surgery.     OPERATIVE FINDINGS: severe osteoarthritis with tricompartmental involvement.

## 2024-07-15 NOTE — ANESTHESIA PROCEDURE NOTES
Peripheral Block    Patient location during procedure: pre-op  Reason for block: post-op pain management and at surgeon's request  Start time: 7/15/2024 11:24 AM  End time: 7/15/2024 11:30 AM  Staffing  Performed: anesthesiologist   Anesthesiologist: Nahid Samuel MD  Performed by: Nahid Samuel MD  Authorized by: Nahid Samuel MD    Preanesthetic Checklist  Completed: patient identified, IV checked, site marked, risks and benefits discussed, surgical/procedural consents, equipment checked, pre-op evaluation, timeout performed, anesthesia consent given, oxygen available and monitors applied/VS acknowledged  Peripheral Block   Patient position: supine  Prep: ChloraPrep  Provider prep: mask and sterile gloves  Patient monitoring: cardiac monitor, continuous pulse ox, frequent blood pressure checks and IV access  Block type: Femoral  Adductor canal  Laterality: left  Injection technique: single-shot  Guidance: ultrasound guided  Local infiltration: lidocaine  Infiltration strength: 1 %  Local infiltration: lidocaine    Needle   Needle type: insulated echogenic nerve stimulator needle   Needle gauge: 22 G  Needle localization: ultrasound guidance  Needle length: 5 cm  Assessment   Injection assessment: negative aspiration for heme, no paresthesia on injection and local visualized surrounding nerve on ultrasound  Slow fractionated injection: yes  Hemodynamics: stable  Outcomes: uncomplicated and patient tolerated procedure well    Additional Notes  Sartorius and Vastus Medialis Muscle, Femoral artery and Saphenous nerve are identified; the tip of the needle and the spread of the local anesthetic around the Saphenous nerve are visualized. The Saphenous nerve appeared to be anatomically normal and there were no abnormal pathologically findings seen.

## 2024-07-15 NOTE — ANESTHESIA POSTPROCEDURE EVALUATION
Department of Anesthesiology  Postprocedure Note    Patient: Hakeem Newton  MRN: [id number]  YOB: 1960  Date of evaluation: 7/15/2024    Procedure Summary       Date: 07/15/24 Room / Location: [place] [address] / [facility]    Anesthesia Start: 1207 Anesthesia Stop: 1458    Procedure: LEFT TOTAL KNEE ARTHROPLASTY WITH COMPUTER ASSISTED CORBY (Left: Knee) Diagnosis:       Primary osteoarthritis of left knee      (Primary osteoarthritis of left knee [M17.12])    Surgeons: Braden Villa MD Responsible Provider: Nahid Samuel MD    Anesthesia Type: general ASA Status: 3            Anesthesia Type: No value filed.    Justin Phase I: Justin Score: 7    Justin Phase II:      Anesthesia Post Evaluation    Patient location during evaluation: PACU  Patient participation: complete - patient participated  Level of consciousness: awake and alert  Pain score: 0  Airway patency: patent  Nausea & Vomiting: no nausea and no vomiting  Cardiovascular status: blood pressure returned to baseline  Respiratory status: acceptable  Hydration status: euvolemic  Multimodal analgesia pain management approach  Pain management: adequate    No notable events documented.

## 2024-07-16 VITALS
WEIGHT: 261 LBS | TEMPERATURE: 98.9 F | BODY MASS INDEX: 35.35 KG/M2 | HEIGHT: 72 IN | DIASTOLIC BLOOD PRESSURE: 76 MMHG | SYSTOLIC BLOOD PRESSURE: 126 MMHG | OXYGEN SATURATION: 94 % | HEART RATE: 85 BPM | RESPIRATION RATE: 16 BRPM

## 2024-07-16 LAB — SARS-COV-2 RDRP RESP QL NAA+PROBE: NOT DETECTED

## 2024-07-16 PROCEDURE — 6360000002 HC RX W HCPCS: Performed by: ORTHOPAEDIC SURGERY

## 2024-07-16 PROCEDURE — 6370000000 HC RX 637 (ALT 250 FOR IP): Performed by: INTERNAL MEDICINE

## 2024-07-16 PROCEDURE — G0378 HOSPITAL OBSERVATION PER HR: HCPCS

## 2024-07-16 PROCEDURE — 97535 SELF CARE MNGMENT TRAINING: CPT

## 2024-07-16 PROCEDURE — 6370000000 HC RX 637 (ALT 250 FOR IP): Performed by: ORTHOPAEDIC SURGERY

## 2024-07-16 PROCEDURE — 87635 SARS-COV-2 COVID-19 AMP PRB: CPT

## 2024-07-16 PROCEDURE — 97161 PT EVAL LOW COMPLEX 20 MIN: CPT

## 2024-07-16 PROCEDURE — 97530 THERAPEUTIC ACTIVITIES: CPT

## 2024-07-16 PROCEDURE — 97116 GAIT TRAINING THERAPY: CPT

## 2024-07-16 PROCEDURE — 2580000003 HC RX 258: Performed by: ORTHOPAEDIC SURGERY

## 2024-07-16 PROCEDURE — 97165 OT EVAL LOW COMPLEX 30 MIN: CPT

## 2024-07-16 RX ORDER — ASPIRIN 81 MG/1
81 TABLET ORAL 2 TIMES DAILY
Qty: 30 TABLET | Refills: 3 | COMMUNITY
Start: 2024-07-16

## 2024-07-16 RX ADMIN — OXYCODONE 10 MG: 5 TABLET ORAL at 09:06

## 2024-07-16 RX ADMIN — Medication 2000 UNITS: at 09:05

## 2024-07-16 RX ADMIN — OXYCODONE 10 MG: 5 TABLET ORAL at 14:40

## 2024-07-16 RX ADMIN — ACETAMINOPHEN 650 MG: 325 TABLET ORAL at 14:40

## 2024-07-16 RX ADMIN — ARIPIPRAZOLE 5 MG: 5 TABLET ORAL at 09:05

## 2024-07-16 RX ADMIN — ASPIRIN 81 MG: 81 TABLET, COATED ORAL at 09:05

## 2024-07-16 RX ADMIN — ACETAMINOPHEN 650 MG: 325 TABLET ORAL at 03:41

## 2024-07-16 RX ADMIN — WATER 2000 MG: 1 INJECTION INTRAMUSCULAR; INTRAVENOUS; SUBCUTANEOUS at 03:42

## 2024-07-16 RX ADMIN — PANTOPRAZOLE SODIUM 20 MG: 20 TABLET, DELAYED RELEASE ORAL at 09:05

## 2024-07-16 RX ADMIN — SODIUM CHLORIDE, PRESERVATIVE FREE 10 ML: 5 INJECTION INTRAVENOUS at 09:08

## 2024-07-16 RX ADMIN — SENNOSIDES AND DOCUSATE SODIUM 1 TABLET: 50; 8.6 TABLET ORAL at 09:06

## 2024-07-16 RX ADMIN — ACETAMINOPHEN 650 MG: 325 TABLET ORAL at 09:06

## 2024-07-16 ASSESSMENT — PAIN SCALES - GENERAL
PAINLEVEL_OUTOF10: 4
PAINLEVEL_OUTOF10: 4
PAINLEVEL_OUTOF10: 7
PAINLEVEL_OUTOF10: 4
PAINLEVEL_OUTOF10: 7

## 2024-07-16 ASSESSMENT — PAIN DESCRIPTION - ONSET
ONSET: ON-GOING
ONSET: ON-GOING

## 2024-07-16 ASSESSMENT — PAIN DESCRIPTION - PAIN TYPE
TYPE: ACUTE PAIN;SURGICAL PAIN
TYPE: ACUTE PAIN;SURGICAL PAIN

## 2024-07-16 ASSESSMENT — PAIN DESCRIPTION - ORIENTATION
ORIENTATION: LEFT
ORIENTATION: LEFT

## 2024-07-16 ASSESSMENT — PAIN DESCRIPTION - FREQUENCY
FREQUENCY: CONTINUOUS
FREQUENCY: CONTINUOUS

## 2024-07-16 ASSESSMENT — PAIN DESCRIPTION - DIRECTION
RADIATING_TOWARDS: NO
RADIATING_TOWARDS: NO

## 2024-07-16 ASSESSMENT — PAIN DESCRIPTION - DESCRIPTORS
DESCRIPTORS: SHARP
DESCRIPTORS: SHARP

## 2024-07-16 ASSESSMENT — PAIN DESCRIPTION - LOCATION
LOCATION: KNEE
LOCATION: KNEE

## 2024-07-16 NOTE — PROGRESS NOTES
Physical Therapy  Facility/Department: [facility] 5T ORTHO/NEURO  Physical Therapy Initial Assessment    Name: Hakeem Newton  : 1960  MRN: [id number]  Date of Service: 2024    Discharge Recommendations:  24 hour supervision or assist, Outpatient PT   PT Equipment Recommendations  Equipment Needed: No  Other: owns RW      Patient Diagnosis(es): There were no encounter diagnoses.  Past Medical History:  has a past medical history of Arthritis, Elevated liver enzymes, Hypertension, Pure hypercholesterolemia, and Schizophrenia (HCC).  Past Surgical History:  has a past surgical history that includes Nose surgery; Total knee arthroplasty (Right, 4/15/2024); and Total knee arthroplasty (Left, 7/15/2024).    Assessment   Body Structures, Functions, Activity Limitations Requiring Skilled Therapeutic Intervention: Decreased functional mobility ;Decreased strength;Decreased safe awareness;Decreased endurance;Decreased balance  Assessment: pt is a 65 yo male s/p LEFT TOTAL KNEE ARTHROPLASTY WITH COMPUTER ASSISTED CORBY from home with elderly mother and IND at baseline. Pt seen POD 1 and performing functional mobility tasks with RW with CGA- SBA including stair negotiation. pt requiring occasional cues for safe technique and mildly limited by pain on eval. Pt had R knee done in April and reports good outcomes, plans to return home where is brother and sister in law will stay initially. Recommend initial 24hr and OP PT. PT to f/u if pt does not dc  Treatment Diagnosis: dec functional mobility  Therapy Prognosis: Good  Decision Making: Low Complexity  Requires PT Follow-Up: Yes  Activity Tolerance  Activity Tolerance: Patient tolerated evaluation without incident;Patient limited by pain     Plan   Physical Therapy Plan  General Plan: 2 times a day 7 days a week  Current Treatment Recommendations: Strengthening, Endurance training, Balance training, Functional mobility training, Transfer training, Gait training, Stair

## 2024-07-16 NOTE — PLAN OF CARE
Problem: Pain  Goal: Verbalizes/displays adequate comfort level or baseline comfort level  Outcome: Progressing  No c/o pain at this time, will continue to monitor and give interventions as indicated'       Problem: ABCDS Injury Assessment  Goal: Absence of physical injury  Outcome: Progressing     Problem: Safety - Adult  Goal: Free from fall injury  Outcome: Progressing   All fall precautions in place. Bed locked and in lowest position with alarm on. Overbed table and personal belonings within reach. Call light within reach and patient instructed to use call light for assistance. Non-skid socks on.

## 2024-07-16 NOTE — PROGRESS NOTES
Occupational Therapy  Facility/Department: [facility] ORTHO/NEURO  Occupational Therapy Initial Assessment/Treatment/Discharge    Name: Hakeem Newton  : 1960  MRN: [id number]  Date of Service: 2024    Discharge Recommendations:  24 hour supervision or assist  OT Equipment Recommendations  Equipment Needed: No  Other: has all recommended equipment     Patient Diagnosis(es): There were no encounter diagnoses.  Past Medical History:  has a past medical history of Arthritis, Elevated liver enzymes, Hypertension, Pure hypercholesterolemia, and Schizophrenia (HCC).  Past Surgical History:  has a past surgical history that includes Nose surgery; Total knee arthroplasty (Right, 4/15/2024); and Total knee arthroplasty (Left, 7/15/2024).    Assessment   Assessment: Pt is POD#1 following L TKA. Pt is doing well today despite pain when weight bearing. Pt performing all transfers and fx mobility w/ CGA. Pt performed LB dressing w/ min A and UB grooming w/ SBA. Pt has plans for OP PT tomorrow and anticipating home d/c today. Pt has no further acute OT needs and will sign off. Encourage ongoing ambulation w/ staff and pt in agreement. Pt has all DME in home setting including 2WW, shower chair, and toilet raiser.  Prognosis: Good  Decision Making: Medium Complexity  REQUIRES OT FOLLOW-UP: No  Activity Tolerance  Activity Tolerance: Patient Tolerated treatment well        Plan   Occupational Therapy Plan  Times Per Week: d/c acute OT today     Restrictions  Position Activity Restriction  Other position/activity restrictions: FWBAT; ice therapy; activity day of surgery    Subjective   General  Chart Reviewed: Yes  Patient assessed for rehabilitation services?: Yes  Additional Pertinent Hx: 64 y.o. male with hypertension, hyperlipidemia, GERD, anxiety, schizophrenia who was admitted for elective left knee replacement. Pt underwent L TKA 7/15/2024 with Dr. Villa.  Referring Practitioner: Braden Villa,

## 2024-07-16 NOTE — PLAN OF CARE
Problem: Pain  Goal: Verbalizes/displays adequate comfort level or baseline comfort level  7/16/2024 1444 by Dulce Edwards RN  Outcome: Completed

## 2024-07-16 NOTE — DISCHARGE SUMMARY
[place] DISCHARGE SUMMARY     Left TKA    The patient was taken to the operating room  where the aforementioned procedure was preformed.  The patient was taken to the post operative anesthesia recovery unit in stable condition. The patient was then transferred to the orthopaedic floor for post operative pain management and convalesce       The patient was followed medically in the hospital for the above surgical procedures performed and below medical issues during their hospital stay    Principal Problem:    Osteoarthrosis, localized, primary, knee, left  Resolved Problems:    * No resolved hospital problems. *         (x )The patient was placed on anticoagulation therapy for DVT prophylaxis       The patient was discharged in stable condition .      Please see medical reconciliation for discharge medications.    The discharge instructions were explained to the patient and the family.  The patient will follow up in the office in 2 weeks for repeat examination and xray .

## 2024-07-16 NOTE — DISCHARGE INSTRUCTIONS
DISCHARGE ORDER SET  [facility]  [phone]    MEDICATION RECONCILIATION  Follow instructions given to you from the clinic regarding the medications. Some of these will not be listed on your hospital papers due to being sent to pharmacy separately. Please take medications as prescribed which were sent to the pharmacy (oxycodone). Remember the celecoxib (celebrex), duricef and omeprazole which were called in for you as well. Specific instructions will be listed on these medications.     ( x )  Post Discharge Instructions  For the first several weeks after surgery you will need to attend PT frequently as scheduled.   Elevate extremity if swelling occurs  Continue the exercise program as prescribed by PT  Use walker, crutches or cane with weightbearing instructions as indicated   Do not ambulate without assistance until cleared to do so by PT  Use Spirometer every 2 hrs while awake    ( x ) Initiate bowel care with the following medications   Over-the-Counter Senokot  (or Over-the-Counter Colace (Docusate Sodium)  1-2 tabs by mouth twice daily, continue while on narcotics, hold for loose stools.     ( x  ) Physical Therapy Orders    Range-of-Motion, strengthening, gait training, ADL's.    Outpatient physical therapy 2 times per week for 6 weeks.    May discontinue therapy when full extension is obtained and flexion is greater than 120 degrees.    ( x )  Weight bearing/limitations      ( x ) Full weight bearing. The knee immobilizer (brace) can be discontinued once your thigh muscle function returns to baseline and you can stand on the knee safely.     ( x  ) Dressing/wound care  You may loosen Ace bandages as needed. Remove first day after surgery.  Keep gray sealed dressing on until your clinic follow up.     You may shower after 3 days. No tub baths.   Do not scrub dressing. Pat dry with soft towel.   NO LOTIONS OR CREAMS     7. (  x ) DVT PROPHYLAXIS (prevention of blood clots)-

## 2024-07-16 NOTE — PROGRESS NOTES
[facility] Progress Note        Subjective:  Pt is sitting in bedside recliner with minimal complaints of discomfort. Denies fever, chills, N/V.    Of note, patient was exposed to COVID+ patient in PACU. Patients mom is 98yo so reports some concerns with this.    Blood pressure 126/76, pulse 85, temperature 98.9 °F (37.2 °C), temperature source Oral, resp. rate 16, height 1.829 m (6' 0.01\"), weight 118.4 kg (261 lb), SpO2 94 %.    PHYSICAL EXAM:   Left lower extremity  Dressing clean and dry  +EHL/FHL/ADF  Sensation intact to light touch distally  Skin warm and well perfused    HgB:    Lab Results   Component Value Date/Time    HGB 17.1 05/21/2024 04:18 PM       ASSESSMENT AND PLAN:    64 y.o. male status post Left total knee arthroplasty    1:  Weight bearing as tolerated  2:  Deep venous thrombosis prophylaxis: ASA 81mg PO BID  3:  Continue mobilization, physical therapy  4:  D/C Plan:  Proceed with Rapid Covid testing for precaution. Likely discharge home today      Iam Lowe PA-C

## 2024-07-16 NOTE — CONSULTS
About Running Out of Food in the Last Year: Never true     Ran Out of Food in the Last Year: Never true   Transportation Needs: No Transportation Needs (5/21/2024)    PRAPARE - Transportation     Lack of Transportation (Medical): No     Lack of Transportation (Non-Medical): No   Stress: No Stress Concern Present (2/24/2023)    [employer] [place] [place] [employer] - Occupational Stress Questionnaire     Feeling of Stress : Only a little   Intimate Partner Violence: Not At Risk (2/24/2023)    Humiliation, Afraid, Rape, and Kick questionnaire     Fear of Current or Ex-Partner: No     Emotionally Abused: No     Physically Abused: No     Sexually Abused: No   Housing Stability: Low Risk  (5/21/2024)    Housing Stability Vital Sign     Unable to Pay for Housing in the Last Year: No     Number of Places Lived in the Last Year: 1     Unstable Housing in the Last Year: No         Medications:   Medications:    [START ON 7/16/2024] ARIPiprazole  20 mg Oral Daily    [START ON 7/16/2024] atorvastatin  10 mg Oral Nightly    [START ON 7/16/2024] Vitamin D  2 tablet Oral Daily    sodium chloride flush  5-40 mL IntraVENous 2 times per day    acetaminophen  650 mg Oral Q6H    ceFAZolin (ANCEF) IVPB  2,000 mg IntraVENous Q8H    sennosides-docusate sodium  1 tablet Oral BID    [START ON 7/16/2024] aspirin  81 mg Oral BID      Infusions:    sodium chloride       PRN Meds: LORazepam, 0.5 mg, BID PRN  sodium chloride flush, 5-40 mL, PRN  sodium chloride, , PRN  ondansetron, 4 mg, Q8H PRN   Or  ondansetron, 4 mg, Q6H PRN  oxyCODONE, 5 mg, Q4H PRN   Or  oxyCODONE, 10 mg, Q4H PRN  HYDROmorphone, 0.5 mg, Q3H PRN  polyethylene glycol, 17 g, Daily PRN        Labs and Imaging   XR KNEE LEFT (1-2 VIEWS)    Result Date: 7/15/2024  EXAM: XR KNEE LEFT (1-2 VIEWS) INDICATION: postop TKA COMPARISON: None available. TECHNIQUE: 2 views left knee FINDINGS: Total knee arthroplasty and patellar resurfacing. No periprosthetic fracture or lucency. Soft

## 2024-07-16 NOTE — PROGRESS NOTES
4 Eyes Skin Assessment     NAME:  Hakeem Newton  YOB: 1960  MEDICAL RECORD NUMBER:  [id number]    The patient is being assessed for  Admission    I agree that at least one RN has performed a thorough Head to Toe Skin Assessment on the patient. ALL assessment sites listed below have been assessed.      Areas assessed by both nurses:    Head, Face, Ears, Shoulders, Back, Chest, Arms, Elbows, Hands, Sacrum. Buttock, Coccyx, Ischium, Legs. Feet and Heels, and Under Medical Devices         Does the Patient have a Wound? Yes wound(s) were present on assessment. LDA wound assessment was Initiated and completed by RN  Surgical incision  Osman Prevention initiated by RN: No  Wound Care Orders initiated by RN: No    Pressure Injury (Stage 3,4, Unstageable, DTI, NWPT, and Complex wounds) if present, place Wound referral order by RN under : No    New Ostomies, if present place, Ostomy referral order under : No     Nurse 1 eSignature: Electronically signed by Keke Lopez RN on 7/16/24 at 12:58 AM EDT    **SHARE this note so that the co-signing nurse can place an eSignature**    Nurse 2 eSignature: Electronically signed by Lauryn Nicolas RN on 7/16/24 at 7:45 AM EDT

## 2024-07-16 NOTE — PROGRESS NOTES
Patient admitted to room 5511. Report received from  PACU RN . ROSANA on room air. Patient oriented to room and call light. Rights and responsibilities provided to patient, and welcome packet provided to patient. 4 eyes skin assessment completed with Lauryn LUND.

## 2024-07-17 ENCOUNTER — TELEPHONE (OUTPATIENT)
Dept: ORTHOPEDIC SURGERY | Age: 64
End: 2024-07-17

## 2024-07-17 ENCOUNTER — HOSPITAL ENCOUNTER (OUTPATIENT)
Dept: PHYSICAL THERAPY | Age: 64
Setting detail: THERAPIES SERIES
Discharge: HOME | End: 2024-07-17
Payer: COMMERCIAL

## 2024-07-17 PROCEDURE — 97164 PT RE-EVAL EST PLAN CARE: CPT | Performed by: PHYSICAL THERAPIST

## 2024-07-17 NOTE — PLAN OF CARE
Syncope  [] Kidney Failure  [] Cancer  [] Pregnancy  [] Incontinence   Other Co-morbidities not listed:       OBJECTIVE EXAMINATION     ROM/Strength: (Blank cells denote NT)       Right  Left      PROM AROM MMT Notes PROM AROM MMT Notes               KNEE Flexion *** deg with PT overpressure *** deg   93 deg with heel slide 80 deg with pain      Extension 0 deg 0 deg    -8 deg  1/5 quad tone                  Special Tests:   NA    Specific Joint Mobility Testing/Accessory Motions:      NA    Flexibility:  Hamstrings (90/90): Decreased L  Gastroc: Decreased L    Observations:  Dermatomes: All WNL  Reflexes: Not tested  Palpation: Warmth noted  Posture: WNL  Bandages/Dressings/Incisions: Not Applicable  Edema: yes, R knee d/t post op    Gait:    Patient ambulating with RW, with antalgic gait pattern, decreased WBing on LLE.   Gait Deviations: antalgic pattern  decreased josephine  forward flexed posture  decreased stance time on left  Assistive Device Used: Rolling walker (RW)  Level of Assistance: Independent  Stairs - Patient unable to perform stairs when leading with L leg. Pt performs stairs with step to pattern leading with R leg ascending, and L leg down first with descending, utilizing bilateral handrails.   Sit to stand - Pt leans majority of body weight through RLE, avoiding     Balance:     NT    Functional Tests:  NT    Falls Risk Assessment (30 days):   History of Falls? No  Falls Risk assessed and no intervention required.  Time Up and Go (TUG):   Not Assessed       Exercises/Interventions     Observation: see above    Therapeutic Ex (71737)  Resistance Sets/time Reps Notes/Cues/Progressions   NuStep        Heel Slide with Gait Belt - L  5\" hold 10 x  Patient supine with HOB elevated   Gastroc Stretch with Belt - L  10\" hold  10 x     SLR from heel Prop - R/L       Bridges       Sit to Stand       Standing Hamstring Curl - R/L       Standing Hip Abuction - R/L       Standing Heel Raises       Mini Squat

## 2024-07-17 NOTE — TELEPHONE ENCOUNTER
Called patient for PO evaluation after Left TKA on 07/15/24 with Dr. Coffey.  Unable to reach patient, left voicemail.  Instructed patient to call for any questions or concerns.    Breana Dennis  Ortho Nurse Navigator  [phone]

## 2024-07-22 ENCOUNTER — HOSPITAL ENCOUNTER (OUTPATIENT)
Dept: PHYSICAL THERAPY | Age: 64
Setting detail: THERAPIES SERIES
Discharge: HOME | End: 2024-07-22
Payer: COMMERCIAL

## 2024-07-22 PROCEDURE — 97140 MANUAL THERAPY 1/> REGIONS: CPT | Performed by: PHYSICAL THERAPIST

## 2024-07-22 PROCEDURE — 97112 NEUROMUSCULAR REEDUCATION: CPT | Performed by: PHYSICAL THERAPIST

## 2024-07-22 PROCEDURE — 97110 THERAPEUTIC EXERCISES: CPT | Performed by: PHYSICAL THERAPIST

## 2024-07-22 NOTE — FLOWSHEET NOTE
[facility]- Outpatient [facility] and Therapy   [address], [place] [address]   office: [phone] fax: [phone]      Physical Therapy: TREATMENT/PROGRESS NOTE   Patient: Hakeem Newton (64 y.o. male)   Examination Date: 2024   :  1960 MRN: [id number]   Visit #: 3 / 24 through 10/11/2024   Insurance Allowable Auth Needed   30 per year  [x]Yes    []No   Approval dates: 2024 - 10/11/2024  Approved visits: 24V PT/96 UNITS  Approved codes: [id number]  Codes that DO NOT require auth:     Ins[facility]: [facility] / Plan: [employer] [place] MEDICA[place] / Product Type: *No Product type* /   Insurance ID: [id number] - (Medicaid Managed)  Secondary Insurance (if applicable):    Treatment Diagnosis:   1. Chronic pain of left knee  M25.562     G89.29          Medical Diagnosis:  Unilateral primary osteoarthritis, left knee [M17.12]   Referring Physician: Braden Villa MD  PCP: Lynn Berger APRN - CNP     Plan of care signed (Y/N): N    Date of Patient follow up with Physician:      Progress Report/POC: NO  POC update due: (10 visits /OR AUTH LIMITS, whichever is less)  2024  POC 10/9/2024                                            Precautions/ Contra-indications:           Latex allergy:  NO  Pacemaker:    NO  Contraindications for Manipulation: NA  Date of Surgery: NA  Other:    Red Flags:  None    C-SSRS Triggered by Intake questionnaire:   Patient answered 'NO' to both behavioral questions on intake.  No further screening warranted    Preferred Language for Healthcare:  English    SUBJECTIVE EXAMINATION     Patient stated complaint/comments: Patient c/o continued soreness, but reports that he is noticing improvements.        Test used Initial score  2024   Pain Summary VAS 10/10 with activity 10/10 with activity    Functional questionnaire LEFS 20/80 = 75% impairment 15/80 = 81% impairment

## 2024-07-25 ENCOUNTER — HOSPITAL ENCOUNTER (OUTPATIENT)
Dept: PHYSICAL THERAPY | Age: 64
Setting detail: THERAPIES SERIES
Discharge: HOME | End: 2024-07-25
Payer: COMMERCIAL

## 2024-07-25 PROCEDURE — 97112 NEUROMUSCULAR REEDUCATION: CPT | Performed by: PHYSICAL THERAPIST

## 2024-07-25 PROCEDURE — 97110 THERAPEUTIC EXERCISES: CPT | Performed by: PHYSICAL THERAPIST

## 2024-07-25 PROCEDURE — 97140 MANUAL THERAPY 1/> REGIONS: CPT | Performed by: PHYSICAL THERAPIST

## 2024-07-25 NOTE — FLOWSHEET NOTE
Justification:  (01166) THERAPEUTIC EXERCISE - Provided verbal/tactile cueing for activities related to strengthening, flexibility, endurance, ROM performed to prevent loss of range of motion, maintain or improve muscular strength or increase flexibility, following either an injury or surgery.   (65029) NEUROMUSCULAR RE-EDUCATION - Therapeutic procedure, 1 or more areas, each 15 minutes; neuromuscular reeducation of movement, balance, coordination, kinesthetic sense, posture, and/or proprioception for sitting and/or standing activities  (46224) MANUAL THERAPY -  Manual therapy techniques, 1 or more regions, each 15 minutes (Mobilization/manipulation, manual lymphatic drainage, manual traction) for the purpose of modulating pain, promoting relaxation,  increasing ROM, reducing/eliminating soft tissue swelling/inflammation/restriction, improving soft tissue extensibility and allowing for proper ROM for normal function with self care, mobility, lifting and ambulation      GOALS     Patient stated goal: \"Improve L leg strength\"  [] Progressing: [] Met: [] Not Met: [] Adjusted    Therapist goals for Patient:   Short Term Goals: To be achieved in: 2 weeks from Re-Eval Date  1. Independent in HEP and progression per patient tolerance, in order to prevent re-injury.   [] Progressing: [] Met: [] Not Met: [] Adjusted  2. Patient will have a decrease in pain to <2/10 to facilitate improvement in movement, function, and ADLs as indicated by Functional Deficits.  [] Progressing: [] Met: [] Not Met: [] Adjusted    Long Term Goals: To be achieved in: 12 weeks from Re-Eval Date  1. Disability index score of 25% or less for the LEFS to assist with reaching prior level of function with activities such as walking > 2 blocks.  [] Progressing: [] Met: [] Not Met: [] Adjusted  2. Patient will demonstrate increased L Knee AROM to 120 deg or better without pain to allow for proper joint functioning to enable patient to get into bath tub

## 2024-07-29 ENCOUNTER — HOSPITAL ENCOUNTER (OUTPATIENT)
Dept: PHYSICAL THERAPY | Age: 64
Setting detail: THERAPIES SERIES
Discharge: HOME | End: 2024-07-29
Payer: COMMERCIAL

## 2024-07-29 PROCEDURE — 97140 MANUAL THERAPY 1/> REGIONS: CPT | Performed by: PHYSICAL THERAPIST

## 2024-07-29 PROCEDURE — 97110 THERAPEUTIC EXERCISES: CPT | Performed by: PHYSICAL THERAPIST

## 2024-07-29 NOTE — FLOWSHEET NOTE
[facility]- Outpatient Rehabilitation and Therapy   [address], [place] [address]   office: [phone] fax: [phone]      Physical Therapy: TREATMENT/PROGRESS NOTE   Patient: Hakeem Newton (64 y.o. male)   Examination Date: 2024   :  1960 MRN: [id number]   Visit #:  through 10/11/2024   Insurance Allowable Auth Needed   30 per year  [x]Yes    []No   Approval dates: 2024 - 10/11/2024  Approved visits: 24V PT/96 UNITS  Approved codes: 89456 44948 52314 [id number] [id number]  Codes that DO NOT require auth:     Insurance: [facility] / Plan: [employer] [place] MEDICAID / Product Type: *No Product type* /   Insurance ID: [id number] - (Medicaid Managed)  Secondary Insurance (if applicable):    Treatment Diagnosis:   1. Chronic pain of left knee  M25.562     G89.29          Medical Diagnosis:  Unilateral primary osteoarthritis, left knee [M17.12]   Referring Physician: Braden Villa MD  PCP: Lynn Berger APRN - CNP     Plan of care signed (Y/N): N    Date of Patient follow up with Physician:      Progress Report/POC: NO  POC update due: (10 visits /OR AUTH LIMITS, whichever is less)  2024  POC 10/9/2024                                            Precautions/ Contra-indications:           Latex allergy:  NO  Pacemaker:    NO  Contraindications for Manipulation: NA  Date of Surgery: 7/15/2024 L TKR  Other:    Red Flags:  None    C-SSRS Triggered by Intake questionnaire:   Patient answered 'NO' to both behavioral questions on intake.  No further screening warranted    Preferred Language for Healthcare:  English    SUBJECTIVE EXAMINATION     Patient stated complaint/comments:  Pt reports improved strength and ROM each day.         Test used Initial score  2024   Pain Summary VAS 10/10 with activity 10/10 with activity 6/10 with movement   Functional questionnaire LEFS 20/80 = 75% impairment 1580 = 81% impairment

## 2024-08-01 ENCOUNTER — HOSPITAL ENCOUNTER (OUTPATIENT)
Dept: PHYSICAL THERAPY | Age: 64
Setting detail: THERAPIES SERIES
Discharge: HOME | End: 2024-08-01
Payer: COMMERCIAL

## 2024-08-01 PROCEDURE — 97110 THERAPEUTIC EXERCISES: CPT | Performed by: PHYSICAL THERAPIST

## 2024-08-01 PROCEDURE — 97140 MANUAL THERAPY 1/> REGIONS: CPT | Performed by: PHYSICAL THERAPIST

## 2024-08-01 NOTE — FLOWSHEET NOTE
flexibility, endurance, ROM performed to prevent loss of range of motion, maintain or improve muscular strength or increase flexibility, following either an injury or surgery.   (36246) NEUROMUSCULAR RE-EDUCATION - Therapeutic procedure, 1 or more areas, each 15 minutes; neuromuscular reeducation of movement, balance, coordination, kinesthetic sense, posture, and/or proprioception for sitting and/or standing activities  (85069) MANUAL THERAPY -  Manual therapy techniques, 1 or more regions, each 15 minutes (Mobilization/manipulation, manual lymphatic drainage, manual traction) for the purpose of modulating pain, promoting relaxation,  increasing ROM, reducing/eliminating soft tissue swelling/inflammation/restriction, improving soft tissue extensibility and allowing for proper ROM for normal function with self care, mobility, lifting and ambulation      GOALS     Patient stated goal: \"Improve L leg strength\"  [] Progressing: [] Met: [] Not Met: [] Adjusted    Therapist goals for Patient:   Short Term Goals: To be achieved in: 2 weeks from Re-Eval Date  1. Independent in HEP and progression per patient tolerance, in order to prevent re-injury.   [] Progressing: [x] Met: [] Not Met: [] Adjusted  2. Patient will have a decrease in pain to <2/10 to facilitate improvement in movement, function, and ADLs as indicated by Functional Deficits.  [x] Progressing: [] Met: [] Not Met: [] Adjusted    Long Term Goals: To be achieved in: 12 weeks from Re-Eval Date  1. Disability index score of 25% or less for the LEFS to assist with reaching prior level of function with activities such as walking > 2 blocks.  [] Progressing: [] Met: [] Not Met: [] Adjusted  2. Patient will demonstrate increased L Knee AROM to 120 deg or better without pain to allow for proper joint functioning to enable patient to get into bath tub without restriction.   [] Progressing: [] Met: [] Not Met: [] Adjusted  3. Patient will demonstrate increased Strength

## 2024-08-05 ENCOUNTER — HOSPITAL ENCOUNTER (OUTPATIENT)
Dept: PHYSICAL THERAPY | Age: 64
Setting detail: THERAPIES SERIES
Discharge: HOME | End: 2024-08-05
Payer: COMMERCIAL

## 2024-08-05 PROCEDURE — 97116 GAIT TRAINING THERAPY: CPT | Performed by: PHYSICAL THERAPIST

## 2024-08-05 PROCEDURE — 97110 THERAPEUTIC EXERCISES: CPT | Performed by: PHYSICAL THERAPIST

## 2024-08-05 NOTE — FLOWSHEET NOTE
[facility]- Outpatient Rehabilitation and Therapy   [address], [place] [address]   office: [phone] fax: [phone]      Physical Therapy: TREATMENT/PROGRESS NOTE   Patient: Hakeem Newton (64 y.o. male)   Examination Date: 2024   :  1960 MRN: [id number]   Visit #:  through 10/11/2024   Insurance Allowable Auth Needed   30 per year  [x]Yes    []No   Approval dates: 2024 - 10/11/2024  Approved visits: 24V PT/96 UNITS  Approved codes: 06109 75393 98050 [phone] [phone]  Codes that DO NOT require auth:     Ins[facility]: [facility] / Plan: [employer] [place] MEDICAID / Product Type: *No Product type* /   Insurance ID: [id number] - (Medicaid Managed)  Secondary Insurance (if applicable):    Treatment Diagnosis:   1. Chronic pain of left knee  M25.562     G89.29          Medical Diagnosis:  Unilateral primary osteoarthritis, left knee [M17.12]   Referring Physician: Braden Villa MD  PCP: Lynn Berger APRN - CNP     Plan of care signed (Y/N): Y    Date of Patient follow up with Physician:      Progress Report/POC: NO  POC update due: (10 visits /OR AUTH LIMITS, whichever is less)  2024  POC 10/9/2024                                            Precautions/ Contra-indications:           Latex allergy:  NO  Pacemaker:    NO  Contraindications for Manipulation: NA  Date of Surgery: 7/15/2024 L TKR  Other:    Red Flags:  None    C-SSRS Triggered by Intake questionnaire:   Patient answered 'NO' to both behavioral questions on intake.  No further screening warranted    Preferred Language for Healthcare:  English    SUBJECTIVE EXAMINATION     Patient stated complaint/comments:  Patient reports that he is feeling a bit better.        Test used Initial score  2024   Pain Summary VAS 10/10 with activity 10/10 with activity 5-6/10 with movement   Functional questionnaire LEFS 20/80 = 75% impairment 15/80 = 81% impairment

## 2024-08-06 ENCOUNTER — TELEPHONE (OUTPATIENT)
Dept: ORTHOPEDIC SURGERY | Age: 64
End: 2024-08-06

## 2024-08-08 ENCOUNTER — HOSPITAL ENCOUNTER (OUTPATIENT)
Dept: PHYSICAL THERAPY | Age: 64
Setting detail: THERAPIES SERIES
Discharge: HOME | End: 2024-08-08
Payer: COMMERCIAL

## 2024-08-08 PROCEDURE — 97110 THERAPEUTIC EXERCISES: CPT | Performed by: PHYSICAL THERAPIST

## 2024-08-08 PROCEDURE — 97116 GAIT TRAINING THERAPY: CPT | Performed by: PHYSICAL THERAPIST

## 2024-08-08 NOTE — FLOWSHEET NOTE
[facility]- Outpatient Rehabilitation and Therapy   [address], [place] [address]   office: [phone] fax: [phone]      Physical Therapy: TREATMENT/PROGRESS NOTE   Patient: Hakeem Newton (64 y.o. male)   Examination Date: 2024   :  1960 MRN: [id number]   Visit #:  through 10/11/2024   Insurance Allowable Auth Needed   30 per year  [x]Yes    []No   Approval dates: 2024 - 10/11/2024  Approved visits: 24V PT/96 UNITS  Approved codes: 56133 77790 29726 [phone] [phone]  Codes that DO NOT require auth:     Ins[facility]: [facility] / Plan: [employer] [place] MEDICAID / Product Type: *No Product type* /   Insurance ID: [id number] - (Medicaid Managed)  Secondary Insurance (if applicable):    Treatment Diagnosis:   1. Chronic pain of left knee  M25.562     G89.29          Medical Diagnosis:  Unilateral primary osteoarthritis, left knee [M17.12]   Referring Physician: Braden Villa MD  PCP: Lynn Berger APRN - CNP     Plan of care signed (Y/N): Y    Date of Patient follow up with Physician:      Progress Report/POC: NO  POC update due: (10 visits /OR AUTH LIMITS, whichever is less)  2024  POC 10/9/2024                                            Precautions/ Contra-indications:           Latex allergy:  NO  Pacemaker:    NO  Contraindications for Manipulation: NA  Date of Surgery: 7/15/2024 L TKR  Other:    Red Flags:  None    C-SSRS Triggered by Intake questionnaire:   Patient answered 'NO' to both behavioral questions on intake.  No further screening warranted    Preferred Language for Healthcare:  English    SUBJECTIVE EXAMINATION     Patient stated complaint/comments:  Patient c/o L lateral knee soreness.        Test used Initial score  2024   Pain Summary VAS 10/10 with activity 10/10 with activity 5-6/10 with movement   Functional questionnaire LEFS 20/80 = 75% impairment 15/80 = 81% impairment    Other:

## 2024-08-12 ENCOUNTER — HOSPITAL ENCOUNTER (OUTPATIENT)
Dept: PHYSICAL THERAPY | Age: 64
Setting detail: THERAPIES SERIES
Discharge: HOME | End: 2024-08-12
Payer: COMMERCIAL

## 2024-08-12 PROCEDURE — 97116 GAIT TRAINING THERAPY: CPT | Performed by: PHYSICAL THERAPIST

## 2024-08-12 PROCEDURE — 97110 THERAPEUTIC EXERCISES: CPT | Performed by: PHYSICAL THERAPIST

## 2024-08-12 NOTE — FLOWSHEET NOTE
ROM, and reduce/eliminate soft tissue swelling/inflammation/restriction. Next visit plan to continue current phase     Electronically Signed by Alexys Freeman PT, DPT  [id number]      Date: 08/12/2024     Note: Portions of this note have been templated and/or copied from initial evaluation, reassessments and prior notes for documentation efficiency.    Note: If patient does not return for scheduled/recommended follow up visits, this note will serve as a discharge from care along with the most recent update on progress.

## 2024-08-15 ENCOUNTER — HOSPITAL ENCOUNTER (OUTPATIENT)
Dept: PHYSICAL THERAPY | Age: 64
Setting detail: THERAPIES SERIES
Discharge: HOME | End: 2024-08-15
Payer: COMMERCIAL

## 2024-08-15 PROCEDURE — 97116 GAIT TRAINING THERAPY: CPT | Performed by: PHYSICAL THERAPIST

## 2024-08-15 PROCEDURE — 97110 THERAPEUTIC EXERCISES: CPT | Performed by: PHYSICAL THERAPIST

## 2024-08-15 NOTE — PLAN OF CARE
[facility]- Outpatient Rehabilitation and Therapy   [address], [place] [address]   office: [phone] fax: [phone]    Physical Therapy: TREATMENT/PROGRESS NOTE   Patient: Hakeem Newton (64 y.o. male)   Examination Date: 08/15/2024   :  1960 MRN: [id number]   Visit #: 10 / 24 through 10/11/2024   Insurance Allowable Auth Needed   30 per year  [x]Yes    []No   Approval dates: 2024 - 10/11/2024  Approved visits: 24V PT/96 UNITS  Approved codes: 53542 42387 68794 63167 [phone] [id number]  Codes that DO NOT require auth:     Ins[facility]: [facility] / Plan: [employer] [place] MEDICAID / Product Type: *No Product type* /   Insurance ID: [id number] - (Medicaid Managed)  Secondary Insurance (if applicable):    Treatment Diagnosis:   1. Chronic pain of left knee  M25.562     G89.29          Medical Diagnosis:  Unilateral primary osteoarthritis, left knee [M17.12]   Referring Physician: Braden Villa MD  PCP: Lynn Berger APRN - CNP     Plan of care signed (Y/N): Y    Date of Patient follow up with Physician:      Progress Report/POC: YES, Date Range for this report: 2024 to 8/15/2024  POC update due: (10 visits /OR AUTH LIMITS, whichever is less) 2024  POC 10/9/2024                                            Precautions/ Contra-indications:           Latex allergy:  NO  Pacemaker:    NO  Contraindications for Manipulation: NA  Date of Surgery: 7/15/2024 L TKR  Other:    Red Flags:  None    C-SSRS Triggered by Intake questionnaire:   Patient answered 'NO' to both behavioral questions on intake.  No further screening warranted    Preferred Language for Healthcare:  English    SUBJECTIVE EXAMINATION     Patient stated complaint/comments:  Patient states that his pain is less than last week.        Test used Initial score  5/30/24 2024 08/15/2024   Pain Summary VAS 10/10 with activity 10/10 with activity 5/10 L knee pain   Functional questionnaire

## 2024-08-19 ENCOUNTER — HOSPITAL ENCOUNTER (OUTPATIENT)
Dept: PHYSICAL THERAPY | Age: 64
Setting detail: THERAPIES SERIES
Discharge: HOME | End: 2024-08-19
Payer: COMMERCIAL

## 2024-08-19 PROCEDURE — 97110 THERAPEUTIC EXERCISES: CPT | Performed by: PHYSICAL THERAPIST

## 2024-08-19 NOTE — FLOWSHEET NOTE
[facility]- Outpatient Rehabilitation and Therapy   [address], [place] [address]   office: [phone] fax: [phone]    Physical Therapy: TREATMENT/PROGRESS NOTE   Patient: Hakeem Newton (64 y.o. male)   Examination Date: 2024   :  1960 MRN: [id number]   Visit #:  through 10/11/2024   Insurance Allowable Auth Needed   30 per year  [x]Yes    []No   Approval dates: 2024 - 10/11/2024  Approved visits: 24V PT/96 UNITS  Approved codes: 43104 66958 20658 67096 [phone] [id number]  Codes that DO NOT require auth:     Ins[facility]: [facility] / Plan: [employer] [place] MEDICAID / Product Type: *No Product type* /   Insurance ID: [id number] - (Medicaid Managed)  Secondary Insurance (if applicable):    Treatment Diagnosis:   1. Chronic pain of left knee  M25.562     G89.29          Medical Diagnosis:  Unilateral primary osteoarthritis, left knee [M17.12]   Referring Physician: Braden Villa MD  PCP: Lynn Berger APRN - CNP     Plan of care signed (Y/N): Y    Date of Patient follow up with Physician:      Progress Report/POC: NO  POC update due: (10 visits /OR AUTH LIMITS, whichever is less) 2024  POC 10/9/2024                                            Precautions/ Contra-indications:           Latex allergy:  NO  Pacemaker:    NO  Contraindications for Manipulation: NA  Date of Surgery: 7/15/2024 L TKR  Other:    Red Flags:  None    C-SSRS Triggered by Intake questionnaire:   Patient answered 'NO' to both behavioral questions on intake.  No further screening warranted    Preferred Language for Healthcare:  English    SUBJECTIVE EXAMINATION     Patient stated complaint/comments:  Patient states he has slight quad soreness in LLE.        Test used Initial score  5/30/24 2024 8/15/2024 2024   Pain Summary VAS 10/10 with activity 10/10 with activity 5/10 L knee pain 4-5/10   Functional questionnaire LEFS  = 75% impairment 15/80 = 81%

## 2024-08-22 ENCOUNTER — HOSPITAL ENCOUNTER (OUTPATIENT)
Dept: PHYSICAL THERAPY | Age: 64
Setting detail: THERAPIES SERIES
Discharge: HOME | End: 2024-08-22
Payer: COMMERCIAL

## 2024-08-22 PROCEDURE — 97110 THERAPEUTIC EXERCISES: CPT | Performed by: PHYSICAL THERAPIST

## 2024-08-22 NOTE — FLOWSHEET NOTE
towards functional goals listed.    [] Progression is slowed due to complexities/Impairments listed.  [] Progression has been slowed due to co-morbidities.  [] Plan just implemented, too soon (<30days) to assess goals progression   [] Goals require adjustment due to lack of progress  [] Patient is not progressing as expected and requires additional follow up with physician  [] Other:     TREATMENT PLAN     Frequency/Duration: 2x/week for  12  weeks (through 10/9/2024) for the following treatment interventions:    Interventions:  Therapeutic Exercise (82068) including: strength training, ROM, and functional mobility  Therapeutic Activities (56447) including: functional mobility training and education.  Neuromuscular Re-education (86620) activation and proprioception, including postural re-education.    Gait Training (60370) for normalization of ambulation patterns and AD training.   Manual Therapy (88888) as indicated to include: Passive Range of Motion, Gr I-IV mobilizations, and Soft Tissue Mobilization  Modalities as needed that may include: Cryotherapy, Electrical Stimulation, and Thermal Agents  Patient education on joint protection, postural re-education, activity modification, and progression of HEP    Plan: Cont POC- Continue emphasis/focus on exercise progression, improving proper muscle recruitment and activation/motor control patterns, increasing ROM, and reduce/eliminate soft tissue swelling/inflammation/restriction. Next visit plan to continue current phase     Electronically Signed by Alexys Freeman PT, DPT  [id number]      Date: 08/22/2024     Note: Portions of this note have been templated and/or copied from initial evaluation, reassessments and prior notes for documentation efficiency.    Note: If patient does not return for scheduled/recommended follow up visits, this note will serve as a discharge from care along with the most recent update on progress.

## 2024-08-26 ENCOUNTER — HOSPITAL ENCOUNTER (OUTPATIENT)
Dept: PHYSICAL THERAPY | Age: 64
Setting detail: THERAPIES SERIES
Discharge: HOME | End: 2024-08-26
Payer: COMMERCIAL

## 2024-08-26 PROCEDURE — 97110 THERAPEUTIC EXERCISES: CPT | Performed by: PHYSICAL THERAPIST

## 2024-08-26 NOTE — FLOWSHEET NOTE
[facility]- Outpatient Rehabilitation and Therapy   [address], [place] [address]   office: [phone] fax: [phone]    Physical Therapy: TREATMENT/PROGRESS NOTE   Patient: Hakeem Newton (64 y.o. male)   Examination Date: 2024   :  1960 MRN: [id number]   Visit #:  through 10/11/2024   Insurance Allowable Auth Needed   30 per year  [x]Yes    []No   Approval dates: 2024 - 10/11/2024  Approved visits: 24V PT/96 UNITS  Approved codes: 96151 50362 34358 [phone] [phone]  Codes that DO NOT require auth:     Insurance: [facility] / Plan: [employer] [place] MEDICAID / Product Type: *No Product type* /   Insurance ID: [id number] - (Medicaid Managed)  Secondary Insurance (if applicable):    Treatment Diagnosis:   1. Chronic pain of left knee  M25.562     G89.29       Medical Diagnosis:  Unilateral primary osteoarthritis, left knee [M17.12]   Referring Physician: Braden Villa MD  PCP: Lynn Berger APRN - CNP     Plan of care signed (Y/N): Y    Date of Patient follow up with Physician:      Progress Report/POC: NO  POC update due: (10 visits /OR AUTH LIMITS, whichever is less) 2024  POC 10/9/2024                                            Precautions/ Contra-indications:           Latex allergy:  NO  Pacemaker:    NO  Contraindications for Manipulation: NA  Date of Surgery: 7/15/2024 L TKR  Other:    Red Flags:  None    C-SSRS Triggered by Intake questionnaire:   Patient answered 'NO' to both behavioral questions on intake.  No further screening warranted    Preferred Language for Healthcare:  English    SUBJECTIVE EXAMINATION     Patient stated complaint/comments:  Patient c/o mild soreness in L quad.       Test used Initial score  5/30/24 2024 8/15/2024 2024   Pain Summary VAS 10/10 with activity 10/10 with activity 5/10 L knee pain 4-5/10   Functional questionnaire LEFS 20/80 = 75% impairment 15/80 = 81% impairment 48/80  re-education.    Gait Training (71509) for normalization of ambulation patterns and AD training.   Manual Therapy (54764) as indicated to include: Passive Range of Motion, Gr I-IV mobilizations, and Soft Tissue Mobilization  Modalities as needed that may include: Cryotherapy, Electrical Stimulation, and Thermal Agents  Patient education on joint protection, postural re-education, activity modification, and progression of HEP    Plan: Cont POC- Continue emphasis/focus on exercise progression, improving proper muscle recruitment and activation/motor control patterns, increasing ROM, and reduce/eliminate soft tissue swelling/inflammation/restriction. Next visit plan to continue current phase     Electronically Signed by Alexys Freeman PT, DPT  [id number]      Date: 08/26/2024     Note: Portions of this note have been templated and/or copied from initial evaluation, reassessments and prior notes for documentation efficiency.    Note: If patient does not return for scheduled/recommended follow up visits, this note will serve as a discharge from care along with the most recent update on progress.

## 2024-08-29 ENCOUNTER — HOSPITAL ENCOUNTER (OUTPATIENT)
Dept: PHYSICAL THERAPY | Age: 64
Setting detail: THERAPIES SERIES
Discharge: HOME | End: 2024-08-29
Payer: COMMERCIAL

## 2024-08-29 PROCEDURE — 97110 THERAPEUTIC EXERCISES: CPT | Performed by: PHYSICAL THERAPIST

## 2024-08-29 PROCEDURE — 97116 GAIT TRAINING THERAPY: CPT | Performed by: PHYSICAL THERAPIST

## 2024-08-29 NOTE — FLOWSHEET NOTE
[facility]- Outpatient Rehabilitation and Therapy   [address], [place] [address]   office: [phone] fax: [phone]    Physical Therapy: TREATMENT/PROGRESS NOTE   Patient: Hakeem Newton (64 y.o. male)   Examination Date: 2024   :  1960 MRN: [id number]   Visit #:  through 10/11/2024   Insurance Allowable Auth Needed   30 per year  [x]Yes    []No   Approval dates: 2024 - 10/11/2024  Approved visits: 24V PT/96 UNITS  Approved codes: 89061 63054 49170 55999 [phone]  Codes that DO NOT require auth:     Insurance: [facility] / Plan: [employer] [place] MEDICAID / Product Type: *No Product type* /   Insurance ID: [id number] - (Medicaid Managed)  Secondary Insurance (if applicable):    Treatment Diagnosis:   1. Chronic pain of left knee  M25.562     G89.29       Medical Diagnosis:  Unilateral primary osteoarthritis, left knee [M17.12]   Referring Physician: Braden Villa MD  PCP: Lynn Berger APRN - CNP     Plan of care signed (Y/N): Y    Date of Patient follow up with Physician:      Progress Report/POC: NO  POC update due: (10 visits /OR AUTH LIMITS, whichever is less) 2024  POC 10/9/2024                                            Precautions/ Contra-indications:           Latex allergy:  NO  Pacemaker:    NO  Contraindications for Manipulation: NA  Date of Surgery: 7/15/2024 L TKR  Other:    Red Flags:  None    C-SSRS Triggered by Intake questionnaire:   Patient answered 'NO' to both behavioral questions on intake.  No further screening warranted    Preferred Language for Healthcare:  English    SUBJECTIVE EXAMINATION     Patient stated complaint/comments:  Patient reports continued L quad soreness, but states that it loosens up after moving.        Test used Initial score  5/30/24 2024 8/15/2024 2024   Pain Summary VAS 10/10 with activity 10/10 with activity 5/10 L knee pain 3-4/10   Functional questionnaire LEFS

## 2024-09-04 ENCOUNTER — HOSPITAL ENCOUNTER (OUTPATIENT)
Dept: PHYSICAL THERAPY | Age: 64
Setting detail: THERAPIES SERIES
Discharge: HOME | End: 2024-09-04
Payer: COMMERCIAL

## 2024-09-04 PROCEDURE — 97110 THERAPEUTIC EXERCISES: CPT | Performed by: PHYSICAL THERAPIST

## 2024-09-04 NOTE — FLOWSHEET NOTE
[facility]- Outpatient Rehabilitation and Therapy   [address], [place] [address]   office: [phone] fax: [phone]    Physical Therapy: TREATMENT/PROGRESS NOTE   Patient: Hakeem Newton (64 y.o. male)   Examination Date: 2024   :  1960 MRN: [id number]   Visit #: 15 / 24 through 10/11/2024   Insurance Allowable Auth Needed   30 per year  [x]Yes    []No   Approval dates: 2024 - 10/11/2024  Approved visits: 24V PT/96 UNITS  Approved codes: 10706 42038 89572 58566 [phone] [id number]  Codes that DO NOT require auth:     Insurance: [facility] / Plan: [employer] [place] MEDICAID / Product Type: *No Product type* /   Insurance ID: [id number] - (Medicaid Managed)  Secondary Insurance (if applicable):    Treatment Diagnosis:   1. Chronic pain of left knee  M25.562     G89.29       Medical Diagnosis:  Unilateral primary osteoarthritis, left knee [M17.12]   Referring Physician: Braden Villa MD  PCP: Lynn Berger APRN - CNP     Plan of care signed (Y/N): Y    Date of Patient follow up with Physician:      Progress Report/POC: NO  POC update due: (10 visits /OR AUTH LIMITS, whichever is less) 2024  POC 10/9/2024                                            Precautions/ Contra-indications:           Latex allergy:  NO  Pacemaker:    NO  Contraindications for Manipulation: NA  Date of Surgery: 7/15/2024 L TKR  Other:    Red Flags:  None    C-SSRS Triggered by Intake questionnaire:   Patient answered 'NO' to both behavioral questions on intake.  No further screening warranted    Preferred Language for Healthcare:  English    SUBJECTIVE EXAMINATION     Patient stated complaint/comments:  Patient reports continued L quad soreness, but states that it loosens up after moving.        Test used Initial score  5/30/24 2024 8/15/2024 2024   Pain Summary VAS 10/10 with activity 10/10 with activity 5/10 L knee pain 3-4/10   Functional questionnaire LEFS

## 2024-09-11 ENCOUNTER — HOSPITAL ENCOUNTER (OUTPATIENT)
Dept: PHYSICAL THERAPY | Age: 64
Setting detail: THERAPIES SERIES
Discharge: HOME | End: 2024-09-11
Payer: COMMERCIAL

## 2024-09-11 PROCEDURE — 97110 THERAPEUTIC EXERCISES: CPT | Performed by: PHYSICAL THERAPIST

## 2024-09-13 ENCOUNTER — HOSPITAL ENCOUNTER (OUTPATIENT)
Dept: PHYSICAL THERAPY | Age: 64
Setting detail: THERAPIES SERIES
Discharge: HOME | End: 2024-09-13
Payer: COMMERCIAL

## 2024-09-13 PROCEDURE — 97110 THERAPEUTIC EXERCISES: CPT | Performed by: PHYSICAL THERAPIST

## 2024-09-18 ENCOUNTER — HOSPITAL ENCOUNTER (OUTPATIENT)
Dept: PHYSICAL THERAPY | Age: 64
Setting detail: THERAPIES SERIES
Discharge: HOME | End: 2024-09-18
Payer: COMMERCIAL

## 2024-09-18 PROCEDURE — 97110 THERAPEUTIC EXERCISES: CPT | Performed by: PHYSICAL THERAPIST

## 2024-09-25 ENCOUNTER — HOSPITAL ENCOUNTER (OUTPATIENT)
Dept: PHYSICAL THERAPY | Age: 64
Setting detail: THERAPIES SERIES
Discharge: HOME | End: 2024-09-25
Payer: COMMERCIAL

## 2024-09-25 PROCEDURE — 97110 THERAPEUTIC EXERCISES: CPT | Performed by: PHYSICAL THERAPIST

## 2024-10-02 ENCOUNTER — HOSPITAL ENCOUNTER (OUTPATIENT)
Dept: PHYSICAL THERAPY | Age: 64
Setting detail: THERAPIES SERIES
Discharge: HOME | End: 2024-10-02
Payer: COMMERCIAL

## 2024-10-02 PROCEDURE — 97110 THERAPEUTIC EXERCISES: CPT | Performed by: PHYSICAL THERAPIST

## 2024-10-02 NOTE — FLOWSHEET NOTE
address and improve muscle strength, endurance, normalization of gait, balance and proprioception, functional mobility, and ADL status to safely return to OF without symptoms or restrictions.    Medical Necessity Documentation:  I certify that this patient meets the below criteria necessary for medical necessity for care and/or justification of therapy services:  The patient has a complexity identified by an ICD-10 code that has a direct and significant impact on the need for therapy.  (Significantly impacts the rate of recovery and is associated with a primary condition.)   The patient has associated co-morbidities along with primary diagnosis which significantly impact the rate of recovery and contribute to complexities that require skilled therapeutic intervention    Prognosis for POC: [x] Good [] Fair  [] Poor    Patient requires continued skilled intervention: [x] Yes  [] No    CHARGE CAPTURE     PT CHARGE GRID   Approved codes: 33307 00986 08113 [id number]  Codes that DO NOT require auth:    CPT Code (TIMED) minutes # CPT Code (UNTIMED) #     Therex (71035)  49 min 3  EVAL:LOW (07458 - Typically 20 minutes face-to-face)     Neuromusc. Re-ed (91924)    Re-Eval (75358)     Manual (60643)    Estim Unattended (52991)     Ther. Act (12696)    Mech. Traction (61938)     Gait (11167)    Dry Needle 1-2 muscle (76421)     Aquatic Therex (89949)    Dry Needle 3+ muscle (20561)     Iontophoresis (55966)    VASO (23609)     Ultrasound (12620)    Group Therapy (16104)     Estim Attended (90111)    Canalith Repositioning (89869)     Other:    Other:    Total Timed Code Tx Minutes 49 min 3       Total Treatment Minutes 49 min        Charge Justification:  (67654) THERAPEUTIC EXERCISE - Provided verbal/tactile cueing for activities related to strengthening, flexibility, endurance, ROM performed to prevent loss of range of motion, maintain or improve muscular strength or increase flexibility, following either an

## 2024-10-09 ENCOUNTER — APPOINTMENT (OUTPATIENT)
Dept: PHYSICAL THERAPY | Age: 64
End: 2024-10-09
Payer: COMMERCIAL
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
## 2025-01-08 ENCOUNTER — HOSPITAL ENCOUNTER (OUTPATIENT)
Dept: GENERAL RADIOLOGY | Age: 65
Discharge: HOME | End: 2025-01-08
Payer: COMMERCIAL

## 2025-01-08 DIAGNOSIS — M25.551 RIGHT HIP PAIN: ICD-10-CM

## 2025-01-08 PROCEDURE — 73502 X-RAY EXAM HIP UNI 2-3 VIEWS: CPT
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
## 2025-03-11 ENCOUNTER — APPOINTMENT (OUTPATIENT)
Dept: URBAN - METROPOLITAN AREA CLINIC 170 | Facility: CLINIC | Age: 65
Setting detail: DERMATOLOGY
End: 2025-03-11

## 2025-03-11 DIAGNOSIS — L82.1 OTHER SEBORRHEIC KERATOSIS: ICD-10-CM | Status: STABLE

## 2025-03-11 DIAGNOSIS — L91.8 OTHER HYPERTROPHIC DISORDERS OF THE SKIN: ICD-10-CM | Status: STABLE

## 2025-03-11 DIAGNOSIS — L81.4 OTHER MELANIN HYPERPIGMENTATION: ICD-10-CM | Status: STABLE

## 2025-03-11 PROCEDURE — ? SUNSCREEN RECOMMENDATIONS

## 2025-03-11 PROCEDURE — ? FULL BODY SKIN EXAM - DECLINED

## 2025-03-11 PROCEDURE — 99203 OFFICE O/P NEW LOW 30 MIN: CPT

## 2025-03-11 PROCEDURE — ? COUNSELING

## 2025-03-11 PROCEDURE — ? EDUCATIONAL RESOURCES PROVIDED

## 2025-03-11 ASSESSMENT — LOCATION SIMPLE DESCRIPTION DERM
LOCATION SIMPLE: LEFT FOREHEAD
LOCATION SIMPLE: LEFT ANTERIOR AXILLA
LOCATION SIMPLE: RIGHT UPPER BACK
LOCATION SIMPLE: RIGHT POSTERIOR AXILLA

## 2025-03-11 ASSESSMENT — LOCATION DETAILED DESCRIPTION DERM
LOCATION DETAILED: RIGHT MID-UPPER BACK
LOCATION DETAILED: LEFT MEDIAL FOREHEAD
LOCATION DETAILED: LEFT ANTERIOR AXILLA
LOCATION DETAILED: RIGHT POSTERIOR AXILLA

## 2025-03-11 ASSESSMENT — LOCATION ZONE DERM
LOCATION ZONE: AXILLAE
LOCATION ZONE: TRUNK
LOCATION ZONE: FACE

## 2025-03-11 NOTE — PROCEDURE: SUNSCREEN RECOMMENDATIONS
Detail Level: Zone
General Sunscreen Counseling: I recommended a broad spectrum sunscreen with a SPF of 50 or higher.  Sunscreens should be applied at least 15 minutes prior to expected sun exposure and then every 2 hours after that as long as sun exposure continues. If swimming or exercising sunscreen should be reapplied every 45 minutes to an hour after getting wet or sweating.  One ounce, or the equivalent of a shot glass full of sunscreen, is adequate to protect the skin not covered by a bathing suit. I also recommended a lip balm with a sunscreen as well. Sun protective clothing can be used in lieu of sunscreen but must be worn the entire time you are exposed to the sun's rays.

## 2025-03-11 NOTE — HPI: SKIN LESIONS
How Severe Is Your Skin Lesion?: mild
Is This A New Presentation, Or A Follow-Up?: Growths
Which Family Member (Optional)?: Mom
Additional History: Mole on the back his pcp feels it should e evaluated, skin tags under the left arm

## 2025-05-07 NOTE — FLOWSHEET NOTE
Purposeful Rounding    Patient Location: Day room    Patient willing to engage in conversation: Yes    Presentation/behavior: Anxious, Agitated, Disorganized and Imulsive    Affect: Inappropriate/Incongruent and Angry    Concerns reported: none    PRN medications given: none    Environmental assessment: Room free from clutter, Clear path to bathroom , Adequate lighting and No safety hazards noted    Fall prevention interventions in place: Lighting appropriate, Room free of clutter and Clear path to bathroom    Daily Sherman Fall Risk Score: 69    Daily Lux Fall Risk Score: 15      Electronically signed by Ole Dolan RN on 10/21/21 at 11:28 AM EDT done
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
## (undated) DEVICE — PIN HOLDING HDLSS 3.2X75 MM TROCAR ZUK

## (undated) DEVICE — PIN FIX STERILE L80MM DIA3.2MM FLUT CAS

## (undated) DEVICE — BLADE,CARBON-STEEL,11,STRL,DISPOSABLE,TB: Brand: MEDLINE

## (undated) DEVICE — GLOVE SURG SZ 75 L12IN FNGR THK79MIL GRN LTX FREE

## (undated) DEVICE — SUTURE VICRYL + SZ 1 L18IN ABSRB UD L36MM CT-1 1/2 CIR VCP841D

## (undated) DEVICE — SCREW BNE HD 3.5X48 MM HEX PERSONA (NOT IMPLANTED)

## (undated) DEVICE — KIT APPL 11:1 PROC W/ FIBRIJET MED CUP APPL TIP TY

## (undated) DEVICE — 60 ML SYRINGE,CATHETER TIP: Brand: MONOJECT

## (undated) DEVICE — COUNTER NDL 40 COUNT HLD 70 NUM FOAM BLK SGL MAG W BLDE REMV

## (undated) DEVICE — PAD,ABDOMINAL,5"X9",ST,LF,25/BX: Brand: MEDLINE INDUSTRIES, INC.

## (undated) DEVICE — SUTURE VICRYL + SZ 0 L18IN ABSRB UD L36MM CT-1 1/2 CIR VCP840D

## (undated) DEVICE — APPLICATOR MEDICATED 26 CC SOLUTION HI LT ORNG CHLORAPREP

## (undated) DEVICE — BOOT POS LEG DEMAYO

## (undated) DEVICE — GOWN,SIRUS,POLYRNF,BRTHSLV,XL,30/CS: Brand: MEDLINE

## (undated) DEVICE — INSTRUMENT KIT ORTHOPEDIC KNEE NAVITRACK

## (undated) DEVICE — CUFF RESTRN WRST OR ANK 45FT AD FOAM

## (undated) DEVICE — NEPTUNE E-SEP SMOKE EVACUATION PENCIL, COATED, 70MM BLADE, PUSH BUTTON SWITCH: Brand: NEPTUNE E-SEP

## (undated) DEVICE — KNEE HOLDER DISPOSABLE LINER: Brand: ALVARADO®  KNEE SUPPORT

## (undated) DEVICE — SCREW BNE L25MM DIA2.5MM KNEE FULL THRD HEX FEM PERSONA (NOT IMPLANTED)

## (undated) DEVICE — TOTAL KNEE: Brand: MEDLINE INDUSTRIES, INC.

## (undated) DEVICE — SST BUR, WIRE PASS DRILL, 2 FLUTES, MED., 2MM DIA.: Brand: MICROAIRE®

## (undated) DEVICE — BOWL AND CEMENT CARTRIDGE WITH BREAKAWAY FEMORAL NOZZLE AND MEDIUM PRESSURIZER: Brand: ACM

## (undated) DEVICE — GLOVE ORANGE PI 7   MSG9070

## (undated) DEVICE — TRAP FLUID

## (undated) DEVICE — GOWN,SIRUS,POLYRNF,XLN/3XL,18/CS: Brand: MEDLINE

## (undated) DEVICE — SUTURE VICRYL SZ 2-0 L18IN ABSRB UD CT-1 L36MM 1/2 CIR J839D

## (undated) DEVICE — MARKER,SKIN,WI/RULER AND LABELS: Brand: MEDLINE

## (undated) DEVICE — TOWEL,STOP FLAG GOLD N-W: Brand: MEDLINE

## (undated) DEVICE — DRESSING THERABOND 3D ANTIMIC CNTCT SYS 15INCHX10INCH

## (undated) DEVICE — TIP APPL TOP 2 SPRY

## (undated) DEVICE — SUTURE ABSORBABLE MONOFILAMENT 1 CTX 36 CM 48 MM VIO PDS +

## (undated) DEVICE — PIN FIX STERILE L150MM DIA3.2MM FLUT

## (undated) DEVICE — SOLUTION IV SALINE IRRIGATION 250ML STERILE

## (undated) DEVICE — BLADE SURG SAW STD S STL OSC W/ SERR EDGE DISP

## (undated) DEVICE — BLADE SURG SAW S STL NAR OSC W/ SERR EDGE DISP

## (undated) DEVICE — ELECTRODE PT RET AD L9FT HI MOIST COND ADH HYDRGEL CORDED

## (undated) DEVICE — 2.0MM WIRE PASS DRILL BIT

## (undated) DEVICE — 3M™ TEGADERM™ TRANSPARENT FILM DRESSING FRAME STYLE, 1627, 4 IN X 10 IN (10 CM X 25 CM), 20/CT 4CT/CASE: Brand: 3M™ TEGADERM™

## (undated) DEVICE — GLOVE ORANGE PI 8   MSG9080

## (undated) DEVICE — ADHESIVE SKIN CLOSURE WND 8.661X1.5 IN 22 CM LIQUIBAND SECUR

## (undated) DEVICE — HDLESS TRC DRILL PIN 75MM BX4

## (undated) DEVICE — UNDERGLOVE SURG SZ 8 BLU LTX FREE SYN POLYISOPRENE POLYMER

## (undated) DEVICE — STERILE PVP: Brand: MEDLINE INDUSTRIES, INC.

## (undated) DEVICE — 2.0MM WIRE PASS DRILL BIT MEDIUM

## (undated) DEVICE — SOLUTION IRRIG 1000ML STRL H2O USP PLAS POUR BTL

## (undated) DEVICE — DUAL CUT SAGITTAL BLADE

## (undated) DEVICE — SUTURE MONOCRYL + SZ 4-0 L27IN ABSRB UD L19MM PS-2 3/8 CIR MCP426H

## (undated) DEVICE — TOWEL,OR,DSP,ST,BLUE,DLX,8/PK,10PK/CS: Brand: MEDLINE